# Patient Record
Sex: MALE | Race: WHITE | NOT HISPANIC OR LATINO | Employment: OTHER | URBAN - METROPOLITAN AREA
[De-identification: names, ages, dates, MRNs, and addresses within clinical notes are randomized per-mention and may not be internally consistent; named-entity substitution may affect disease eponyms.]

---

## 2019-06-10 ENCOUNTER — CONVERSION ENCOUNTER (OUTPATIENT)
Dept: SURGERY | Facility: CLINIC | Age: 69
End: 2019-06-10

## 2019-06-10 ENCOUNTER — OFFICE VISIT CONVERTED (OUTPATIENT)
Dept: SURGERY | Facility: CLINIC | Age: 69
End: 2019-06-10
Attending: SURGERY

## 2019-06-17 ENCOUNTER — HOSPITAL ENCOUNTER (OUTPATIENT)
Dept: GASTROENTEROLOGY | Facility: HOSPITAL | Age: 69
Setting detail: HOSPITAL OUTPATIENT SURGERY
Discharge: HOME | End: 2019-06-17
Attending: SURGERY

## 2019-07-08 ENCOUNTER — OFFICE VISIT CONVERTED (OUTPATIENT)
Dept: SURGERY | Facility: CLINIC | Age: 69
End: 2019-07-08
Attending: SURGERY

## 2019-09-20 ENCOUNTER — OFFICE VISIT CONVERTED (OUTPATIENT)
Dept: FAMILY MEDICINE CLINIC | Facility: CLINIC | Age: 69
End: 2019-09-20
Attending: FAMILY MEDICINE

## 2020-01-02 ENCOUNTER — OFFICE VISIT CONVERTED (OUTPATIENT)
Dept: FAMILY MEDICINE CLINIC | Facility: CLINIC | Age: 70
End: 2020-01-02
Attending: FAMILY MEDICINE

## 2020-01-02 ENCOUNTER — CONVERSION ENCOUNTER (OUTPATIENT)
Dept: FAMILY MEDICINE CLINIC | Facility: CLINIC | Age: 70
End: 2020-01-02

## 2020-01-22 ENCOUNTER — OFFICE VISIT CONVERTED (OUTPATIENT)
Dept: FAMILY MEDICINE CLINIC | Facility: CLINIC | Age: 70
End: 2020-01-22
Attending: FAMILY MEDICINE

## 2020-05-27 ENCOUNTER — OFFICE VISIT CONVERTED (OUTPATIENT)
Dept: FAMILY MEDICINE CLINIC | Facility: CLINIC | Age: 70
End: 2020-05-27
Attending: FAMILY MEDICINE

## 2020-05-29 ENCOUNTER — HOSPITAL ENCOUNTER (OUTPATIENT)
Dept: GENERAL RADIOLOGY | Facility: HOSPITAL | Age: 70
Discharge: HOME | End: 2020-05-29
Attending: FAMILY MEDICINE

## 2020-05-29 LAB
ALBUMIN SERPL-MCNC: 4.3 G/DL (ref 3.5–5)
ALBUMIN/GLOB SERPL: 1.4 {RATIO} (ref 1.4–2.6)
ALP SERPL-CCNC: 80 U/L (ref 56–155)
ALT SERPL-CCNC: 19 U/L (ref 10–40)
ANION GAP SERPL CALC-SCNC: 20 MMOL/L (ref 8–19)
AST SERPL-CCNC: 22 U/L (ref 15–50)
BASOPHILS # BLD AUTO: 0.05 10*3/UL (ref 0–0.2)
BASOPHILS NFR BLD AUTO: 0.5 % (ref 0–3)
BILIRUB SERPL-MCNC: 0.27 MG/DL (ref 0.2–1.3)
BUN SERPL-MCNC: 12 MG/DL (ref 5–25)
BUN/CREAT SERPL: 15 {RATIO} (ref 6–20)
CALCIUM SERPL-MCNC: 9.8 MG/DL (ref 8.7–10.4)
CHLORIDE SERPL-SCNC: 102 MMOL/L (ref 99–111)
CHOLEST SERPL-MCNC: 231 MG/DL (ref 107–200)
CHOLEST/HDLC SERPL: 4.9 {RATIO} (ref 3–6)
CONV ABS IMM GRAN: 0.04 10*3/UL (ref 0–0.2)
CONV CO2: 21 MMOL/L (ref 22–32)
CONV IMMATURE GRAN: 0.4 % (ref 0–1.8)
CONV TOTAL PROTEIN: 7.3 G/DL (ref 6.3–8.2)
CREAT UR-MCNC: 0.81 MG/DL (ref 0.7–1.2)
DEPRECATED RDW RBC AUTO: 44.9 FL (ref 35.1–43.9)
EOSINOPHIL # BLD AUTO: 0.32 10*3/UL (ref 0–0.7)
EOSINOPHIL # BLD AUTO: 3.3 % (ref 0–7)
ERYTHROCYTE [DISTWIDTH] IN BLOOD BY AUTOMATED COUNT: 13.3 % (ref 11.6–14.4)
GFR SERPLBLD BASED ON 1.73 SQ M-ARVRAT: >60 ML/MIN/{1.73_M2}
GLOBULIN UR ELPH-MCNC: 3 G/DL (ref 2–3.5)
GLUCOSE SERPL-MCNC: 119 MG/DL (ref 70–99)
HCT VFR BLD AUTO: 52.7 % (ref 42–52)
HDLC SERPL-MCNC: 47 MG/DL (ref 40–60)
HGB BLD-MCNC: 17.7 G/DL (ref 14–18)
LDLC SERPL CALC-MCNC: 167 MG/DL (ref 70–100)
LYMPHOCYTES # BLD AUTO: 2.77 10*3/UL (ref 1–5)
LYMPHOCYTES NFR BLD AUTO: 29 % (ref 20–45)
MCH RBC QN AUTO: 30.8 PG (ref 27–31)
MCHC RBC AUTO-ENTMCNC: 33.6 G/DL (ref 33–37)
MCV RBC AUTO: 91.8 FL (ref 80–96)
MONOCYTES # BLD AUTO: 0.71 10*3/UL (ref 0.2–1.2)
MONOCYTES NFR BLD AUTO: 7.4 % (ref 3–10)
NEUTROPHILS # BLD AUTO: 5.67 10*3/UL (ref 2–8)
NEUTROPHILS NFR BLD AUTO: 59.4 % (ref 30–85)
NRBC CBCN: 0 % (ref 0–0.7)
OSMOLALITY SERPL CALC.SUM OF ELEC: 289 MOSM/KG (ref 273–304)
PLATELET # BLD AUTO: 211 10*3/UL (ref 130–400)
PMV BLD AUTO: 11.8 FL (ref 9.4–12.4)
POTASSIUM SERPL-SCNC: 4.4 MMOL/L (ref 3.5–5.3)
PSA SERPL-MCNC: 8.16 NG/ML (ref 0–4)
RBC # BLD AUTO: 5.74 10*6/UL (ref 4.7–6.1)
SODIUM SERPL-SCNC: 139 MMOL/L (ref 135–147)
T4 FREE SERPL-MCNC: 1.1 NG/DL (ref 0.9–1.8)
TRIGL SERPL-MCNC: 84 MG/DL (ref 40–150)
TSH SERPL-ACNC: 2.55 M[IU]/L (ref 0.27–4.2)
VLDLC SERPL-MCNC: 17 MG/DL (ref 5–37)
WBC # BLD AUTO: 9.56 10*3/UL (ref 4.8–10.8)

## 2020-06-03 LAB
B MICROTI DNA BLD QL NAA+PROBE: NOT DETECTED
CONV ANAPLASMA PHAGOCYTOPHILUM PCR: NOT DETECTED
CONV BABESIA SPECIES PCR: NOT DETECTED
CONV EHRLICHIA EWINGII CANIS PCR: NOT DETECTED
CONV EHRLICHIA MURIS LIKE PCR: NOT DETECTED
E CHAFFEENSIS DNA BLD QL NAA+PROBE: NOT DETECTED

## 2020-06-08 ENCOUNTER — OFFICE VISIT CONVERTED (OUTPATIENT)
Dept: UROLOGY | Facility: CLINIC | Age: 70
End: 2020-06-08
Attending: NURSE PRACTITIONER

## 2020-06-29 ENCOUNTER — OFFICE VISIT CONVERTED (OUTPATIENT)
Dept: UROLOGY | Facility: CLINIC | Age: 70
End: 2020-06-29
Attending: UROLOGY

## 2020-07-06 ENCOUNTER — HOSPITAL ENCOUNTER (OUTPATIENT)
Dept: PREADMISSION TESTING | Facility: HOSPITAL | Age: 70
Discharge: HOME | End: 2020-07-06
Attending: UROLOGY

## 2020-07-07 LAB — SARS-COV-2 RNA SPEC QL NAA+PROBE: NOT DETECTED

## 2020-07-08 ENCOUNTER — HOSPITAL ENCOUNTER (OUTPATIENT)
Dept: PERIOP | Facility: HOSPITAL | Age: 70
Setting detail: HOSPITAL OUTPATIENT SURGERY
Discharge: HOME | End: 2020-07-08
Attending: UROLOGY

## 2020-07-08 LAB
ALBUMIN SERPL-MCNC: 4 G/DL (ref 3.5–5)
ALBUMIN/GLOB SERPL: 1.4 {RATIO} (ref 1.4–2.6)
ALP SERPL-CCNC: 85 U/L (ref 56–155)
ALT SERPL-CCNC: 14 U/L (ref 10–40)
ANION GAP SERPL CALC-SCNC: 15 MMOL/L (ref 8–19)
AST SERPL-CCNC: 15 U/L (ref 15–50)
BASOPHILS # BLD AUTO: 0.05 10*3/UL (ref 0–0.2)
BASOPHILS NFR BLD AUTO: 0.5 % (ref 0–3)
BILIRUB SERPL-MCNC: 0.18 MG/DL (ref 0.2–1.3)
BUN SERPL-MCNC: 17 MG/DL (ref 5–25)
BUN/CREAT SERPL: 21 {RATIO} (ref 6–20)
CALCIUM SERPL-MCNC: 9.5 MG/DL (ref 8.7–10.4)
CHLORIDE SERPL-SCNC: 104 MMOL/L (ref 99–111)
CONV ABS IMM GRAN: 0.06 10*3/UL (ref 0–0.2)
CONV CO2: 25 MMOL/L (ref 22–32)
CONV IMMATURE GRAN: 0.7 % (ref 0–1.8)
CONV TOTAL PROTEIN: 6.9 G/DL (ref 6.3–8.2)
CREAT UR-MCNC: 0.81 MG/DL (ref 0.7–1.2)
DEPRECATED RDW RBC AUTO: 44.4 FL (ref 35.1–43.9)
EOSINOPHIL # BLD AUTO: 0.39 10*3/UL (ref 0–0.7)
EOSINOPHIL # BLD AUTO: 4.2 % (ref 0–7)
ERYTHROCYTE [DISTWIDTH] IN BLOOD BY AUTOMATED COUNT: 13.2 % (ref 11.6–14.4)
GFR SERPLBLD BASED ON 1.73 SQ M-ARVRAT: >60 ML/MIN/{1.73_M2}
GLOBULIN UR ELPH-MCNC: 2.9 G/DL (ref 2–3.5)
GLUCOSE SERPL-MCNC: 110 MG/DL (ref 70–99)
HCT VFR BLD AUTO: 52 % (ref 42–52)
HGB BLD-MCNC: 17.6 G/DL (ref 14–18)
LYMPHOCYTES # BLD AUTO: 2.5 10*3/UL (ref 1–5)
LYMPHOCYTES NFR BLD AUTO: 27.1 % (ref 20–45)
MCH RBC QN AUTO: 30.9 PG (ref 27–31)
MCHC RBC AUTO-ENTMCNC: 33.8 G/DL (ref 33–37)
MCV RBC AUTO: 91.4 FL (ref 80–96)
MONOCYTES # BLD AUTO: 0.77 10*3/UL (ref 0.2–1.2)
MONOCYTES NFR BLD AUTO: 8.3 % (ref 3–10)
NEUTROPHILS # BLD AUTO: 5.46 10*3/UL (ref 2–8)
NEUTROPHILS NFR BLD AUTO: 59.2 % (ref 30–85)
NRBC CBCN: 0 % (ref 0–0.7)
OSMOLALITY SERPL CALC.SUM OF ELEC: 290 MOSM/KG (ref 273–304)
PLATELET # BLD AUTO: 187 10*3/UL (ref 130–400)
PMV BLD AUTO: 10.8 FL (ref 9.4–12.4)
POTASSIUM SERPL-SCNC: 4.5 MMOL/L (ref 3.5–5.3)
RBC # BLD AUTO: 5.69 10*6/UL (ref 4.7–6.1)
SODIUM SERPL-SCNC: 139 MMOL/L (ref 135–147)
WBC # BLD AUTO: 9.23 10*3/UL (ref 4.8–10.8)

## 2020-07-16 ENCOUNTER — OFFICE VISIT CONVERTED (OUTPATIENT)
Dept: UROLOGY | Facility: CLINIC | Age: 70
End: 2020-07-16
Attending: UROLOGY

## 2020-07-20 ENCOUNTER — OFFICE VISIT CONVERTED (OUTPATIENT)
Dept: UROLOGY | Facility: CLINIC | Age: 70
End: 2020-07-20
Attending: UROLOGY

## 2020-08-10 ENCOUNTER — HOSPITAL ENCOUNTER (OUTPATIENT)
Dept: PREADMISSION TESTING | Facility: HOSPITAL | Age: 70
Discharge: HOME | End: 2020-08-10
Attending: UROLOGY

## 2020-08-10 LAB
ANION GAP SERPL CALC-SCNC: 22 MMOL/L (ref 8–19)
APTT BLD: 26.3 S (ref 22.2–34.2)
BASOPHILS # BLD AUTO: 0.05 10*3/UL (ref 0–0.2)
BASOPHILS NFR BLD AUTO: 0.6 % (ref 0–3)
BUN SERPL-MCNC: 12 MG/DL (ref 5–25)
BUN/CREAT SERPL: 13 {RATIO} (ref 6–20)
CALCIUM SERPL-MCNC: 10.2 MG/DL (ref 8.7–10.4)
CHLORIDE SERPL-SCNC: 101 MMOL/L (ref 99–111)
CONV ABS IMM GRAN: 0.05 10*3/UL (ref 0–0.2)
CONV CO2: 21 MMOL/L (ref 22–32)
CONV IMMATURE GRAN: 0.6 % (ref 0–1.8)
CREAT UR-MCNC: 0.89 MG/DL (ref 0.7–1.2)
DEPRECATED RDW RBC AUTO: 43.8 FL (ref 35.1–43.9)
EOSINOPHIL # BLD AUTO: 0.16 10*3/UL (ref 0–0.7)
EOSINOPHIL # BLD AUTO: 1.8 % (ref 0–7)
ERYTHROCYTE [DISTWIDTH] IN BLOOD BY AUTOMATED COUNT: 13.2 % (ref 11.6–14.4)
GFR SERPLBLD BASED ON 1.73 SQ M-ARVRAT: >60 ML/MIN/{1.73_M2}
GLUCOSE SERPL-MCNC: 89 MG/DL (ref 70–99)
HCT VFR BLD AUTO: 52.9 % (ref 42–52)
HGB BLD-MCNC: 18.2 G/DL (ref 14–18)
INR PPP: 0.93 (ref 2–3)
LYMPHOCYTES # BLD AUTO: 2.6 10*3/UL (ref 1–5)
LYMPHOCYTES NFR BLD AUTO: 28.8 % (ref 20–45)
MCH RBC QN AUTO: 31 PG (ref 27–31)
MCHC RBC AUTO-ENTMCNC: 34.4 G/DL (ref 33–37)
MCV RBC AUTO: 90.1 FL (ref 80–96)
MONOCYTES # BLD AUTO: 0.66 10*3/UL (ref 0.2–1.2)
MONOCYTES NFR BLD AUTO: 7.3 % (ref 3–10)
NEUTROPHILS # BLD AUTO: 5.52 10*3/UL (ref 2–8)
NEUTROPHILS NFR BLD AUTO: 60.9 % (ref 30–85)
NRBC CBCN: 0 % (ref 0–0.7)
OSMOLALITY SERPL CALC.SUM OF ELEC: 289 MOSM/KG (ref 273–304)
PLATELET # BLD AUTO: 180 10*3/UL (ref 130–400)
PMV BLD AUTO: 10.8 FL (ref 9.4–12.4)
POTASSIUM SERPL-SCNC: 4.2 MMOL/L (ref 3.5–5.3)
PROTHROMBIN TIME: 10.2 S (ref 9.4–12)
RBC # BLD AUTO: 5.87 10*6/UL (ref 4.7–6.1)
SODIUM SERPL-SCNC: 140 MMOL/L (ref 135–147)
WBC # BLD AUTO: 9.04 10*3/UL (ref 4.8–10.8)

## 2020-08-12 LAB — BACTERIA UR CULT: NORMAL

## 2020-08-17 ENCOUNTER — HOSPITAL ENCOUNTER (OUTPATIENT)
Dept: PREADMISSION TESTING | Facility: HOSPITAL | Age: 70
Discharge: HOME | End: 2020-08-17
Attending: UROLOGY

## 2020-08-18 LAB — SARS-COV-2 RNA SPEC QL NAA+PROBE: NOT DETECTED

## 2020-08-20 ENCOUNTER — HOSPITAL ENCOUNTER (OUTPATIENT)
Dept: PERIOP | Facility: HOSPITAL | Age: 70
Setting detail: HOSPITAL OUTPATIENT SURGERY
Discharge: HOME | End: 2020-08-21
Attending: UROLOGY

## 2020-08-20 LAB
ABO GROUP BLD: NORMAL
BLD GP AB SCN SERPL QL: NORMAL
CONV ABD CONTROL: NORMAL
RH BLD: NORMAL

## 2020-08-21 LAB
ANION GAP SERPL CALC-SCNC: 14 MMOL/L (ref 8–19)
BASOPHILS # BLD AUTO: 0.02 10*3/UL (ref 0–0.2)
BASOPHILS NFR BLD AUTO: 0.2 % (ref 0–3)
BUN SERPL-MCNC: 12 MG/DL (ref 5–25)
BUN/CREAT SERPL: 13 {RATIO} (ref 6–20)
CALCIUM SERPL-MCNC: 8.8 MG/DL (ref 8.7–10.4)
CHLORIDE SERPL-SCNC: 102 MMOL/L (ref 99–111)
CONV ABS IMM GRAN: 0.05 10*3/UL (ref 0–0.2)
CONV CO2: 24 MMOL/L (ref 22–32)
CONV IMMATURE GRAN: 0.4 % (ref 0–1.8)
CREAT UR-MCNC: 0.89 MG/DL (ref 0.7–1.2)
DEPRECATED RDW RBC AUTO: 45.7 FL (ref 35.1–43.9)
EOSINOPHIL # BLD AUTO: 0.01 10*3/UL (ref 0–0.7)
EOSINOPHIL # BLD AUTO: 0.1 % (ref 0–7)
ERYTHROCYTE [DISTWIDTH] IN BLOOD BY AUTOMATED COUNT: 13.4 % (ref 11.6–14.4)
GFR SERPLBLD BASED ON 1.73 SQ M-ARVRAT: >60 ML/MIN/{1.73_M2}
GLUCOSE SERPL-MCNC: 108 MG/DL (ref 70–99)
HCT VFR BLD AUTO: 43.7 % (ref 42–52)
HGB BLD-MCNC: 14.7 G/DL (ref 14–18)
LYMPHOCYTES # BLD AUTO: 1.79 10*3/UL (ref 1–5)
LYMPHOCYTES NFR BLD AUTO: 15.3 % (ref 20–45)
MCH RBC QN AUTO: 30.8 PG (ref 27–31)
MCHC RBC AUTO-ENTMCNC: 33.6 G/DL (ref 33–37)
MCV RBC AUTO: 91.6 FL (ref 80–96)
MONOCYTES # BLD AUTO: 1.04 10*3/UL (ref 0.2–1.2)
MONOCYTES NFR BLD AUTO: 8.9 % (ref 3–10)
NEUTROPHILS # BLD AUTO: 8.79 10*3/UL (ref 2–8)
NEUTROPHILS NFR BLD AUTO: 75.1 % (ref 30–85)
NRBC CBCN: 0 % (ref 0–0.7)
OSMOLALITY SERPL CALC.SUM OF ELEC: 282 MOSM/KG (ref 273–304)
PLATELET # BLD AUTO: 163 10*3/UL (ref 130–400)
PMV BLD AUTO: 10.3 FL (ref 9.4–12.4)
POTASSIUM SERPL-SCNC: 4.1 MMOL/L (ref 3.5–5.3)
RBC # BLD AUTO: 4.77 10*6/UL (ref 4.7–6.1)
SODIUM SERPL-SCNC: 136 MMOL/L (ref 135–147)
WBC # BLD AUTO: 11.7 10*3/UL (ref 4.8–10.8)

## 2020-08-28 ENCOUNTER — OFFICE VISIT CONVERTED (OUTPATIENT)
Dept: UROLOGY | Facility: CLINIC | Age: 70
End: 2020-08-28
Attending: UROLOGY

## 2020-10-28 ENCOUNTER — HOSPITAL ENCOUNTER (OUTPATIENT)
Dept: GENERAL RADIOLOGY | Facility: HOSPITAL | Age: 70
Discharge: HOME | End: 2020-10-28
Attending: UROLOGY

## 2020-10-28 LAB — PSA SERPL-MCNC: <0.01 NG/ML (ref 0–4)

## 2020-11-09 ENCOUNTER — TELEPHONE CONVERTED (OUTPATIENT)
Dept: UROLOGY | Facility: CLINIC | Age: 70
End: 2020-11-09
Attending: UROLOGY

## 2021-03-08 ENCOUNTER — HOSPITAL ENCOUNTER (OUTPATIENT)
Dept: GENERAL RADIOLOGY | Facility: HOSPITAL | Age: 71
Discharge: HOME | End: 2021-03-08
Attending: UROLOGY

## 2021-03-08 LAB — PSA SERPL-MCNC: <0.01 NG/ML (ref 0–4)

## 2021-03-15 ENCOUNTER — TELEPHONE CONVERTED (OUTPATIENT)
Dept: UROLOGY | Facility: CLINIC | Age: 71
End: 2021-03-15
Attending: UROLOGY

## 2021-03-19 ENCOUNTER — OFFICE VISIT CONVERTED (OUTPATIENT)
Dept: FAMILY MEDICINE CLINIC | Facility: CLINIC | Age: 71
End: 2021-03-19
Attending: FAMILY MEDICINE

## 2021-03-19 ENCOUNTER — HOSPITAL ENCOUNTER (OUTPATIENT)
Dept: GENERAL RADIOLOGY | Facility: HOSPITAL | Age: 71
Discharge: HOME | End: 2021-03-19
Attending: FAMILY MEDICINE

## 2021-03-19 LAB
ALBUMIN SERPL-MCNC: 4.4 G/DL (ref 3.5–5)
ALBUMIN/GLOB SERPL: 1.3 {RATIO} (ref 1.4–2.6)
ALP SERPL-CCNC: 81 U/L (ref 56–155)
ALT SERPL-CCNC: 18 U/L (ref 10–40)
ANION GAP SERPL CALC-SCNC: 19 MMOL/L (ref 8–19)
AST SERPL-CCNC: 21 U/L (ref 15–50)
BASOPHILS # BLD AUTO: 0.06 10*3/UL (ref 0–0.2)
BASOPHILS NFR BLD AUTO: 0.8 % (ref 0–3)
BILIRUB SERPL-MCNC: 0.32 MG/DL (ref 0.2–1.3)
BUN SERPL-MCNC: 11 MG/DL (ref 5–25)
BUN/CREAT SERPL: 14 {RATIO} (ref 6–20)
CALCIUM SERPL-MCNC: 10.1 MG/DL (ref 8.7–10.4)
CHLORIDE SERPL-SCNC: 101 MMOL/L (ref 99–111)
CHOLEST SERPL-MCNC: 218 MG/DL (ref 107–200)
CHOLEST/HDLC SERPL: 4 {RATIO} (ref 3–6)
CONV ABS IMM GRAN: 0.02 10*3/UL (ref 0–0.2)
CONV CO2: 25 MMOL/L (ref 22–32)
CONV IMMATURE GRAN: 0.3 % (ref 0–1.8)
CONV TOTAL PROTEIN: 7.7 G/DL (ref 6.3–8.2)
CREAT UR-MCNC: 0.79 MG/DL (ref 0.7–1.2)
DEPRECATED RDW RBC AUTO: 44.4 FL (ref 35.1–43.9)
EOSINOPHIL # BLD AUTO: 0.09 10*3/UL (ref 0–0.7)
EOSINOPHIL # BLD AUTO: 1.2 % (ref 0–7)
ERYTHROCYTE [DISTWIDTH] IN BLOOD BY AUTOMATED COUNT: 14 % (ref 11.6–14.4)
GFR SERPLBLD BASED ON 1.73 SQ M-ARVRAT: >60 ML/MIN/{1.73_M2}
GLOBULIN UR ELPH-MCNC: 3.3 G/DL (ref 2–3.5)
GLUCOSE SERPL-MCNC: 106 MG/DL (ref 70–99)
HCT VFR BLD AUTO: 54.7 % (ref 42–52)
HDLC SERPL-MCNC: 55 MG/DL (ref 40–60)
HGB BLD-MCNC: 18.5 G/DL (ref 14–18)
LDLC SERPL CALC-MCNC: 150 MG/DL (ref 70–100)
LYMPHOCYTES # BLD AUTO: 1.98 10*3/UL (ref 1–5)
LYMPHOCYTES NFR BLD AUTO: 27.2 % (ref 20–45)
MCH RBC QN AUTO: 30.3 PG (ref 27–31)
MCHC RBC AUTO-ENTMCNC: 33.8 G/DL (ref 33–37)
MCV RBC AUTO: 89.5 FL (ref 80–96)
MONOCYTES # BLD AUTO: 0.5 10*3/UL (ref 0.2–1.2)
MONOCYTES NFR BLD AUTO: 6.9 % (ref 3–10)
NEUTROPHILS # BLD AUTO: 4.63 10*3/UL (ref 2–8)
NEUTROPHILS NFR BLD AUTO: 63.6 % (ref 30–85)
NRBC CBCN: 0 % (ref 0–0.7)
OSMOLALITY SERPL CALC.SUM OF ELEC: 290 MOSM/KG (ref 273–304)
PLATELET # BLD AUTO: 207 10*3/UL (ref 130–400)
PMV BLD AUTO: 11.3 FL (ref 9.4–12.4)
POTASSIUM SERPL-SCNC: 4.8 MMOL/L (ref 3.5–5.3)
RBC # BLD AUTO: 6.11 10*6/UL (ref 4.7–6.1)
SODIUM SERPL-SCNC: 140 MMOL/L (ref 135–147)
TRIGL SERPL-MCNC: 64 MG/DL (ref 40–150)
TSH SERPL-ACNC: 1.28 M[IU]/L (ref 0.27–4.2)
VLDLC SERPL-MCNC: 13 MG/DL (ref 5–37)
WBC # BLD AUTO: 7.28 10*3/UL (ref 4.8–10.8)

## 2021-03-24 ENCOUNTER — OFFICE VISIT CONVERTED (OUTPATIENT)
Dept: FAMILY MEDICINE CLINIC | Facility: CLINIC | Age: 71
End: 2021-03-24
Attending: NURSE PRACTITIONER

## 2021-03-24 ENCOUNTER — CONVERSION ENCOUNTER (OUTPATIENT)
Dept: FAMILY MEDICINE CLINIC | Facility: CLINIC | Age: 71
End: 2021-03-24

## 2021-05-10 NOTE — H&P
History and Physical      Patient Name: Colin Nevarez   Patient ID: [id number]   Sex: Male   YOB: 1950    Primary Care Provider: Liz Roper DO   Referring Provider: Liz Roper DO    Visit Date: June 8, 2020    Provider: CHEN Mauro   Location: [facility]   Location Address: [address] [place] [address], [address] [place] [address]  [id number]   Location Phone: [phone]          Chief Complaint  · Elevated PSA      History Of Present Illness  The patient is a 70 year old /White male, who presents on referral from Liz Roper DO, for an urological evaluation for an elevated PSA. The PSA was 8.16 on 5/29/2020 . Patient was seen in the past per Dr Clifton 1-12-17 and noted not issue with patient's age at that time. Noted psa of 4.5 which was collected per DR. Garner 7-9-15. There was not a psa documented as being drawn at visit it Dr Clifton so unsure what was his psa in 2017. Patient unsure of father history, passed from CVA at age 40. Patient does have extensive history of cancer in his family. Sister and Mother had Melanoma. Denies any voiding complaints. No dysuria or gross hematuria. States no problems with voiding or flow and sleeps through the night.      AUA 4/35       Past Medical History  Arthritis; Chronic Obstructive Pulmonary Disease; Forgetfulness; Heart flutter, ventricular; High cholesterol; Ringing in ear; Shortness Of Air; Shortness of breath         Past Surgical History  Appendectomy; Colonoscopy; Tonsilectomy         Medication List  aspirin 81 mg oral tablet,delayed release (DR/EC); Men's Multivitamin 400- mcg oral tablet         Allergy List  NO KNOWN DRUG ALLERGIES         Family Medical History  Stroke; Cancer, Unspecified; Family history of breast cancer         Social History  Alcohol (Never); Caffeine (Never); Second hand smoke exposure (Current some day); Tobacco (Current every day)         Immunizations  Name Date Admin   Influenza    Influenza   "  [id number]    Prevnar 13          Review of Systems  · Constitutional  o Denies  o : fever, headache, chills  · Eyes  o Denies  o : eye pain, double vision, blurred vision  · HENT  o Denies  o : sinus problems, sore throat, ear infection  · Cardiovascular  o Denies  o : chest pain, high blood pressure, varicosities  · Respiratory  o Admits  o : shortness of breath, wheezing, frequent cough  · Gastrointestinal  o Denies  o : nausea, vomiting, heartburn, indigestion, abdominal pain  · Genitourinary  o Admits  o : urgency, decreased stream  o Denies  o : frequency, nocturia, hematuria, urinary retention, painful urination  · Integument  o Denies  o : rash, itching, boils  · Neurologic  o Denies  o : tingling or numbness, tremors, dizzy spells  · Musculoskeletal  o Admits  o : joint pain  o Denies  o : neck pain, back pain  · Endocrine  o Denies  o : cold intolerance, heat intolerance, tired, excessive thirst, sluggish  · Psychiatric  o Admits  o : feels satisfied with life  o Denies  o : severe depression, concerns with hurting themselves  · Heme-Lymph  o Denies  o : swollen glands, blood clotting problems  · Allergic-Immunologic  o Denies  o : sinus allergy symptoms, hay fever      Vitals  Date Time BP Position Site L\R Cuff Size HR RR TEMP (F) WT  HT  BMI kg/m2 BSA m2 O2 Sat        06/08/2020 01:46 /94 Sitting    96 - R  98 160lbs 0oz 5'  10\" 22.96 1.89           Physical Examination  · Constitutional  o Appearance  o : Well nourished, well developed patient in no acute distress. Ambulating without difficulty.  · Respiratory  o Respiratory Effort  o : Breathing is unlabored without accessory muscle use  o Inspection of Chest  o : normal appearance, no retractions  o Auscultation of Lungs  o : diminished with faint rhonchi right lower lobe  · Cardiovascular  o Heart  o :   § Auscultation of Heart  § : regular rate and rhythm, no murmurs, gallops or rubs  · Gastrointestinal  o Abdominal Examination  o : " Scaphoid abdomen which is non-tender to palpation with normal tone and without rigidity or guarding. Normal bowel sounds. No masses present.  o Liver and spleen  o : No hepatomegaly present. Liver is non-tender to palpation and spleen is not palpable.  o Hernias  o : small left inguinal hernia  · Genitourinary  o Bladder  o : no abnormalities  o Penis  o : Normal appearance without lesion, discharge or masses.  o Urethral Meatus  o : no abnormalities  o Scrotum and Scrotal Contents  o :   § Scrotum  § : no abnormalities  § Epididymides  § : no abnormalities except left spermatocele  § Testes  § : no abnormalities  o Digital Rectal Examination  o :   § Prostate  § : enlarged prostate present approximately 30g firmness right base region  · Lymphatic  o Groin  o : No lymphadenopathy present  · Skin and Subcutaneous Tissue  o General Inspection  o : No rashes, lesions or areas of discoloration present. Skin turgor is normal.  o General Palpation  o : No abnormalities, masses or tenderness on palpation.  · Neurologic  o Mental Status Examination  o : grossly oriented to person, place and time  o Gait and Station  o : normal gait, able to stand without difficulty  · Psychiatric  o Mood and Affect  o : mood normal, affect appropriate      Figure 1.0: Pain Rating Scale-[place]         Results  · In-Office Procedures  o Lab procedure  § Automated dipstick urinalysis with microscopy (82749)   § Color Ur: Yellow   § Clarity Ur: Clear   § Glucose Ur Ql Strip: Negative   § Bilirub Ur Ql Strip: Negative   § Ketones Ur Ql Strip: Negative   § Sp Gr Ur Qn: 1.005   § Hgb Ur Ql Strip: Negative   § pH Ur-LsCnc: 6.0   § Prot Ur Ql Strip: Negative   § Urobilinogen Ur Strip-mCnc: 0.2 E.U./dL   § Nitrite Ur Ql Strip: Negative   § WBC Est Ur Ql Strip: Negative   § RBC UrnS Qn HPF: 0   § WBC UrnS Qn HPF: 0   § Bacteria UrnS Qn HPF: 0   § Crystals UrnS Qn HPF: 0   § Epithelial Cells (non renal): 0 /HPF  § Epithelial Cells (renal): 0        Assessment  · Chronic Obstructive Pulmonary Disease     491.21  · Elevated PSA     790.93/R97.20  · Enlarged prostate on rectal examination     600.00/N40.0  · Prostate nodule     600.10/N40.2  · Hypertension     401.9/I10  · Inguinal hernia, left     550.90/K40.90  · Spermatocele of epididymis     608.1/N43.40      Plan  · Orders  o MRI prostate wo then w contrast (83405) - 790.93/R97.20, 600.00/N40.0, 600.10/N40.2 - 06/08/2020  o BUN (92184) - 790.93/R97.20, 600.10/N40.2 - 06/08/2020  o Creatinine blood (37796) - 790.93/R97.20, 600.10/N40.2 - 06/08/2020  · Medications  o Medications have been Reconciled  o Transition of Care or Provider Policy     patient has elevated psa since 2015 according to available records. will schedule mri prostate to evaluate then possible us and bx of prostate. Denies any metal or implanted devices. follow up after mri with Dr Giles when returns from medical and discuss    very small left inguinal hernia and left spermatocele treat conservatively since not problematic or painful             Electronically Signed by: CHEN Mauro -Author on June 8, 2020 02:56:30 PM

## 2021-05-13 NOTE — PROGRESS NOTES
"   Progress Note      Patient Name: Colin Nevarez   Patient ID: [id number]   Sex: Male   YOB: 1950    Primary Care Provider: Liz Roper DO   Referring Provider: Liz Roper DO    Visit Date: 2020    Provider: Florin Rodriguez MD   Location: [facility]   Location Address: [address] [place] [address]  [id number]   Location Phone: [phone]          Chief Complaint  · pt here for urologic issues      History Of Present Illness     61    Prostate CA    2020 MRI fusion prostate biopsy  Region of interest3+3, multiple cores, 22%  Right base3+3, 2/2, 17%  Right apex3+3, 2/2, 77%  Right mid3+4, 2/2, 46%  Left base, left apex, left midnegative  Region of interest #23 +3, 2/2, 26%     MRI [id number] g17 x 4 mm, PIRADS 5.  Right lateral peripheral zone at the apex to mid gland    8.16\">70-year-old gentleman recently diagnosed with prostatic adenocarcinoma Dr. Giles clinical T2, pt with ruptured open appy many yrs ago.    Good stream.  No trouble with initiation or intermittency of stream. Nocturia X  0.  No urgency or frequency.  No incontinence.  No prostate meds    Not worried about erections.    no gross hematuria, dysuria or recurrent urinary tract infections.  No history of kidney or bladder stone.    Patient had a ruptured appendix in .  Still has a large midline incision.    No urologic family history    No CAD, No COPD.  smokes 1 ppd.  ASA 82  Mom dies of CA at 50.  Dad  at 60 of CVA.      0.81, GFR >60    Prostate CA    2020 MRI fusion prostate biopsy  Region of interest3+3, multiple cores, 22%  Right base3+3, 2/2, 17%  Right apex3+3, 2/2, 77%  Right mid3+4, 2/2, 46%  Left base, left apex, left midnegative  Region of interest #23 +3, 2/2, 26%     MRI [id number] g17 x 4 mm, PIRADS 5.  Right lateral peripheral zone at the apex to mid gland    8.16       Past Medical History  Arthritis; Chronic Obstructive Pulmonary Disease; Forgetfulness; Heart " "flutter, ventricular; High cholesterol; Ringing in ear; Shortness Of Air; Shortness of breath         Past Surgical History  Appendectomy; Colonoscopy; Tonsilectomy         Medication List  aspirin 81 mg oral tablet,delayed release (DR/EC); Men's Multivitamin 400- mcg oral tablet         Allergy List  NO KNOWN DRUG ALLERGIES         Family Medical History  Stroke; Cancer, Unspecified; Family history of breast cancer         Social History  Alcohol (Never); Caffeine (Never); Second hand smoke exposure (Current some day); Tobacco (Current every day)         Immunizations  Name Date Admin   Influenza    Influenza    [id number]    Prevnar 13          Review of Systems  · Constitutional  o Denies  o : chills, fever  · Gastrointestinal  o Denies  o : nausea, vomiting      Vitals  Date Time BP Position Site L\R Cuff Size HR RR TEMP (F) WT  HT  BMI kg/m2 BSA m2 O2 Sat        07/20/2020 09:23 AM       17  159lbs 0oz 5'  10\" 22.81 1.89           Physical Examination  · Constitutional  o Appearance  o : Well-appearing, well-developed, in no acute distress  · Respiratory  o Respiratory Effort  o : Unlabored breathing  o Auscultation of Lungs  o : Unlabored breathing, clear to auscultation bilaterally  · Cardiovascular  o Heart  o :   § Auscultation of Heart  § : Regular rate and rhythm, no murmurs  · Gastrointestinal  o Abdominal Examination  o : Nontender, nondistended, no rigidity or guarding, no hepatosplenomegaly  · Neurologic  o Mental Status Examination  o :   § Orientation  § : Grossly oriented to person, place and time, judgment and insight intact, normal mood and affect       6 cm midline incision going down from below the umbilicus to the pelvis.               Assessment  · Prostate cancer     185/C61    Problems Reconciled  Plan  · Medications  o Medications have been Reconciled  o Transition of Care or Provider Policy  · Instructions  o Electronically Identified Patient Education Materials Provided " Electronically       Patient with ruptured appendectomy several years ago.  Patient wanting prostatectomy I did discuss if I cannot get prostatectomy done safely because of adhesions we would stop the procedure and send him for radiation.  Patient voiced understanding.  I may also consider extraperitoneal approach at the time of surgery    Today in clinic the patient was counseled on the risk and benefits of laparoscopic robotic prostatectomy.  All risk and benefits were discussed including but not limited to the risk of bleeding, infection, damage to adjacent structures, anesthetic complications and all other complications up to and including death.     We also discussed the alternatives of laparoscopic robotic prostatectomy including the risk and benefits of each.  This included but was not limited to brachy therapy, external beam radiation therapy, open prostatectomy, active surveillance and also watchful waiting.     We also discussed today in clinic the side effects of surgery including erectile dysfunction and urinary incontinence.  The patient understands that his erections will not be as good as they are now after surgery.  We also discussed he may get no erections after surgery.  We also discussed that the patient will leak urine after surgery and this gets better over time usually taking a year to see a new baseline continence. The treatments of these were also discussed.  Patient understands these risks and acknowledges understanding.     We also discussed radiation therapy and encouraged the patient to seek an opinion a radiation oncologist.  We discussed the different radiation modalities including IMRTand brachytherapy.  We discussed a 10 year outcomes from radiation and surgery appears similar.  We discussed the risk of radiation including erectile dysfunction, radiation cystitis, proctitis and secondary malignancies.     We also discussed alternative therapies including HIFU and cryotherapy.  We  discussed these or not currently NCCN guidelines and are not considered standard of care currently.     Patient has decided on prostatectomy    Greater than 50 minutes was used in counseling and coordination of care, with greater than 51% of this in face-to-face counseling                  Electronically Signed by: Florin Rodriguez MD -Author on July 20, 2020 03:45:28 PM

## 2021-05-13 NOTE — PROGRESS NOTES
"   Progress Note      Patient Name: Colin Nevarez   Patient ID: [id number]   Sex: Male   YOB: 1950    Primary Care Provider: Liz Roper DO   Referring Provider: Liz Roper DO    Visit Date: 2020    Provider: Florin Rodriguez MD   Location: [facility]   Location Address: [address] [place] [address]  [id number]   Location Phone: [phone]          Chief Complaint  · pt here for urologic issues      History Of Present Illness     61    Prostate CA      RALP w LND  3+4, 4+3 5 to 10%, tertiary pattern 5 5% overall Venetie score 7, margins involved, right apical, right anterior, right posterior, 7 lymph nodes negative    pT2 N0 R1    2020 MRI fusion prostate biopsy  Region of interest3+3, multiple cores, 22%  Right base3+3, 2/2, 17%  Right apex3+3, 2/2, 77%  Right mid3+4, 2/2, 46%  Left base, left apex, left midnegative  Region of interest #23 +3, 2/2, 26%     MRI [id number] g17 x 4 mm, PIRADS 5.  Right lateral peripheral zone at the apex to mid gland    8.16\">70-year-old gentleman status post  RALPw LND  20 , pT2 N0 R1    He has been doing well, catheter is draining without issue.  No pain meds  No GH    Not worried about erections.    Previous    No history of kidney stone.    Patient had a ruptured appendix in .  Still has a large midline incision.    No urologic family history    No CAD, No COPD.  smokes 1 ppd.  ASA 82  Mom dies of CA at 50.  Dad  at 60 of CVA.      0.81, GFR >60    Prostate CA      RALP w LND  3+4, 4+3 5 to 10%, tertiary pattern 5 5% overall Venetie score 7, margins involved, right apical, right anterior, right posterior, 7 lymph nodes negative    pT2 N0 R1    2020 MRI fusion prostate biopsy  Region of interest3+3, multiple cores, 22%  Right base3+3, 2/2, 17%  Right apex3+3, 2/2, 77%  Right mid3+4, 2/2, 46%  Left base, left apex, left midnegative  Region of interest #23 +3, 2/2, 26%     MRI [id number] g17 x 4 mm, " "PIRADS 5.  Right lateral peripheral zone at the apex to mid gland  5/20  8.16       Past Medical History  Arthritis; Chronic Obstructive Pulmonary Disease; Forgetfulness; Heart flutter, ventricular; High cholesterol; Ringing in ear; Shortness Of Air; Shortness of breath         Past Surgical History  Appendectomy; Colonoscopy; Radical Prostatectomy; Tonsilectomy         Medication List  aspirin 81 mg oral tablet,delayed release (DR/EC); Men's Multivitamin 400- mcg oral tablet         Allergy List  NO KNOWN DRUG ALLERGIES         Family Medical History  Stroke; Cancer, Unspecified; Family history of breast cancer         Social History  Alcohol (Never); Caffeine (Never); Second hand smoke exposure (Current some day); Tobacco (Current every day)         Immunizations  Name Date Admin   Influenza    Influenza    [id number]    Prevnar 13          Review of Systems  · Constitutional  o Denies  o : chills  · Respiratory  o Denies  o : cough      Vitals  Date Time BP Position Site L\R Cuff Size HR RR TEMP (F) WT  HT  BMI kg/m2 BSA m2 O2 Sat        08/28/2020 08:58 AM       15  159lbs 0oz 5'  10\" 22.81 1.89           Physical Examination  · Constitutional  o Appearance  o : Well-appearing, well-developed, in no acute distress  · Respiratory  o Respiratory Effort  o : Unlabored breathing  · Gastrointestinal  o Abdominal Examination  o : Nontender, nondistended, no rigidity or guarding, no hepatosplenomegaly  · Neurologic  o Mental Status Examination  o :   § Orientation  § : Grossly oriented to person, place and time, judgment and insight intact, normal mood and affect     Incisions are clean/dry and intact               Assessment  · Prostate cancer     185/C61    Problems Reconciled  Plan  · Orders  o PSA ultrasensitive DIAGNOSTIC [facility] (78328) - 185/C61 - 10/28/2020  · Medications  o Medications have been Reconciled  o Transition of Care or Provider Policy  · Instructions  o Electronically Identified Patient " Education Materials Provided Electronically       Pathology discussed today, patient with a positive margin we discussed implications of this    Catheter removed today    Follow-up in 2 months with PSA before'    Greater than 15 minutes was used in counseling and coordination of care, with greater than 51% of this in face-to-face counseling             Electronically Signed by: Florin Rodriguez MD -Author on August 28, 2020 12:33:28 PM

## 2021-05-13 NOTE — PROGRESS NOTES
Progress Note      Patient Name: Colin Nevarez   Patient ID: [id number]   Sex: Male   YOB: 1950    Primary Care Provider: Liz Roper DO   Referring Provider: Liz Roper DO    Visit Date: May 27, 2020    Provider: Liz Roper DO   Location: [facility]   Location Address: [address] [place] [address] [address]  [place], [place]  [id number]   Location Phone: [phone]          Chief Complaint  · Follow-up to discuss CT results      History Of Present Illness  Colin Nevarez is a 70 year old /White male who presents for evaluation and treatment of:      He is here today for management of his chronic medical conditions. He has a PMH significant for tobacco abuse, COPD and chronic back pain. He is a smoker. He has smoked since he was 7 years old. He smokes a little less than one pack per day. He uses a Trelagy inhaler only occasionally. He has an albuterol neb machine at home he only occasionally uses. He says that his breathing is stable. He does not drink EtOH. He has chronic back pain that he only uses OTC medications for.     He is complaining today for feeling fatigued. He has removed several ticks since his last appointment.     He says that his breathing is stable.    He recently had a CT chest that was revealing for stable nodules most like granulomas.    He has no other complaints and denies CP, SOB, weakness, numbness, N/V/D, dizziness or syncope.       Past Medical History  Disease Name Date Onset Notes   Arthritis --  --    Chronic Obstructive Pulmonary Disease --  --    Forgetfulness --  --    Heart flutter, ventricular --  --    High cholesterol --  --    Ringing in ear --  --    Shortness Of Air --  --    Shortness of breath --  --          Past Surgical History  Procedure Name Date Notes   Appendectomy --  --    Colonoscopy 06/01/19 --    Tonsilectomy 1969 per pt         Medication List  Name Date Started Instructions   aspirin 81 mg oral tablet,delayed release  "(/EC)  take 1 tablet (81 mg) by oral route once daily   Men's Multivitamin 400- mcg oral tablet  take 1 tablet by oral route daily         Allergy List  Allergen Name Date Reaction Notes   NO KNOWN DRUG ALLERGIES --  --  --          Family Medical History  Disease Name Relative/Age Notes   Stroke Father/   --    Cancer, Unspecified Mother/50s  Sister/40s   --    Family history of breast cancer Grandmother (maternal)/  Grandmother (paternal)/  Mother/50s  Sister/40s   Mother/50s; Sister/40s; Grandmother (maternal); Grandmother (paternal)         Social History  Finding Status Start/Stop Quantity Notes   Alcohol Never --/-- --  drinks no   Caffeine Never --/-- --  drinks no   Second hand smoke exposure Current some day --/-- --  yes   Tobacco Current every day 8/-- less than 1ppd current everyday smoker; started smoking at age 9; smoked 20 cigarette(s) per day         Immunizations  NameDate Admin Mfg Trade Name Lot Number Route Inj VIS Given VIS Publication   Uffgnocfb04/23/2019 [facility] Fluarix, quadrivalent, preservative free [id number]  LD 10/23/2019    Comments: pt tolerated well NDC# [id number]   Mdsjjorqw77/01/2018 [facility] Fluarix, quadrivalent, preservative free [id number] NE NE 10/01/2018    Comments:    Okipwmuoh1843/04/2017 MSD PNEUMOVAX 23 [id number] [facility] 09/20/2019    Comments: patient had both pneumona injections   Prevnar 1309/03/2018 PFR PREVNAR 13  [facility] 09/20/2019    Comments:          Review of Systems  · Constitutional  o * See HPI  · Cardiovascular  o * See HPI  · Respiratory  o * See HPI  · Gastrointestinal  o * See HPI      Vitals  Date Time BP Position Site L\R Cuff Size HR RR TEMP (F) WT  HT  BMI kg/m2 BSA m2 O2 Sat HC       05/27/2020 09:24 /87 Sitting    74 - R 20 98.4 160lbs 3oz 5'  8\" 24.36 1.87 96 %    05/27/2020 09:24 /85 Sitting                     Physical Examination  · Constitutional  o Appearance  o : well-nourished, well developed, alert, NAD  · Head and Face  o Head  o : "   § Inspection  § : atraumatic, normocephalic  · Eyes  o Eyes  o : extraocular movements intact, no scleral icterus, no conjunctival injection  · Ears, Nose, Mouth and Throat  o Nose  o :   § Intranasal Exam  § : nares patent  o Oral Cavity  o :   § Oral Mucosa  § : moist mucous membranes  · Respiratory  o Respiratory  o : decreased breath sound throughout, no rhonchi or wheezing appreciated.  · Cardiovascular  o Heart  o :   § Auscultation of Heart  § : regular rate and rhythm, no murmurs, rubs, or gallops  o Peripheral Vascular System  o :   § Extremities  § : no edema  · Skin and Subcutaneous Tissue  o General Inspection  o : no rashes present, no lesions present, no areas of discoloration, skin turgor normal  · Neurologic  o Cranial Nerves  o : crainial nerves 2-12 grossly intact  o Gait and Station  o :   § Gait Screening  § : normal gait  · Psychiatric  o General  o : normal mood and affect      Figure 1.0: Pain Rating Scale-[place]             Assessment  · COPD (chronic obstructive pulmonary disease)     496/J44.9  · Fatigue     780.79/R53.83  · Hyperlipidemia     272.4/E78.5  · Tobacco abuse counseling       Tobacco abuse counseling     V65.42/Z71.6  · Need for hepatitis C screening test     V73.89/Z11.59  · Screening for lipid disorders     V77.91/Z13.220      Plan  · Orders  o ACO-17: Screened for tobacco use AND received tobacco cessation intervention (4004F) - V65.42/Z71.6 - 05/27/2020  o Free T4 (05924) - 780.79/R53.83 - 05/27/2020  o Male Physical Primary Care Panel (CMP, CBC, TSH, Lipid, PSA) [facility] (25146, 74835, 36978, 70213, 12832, ) - 780.79/R53.83, 272.4/E78.5 - 05/27/2020  o ACO-39: Current medications updated and reviewed () - - 05/27/2020  o ACO-13: Fall Risk Screening with no falls in past year or only one fall without injury in the past year (1101F) - - 05/27/2020  o Tick borne disease panel (06793, 15455, 98646) - 780.79/R53.83 - 05/27/2020  · Medications  o Medications have been  Reconciled  o Transition of Care or Provider Policy  · Instructions  o Advised that cheeses and other sources of dairy fats, animal fats, fast food, and the extras (candy, pastries, pies, doughnuts and cookies) all contain LDL raising nutrients. Advised to increase fruits, vegetables, whole grains, and to monitor portion sizes.   o Tobacco and smoking cessation counseling for more than 3 minutes was completed.  o Medicare suggests a once in a lifetime screening for Hepatitis C for all Medicare beneficiaries born between 2763-9521.  o Patient was educated/instructed on their diagnosis, treatment and medications prior to discharge from the clinic today.  · Disposition  o Follow up in 6 months     1. Fatigue with recent tick bite: He was given an order today for a tick panel.   2.  Tobacco abuse: Tobacco cessation was highly encouraged today's visit.  3.  COPD: The patient was given 4 Anoro inhalers to use as directed. He has a neb machine at home and he was told to use as directed with duo nebs.     Follow-up in 6 months.             Electronically Signed by: Liz Roper DO -Author on June 5, 2020 10:43:45 PM

## 2021-05-13 NOTE — PROGRESS NOTES
Progress Note      Patient Name: Colin Nevarez   Patient ID: [id number]   Sex: Male   YOB: 1950    Primary Care Provider: Liz Roper DO   Referring Provider: Liz Roper DO    Visit Date: July 16, 2020    Provider: Domi Giles MD   Location: [facility]   Location Address: [address] [place] [address], [address]  [place], [place]  [id number]   Location Phone: [phone]          Chief Complaint  · follow up      History Of Present Illness  The patient is a 70 year old /White male , who presents to follow up on cystoscopy ,prostate biopsy done 7/8/20.          cbc,cmp 7/8/20  psa 5/29/20 8.16.  Patient is doing fine post biopsy office prostate.  All the biopsies on the right side of prostate are positive for prostate cancer.  Majority of them are [place] grade 3+3 and 1 of them is 3+4.  Without much amount of prostate cancer, I would recommend radical prostatectomy.  Patient also has a median lobe which might make it harder to have it frozen if we do cryo ablation of prostate which is another avenue we can go by.       Past Medical History  Arthritis; Chronic Obstructive Pulmonary Disease; Forgetfulness; Heart flutter, ventricular; High cholesterol; Ringing in ear; Shortness Of Air; Shortness of breath         Past Surgical History  Appendectomy; Colonoscopy; Tonsilectomy         Medication List  aspirin 81 mg oral tablet,delayed release (DR/EC); Men's Multivitamin 400- mcg oral tablet         Allergy List  NO KNOWN DRUG ALLERGIES         Family Medical History  Stroke; Cancer, Unspecified; Family history of breast cancer         Social History  Alcohol (Never); Caffeine (Never); Second hand smoke exposure (Current some day); Tobacco (Current every day)         Immunizations  Name Date Admin   Influenza    Influenza    [id number]    Prevnar 13          Review of Systems  · Constitutional  o Denies  o : fever, headache, chills  · Eyes  o Denies  o : eye pain, double vision,  "blurred vision  · HENT  o Denies  o : sinus problems, sore throat, ear infection  · Cardiovascular  o Denies  o : chest pain, high blood pressure, varicosities  · Respiratory  o Denies  o : shortness of breath, wheezing, frequent cough  · Gastrointestinal  o Denies  o : nausea, vomiting, heartburn, indigestion, abdominal pain  · Genitourinary  o Denies  o : urgency, frequency, urinary retention, painful urination  · Integument  o Denies  o : rash, itching, boils  · Neurologic  o Denies  o : tingling or numbness, tremors, dizzy spells  · Musculoskeletal  o Denies  o : joint pain, neck pain, back pain  · Endocrine  o Denies  o : cold intolerance, heat intolerance, tired, excessive thirst, sluggish  · Psychiatric  o Admits  o : feels satisfied with life  o Denies  o : severe depression, concerns with hurting themselves  · Heme-Lymph  o Denies  o : swollen glands, blood clotting problems  · Allergic-Immunologic  o Denies  o : sinus allergy symptoms, hay fever      Vitals  Date Time BP Position Site L\R Cuff Size HR RR TEMP (F) WT  HT  BMI kg/m2 BSA m2 O2 Sat        07/16/2020 09:51 /82 Sitting    74 - R  96.9 165lbs 0oz 5'  10\" 23.67 1.92           Physical Examination  · Constitutional  o Appearance  o : Well nourished, well developed patient in no acute distress. Ambulating without difficulty.  · Skin and Subcutaneous Tissue  o General Inspection  o : No rashes, lesions or areas of discoloration present. Skin turgor is normal.  o General Palpation  o : No abnormalities, masses or tenderness on palpation.  · Neurologic  o Mental Status Examination  o : grossly oriented to person, place and time  o Gait and Station  o : normal gait, able to stand without difficulty  · Psychiatric  o Mood and Affect  o : mood normal, affect appropriate      Figure 1.0: Pain Rating Scale-[place]         Results  · In-Office Procedures  o Lab procedure  § Automated dipstick urinalysis with microscopy (74549)   § Color Ur: Yellow "   § Clarity Ur: Clear   § Glucose Ur Ql Strip: Negative   § Bilirub Ur Ql Strip: Negative   § Ketones Ur Ql Strip: Negative   § Sp Gr Ur Qn: 1.015   § Hgb Ur Ql Strip: Small   § pH Ur-LsCnc: 7.0   § Prot Ur Ql Strip: Negative   § Urobilinogen Ur Strip-mCnc: 0.2 E.U./dL   § Nitrite Ur Ql Strip: Negative   § WBC Est Ur Ql Strip: Trace       Assessment  · Prostate Cancer     185/C61  T2 N0 M0    Problems Reconciled  Plan  · Instructions  o With the volume of prostate cancer I would recommend radical prostatectomy. He can always have radiation or cryoablation of prostate but with a median lobe, it might be harder to freeze that area. I am going to refer him to Dr. Rodriguez for at least a consultation for radical prostatectomy            Electronically Signed by: Domi Giles MD -Author on July 16, 2020 10:15:51 AM

## 2021-05-13 NOTE — PROGRESS NOTES
Progress Note      Patient Name: Colin Nevarez   Patient ID: [id number]   Sex: Male   YOB: 1950    Primary Care Provider: Liz Roper DO   Referring Provider: Liz Roper DO    Visit Date: June 29, 2020    Provider: Domi Giles MD   Location: [facility]   Location Address: [address] [place] [address], [address]  [id number]   Location Phone: [phone]          Chief Complaint  · follow up      History Of Present Illness  The patient is a 70 year old /White male , who presents for results of mri.      5/29/20 psa 8.16  mri of prostate 6/25/20.  Not much difficulty with urination.  Nocturia sometimes.  Pain in the left scrotum.  One episode of UTI       Past Medical History  Arthritis; Chronic Obstructive Pulmonary Disease; Forgetfulness; Heart flutter, ventricular; High cholesterol; Ringing in ear; Shortness Of Air; Shortness of breath         Past Surgical History  Appendectomy; Colonoscopy; Tonsilectomy         Medication List  aspirin 81 mg oral tablet,delayed release (DR/EC); Men's Multivitamin 400- mcg oral tablet         Allergy List  NO KNOWN DRUG ALLERGIES         Family Medical History  Stroke; Cancer, Unspecified; Family history of breast cancer         Social History  Alcohol (Never); Caffeine (Never); Second hand smoke exposure (Current some day); Tobacco (Current every day)         Immunizations  Name Date Admin   Influenza    Influenza    [id number]    Prevnar 13          Review of Systems  · Constitutional  o Denies  o : fever, headache, chills  · Eyes  o Denies  o : eye pain, double vision, blurred vision  · HENT  o Denies  o : sinus problems, sore throat, ear infection  · Cardiovascular  o Denies  o : chest pain, high blood pressure, varicosities  · Respiratory  o Denies  o : shortness of breath, wheezing, frequent cough  · Gastrointestinal  o Denies  o : nausea, vomiting, heartburn, indigestion, abdominal pain  · Genitourinary  o Denies  o :  "urgency, frequency, urinary retention, painful urination  · Integument  o Denies  o : rash, itching, boils  · Neurologic  o Denies  o : tingling or numbness, tremors, dizzy spells  · Musculoskeletal  o Denies  o : joint pain, neck pain, back pain  · Endocrine  o Denies  o : cold intolerance, heat intolerance, tired, excessive thirst, sluggish  · Psychiatric  o Admits  o : feels satisfied with life  o Denies  o : severe depression, concerns with hurting themselves  · Heme-Lymph  o Denies  o : swollen glands, blood clotting problems  · Allergic-Immunologic  o Denies  o : sinus allergy symptoms, hay fever  · All Others Negative      Vitals  Date Time BP Position Site L\R Cuff Size HR RR TEMP (F) WT  HT  BMI kg/m2 BSA m2 O2 Sat HC       06/29/2020 08:52 /86 Sitting    72 - R  97.9 160lbs 0oz 5'  10\" 22.96 1.89           Physical Examination  · Constitutional  o Appearance  o : Well nourished, well developed patient in no acute distress. Ambulating without difficulty.  · Gastrointestinal  o Abdominal Examination  o : Scaphoid abdomen which is non-tender to palpation with normal tone and without rigidity or guarding. Normal bowel sounds. No masses present.  o Liver and spleen  o : No hepatomegaly present. Liver is non-tender to palpation and spleen is not palpable.  o Hernias  o : No abdominal wall hernias are present.  · Genitourinary  o Bladder  o : no abnormalities  o Penis  o : Uncircumcised normal penis  o Urethral Meatus  o : no abnormalities  o Scrotum and Scrotal Contents  o :   § Scrotum  § : no abnormalities  § Epididymides  § : Bilateral large spermatoceles. Left about 4.5 cm and 4 cm on the right side about 4 cm x 3.5 cm  § Testes  § : no abnormalities  o Digital Rectal Examination  o :   § Prostate  § : Deferred at his request  · Skin and Subcutaneous Tissue  o General Inspection  o : No rashes, lesions or areas of discoloration present. Skin turgor is normal.  o General Palpation  o : No abnormalities, " masses or tenderness on palpation.  · Neurologic  o Mental Status Examination  o : grossly oriented to person, place and time  o Gait and Station  o : normal gait, able to stand without difficulty  · Psychiatric  o Mood and Affect  o : mood normal, affect appropriate      Figure 1.0: Pain Rating Scale-[place]         Results  · In-Office Procedures  o Lab procedure  § Automated dipstick urinalysis with microscopy (75838)   § Color Ur: Yellow   § Clarity Ur: Clear   § Glucose Ur Ql Strip: Negative   § Bilirub Ur Ql Strip: Negative   § Ketones Ur Ql Strip: Negative   § Sp Gr Ur Qn: 1.015   § Hgb Ur Ql Strip: Negative   § pH Ur-LsCnc: 6.0   § Prot Ur Ql Strip: Negative   § Urobilinogen Ur Strip-mCnc: 0.2 E.U./dL   § Nitrite Ur Ql Strip: Negative   § WBC Est Ur Ql Strip: Negative   § RBC UrnS Qn HPF: 0   § WBC UrnS Qn HPF: 0   § Bacteria UrnS Qn HPF: 0   § Crystals UrnS Qn HPF: 0   § Epithelial Cells (non renal): 0 /HPF  § Epithelial Cells (renal): 0       Assessment  · Elevated Prostate Specific Antigen (PSA)     790.93/R97.2  · Spermatocele     608.1/N43.40  Bilateral    Problems Reconciled  Plan  · Medications  o Medications have been Reconciled  o Transition of Care or Provider Policy  · Instructions  o Will do MRI of prostate fusion biopsies along with cystoscopy in the hospital            Electronically Signed by: Domi Giles MD -Author on June 29, 2020 09:32:09 AM

## 2021-05-13 NOTE — PROGRESS NOTES
Progress Note      Patient Name: Colin Nevarez   Patient ID: [id number]   Sex: Male   YOB: 1950    Primary Care Provider: Liz Roper DO   Referring Provider: Liz Roper DO    Visit Date: 2020    Provider: Florin Rodriguez MD   Location: [facility]   Location Address: [address] [place] [address]  [id number]   Location Phone: [phone]          Chief Complaint  · pt here for urologic issues      History Of Present Illness  TELEHEALTH TELEPHONE VISIT  Colin Nevarez is a 70 year old /White male who is presenting for evaluation via telehealth telephone visit. Verbal consent obtained before beginning visit.   Provider spent 8 minutes with the patient during the telehealth visit.   The following staff were present during this visit: A Umer   Past Medical History/ Overview of Patient Symptoms     61    Prostate CA    10/20 >70-year-old gentleman status post  RALPw LND  20 , pT2 N0 R1    Kegels causes him to have rectal pain.  Noc improved X1.  2 pads QD.    Not worried about erections.    Previous    No history of kidney stone.    Patient had a ruptured appendix in .  Still has a large midline incision.    No urologic family history    No CAD, No COPD.  smokes 1 ppd.  ASA 82  Mom dies of CA at 50.  Dad  at 60 of CVA.      0.81, GFR >60    Prostate CA    10/20  <0.01      RALP w LND  3+4, 4+3 5 to 10%, tertiary pattern 5 5% overall Dee score 7, margins involved, right apical, right anterior, right posterior, 7 lymph nodes negative    pT2 N0 R1    2020 MRI fusion prostate biopsy  Region of interest - 3+3, multiple cores, 22%  Right base3+3, 2/2, 17%  Right apex3+3, 2/2, 77%  Right mid3+4, 2/2, 46%  Left base, left apex, left midnegative  Region of interest #23 +3, 2/2, 26%     MRI [id number] g17 x 4 mm, PIRADS 5.  Right lateral peripheral zone at the apex to mid gland    8.16       Past Medical History  Arthritis;  Chronic Obstructive Pulmonary Disease; Forgetfulness; Heart flutter, ventricular; High cholesterol; Prostate Cancer; Ringing in ear; Shortness Of Air; Shortness of breath         Past Surgical History  Appendectomy; Colonoscopy; Radical Prostatectomy; Tonsilectomy         Medication List  aspirin 81 mg oral tablet,delayed release (DR/EC); Men's Multivitamin 400- mcg oral tablet         Allergy List  NO KNOWN DRUG ALLERGIES       Allergies Reconciled  Family Medical History  Stroke; Cancer, Unspecified; Family history of breast cancer         Social History  Alcohol (Never); Caffeine (Never); Second hand smoke exposure (Current some day); Tobacco (Current every day)         Immunizations  Name Date Admin   Influenza 10/23/2019   Influenza 10/01/2018   Mlicskasu59 09/04/2017   Prevnar 13 09/03/2018         Review of Systems  · Constitutional  o Denies  o : chills  · Respiratory  o Denies  o : cough  · Gastrointestinal  o Denies  o : nausea      Physical Examination                    Assessment  · Prostate cancer     185/C61  · Incontinence     788.30/R32      Plan  · Orders  o PSA ultrasensitive DIAGNOSTIC [facility] (94437, 56034) - 185/C61 - 03/09/2021  o Physican Telephone evaluation, 5-10 min (99589) - 185/C61, 788.30/R32 - 11/09/2020  · Medications  o Medications have been Reconciled  o Transition of Care or Provider Policy  · Instructions  o Plan Of Care:   o Electronically Identified Patient Education Materials Provided Electronically       Doing well.    Follow-up in 4 months with PSA before                 Electronically Signed by: Florin Rodriguez MD -Author on November 9, 2020 02:20:16 PM

## 2021-05-14 VITALS
SYSTOLIC BLOOD PRESSURE: 143 MMHG | WEIGHT: 155.31 LBS | DIASTOLIC BLOOD PRESSURE: 84 MMHG | RESPIRATION RATE: 20 BRPM | HEART RATE: 70 BPM | HEIGHT: 68 IN | OXYGEN SATURATION: 98 % | BODY MASS INDEX: 23.54 KG/M2 | TEMPERATURE: 97 F

## 2021-05-14 VITALS
WEIGHT: 158.12 LBS | HEIGHT: 68 IN | DIASTOLIC BLOOD PRESSURE: 90 MMHG | SYSTOLIC BLOOD PRESSURE: 160 MMHG | HEART RATE: 74 BPM | BODY MASS INDEX: 23.96 KG/M2 | OXYGEN SATURATION: 95 % | RESPIRATION RATE: 20 BRPM | TEMPERATURE: 97.5 F

## 2021-05-14 VITALS — HEIGHT: 70 IN | BODY MASS INDEX: 22.76 KG/M2 | WEIGHT: 159 LBS | RESPIRATION RATE: 15 BRPM

## 2021-05-14 NOTE — PROGRESS NOTES
Progress Note      Patient Name: Colin Nevarez   Patient ID: [id number]   Sex: Male   YOB: 1950    Primary Care Provider: Liz Roper DO   Referring Provider: Liz Roper DO    Visit Date: March 19, 2021    Provider: Narendra Jenkins DO   Location: [facility]   Location Address: [address] [place] [address]  [id number]   Location Phone: [phone]          Chief Complaint  · 6 month follow up   · bloodwork      History Of Present Illness  Colin Nevarez is a 70 year old /White male who presents for evaluation and treatment of:        Patient presents to follow-up 6 months last seen by PCP on 5/2020 history of prostate cancer follows with urology recently seen in the last 4 days, getting repeat screening done 9/2021 seen via telehealth overall doing well         Past Medical History  Disease Name Date Onset Notes   Arthritis --  --    Chronic Obstructive Pulmonary Disease --  --    Forgetfulness --  --    Heart flutter, ventricular --  --    High cholesterol --  --    Prostate cancer --  --    Ringing in ear --  --    Shortness Of Air --  --    Shortness of breath --  --          Past Surgical History  Procedure Name Date Notes   Appendectomy --  --    Colonoscopy 06/01/19 --    Radical Prostatectomy --  --    Tonsilectomy 1969 per pt         Medication List  Name Date Started Instructions   aspirin 81 mg oral tablet,delayed release (DR/EC)  take 1 tablet (81 mg) by oral route once daily   Breo Ellipta 100-25 mcg/dose inhalation blister with device  inhale 1 puff by inhalation route once daily at the same time each day   Men's Multivitamin 400- mcg oral tablet  take 1 tablet by oral route daily   Symbicort 160-4.5 mcg/actuation inhalation HFA aerosol inhaler  inhale 2 puffs by inhalation route 2 times per day in the morning and evening         Allergy List  Allergen Name Date Reaction Notes   NO KNOWN DRUG ALLERGIES --  --  --        Allergies  "Reconciled  Family Medical History  Disease Name Relative/Age Notes   Stroke Father/   --    Cancer, Unspecified Mother/50s  Sister/40s   --    Family history of breast cancer Grandmother (maternal)/  Grandmother (paternal)/  Mother/50s  Sister/40s   Mother/50s; Sister/40s; Grandmother (maternal); Grandmother (paternal)         Social History  Finding Status Start/Stop Quantity Notes   Alcohol Never --/-- --  03/15/2021 - drinks no   Caffeine Never --/-- --  drinks no   Second hand smoke exposure Current some day --/-- --  yes   Tobacco Current every day 8/-- less than 1ppd 06/08/2020 - current everyday smoker; started smoking at age 9; smoked 20 cigarette(s) per day         Immunizations  NameDate Admin Mfg Trade Name Lot Number Route Inj VIS Given VIS Publication   Onqtbvsjh47/23/2019 [facility] Fluarix, quadrivalent, preservative free [id number] IM LD 10/23/2019    Comments: pt tolerated well NDC# [id number]   Ljbzgevpv6061/04/2017 MSD PNEUMOVAX 23 [id number] [facility] 09/20/2019    Comments: patient had both pneumona injections   Prevnar 1309/03/2018 PFR PREVNAR 13  [facility] 09/20/2019    Comments:          Review of Systems  · Constitutional  o Denies  o : fever, fatigue, weight loss, weight gain  · HENT  o Denies  o : sinus pain, nasal congestion, nasal discharge, postnasal drip  · Cardiovascular  o Denies  o : lower extremity edema, claudication, chest pressure, palpitations  · Respiratory  o Denies  o : shortness of breath, wheezing, cough, hemoptysis, dyspnea on exertion  · Gastrointestinal  o Denies  o : nausea, vomiting, diarrhea, constipation, abdominal pain  · Integument  o Denies  o : rash, itching  · Musculoskeletal  o Denies  o : joint pain, joint swelling  · Psychiatric  o Denies  o : anxiety, depression      Vitals  Date Time BP Position Site L\R Cuff Size HR RR TEMP (F) WT  HT  BMI kg/m2 BSA m2 O2 Sat FR L/min FiO2        03/19/2021 10:24 /90 Sitting    74 - R 20 97.5 158lbs 2oz 5'  8\" 24.04 1.86 95 %  21% "          Physical Examination  · Constitutional  o Appearance  o : no acute distress, well-nourished  · Head and Face  o Head  o :   § Inspection  § : atraumatic, normocephalic  · Eyes  o Eyes  o : extraocular movements intact, no scleral icterus, no conjunctival injection  · Ears, Nose, Mouth and Throat  o Ears  o :   § External Ears  § : normal  o Nose  o :   § Intranasal Exam  § : nares patent  o Oral Cavity  o :   § Oral Mucosa  § : moist mucous membranes  · Respiratory  o Respiratory Effort  o : breathing comfortably, symmetric chest rise  o Auscultation of Lungs  o : clear to asculatation bilaterally, no wheezes, rales, or rhonchii  · Cardiovascular  o Heart  o :   § Auscultation of Heart  § : regular rate and rhythm, no murmurs, rubs, or gallops  o Peripheral Vascular System  o :   § Extremities  § : no edema  · Lymphatic  o Neck  o : no lymphadenopathy present  · Skin and Subcutaneous Tissue  o General Inspection  o : no lesions present, no areas of discoloration, skin turgor normal  · Neurologic  o Mental Status Examination  o :   § Orientation  § : grossly oriented to person, place and time  o Gait and Station  o :   § Gait Screening  § : normal gait  · Psychiatric  o General  o : normal mood and affect              Assessment  · Screening for depression     V79.0/Z13.89  · Chronic Obstructive Pulmonary Disease     491.21  · High cholesterol     272.0/E78.0  · Shortness Of Air     786.05  · Prostate cancer     185/C61           Will repeat labs for chronic condition HLD physical panel    Follow-up and repeat PSA per urology    Continue with inhalers for COPD renew Symbicort or try to get samples today      Follow-up 6 months for annual on his visit with PCP     Problems Reconciled  Plan  · Orders  o Physical, Primary Care Panel (CBC, CMP, Lipid, TSH) [facility] ([id number], [id number], [id number], [id number]) - 491.21, 272.0/E78.0, 786.05, 185/C61 - 03/19/2021  o ACO-18: Negative screen for clinical depression using a standardized  tool () - - 03/19/2021  o ACO-14: Influenza immunization was not administered for reasons documented [facility] () - - 03/19/2021   pt declined flu shot  o ACO-13: Fall Risk Screening with 2 or more falls in past year or any fall with injury in the past year (1100F) - - 03/19/2021  o ACO-39: Current medications updated and reviewed (, 1159F) - 491.21, 272.0/E78.0, 786.05, 185/C61 - 03/19/2021  · Medications  o Medications have been Reconciled  o Transition of Care or Provider Policy  · Instructions  o Depression Screen completed and scanned into the EMR under the designated folder within the patient's documents.  o Today's PHQ-9 result is _4__  o Handouts were given to patient:   o Patient is taking medications as prescribed and doing well.   o Take all medications as prescribed/directed.  o Patient was educated/instructed on their diagnosis, treatment and medications prior to discharge from the clinic today.  o Risks, benefits, and alternatives were discussed with the patient. The patient is aware of risks associated with:  o Chronic conditions reviewed and taken into consideration for today's treatment plan.  o Flu vaccine declined.  o Electronically Identified Patient Education Materials Provided Electronically  · Disposition  o Call or Return if symptoms worsen or persist.  o annual wellness at follow up  o Labs before follow up ordered  o Return Visit Request in/on 6 months +/- 7 days ([id number]).            Electronically Signed by: Narendra Jenkins DO -Author on March 19, 2021 11:05:05 AM

## 2021-05-14 NOTE — PROGRESS NOTES
Progress Note      Patient Name: Colin Nevarez   Patient ID: [id number]   Sex: Male   YOB: 1950    Primary Care Provider: Liz Roper DO   Referring Provider: Liz Roper DO    Visit Date: March 15, 2021    Provider: Florin Rodriguez MD   Location: [facility]   Location Address: [address] [place] [address]  [id number]   Location Phone: [phone]          Chief Complaint  · urological issues      History Of Present Illness  TELEHEALTH TELEPHONE VISIT  Colin Nevarez is a 70 year old /White male who is presenting for evaluation via telehealth telephone visit. Verbal consent obtained before beginning visit.   Provider spent 6 minutes with the patient during the telehealth visit.   The following staff were present during this visit: SUKHWINDER [place]   Past Medical History/ Overview of Patient Symptoms     61    Prostate CA    3/21   >

## 2021-05-14 NOTE — PROGRESS NOTES
Progress Note      Patient Name: Colin Nevarez   Patient ID: [id number]   Sex: Male   YOB: 1950    Primary Care Provider: Liz Roper DO   Referring Provider: Liz Roper DO    Visit Date: March 24, 2021    Provider: CHEN Lee   Location: [facility]   Location Address: [address] [place] [address]  [id number]   Location Phone: [phone]          Chief Complaint  · Two week follow up for lab results.      History Of Present Illness  Colin Nevarez is a 70 year old /White male who presents for evaluation and treatment of:      Pt presents for FU labs drawn 3/19/21. Pt with COPD, OA, elevated BP, hyperlipidemia. Smoker. No acute issues or complaints.       Past Medical History  Disease Name Date Onset Notes   Arthritis --  --    Chronic Obstructive Pulmonary Disease --  --    Forgetfulness --  --    Heart flutter, ventricular --  --    High cholesterol --  --    Prostate cancer --  --    Ringing in ear --  --    Shortness Of Air --  --    Shortness of breath --  --          Past Surgical History  Procedure Name Date Notes   Appendectomy --  --    Colonoscopy 06/01/19 --    Radical Prostatectomy --  --    Tonsilectomy 1969 per pt         Medication List  Name Date Started Instructions   aspirin 81 mg oral tablet,delayed release (DR/EC)  take 1 tablet (81 mg) by oral route once daily   Breo Ellipta 100-25 mcg/dose inhalation blister with device  inhale 1 puff by inhalation route once daily at the same time each day   Men's Multivitamin 400- mcg oral tablet  take 1 tablet by oral route daily   Symbicort 160-4.5 mcg/actuation inhalation HFA aerosol inhaler  inhale 2 puffs by inhalation route 2 times per day in the morning and evening         Allergy List  Allergen Name Date Reaction Notes   NO KNOWN DRUG ALLERGIES --  --  --        Allergies Reconciled  Family Medical History  Disease Name Relative/Age Notes   Stroke Father/   --    Cancer,  "Unspecified Mother/50s  Sister/40s   --    Family history of breast cancer Grandmother (maternal)/  Grandmother (paternal)/  Mother/50s  Sister/40s   Mother/50s; Sister/40s; Grandmother (maternal); Grandmother (paternal)         Social History  Finding Status Start/Stop Quantity Notes   Alcohol Never --/-- --  03/15/2021 - drinks no   Caffeine Never --/-- --  drinks no   Second hand smoke exposure Current some day --/-- --  yes   Tobacco Current every day 8/-- less than 1ppd 06/08/2020 - current everyday smoker; started smoking at age 9; smoked 20 cigarette(s) per day         Immunizations  NameDate Admin Mfg Trade Name Lot Number Route Inj VIS Given VIS Publication   Qvdiqimfy56/23/2019 [facility] Fluarix, quadrivalent, preservative free [id number] IM LD 10/23/2019    Comments: pt tolerated well NDC# [id number]   Ckxabbbaf5414/04/2017 MSD PNEUMOVAX 23 [id number] [facility] 09/20/2019    Comments: patient had both pneumona injections   Prevnar 1309/03/2018 PFR PREVNAR 13  [facility] 09/20/2019    Comments:          Review of Systems  · Constitutional  o Denies  o : fever, fatigue, weight loss, weight gain  · Eyes  o Denies  o : blurred or double vision  · HENT  o Denies  o : headaches, nasal congestion, sore throat  · Cardiovascular  o Denies  o : lower extremity edema, claudication, chest pressure, palpitations  · Respiratory  o Denies  o : shortness of breath, wheezing, cough, hemoptysis, dyspnea on exertion  · Gastrointestinal  o Denies  o : nausea, vomiting, diarrhea, constipation, abdominal pain  · Musculoskeletal  o Denies  o : muscle pain  · Psychiatric  o Denies  o : anxiety, depression, suicidal ideation, homicidal ideation      Vitals  Date Time BP Position Site L\R Cuff Size HR RR TEMP (F) WT  HT  BMI kg/m2 BSA m2 O2 Sat FR L/min FiO2        03/24/2021 11:15 /84 Sitting    70 - R 20 97 155lbs 5oz 5'  8\" 23.61 1.84 98 %  21%          Physical Examination  · Constitutional  o Appearance  o : no acute distress, " well-nourished  · Head and Face  o Head  o :   § Inspection  § : atraumatic, normocephalic  o Face  o :   § Inspection  § : no facial lesions  · Eyes  o Eyes  o : extraocular movements intact, no scleral icterus, no conjunctival injection  · Ears, Nose, Mouth and Throat  o Ears  o :   § External Ears  § : normal  o Nose  o :   § Intranasal Exam  § : nares patent  o Oral Cavity  o :   § Oral Mucosa  § : moist mucous membranes  · Neck  o Thyroid  o : gland size normal, nontender, no nodules or masses present on palpation, symmetric  · Respiratory  o Respiratory Effort  o : breathing comfortably, symmetric chest rise  o Auscultation of Lungs  o : RUL, RML, RLL insp wheeze present   · Cardiovascular  o Heart  o :   § Auscultation of Heart  § : regular rate and rhythm, no murmurs, rubs, or gallops  o Peripheral Vascular System  o :   § Extremities  § : no edema  · Lymphatic  o Neck  o : no lymphadenopathy present  o Supraclavicular Nodes  o : no supraclavicular nodes  · Skin and Subcutaneous Tissue  o General Inspection  o : no lesions present, no areas of discoloration, skin turgor normal  · Neurologic  o Mental Status Examination  o :   § Orientation  § : grossly oriented to person, place and time  o Gait and Station  o :   § Gait Screening  § : normal gait  · Psychiatric  o General  o : normal mood and affect              Assessment  · COPD (chronic obstructive pulmonary disease)     496/J44.9  · Essential hypertension     401.9/I10  · Hyperlipidemia     272.4/E78.5  · Nicotine dependence     305.1/F17.200  · Osteoarthritis     715.90/M19.90       Reviewed labs results with patient.    Elevated BP without dx of HTN:  Discussed tx options  Rec medication to treat  Pt declines at this time  Low na diet   Regular exercise/activity   Take BP reg, keep log, bring to next appt     COPD:  Continue inhalers as prescribed  States he has albuterol inhaler at home  STRONGLY rec smoking cessation        "      Plan  · Orders  o Smoking cessation counseling, 3-10 minutes [facility] ([id number]) - 496/J44.9 - 03/24/2021  o Smoking cessation counseling, 3-10 minutes [facility] ([id number]) - 305.1/F17.200 - 03/24/2021  o ACO-17: Screened for tobacco use AND received tobacco cessation intervention (4004F) - 305.1/F17.200 - 03/24/2021  o ACO-14: Influenza immunization was not administered for reasons documented [facility] () - - 03/24/2021   Declined  o ACO-13: Fall Risk Screening with no falls in past year or only one fall without injury in the past year (1101F) - - 03/24/2021  o ACO - Pt declines to or was not able to provide an Advance Care Plan or name a Surrogate Decision Maker (1124F) - - 03/24/2021  o ACO-39: Current medications updated and reviewed (1159F, ) - - 03/24/2021  · Medications  o Medications have been Reconciled  o Transition of Care or Provider Policy  · Instructions  o Handout: Reviewed with patient COPD/Emphysema \"Action Plan\" handout-patient given copy of action plan.  o Goals of Care include: reduce long-term decline in lung function, prevent and treat exacerbations, reduce hospitalizations and mortality, relieve disabling dyspnea, and improve exercise tolerance and health related quality of life.  o Patient advised to monitor blood pressure (B/P) at home and journal readings. Patient informed that a B/P reading at home of more than 130/80 is considered hypertension. For readings greater qway596/90 or higher patient is advised to follow up in the office with readings for management. Patient advised to limit sodium intake.  o Advised that cheeses and other sources of dairy fats, animal fats, fast food, and the extras (candy, pastries, pies, doughnuts and cookies) all contain LDL raising nutrients. Advised to increase fruits, vegetables, whole grains, and to monitor portion sizes.   o *Form of nicotine being used: cigarettes   o Patient was strongly encouraged to discontinue use of any nicotine containing product or " minimize the use of the product.  o Discussed smoking cessation and counseling with patient for over 3 minutes.  o Tylenol may be used as needed every 4-6 hours for pain.  o Advil/Aleve/Motrin/Ibuprofen may be used as needed every 6-8 hours with food.  o Patient is taking medications as prescribed and doing well.   o Take all medications as prescribed/directed.  o Patient was educated/instructed on their diagnosis, treatment and medications prior to discharge from the clinic today.  o Patient counseled to stop smoking.  o Patient was instructed to exercise regularly.  o Patient instructed to seek medical attention urgently for new or worsening symptoms.  o Call the office with any concerns or questions.  o Bring all medicines with their bottles to each office visit.  o Risks, benefits, and alternatives were discussed with the patient. The patient is aware of risks associated with: all meds and conditions.   o Chronic conditions reviewed and taken into consideration for today's treatment plan.  o Flu vaccine declined.  o Discussed Covid-19 precautions including, but not limited to, social distancing, avoid touching your face, and hand washing.   · Disposition  o Call or Return if symptoms worsen or persist.  o Follow Up PRN.  o Follow Up in 3 months.  o Proceed to ED for all medical emergencies.            Electronically Signed by: CHEN Lee -Author on March 24, 2021 11:54:43 AM

## 2021-05-15 VITALS — RESPIRATION RATE: 17 BRPM | BODY MASS INDEX: 22.76 KG/M2 | HEIGHT: 70 IN | WEIGHT: 159 LBS

## 2021-05-15 VITALS
WEIGHT: 160 LBS | SYSTOLIC BLOOD PRESSURE: 157 MMHG | HEIGHT: 70 IN | HEART RATE: 72 BPM | TEMPERATURE: 97.9 F | DIASTOLIC BLOOD PRESSURE: 86 MMHG | BODY MASS INDEX: 22.9 KG/M2

## 2021-05-15 VITALS — BODY MASS INDEX: 22.73 KG/M2 | HEIGHT: 68 IN | RESPIRATION RATE: 16 BRPM | WEIGHT: 150 LBS

## 2021-05-15 VITALS
BODY MASS INDEX: 24.28 KG/M2 | WEIGHT: 160.19 LBS | HEIGHT: 68 IN | TEMPERATURE: 98.4 F | OXYGEN SATURATION: 96 % | SYSTOLIC BLOOD PRESSURE: 169 MMHG | RESPIRATION RATE: 20 BRPM | HEART RATE: 74 BPM | DIASTOLIC BLOOD PRESSURE: 87 MMHG

## 2021-05-15 VITALS — HEIGHT: 68 IN | RESPIRATION RATE: 16 BRPM | WEIGHT: 152.31 LBS | BODY MASS INDEX: 23.08 KG/M2

## 2021-05-15 VITALS
HEART RATE: 84 BPM | HEIGHT: 68 IN | SYSTOLIC BLOOD PRESSURE: 132 MMHG | BODY MASS INDEX: 23.85 KG/M2 | WEIGHT: 157.37 LBS | DIASTOLIC BLOOD PRESSURE: 70 MMHG | SYSTOLIC BLOOD PRESSURE: 132 MMHG | OXYGEN SATURATION: 92 % | DIASTOLIC BLOOD PRESSURE: 106 MMHG | RESPIRATION RATE: 16 BRPM | TEMPERATURE: 97.8 F

## 2021-05-15 VITALS
BODY MASS INDEX: 23.79 KG/M2 | OXYGEN SATURATION: 94 % | HEART RATE: 82 BPM | DIASTOLIC BLOOD PRESSURE: 84 MMHG | HEIGHT: 68 IN | RESPIRATION RATE: 18 BRPM | SYSTOLIC BLOOD PRESSURE: 154 MMHG | TEMPERATURE: 98.6 F | WEIGHT: 157 LBS

## 2021-05-15 VITALS
RESPIRATION RATE: 16 BRPM | WEIGHT: 154.5 LBS | SYSTOLIC BLOOD PRESSURE: 137 MMHG | BODY MASS INDEX: 23.42 KG/M2 | TEMPERATURE: 98.4 F | HEART RATE: 73 BPM | DIASTOLIC BLOOD PRESSURE: 86 MMHG | OXYGEN SATURATION: 95 % | HEIGHT: 68 IN

## 2021-05-15 VITALS
DIASTOLIC BLOOD PRESSURE: 82 MMHG | TEMPERATURE: 96.9 F | WEIGHT: 165 LBS | HEIGHT: 70 IN | SYSTOLIC BLOOD PRESSURE: 157 MMHG | BODY MASS INDEX: 23.62 KG/M2 | HEART RATE: 74 BPM

## 2021-05-15 VITALS
SYSTOLIC BLOOD PRESSURE: 198 MMHG | HEART RATE: 96 BPM | DIASTOLIC BLOOD PRESSURE: 94 MMHG | WEIGHT: 160 LBS | BODY MASS INDEX: 22.9 KG/M2 | HEIGHT: 70 IN | TEMPERATURE: 98 F

## 2021-06-24 ENCOUNTER — TELEPHONE (OUTPATIENT)
Dept: FAMILY MEDICINE CLINIC | Facility: CLINIC | Age: 71
End: 2021-06-24

## 2021-06-24 NOTE — TELEPHONE ENCOUNTER
Pt called stating you was going to give him some samples of inhalers back before you were out but then we did not have what he said you wanted him to have. Please advise.

## 2021-06-25 RX ORDER — FLUTICASONE FUROATE 100 UG/1
1 POWDER RESPIRATORY (INHALATION) DAILY
Qty: 2 EACH | Refills: 0 | COMMUNITY
Start: 2021-06-25 | End: 2022-03-29

## 2021-06-25 NOTE — TELEPHONE ENCOUNTER
I have a bag in my office with his name and three inhalers in it. Please call and have him swing by and pick it up.

## 2021-06-25 NOTE — TELEPHONE ENCOUNTER
Patient is aware and will  samples in office later this morning. Can you go ahead and order the samples in patient's med list so we can dispense them? 0.

## 2021-07-12 ENCOUNTER — OFFICE VISIT (OUTPATIENT)
Dept: FAMILY MEDICINE CLINIC | Facility: CLINIC | Age: 71
End: 2021-07-12

## 2021-07-12 VITALS
OXYGEN SATURATION: 95 % | SYSTOLIC BLOOD PRESSURE: 187 MMHG | BODY MASS INDEX: 23.76 KG/M2 | WEIGHT: 156.8 LBS | DIASTOLIC BLOOD PRESSURE: 79 MMHG | HEIGHT: 68 IN | TEMPERATURE: 97.3 F | HEART RATE: 68 BPM

## 2021-07-12 DIAGNOSIS — B96.89 ACUTE BACTERIAL SINUSITIS: Primary | ICD-10-CM

## 2021-07-12 DIAGNOSIS — E78.00 HIGH CHOLESTEROL: ICD-10-CM

## 2021-07-12 DIAGNOSIS — J44.9 CHRONIC OBSTRUCTIVE PULMONARY DISEASE, UNSPECIFIED COPD TYPE (HCC): ICD-10-CM

## 2021-07-12 DIAGNOSIS — J01.90 ACUTE BACTERIAL SINUSITIS: Primary | ICD-10-CM

## 2021-07-12 DIAGNOSIS — I10 ESSENTIAL HYPERTENSION: ICD-10-CM

## 2021-07-12 PROBLEM — C61 PROSTATE CANCER: Status: ACTIVE | Noted: 2021-07-12

## 2021-07-12 PROBLEM — M19.90 ARTHRITIS: Status: ACTIVE | Noted: 2021-07-12

## 2021-07-12 PROBLEM — I49.02: Status: ACTIVE | Noted: 2021-07-12

## 2021-07-12 PROCEDURE — 99214 OFFICE O/P EST MOD 30 MIN: CPT | Performed by: FAMILY MEDICINE

## 2021-07-12 RX ORDER — AMLODIPINE BESYLATE 5 MG/1
5 TABLET ORAL DAILY
Qty: 90 TABLET | Refills: 1 | Status: SHIPPED | OUTPATIENT
Start: 2021-07-12 | End: 2021-11-29 | Stop reason: SDUPTHER

## 2021-07-12 RX ORDER — MULTIPLE VITAMINS W/ MINERALS TAB 9MG-400MCG
TAB ORAL
COMMUNITY

## 2021-07-12 RX ORDER — PREDNISONE 20 MG/1
TABLET ORAL
Qty: 11 TABLET | Refills: 0 | Status: SHIPPED | OUTPATIENT
Start: 2021-07-12 | End: 2021-07-19

## 2021-07-12 RX ORDER — AMOXICILLIN AND CLAVULANATE POTASSIUM 875; 125 MG/1; MG/1
1 TABLET, FILM COATED ORAL 2 TIMES DAILY
Qty: 29 TABLET | Refills: 0 | Status: SHIPPED | OUTPATIENT
Start: 2021-07-12 | End: 2021-11-29

## 2021-07-12 RX ORDER — ASPIRIN 81 MG/1
TABLET, CHEWABLE ORAL
COMMUNITY

## 2021-07-12 NOTE — ASSESSMENT & PLAN NOTE
The 10-year ASCVD risk score (Kath OGLESBY Jr., et al., 2013) is: 38.2%    Values used to calculate the score:      Age: 71 years      Sex: Male      Is Non- : No      Diabetic: No      Tobacco smoker: Yes      Systolic Blood Pressure: 187 mmHg      Is BP treated: No      HDL Cholesterol: 55 mg/dL      Total Cholesterol: 218 mg/dL     The patient cannot tolerate statins.  At his next clinic appointment we will discuss Zetia with the patient.

## 2021-07-12 NOTE — PROGRESS NOTES
"Chief Complaint  Sinusitis (Congestion )    Subjective          Colin Nevarez presents to [facility]  He is here today for management of his chronic medical conditions. He has a PMH significant for tobacco abuse, COPD, prostate cancer and chronic back pain. He is a smoker. He has smoked since he was 7 years old. He smokes a little less than one pack per day.    The patient has no other complaints today and denies chest pain, shortness of breath, weakness, numbness, nausea, vomiting, diarrhea, dizziness or syncopal event.        Objective   Vital Signs:   BP (!) 187/79 (BP Location: Left arm, Patient Position: Sitting, Cuff Size: Adult)   Pulse 68   Temp 97.3 °F (36.3 °C)   Ht 172.7 cm (67.99\")   Wt 71.1 kg (156 lb 12.8 oz)   SpO2 95%   BMI 23.85 kg/m²     Physical Exam  Vitals reviewed.   Constitutional:       Appearance: Normal appearance. He is well-developed.   HENT:      Head: Normocephalic and atraumatic.      Right Ear: External ear normal.      Left Ear: External ear normal.      Mouth/Throat:      Pharynx: No oropharyngeal exudate.   Eyes:      Conjunctiva/sclera: Conjunctivae normal.      Pupils: Pupils are equal, round, and reactive to light.   Neck:      Vascular: No carotid bruit.   Cardiovascular:      Rate and Rhythm: Normal rate and regular rhythm.      Heart sounds: No murmur heard.   No friction rub. No gallop.    Pulmonary:      Effort: Pulmonary effort is normal.      Breath sounds: Normal breath sounds. Decreased air movement present. No wheezing or rhonchi.   Abdominal:      General: Bowel sounds are normal. There is no distension.      Palpations: Abdomen is soft.      Tenderness: There is no abdominal tenderness.   Skin:     General: Skin is warm and dry.   Neurological:      Mental Status: He is alert and oriented to person, place, and time.      Cranial Nerves: No cranial nerve deficit.      Motor: No weakness.   Psychiatric:         Mood and Affect: " Mood and affect normal.         Behavior: Behavior normal.         Thought Content: Thought content normal.         Judgment: Judgment normal.        Result Review :     CMP    CMP 8/10/20 8/21/20 3/19/21   Glucose 89 108 (A) 106 (A)   BUN 12 12 11   Creatinine 0.89 0.89 0.79   Sodium 140 136 140   Potassium 4.2 4.1 4.8   Chloride 101 102 101   Calcium 10.2 8.8 10.1   Albumin   4.4   Total Bilirubin   0.32   Alkaline Phosphatase   81   AST (SGOT)   21   ALT (SGPT)   18   (A) Abnormal value            CBC    CBC 8/10/20 8/21/20 3/19/21   WBC 9.04 11.70 (A) 7.28   RBC 5.87 4.77 6.11 (A)   Hemoglobin 18.2 (A) 14.7 18.5 (A)   Hematocrit 52.9 (A) 43.7 54.7 (A)   MCV 90.1 91.6 89.5   MCH 31.0 30.8 30.3   MCHC 34.4 33.6 33.8   RDW 13.2 13.4 14.0   Platelets 180 163 207   (A) Abnormal value       Comments are available for some flowsheets but are not being displayed.           Lipid Panel    Lipid Panel 3/19/21   Total Cholesterol 218 (A)   Triglycerides 64   HDL Cholesterol 55   VLDL Cholesterol 13   LDL Cholesterol  150 (A)   (A) Abnormal value       Comments are available for some flowsheets but are not being displayed.           TSH    TSH 3/19/21   TSH 1.280                     Assessment and Plan    Diagnoses and all orders for this visit:    1. Acute bacterial sinusitis (Primary)  Comments:  The patient was given a prescription today of Augmentin and prednisone to be taken as directed.  He was told to call back or call 911 if he did not get better.    2. Chronic obstructive pulmonary disease, unspecified COPD type (CMS/[facility])  Assessment & Plan:  The patient's O2 saturation today is 95%.  He has significantly diminished breath sounds.  He is continue to smoke.  He is not interested in quitting smoking at this time.  He will be continued on Breo inhaler as prescribed.      3. High cholesterol  Assessment & Plan:  The 10-year ASCVD risk score (Kath OGLESBY Jr., et al., 2013) is: 38.2%    Values used to calculate the score:       Age: 71 years      Sex: Male      Is Non- : No      Diabetic: No      Tobacco smoker: Yes      Systolic Blood Pressure: 187 mmHg      Is BP treated: No      HDL Cholesterol: 55 mg/dL      Total Cholesterol: 218 mg/dL     The patient cannot tolerate statins.  At his next clinic appointment we will discuss Zetia with the patient.        4. Essential hypertension  Assessment & Plan:  The patient was started on amlodipine 5 mg daily today's visit.  He was educated about hypertension and told that he had had it for quite some time and we have been waiting to see if it improved but it has not.  He verbalized understanding.  He was encouraged to check his blood pressure at home.    The patient was educated about the need to check blood pressure at least twice per day and bring a log to their next appointment. They were educated about HTN crisis and the symptoms to look for. They were told to call 911 if they developed these symptoms.         Other orders  -     amLODIPine (NORVASC) 5 MG tablet; Take 1 tablet by mouth Daily.  Dispense: 90 tablet; Refill: 1  -     amoxicillin-clavulanate (Augmentin) 875-125 MG per tablet; Take 1 tablet by mouth 2 (Two) Times a Day.  Dispense: 29 tablet; Refill: 0  -     predniSONE (DELTASONE) 20 MG tablet; Take 2 tablets by mouth Daily for 4 days, THEN 1 tablet Daily for 3 days.  Dispense: 11 tablet; Refill: 0      Follow Up   Return in about 6 months (around 1/12/2022).  Patient was given instructions and counseling regarding his condition or for health maintenance advice. Please see specific information pulled into the AVS if appropriate.

## 2021-07-12 NOTE — ASSESSMENT & PLAN NOTE
The patient's O2 saturation today is 95%.  He has significantly diminished breath sounds.  He is continue to smoke.  He is not interested in quitting smoking at this time.  He will be continued on Breo inhaler as prescribed.

## 2021-07-12 NOTE — ASSESSMENT & PLAN NOTE
The patient was started on amlodipine 5 mg daily today's visit.  He was educated about hypertension and told that he had had it for quite some time and we have been waiting to see if it improved but it has not.  He verbalized understanding.  He was encouraged to check his blood pressure at home.    The patient was educated about the need to check blood pressure at least twice per day and bring a log to their next appointment. They were educated about HTN crisis and the symptoms to look for. They were told to call 911 if they developed these symptoms.

## 2021-09-01 ENCOUNTER — TRANSCRIBE ORDERS (OUTPATIENT)
Dept: LAB | Facility: HOSPITAL | Age: 71
End: 2021-09-01

## 2021-09-01 ENCOUNTER — LAB (OUTPATIENT)
Dept: LAB | Facility: HOSPITAL | Age: 71
End: 2021-09-01

## 2021-09-01 DIAGNOSIS — C61 PROSTATE CANCER (HCC): Primary | ICD-10-CM

## 2021-09-01 DIAGNOSIS — C61 PROSTATE CANCER (HCC): ICD-10-CM

## 2021-09-01 LAB — PSA SERPL-MCNC: <0.014 NG/ML (ref 0–4)

## 2021-09-01 PROCEDURE — 36415 COLL VENOUS BLD VENIPUNCTURE: CPT

## 2021-09-01 PROCEDURE — 84153 ASSAY OF PSA TOTAL: CPT

## 2021-09-20 PROBLEM — N39.3 STRESS INCONTINENCE OF URINE: Status: ACTIVE | Noted: 2021-09-20

## 2021-09-20 NOTE — PROGRESS NOTES
Chief Complaint    Urologic complaint    Subjective          Colin Nevarez presents to [facility]  History of Present Illness     71-year-old gentleman status post  RALPw LND  20 , pT2 N0 R1    Minimal incontinence, no pads.      No GH    No straining    Not worried about erections.    Previous    No history of kidney stone.    Patient had a ruptured appendix in .  Still has a large midline incision.    No urologic family history    No CAD, No COPD.  smokes 1 ppd.  ASA 82  Mom dies of CA at 50.  Dad  at 60 of CVA.      0.81, GFR >60    Prostate CA       <0.014  3/21    <0.01  10/20  <0.01      RALP w LND  3+4, 4+3 5 to 10%, tertiary pattern 5 5% overall Shiner score 7, margins involved, right apical, right anterior, right posterior, 7 lymph nodes negative    pT2 N0 R1    2020 MRI fusion prostate biopsy  Region of interest - 3+3, multiple cores, 22%  Right base3+3, 2/2, 17%  Right apex3+3, 2/2, 77%  Right mid3+4, 2/2, 46%  Left base, left apex, left midnegative  Region of interest #23 +3, 2/2, 26%     MRI [id number] g17 x 4 mm, PIRADS 5.  Right lateral peripheral zone at the apex to mid gland    8.16    Past History:  Medical History: has a past medical history of Arthritis, COPD (chronic obstructive pulmonary disease) (CMS/HCC), Essential hypertension (2021), Forgetfulness, Heart flutter, ventricular (CMS/HCC), High cholesterol, Prostate cancer (CMS/HCC), Ringing in ear, Shortness of Air, and SOB (shortness of breath).   Surgical History: has a past surgical history that includes Appendectomy; Colonoscopy (2019); Prostatectomy; and Tonsillectomy ().   Family History: family history includes Breast cancer in his maternal grandmother and paternal grandmother; Breast cancer (age of onset: 40) in his sister; Breast cancer (age of onset: 50) in his mother; Stroke in his father.   Social History: reports that he has been smoking. He does not  have any smokeless tobacco history on file. He reports that he does not drink alcohol.  Allergies: Patient has no known allergies.       Current Outpatient Medications:   •  amLODIPine (NORVASC) 5 MG tablet, Take 1 tablet by mouth Daily., Disp: 90 tablet, Rfl: 1  •  amoxicillin-clavulanate (Augmentin) 875-125 MG per tablet, Take 1 tablet by mouth 2 (Two) Times a Day., Disp: 29 tablet, Rfl: 0  •  aspirin 81 MG chewable tablet, , Disp: , Rfl:   •  Fluticasone Furoate (Arnuity Ellipta) 100 MCG/ACT aerosol powder , Inhale 1 puff Daily., Disp: 2 each, Rfl: 0  •  Fluticasone Furoate-Vilanterol (Breo Ellipta) 100-25 MCG/INH inhaler, Breo Ellipta 100-25 mcg/dose inhalation blister with device inhale 1 puff by inhalation route once daily at the same time each day   Active, Disp: , Rfl:   •  multivitamin with minerals (MENS MULTIVITAMIN PO), Men's Multivitamin 400- mcg oral tablet take 1 tablet by oral route daily   Active, Disp: , Rfl:      Physical exam       Alert and orient x3  Well appearing, well developed, in no acute distress   Unlabored respirations  Nontender/nondistended      Grossly oriented to person, place and time, judgment is intact, normal mood and affect    Results for orders placed or performed in visit on 09/01/21   PSA DIAGNOSTIC    Specimen: Blood   Result Value Ref Range    PSA <0.014 0.000 - 4.000 ng/mL        Objective     Vital Signs:   There were no vitals taken for this visit.             Assessment and Plan    Diagnoses and all orders for this visit:    1. Prostate cancer (CMS/[facility]) (Primary)    2. Stress incontinence of urine      PSA undetectable, patient given reassurance    Follow-up in 6 months with PSA, if that is okay we will go to yearly.

## 2021-09-21 ENCOUNTER — OFFICE VISIT (OUTPATIENT)
Dept: UROLOGY | Facility: CLINIC | Age: 71
End: 2021-09-21

## 2021-09-21 VITALS — BODY MASS INDEX: 23.64 KG/M2 | HEIGHT: 68 IN | WEIGHT: 156 LBS | RESPIRATION RATE: 17 BRPM

## 2021-09-21 DIAGNOSIS — C61 PROSTATE CANCER (HCC): Primary | ICD-10-CM

## 2021-09-21 DIAGNOSIS — N39.3 STRESS INCONTINENCE OF URINE: ICD-10-CM

## 2021-09-21 PROCEDURE — 99212 OFFICE O/P EST SF 10 MIN: CPT | Performed by: UROLOGY

## 2021-11-29 ENCOUNTER — OFFICE VISIT (OUTPATIENT)
Dept: FAMILY MEDICINE CLINIC | Facility: CLINIC | Age: 71
End: 2021-11-29

## 2021-11-29 VITALS
OXYGEN SATURATION: 95 % | TEMPERATURE: 97.4 F | DIASTOLIC BLOOD PRESSURE: 86 MMHG | WEIGHT: 156 LBS | HEIGHT: 68 IN | HEART RATE: 75 BPM | SYSTOLIC BLOOD PRESSURE: 147 MMHG | RESPIRATION RATE: 20 BRPM | BODY MASS INDEX: 23.64 KG/M2

## 2021-11-29 DIAGNOSIS — J44.9 CHRONIC OBSTRUCTIVE PULMONARY DISEASE, UNSPECIFIED COPD TYPE (HCC): Chronic | ICD-10-CM

## 2021-11-29 DIAGNOSIS — F17.219 CIGARETTE NICOTINE DEPENDENCE WITH NICOTINE-INDUCED DISORDER: Chronic | ICD-10-CM

## 2021-11-29 DIAGNOSIS — I10 ESSENTIAL HYPERTENSION: Primary | ICD-10-CM

## 2021-11-29 DIAGNOSIS — E78.00 HIGH CHOLESTEROL: ICD-10-CM

## 2021-11-29 PROBLEM — C61 PROSTATE CANCER: Chronic | Status: ACTIVE | Noted: 2021-07-12

## 2021-11-29 PROBLEM — I49.02: Chronic | Status: ACTIVE | Noted: 2021-07-12

## 2021-11-29 PROCEDURE — 99214 OFFICE O/P EST MOD 30 MIN: CPT | Performed by: FAMILY MEDICINE

## 2021-11-29 RX ORDER — AMLODIPINE BESYLATE 10 MG/1
10 TABLET ORAL DAILY
Qty: 90 TABLET | Refills: 1 | Status: SHIPPED | OUTPATIENT
Start: 2022-01-01 | End: 2021-11-29 | Stop reason: SDUPTHER

## 2021-11-29 RX ORDER — ALBUTEROL SULFATE 2.5 MG/3ML
2.5 SOLUTION RESPIRATORY (INHALATION) EVERY 4 HOURS PRN
Qty: 100 EACH | Refills: 12 | Status: SHIPPED | OUTPATIENT
Start: 2021-11-29 | End: 2021-11-29

## 2021-11-29 RX ORDER — AMLODIPINE BESYLATE 10 MG/1
10 TABLET ORAL DAILY
Qty: 30 TABLET | Refills: 0 | Status: SHIPPED | OUTPATIENT
Start: 2021-11-29 | End: 2022-03-29 | Stop reason: SDUPTHER

## 2021-11-29 RX ORDER — ALBUTEROL SULFATE 2.5 MG/3ML
2.5 SOLUTION RESPIRATORY (INHALATION) EVERY 4 HOURS PRN
Qty: 100 EACH | Refills: 12 | Status: SHIPPED | OUTPATIENT
Start: 2021-11-29

## 2021-11-29 RX ORDER — AMLODIPINE BESYLATE 10 MG/1
10 TABLET ORAL DAILY
Qty: 90 TABLET | Refills: 1 | Status: SHIPPED | OUTPATIENT
Start: 2021-11-29 | End: 2021-11-29

## 2021-11-29 NOTE — ASSESSMENT & PLAN NOTE
Tobacco use is unchanged.  Smoking cessation counseling was provided.  Tobacco use will be reassessed at the next regular appointment.

## 2021-11-29 NOTE — ASSESSMENT & PLAN NOTE
Lipid abnormalities are stable. He cannot tolerate statins.    Lipids will be reassessed in 6 months.    The 10-year ASCVD risk score (Kath OGLESBY Jr., et al., 2013) is: 31%    Values used to calculate the score:      Age: 71 years      Sex: Male      Is Non- : No      Diabetic: No      Tobacco smoker: Yes      Systolic Blood Pressure: 147 mmHg      Is BP treated: Yes      HDL Cholesterol: 55 mg/dL      Total Cholesterol: 218 mg/dL

## 2021-11-29 NOTE — PROGRESS NOTES
"Chief Complaint  Hypertension (feels like blood pressure has been running high, doesn't have a way to check it everyday)    Subjective          Colin Nevarez presents to [facility]  He is here today for management of his chronic medical conditions. He has a PMH significant for tobacco abuse, COPD, prostate cancer and chronic back pain. He is a smoker. He has smoked since he was 7 years old. He smokes a little less than one pack per day.    He is still smoking at today's visit.     He does not check his blood pressure at home.     He says that his breathing is slightly worse than a year ago. He does use his inhalers from time to time      The patient has no other complaints today and denies chest pain, shortness of breath, weakness, numbness, nausea, vomiting, diarrhea, dizziness or syncopal event.      Objective   Vital Signs:   /86 (BP Location: Left arm, Patient Position: Sitting)   Pulse 75   Temp 97.4 °F (36.3 °C) (Temporal)   Resp 20   Ht 172.7 cm (67.99\")   Wt 70.8 kg (156 lb)   SpO2 95%   BMI 23.73 kg/m²     Physical Exam  Vitals reviewed.   Constitutional:       Appearance: Normal appearance. He is well-developed.   HENT:      Head: Normocephalic and atraumatic.      Right Ear: External ear normal.      Left Ear: External ear normal.      Mouth/Throat:      Pharynx: No oropharyngeal exudate.   Eyes:      Conjunctiva/sclera: Conjunctivae normal.      Pupils: Pupils are equal, round, and reactive to light.   Neck:      Vascular: No carotid bruit.   Cardiovascular:      Rate and Rhythm: Normal rate and regular rhythm.      Heart sounds: No murmur heard.  No friction rub. No gallop.    Pulmonary:      Breath sounds: Decreased air movement present. Examination of the right-upper field reveals decreased breath sounds. Examination of the left-upper field reveals decreased breath sounds. Examination of the right-middle field reveals decreased breath sounds. Examination " of the left-middle field reveals decreased breath sounds. Examination of the right-lower field reveals decreased breath sounds. Examination of the left-lower field reveals decreased breath sounds. Decreased breath sounds present.   Abdominal:      General: There is no distension.   Skin:     General: Skin is warm and dry.   Neurological:      Mental Status: He is alert and oriented to person, place, and time.      Cranial Nerves: No cranial nerve deficit.      Motor: No weakness.   Psychiatric:         Mood and Affect: Mood and affect normal.         Behavior: Behavior normal.         Thought Content: Thought content normal.         Judgment: Judgment normal.        Result Review :     CMP    CMP 3/19/21   Glucose 106 (A)   BUN 11   Creatinine 0.79   Sodium 140   Potassium 4.8   Chloride 101   Calcium 10.1   Albumin 4.4   Total Bilirubin 0.32   Alkaline Phosphatase 81   AST (SGOT) 21   ALT (SGPT) 18   (A) Abnormal value            CBC    CBC 3/19/21   WBC 7.28   RBC 6.11 (A)   Hemoglobin 18.5 (A)   Hematocrit 54.7 (A)   MCV 89.5   MCH 30.3   MCHC 33.8   RDW 14.0   Platelets 207   (A) Abnormal value            Lipid Panel    Lipid Panel 3/19/21   Total Cholesterol 218 (A)   Triglycerides 64   HDL Cholesterol 55   VLDL Cholesterol 13   LDL Cholesterol  150 (A)   (A) Abnormal value       Comments are available for some flowsheets but are not being displayed.           TSH    TSH 3/19/21   TSH 1.280                     Assessment and Plan    Diagnoses and all orders for this visit:    1. Essential hypertension (Primary)  Assessment & Plan:  Hypertension is unchanged.  Medication changes per orders.  Blood pressure will be reassessed at the next regular appointment.      2. High cholesterol  Assessment & Plan:  Lipid abnormalities are stable. He cannot tolerate statins.    Lipids will be reassessed in 6 months.    The 10-year ASCVD risk score (Stanton MENDEL Jr., et al., 2013) is: 31%    Values used to calculate the score:       Age: 71 years      Sex: Male      Is Non- : No      Diabetic: No      Tobacco smoker: Yes      Systolic Blood Pressure: 147 mmHg      Is BP treated: Yes      HDL Cholesterol: 55 mg/dL      Total Cholesterol: 218 mg/dL        3. Chronic obstructive pulmonary disease, unspecified COPD type (HCC)  Assessment & Plan:  COPD is improving with treatment.  Continue current medications.          4. Cigarette nicotine dependence with nicotine-induced disorder  Assessment & Plan:  Tobacco use is unchanged.  Smoking cessation counseling was provided.  Tobacco use will be reassessed at the next regular appointment.      Other orders  -     Discontinue: amLODIPine (NORVASC) 10 MG tablet; Take 1 tablet by mouth Daily.  Dispense: 90 tablet; Refill: 1  -     amLODIPine (NORVASC) 10 MG tablet; Take 1 tablet by mouth Daily.  Dispense: 30 tablet; Refill: 0  -     Discontinue: amLODIPine (NORVASC) 10 MG tablet; Take 1 tablet by mouth Daily.  Dispense: 90 tablet; Refill: 1  -     Discontinue: albuterol (PROVENTIL) (2.5 MG/3ML) 0.083% nebulizer solution; Take 2.5 mg by nebulization Every 4 (Four) Hours As Needed for Wheezing.  Dispense: 100 each; Refill: 12  -     albuterol (PROVENTIL) (2.5 MG/3ML) 0.083% nebulizer solution; Take 2.5 mg by nebulization Every 4 (Four) Hours As Needed for Wheezing.  Dispense: 100 each; Refill: 12      Follow Up   Return in about 3 months (around 2/28/2022).  Patient was given instructions and counseling regarding his condition or for health maintenance advice. Please see specific information pulled into the AVS if appropriate.

## 2022-03-29 ENCOUNTER — TRANSCRIBE ORDERS (OUTPATIENT)
Dept: LAB | Facility: HOSPITAL | Age: 72
End: 2022-03-29

## 2022-03-29 ENCOUNTER — OFFICE VISIT (OUTPATIENT)
Dept: FAMILY MEDICINE CLINIC | Facility: CLINIC | Age: 72
End: 2022-03-29

## 2022-03-29 ENCOUNTER — LAB (OUTPATIENT)
Dept: LAB | Facility: HOSPITAL | Age: 72
End: 2022-03-29

## 2022-03-29 VITALS
TEMPERATURE: 97.8 F | HEART RATE: 73 BPM | HEIGHT: 68 IN | BODY MASS INDEX: 23.45 KG/M2 | SYSTOLIC BLOOD PRESSURE: 147 MMHG | RESPIRATION RATE: 18 BRPM | OXYGEN SATURATION: 96 % | DIASTOLIC BLOOD PRESSURE: 75 MMHG | WEIGHT: 154.7 LBS

## 2022-03-29 DIAGNOSIS — F17.219 CIGARETTE NICOTINE DEPENDENCE WITH NICOTINE-INDUCED DISORDER: Chronic | ICD-10-CM

## 2022-03-29 DIAGNOSIS — Z12.11 ENCOUNTER FOR SCREENING FOR MALIGNANT NEOPLASM OF COLON: ICD-10-CM

## 2022-03-29 DIAGNOSIS — S40.869A TICK BITE OF AXILLARY REGION, UNSPECIFIED LATERALITY, INITIAL ENCOUNTER: Primary | ICD-10-CM

## 2022-03-29 DIAGNOSIS — E78.00 HIGH CHOLESTEROL: ICD-10-CM
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
## 2022-03-29 DIAGNOSIS — I10 ESSENTIAL HYPERTENSION: ICD-10-CM

## 2022-03-29 DIAGNOSIS — S40.869A TICK BITE OF AXILLARY REGION, UNSPECIFIED LATERALITY, INITIAL ENCOUNTER: ICD-10-CM

## 2022-03-29 DIAGNOSIS — I10 ESSENTIAL HYPERTENSION: Primary | Chronic | ICD-10-CM
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
## 2022-03-29 LAB
ALBUMIN SERPL-MCNC: 4.8 G/DL (ref 3.5–5.2)
ALBUMIN/GLOB SERPL: 1.7 G/DL
ALP SERPL-CCNC: 102 U/L (ref 39–117)
ALT SERPL W P-5'-P-CCNC: 18 U/L (ref 1–41)
ANION GAP SERPL CALCULATED.3IONS-SCNC: 12 MMOL/L (ref 5–15)
AST SERPL-CCNC: 19 U/L (ref 1–40)
BASOPHILS # BLD AUTO: 0.06 10*3/MM3 (ref 0–0.2)
BASOPHILS NFR BLD AUTO: 0.7 % (ref 0–1.5)
BILIRUB SERPL-MCNC: 0.4 MG/DL (ref 0–1.2)
BUN SERPL-MCNC: 11 MG/DL (ref 8–23)
BUN/CREAT SERPL: 15.3 (ref 7–25)
CALCIUM SPEC-SCNC: 10 MG/DL (ref 8.6–10.5)
CHLORIDE SERPL-SCNC: 99 MMOL/L (ref 98–107)
CHOLEST SERPL-MCNC: 240 MG/DL (ref 0–200)
CO2 SERPL-SCNC: 26 MMOL/L (ref 22–29)
CREAT SERPL-MCNC: 0.72 MG/DL (ref 0.76–1.27)
DEPRECATED RDW RBC AUTO: 43 FL (ref 37–54)
EGFRCR SERPLBLD CKD-EPI 2021: 98.3 ML/MIN/1.73
EOSINOPHIL # BLD AUTO: 0.11 10*3/MM3 (ref 0–0.4)
EOSINOPHIL NFR BLD AUTO: 1.2 % (ref 0.3–6.2)
ERYTHROCYTE [DISTWIDTH] IN BLOOD BY AUTOMATED COUNT: 13.4 % (ref 12.3–15.4)
GLOBULIN UR ELPH-MCNC: 2.9 GM/DL
GLUCOSE SERPL-MCNC: 90 MG/DL (ref 65–99)
HCT VFR BLD AUTO: 53.8 % (ref 37.5–51)
HDLC SERPL-MCNC: 54 MG/DL (ref 40–60)
HGB BLD-MCNC: 18.2 G/DL (ref 13–17.7)
IMM GRANULOCYTES # BLD AUTO: 0.03 10*3/MM3 (ref 0–0.05)
IMM GRANULOCYTES NFR BLD AUTO: 0.3 % (ref 0–0.5)
LDLC SERPL CALC-MCNC: 168 MG/DL (ref 0–100)
LDLC/HDLC SERPL: 3.07 {RATIO}
LYMPHOCYTES # BLD AUTO: 2.87 10*3/MM3 (ref 0.7–3.1)
LYMPHOCYTES NFR BLD AUTO: 31.1 % (ref 19.6–45.3)
MCH RBC QN AUTO: 30 PG (ref 26.6–33)
MCHC RBC AUTO-ENTMCNC: 33.8 G/DL (ref 31.5–35.7)
MCV RBC AUTO: 88.6 FL (ref 79–97)
MONOCYTES # BLD AUTO: 0.77 10*3/MM3 (ref 0.1–0.9)
MONOCYTES NFR BLD AUTO: 8.4 % (ref 5–12)
NEUTROPHILS NFR BLD AUTO: 5.38 10*3/MM3 (ref 1.7–7)
NEUTROPHILS NFR BLD AUTO: 58.3 % (ref 42.7–76)
NRBC BLD AUTO-RTO: 0 /100 WBC (ref 0–0.2)
PLATELET # BLD AUTO: 237 10*3/MM3 (ref 140–450)
PMV BLD AUTO: 11.4 FL (ref 6–12)
POTASSIUM SERPL-SCNC: 4.1 MMOL/L (ref 3.5–5.2)
PROT SERPL-MCNC: 7.7 G/DL (ref 6–8.5)
RBC # BLD AUTO: 6.07 10*6/MM3 (ref 4.14–5.8)
SODIUM SERPL-SCNC: 137 MMOL/L (ref 136–145)
TRIGL SERPL-MCNC: 100 MG/DL (ref 0–150)
TSH SERPL DL<=0.05 MIU/L-ACNC: 1.89 UIU/ML (ref 0.27–4.2)
VLDLC SERPL-MCNC: 18 MG/DL (ref 5–40)
WBC NRBC COR # BLD: 9.22 10*3/MM3 (ref 3.4–10.8)

## 2022-03-29 PROCEDURE — 80061 LIPID PANEL: CPT

## 2022-03-29 PROCEDURE — 99214 OFFICE O/P EST MOD 30 MIN: CPT | Performed by: FAMILY MEDICINE

## 2022-03-29 PROCEDURE — 84443 ASSAY THYROID STIM HORMONE: CPT

## 2022-03-29 PROCEDURE — 86618 LYME DISEASE ANTIBODY: CPT

## 2022-03-29 PROCEDURE — 36415 COLL VENOUS BLD VENIPUNCTURE: CPT

## 2022-03-29 PROCEDURE — 85025 COMPLETE CBC W/AUTO DIFF WBC: CPT

## 2022-03-29 PROCEDURE — 80053 COMPREHEN METABOLIC PANEL: CPT

## 2022-03-29 RX ORDER — AMLODIPINE BESYLATE 10 MG/1
10 TABLET ORAL DAILY
Qty: 90 TABLET | Refills: 1 | Status: SHIPPED | OUTPATIENT
Start: 2022-03-29 | End: 2022-10-17 | Stop reason: SDUPTHER

## 2022-03-29 NOTE — PROGRESS NOTES
"Chief Complaint  Labs Only (Needs yearly labs ) and colonosopy referral    Subjective          Colin Nevarez presents to [facility]  He is here today for management of his chronic medical conditions. He has tobacco abuse disorder, COPD, prostate cancer and chronic back pain. He is a smoker. He has smoked since he was 7 years old. He smokes a little less than one pack per day.     He is still smoking at today's visit.      He does not check his blood pressure at home.      He says that his breathing is better than a year ago with his current inhalters. He uses his inhalers from time to time      The patient has no other complaints today and denies chest pain, shortness of breath, weakness, numbness, nausea, vomiting, diarrhea, dizziness or syncopal event.      Objective   Vital Signs:   /75 (BP Location: Left arm, Patient Position: Sitting)   Pulse 73   Temp 97.8 °F (36.6 °C)   Resp 18   Ht 172.7 cm (67.99\")   Wt 70.2 kg (154 lb 11.2 oz)   SpO2 96%   BMI 23.53 kg/m²     BMI is within normal parameters. No follow-up required.      Physical Exam  Vitals reviewed.   Constitutional:       Appearance: Normal appearance. He is well-developed.   HENT:      Head: Normocephalic and atraumatic.      Right Ear: External ear normal.      Left Ear: External ear normal.      Mouth/Throat:      Pharynx: No oropharyngeal exudate.   Eyes:      Conjunctiva/sclera: Conjunctivae normal.      Pupils: Pupils are equal, round, and reactive to light.   Neck:      Vascular: No carotid bruit.   Cardiovascular:      Rate and Rhythm: Normal rate and regular rhythm.      Heart sounds: No murmur heard.    No friction rub. No gallop.   Pulmonary:      Effort: Pulmonary effort is normal.      Breath sounds: Normal breath sounds. No wheezing or rhonchi.   Abdominal:      General: There is no distension.   Skin:     General: Skin is warm and dry.   Neurological:      Mental Status: He is alert and " oriented to person, place, and time.      Cranial Nerves: No cranial nerve deficit.      Motor: No weakness.   Psychiatric:         Mood and Affect: Mood and affect normal.         Behavior: Behavior normal.         Thought Content: Thought content normal.         Judgment: Judgment normal.        Result Review :                               Assessment and Plan    Diagnoses and all orders for this visit:    1. Essential hypertension (Primary)  Assessment & Plan:  Hypertension is improving with treatment.  Continue current treatment regimen.  Dietary sodium restriction.  Blood pressure will be reassessed at the next regular appointment.    Orders:  -     Comprehensive Metabolic Panel; Future  -     CBC & Differential; Future  -     TSH; Future    2. Encounter for screening for malignant neoplasm of colon  -     Ambulatory Referral For Screening Colonoscopy    3. High cholesterol  Assessment & Plan:  Lipid abnormalities are improving with lifestyle modifications.  Nutritional counseling was provided.  Lipids will be reassessed in 6 months.    Orders:  -     Lipid Panel; Future    4. Cigarette nicotine dependence with nicotine-induced disorder  Assessment & Plan:  Tobacco use is unchanged.  Smoking cessation counseling was provided.  Tobacco use will be reassessed at the next regular appointment.      Other orders  -     amLODIPine (NORVASC) 10 MG tablet; Take 1 tablet by mouth Daily.  Dispense: 90 tablet; Refill: 1      Follow Up   Return in about 6 months (around 9/29/2022).  Patient was given instructions and counseling regarding his condition or for health maintenance advice. Please see specific information pulled into the AVS if appropriate.

## 2022-03-30 LAB
B BURGDOR IGG SER QL: NEGATIVE
B BURGDOR IGM SER QL: NEGATIVE

## 2022-04-26 ENCOUNTER — OFFICE VISIT (OUTPATIENT)
Dept: FAMILY MEDICINE CLINIC | Facility: CLINIC | Age: 72
End: 2022-04-26

## 2022-04-26 VITALS
WEIGHT: 149 LBS | TEMPERATURE: 97.3 F | BODY MASS INDEX: 22.58 KG/M2 | OXYGEN SATURATION: 92 % | RESPIRATION RATE: 16 BRPM | DIASTOLIC BLOOD PRESSURE: 80 MMHG | HEIGHT: 68 IN | SYSTOLIC BLOOD PRESSURE: 150 MMHG | HEART RATE: 87 BPM

## 2022-04-26 DIAGNOSIS — J20.8 ACUTE BACTERIAL BRONCHITIS: Primary | ICD-10-CM

## 2022-04-26 DIAGNOSIS — J44.9 CHRONIC OBSTRUCTIVE PULMONARY DISEASE, UNSPECIFIED COPD TYPE: Chronic | ICD-10-CM

## 2022-04-26 DIAGNOSIS — B96.89 ACUTE BACTERIAL BRONCHITIS: Primary | ICD-10-CM

## 2022-04-26 DIAGNOSIS — I10 ESSENTIAL HYPERTENSION: Chronic | ICD-10-CM

## 2022-04-26 DIAGNOSIS — F17.219 CIGARETTE NICOTINE DEPENDENCE WITH NICOTINE-INDUCED DISORDER: Chronic | ICD-10-CM

## 2022-04-26 PROCEDURE — 99214 OFFICE O/P EST MOD 30 MIN: CPT | Performed by: FAMILY MEDICINE

## 2022-04-26 RX ORDER — CEFDINIR 300 MG/1
300 CAPSULE ORAL 2 TIMES DAILY
Qty: 20 CAPSULE | Refills: 0 | Status: SHIPPED | OUTPATIENT
Start: 2022-04-26 | End: 2022-07-14

## 2022-05-03 PROBLEM — J20.8 ACUTE BACTERIAL BRONCHITIS: Status: ACTIVE | Noted: 2022-05-03

## 2022-05-03 PROBLEM — B96.89 ACUTE BACTERIAL BRONCHITIS: Status: ACTIVE | Noted: 2022-05-03

## 2022-05-03 NOTE — ASSESSMENT & PLAN NOTE
COPD is worsening.  Counseled to avoid exposure to cigarette smoke.  Continue current medications.

## 2022-05-03 NOTE — ASSESSMENT & PLAN NOTE
The patient was encouraged to stop smoking.  He was encouraged to use his inhalers as directed.  Patient was given a prescription today of cefdinir to be taken as directed.  He was told for symptoms not improved to go to acute care ER.

## 2022-07-14 ENCOUNTER — OFFICE VISIT (OUTPATIENT)
Dept: FAMILY MEDICINE CLINIC | Facility: CLINIC | Age: 72
End: 2022-07-14

## 2022-07-14 VITALS
DIASTOLIC BLOOD PRESSURE: 69 MMHG | SYSTOLIC BLOOD PRESSURE: 125 MMHG | RESPIRATION RATE: 18 BRPM | TEMPERATURE: 98.2 F | HEART RATE: 70 BPM | HEIGHT: 70 IN | WEIGHT: 146 LBS | OXYGEN SATURATION: 95 % | BODY MASS INDEX: 20.9 KG/M2

## 2022-07-14 DIAGNOSIS — R31.9 HEMATURIA, UNSPECIFIED TYPE: ICD-10-CM

## 2022-07-14 DIAGNOSIS — J44.9 CHRONIC OBSTRUCTIVE PULMONARY DISEASE, UNSPECIFIED COPD TYPE: Chronic | ICD-10-CM

## 2022-07-14 DIAGNOSIS — I10 ESSENTIAL HYPERTENSION: Chronic | ICD-10-CM

## 2022-07-14 DIAGNOSIS — Z00.00 MEDICARE ANNUAL WELLNESS VISIT, SUBSEQUENT: Primary | ICD-10-CM

## 2022-07-14 DIAGNOSIS — F17.219 CIGARETTE NICOTINE DEPENDENCE WITH NICOTINE-INDUCED DISORDER: Chronic | ICD-10-CM

## 2022-07-14 PROBLEM — B96.89 ACUTE BACTERIAL BRONCHITIS: Status: RESOLVED | Noted: 2022-05-03 | Resolved: 2022-07-14

## 2022-07-14 PROBLEM — J20.8 ACUTE BACTERIAL BRONCHITIS: Status: RESOLVED | Noted: 2022-05-03 | Resolved: 2022-07-14

## 2022-07-14 LAB
BACTERIA UR QL AUTO: ABNORMAL /HPF
BILIRUB BLD-MCNC: NEGATIVE MG/DL
BILIRUB UR QL STRIP: NEGATIVE
CLARITY UR: CLEAR
CLARITY, POC: CLEAR
COLOR UR: YELLOW
COLOR UR: YELLOW
EXPIRATION DATE: ABNORMAL
GLUCOSE UR STRIP-MCNC: NEGATIVE MG/DL
GLUCOSE UR STRIP-MCNC: NEGATIVE MG/DL
HGB UR QL STRIP.AUTO: NEGATIVE
HYALINE CASTS UR QL AUTO: ABNORMAL /LPF
KETONES UR QL STRIP: NEGATIVE
KETONES UR QL: NEGATIVE
LEUKOCYTE EST, POC: NEGATIVE
LEUKOCYTE ESTERASE UR QL STRIP.AUTO: NEGATIVE
Lab: ABNORMAL
NITRITE UR QL STRIP: NEGATIVE
NITRITE UR-MCNC: NEGATIVE MG/ML
PH UR STRIP.AUTO: 7.5 [PH] (ref 5–8)
PH UR: 7.5 [PH] (ref 5–8)
PROT UR QL STRIP: ABNORMAL
PROT UR STRIP-MCNC: NEGATIVE MG/DL
RBC # UR STRIP: ABNORMAL /HPF
RBC # UR STRIP: ABNORMAL /UL
REF LAB TEST METHOD: ABNORMAL
SP GR UR STRIP: 1.01 (ref 1–1.03)
SP GR UR: 1.02 (ref 1–1.03)
SQUAMOUS #/AREA URNS HPF: ABNORMAL /HPF
UROBILINOGEN UR QL STRIP: ABNORMAL
UROBILINOGEN UR QL: NORMAL
WBC # UR STRIP: ABNORMAL /HPF

## 2022-07-14 PROCEDURE — 87086 URINE CULTURE/COLONY COUNT: CPT | Performed by: FAMILY MEDICINE

## 2022-07-14 PROCEDURE — 1170F FXNL STATUS ASSESSED: CPT | Performed by: FAMILY MEDICINE

## 2022-07-14 PROCEDURE — 99213 OFFICE O/P EST LOW 20 MIN: CPT | Performed by: FAMILY MEDICINE

## 2022-07-14 PROCEDURE — 81001 URINALYSIS AUTO W/SCOPE: CPT | Performed by: FAMILY MEDICINE

## 2022-07-14 PROCEDURE — 81003 URINALYSIS AUTO W/O SCOPE: CPT | Performed by: FAMILY MEDICINE

## 2022-07-14 PROCEDURE — 1159F MED LIST DOCD IN RCRD: CPT | Performed by: FAMILY MEDICINE

## 2022-07-14 PROCEDURE — G0439 PPPS, SUBSEQ VISIT: HCPCS | Performed by: FAMILY MEDICINE

## 2022-07-14 PROCEDURE — 1126F AMNT PAIN NOTED NONE PRSNT: CPT | Performed by: FAMILY MEDICINE

## 2022-07-14 NOTE — PROGRESS NOTES
The ABCs of the Annual Wellness Visit  Subsequent Medicare Wellness Visit    Chief Complaint   Patient presents with   • Shortness of Breath   • Blood in Urine     Urine frequency    • Medicare Wellness-subsequent      Subjective    History of Present Illness:  Colin Nevarez is a 72 y.o. male who presents for a Subsequent Medicare Wellness Visit.    The following portions of the patient's history were reviewed and   updated as appropriate: allergies, current medications, past family history, past medical history, past social history, past surgical history and problem list.    Compared to one year ago, the patient feels his physical   health is better.    Compared to one year ago, the patient feels his mental   health is better.    Recent Hospitalizations:  He was not admitted to the hospital during the last year.       Current Medical Providers:  Patient Care Team:  Liz Roper DO as PCP - General (Family Medicine)    Outpatient Medications Prior to Visit   Medication Sig Dispense Refill   • albuterol (PROVENTIL) (2.5 MG/3ML) 0.083% nebulizer solution Take 2.5 mg by nebulization Every 4 (Four) Hours As Needed for Wheezing. 100 each 12   • amLODIPine (NORVASC) 10 MG tablet Take 1 tablet by mouth Daily. 90 tablet 1   • aspirin 81 MG chewable tablet      • Fluticasone-Umeclidin-Vilant (TRELEGY ELLIPTA IN) Inhale.     • guaiFENesin (ROBITUSSIN) 100 MG/5ML syrup Take 200 mg by mouth 3 (Three) Times a Day As Needed for Cough.     • multivitamin with minerals tablet tablet Men's Multivitamin 400- mcg oral tablet take 1 tablet by oral route daily   Active     • cefdinir (OMNICEF) 300 MG capsule Take 1 capsule by mouth 2 (Two) Times a Day. 20 capsule 0     No facility-administered medications prior to visit.       No opioid medication identified on active medication list. I have reviewed chart for other potential  high risk medication/s and harmful drug interactions in the elderly.          Aspirin is on active  "medication list. Aspirin use is indicated based on review of current medical condition/s. Pros and cons of this therapy have been discussed today. Benefits of this medication outweigh potential harm.  Patient has been encouraged to continue taking this medication.  .      Patient Active Problem List   Diagnosis   • Arthritis   • Chronic obstructive pulmonary disease (HCC)   • Heart flutter, ventricular (HCC)   • High cholesterol   • Prostate cancer (HCC)   • Essential hypertension   • Stress incontinence of urine   • Cigarette nicotine dependence with nicotine-induced disorder   • Hematuria   • Medicare annual wellness visit, subsequent     Advance Care Planning  Advance Directive is not on file.  ACP discussion was held with the patient during this visit. Patient has an advance directive (not in EMR), copy requested.    Review of Systems   HENT: Negative for trouble swallowing.    Eyes: Negative for visual disturbance.   Respiratory: Negative for apnea.    Cardiovascular: Negative for chest pain.   Gastrointestinal: Negative for blood in stool.   Endocrine: Negative for polyphagia.   Genitourinary: Negative for dysuria.   Skin: Negative for color change.   Allergic/Immunologic: Negative for immunocompromised state.   Neurological: Negative for seizures.   Hematological: Negative for adenopathy.   Psychiatric/Behavioral: Negative for behavioral problems.        Objective    Vitals:    07/14/22 0817   BP: 125/69   BP Location: Left arm   Patient Position: Sitting   Pulse: 70   Resp: 18   Temp: 98.2 °F (36.8 °C)   SpO2: 95%   Weight: 66.2 kg (146 lb)   Height: 177.8 cm (70\")   PainSc: 0-No pain     Estimated body mass index is 20.95 kg/m² as calculated from the following:    Height as of this encounter: 177.8 cm (70\").    Weight as of this encounter: 66.2 kg (146 lb).    BMI is within normal parameters. No other follow-up for BMI required.      Does the patient have evidence of cognitive impairment? No    Physical " Exam  Vitals reviewed.   Constitutional:       Appearance: Normal appearance. He is well-developed.   HENT:      Head: Normocephalic and atraumatic.      Right Ear: External ear normal.      Left Ear: External ear normal.      Mouth/Throat:      Pharynx: No oropharyngeal exudate.   Eyes:      Conjunctiva/sclera: Conjunctivae normal.      Pupils: Pupils are equal, round, and reactive to light.   Neck:      Vascular: No carotid bruit.   Cardiovascular:      Rate and Rhythm: Normal rate and regular rhythm.      Heart sounds: No murmur heard.    No friction rub. No gallop.   Pulmonary:      Breath sounds: Decreased air movement present. No wheezing or rhonchi.   Abdominal:      General: There is no distension.   Skin:     General: Skin is warm and dry.   Neurological:      Mental Status: He is alert and oriented to person, place, and time.      Cranial Nerves: No cranial nerve deficit.      Motor: No weakness.   Psychiatric:         Mood and Affect: Mood and affect normal.         Behavior: Behavior normal.         Thought Content: Thought content normal.         Judgment: Judgment normal.                 HEALTH RISK ASSESSMENT    Smoking Status:  Social History     Tobacco Use   Smoking Status Current Every Day Smoker   • Packs/day: 1.00   • Types: Cigarettes   Smokeless Tobacco Never Used     Alcohol Consumption:  Social History     Substance and Sexual Activity   Alcohol Use Never     Fall Risk Screen:    [facility] Fall Risk Assessment was completed, and patient is at MODERATE risk for falls. Assessment completed on:3/29/2022    Depression Screening:  PHQ-2/PHQ-9 Depression Screening 7/14/2022   Retired PHQ-9 Total Score -   Retired Total Score -   Little Interest or Pleasure in Doing Things 1-->several days   Feeling Down, Depressed or Hopeless 1-->several days   Trouble Falling or Staying Asleep, or Sleeping Too Much 1-->several days   Feeling Tired or Having Little Energy 1-->several days   Poor Appetite or  Overeating 0-->not at all   Feeling Bad about Yourself - or that You are a Failure or Have Let Yourself or Your Family Down 0-->not at all   Trouble Concentrating on Things, Such as Reading the Newspaper or Watching Television 0-->not at all   Moving or Speaking So Slowly that Other People Could Have Noticed? Or the Opposite - Being So Fidgety 0-->not at all   Thoughts that You Would be Better Off Dead or of Hurting Yourself in Some Way 0-->not at all   PHQ-9: Brief Depression Severity Measure Score 4   If You Checked Off Any Problems, How Difficult Have These Problems Made It For You to Do Your Work, Take Care of Things at Home, or Get Along with Other People? not difficult at all       Health Habits and Functional and Cognitive Screening:  Functional & Cognitive Status 7/14/2022   Do you have difficulty preparing food and eating? No   Do you have difficulty bathing yourself, getting dressed or grooming yourself? No   Do you have difficulty using the toilet? No   Do you have difficulty moving around from place to place? No   Do you have trouble with steps or getting out of a bed or a chair? No   Current Diet Other   Dental Exam Not up to date   Eye Exam Not up to date   Exercise (times per week) 5 times per week   Current Exercises Include Yard Work   Do you need help using the phone?  No   Are you deaf or do you have serious difficulty hearing?  Yes   Do you need help with transportation? No   Do you need help shopping? No   Do you need help preparing meals?  No   Do you need help with housework?  No   Do you need help with laundry? No   Do you need help taking your medications? No   Do you need help managing money? No   Do you ever drive or ride in a car without wearing a seat belt? Yes   Have you felt unusual stress, anger or loneliness in the last month? Yes   Who do you live with? Alone   If you need help, do you have trouble finding someone available to you? Yes   Have you been bothered in the last four weeks  by sexual problems? No   Do you have difficulty concentrating, remembering or making decisions? No       Age-appropriate Screening Schedule:  Refer to the list below for future screening recommendations based on patient's age, sex and/or medical conditions. Orders for these recommended tests are listed in the plan section. The patient has been provided with a written plan.    Health Maintenance   Topic Date Due   • TDAP/TD VACCINES (1 - Tdap) 11/29/2022 (Originally 5/6/1969)   • ZOSTER VACCINE (1 of 2) 11/29/2022 (Originally 5/6/2001)   • INFLUENZA VACCINE  10/01/2022   • LIPID PANEL  03/29/2023              Assessment & Plan   CMS Preventative Services Quick Reference  Risk Factors Identified During Encounter  None Identified  The above risks/problems have been discussed with the patient.  Follow up actions/plans if indicated are seen below in the Assessment/Plan Section.  Pertinent information has been shared with the patient in the After Visit Summary.    Diagnoses and all orders for this visit:    1. Medicare annual wellness visit, subsequent (Primary)    2. Hematuria, unspecified type  -     POCT urinalysis dipstick, automated  -     Urine Culture - Urine, Urine, Clean Catch; Future  -     Urinalysis With Microscopic - Urine, Clean Catch; Future  -     Ambulatory Referral to Urology    3. Cigarette nicotine dependence with nicotine-induced disorder  Assessment & Plan:  Tobacco use is unchanged.  Smoking cessation counseling was provided.  Tobacco use will be reassessed at the next regular appointment.    Orders:  -     CT Chest Low Dose Wo; Future    4. Chronic obstructive pulmonary disease, unspecified COPD type (HCC)  Assessment & Plan:  COPD is improving with treatment.  COPD information handout given.          5. Essential hypertension  Assessment & Plan:  Hypertension is improving with treatment.  Continue current treatment regimen.  Dietary sodium restriction.  Blood pressure will be reassessed at the  next regular appointment.        Follow Up:   No follow-ups on file.     An After Visit Summary and PPPS were made available to the patient.

## 2022-07-16 LAB — BACTERIA SPEC AEROBE CULT: ABNORMAL

## 2022-07-18 ENCOUNTER — TELEPHONE (OUTPATIENT)
Dept: FAMILY MEDICINE CLINIC | Facility: CLINIC | Age: 72
End: 2022-07-18

## 2022-07-18 RX ORDER — NITROFURANTOIN 25; 75 MG/1; MG/1
100 CAPSULE ORAL 2 TIMES DAILY
Qty: 10 CAPSULE | Refills: 0 | Status: SHIPPED | OUTPATIENT
Start: 2022-07-18 | End: 2023-01-12

## 2022-07-18 NOTE — TELEPHONE ENCOUNTER
Patient called back, stated he had a missed call from office. Let patient know of lab results and prescriptions that were sent. Also let him know Dr. Rodriguez office called to schedule appointment. Gave their office number to patient

## 2022-07-19 ENCOUNTER — LAB (OUTPATIENT)
Dept: LAB | Facility: HOSPITAL | Age: 72
End: 2022-07-19
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
## 2022-07-19 DIAGNOSIS — S40.869A TICK BITE OF AXILLARY REGION, UNSPECIFIED LATERALITY, INITIAL ENCOUNTER: ICD-10-CM

## 2022-07-19 DIAGNOSIS — C61 PROSTATE CANCER: Primary | ICD-10-CM

## 2022-07-19 DIAGNOSIS — C61 PROSTATE CANCER: ICD-10-CM

## 2022-07-19 PROCEDURE — 36415 COLL VENOUS BLD VENIPUNCTURE: CPT

## 2022-07-19 PROCEDURE — 84153 ASSAY OF PSA TOTAL: CPT

## 2022-07-19 PROCEDURE — 86757 RICKETTSIA ANTIBODY: CPT

## 2022-07-20 LAB — PSA SERPL-MCNC: <0.014 NG/ML (ref 0–4)

## 2022-07-21 ENCOUNTER — TELEPHONE (OUTPATIENT)
Dept: FAMILY MEDICINE CLINIC | Facility: CLINIC | Age: 72
End: 2022-07-21

## 2022-07-21 DIAGNOSIS — I71.20 THORACIC AORTIC ANEURYSM WITHOUT RUPTURE: Primary | ICD-10-CM

## 2022-07-23 LAB — R RICKETTSI IGM SER-ACNC: 0.42 INDEX (ref 0–0.89)

## 2022-07-25 ENCOUNTER — TELEPHONE (OUTPATIENT)
Dept: FAMILY MEDICINE CLINIC | Facility: CLINIC | Age: 72
End: 2022-07-25

## 2022-07-25 ENCOUNTER — APPOINTMENT (OUTPATIENT)
Dept: CT IMAGING | Facility: HOSPITAL | Age: 72
End: 2022-07-25

## 2022-07-28 LAB
R RICKETTSI IGG SER QL IA: POSITIVE
R RICKETTSI IGG TITR SER IF: NORMAL {TITER}

## 2022-08-07 NOTE — PROGRESS NOTES
Chief Complaint    Urologic complaint    Subjective          Colin Nevarez presents to [facility]  History of Present Illness     72-year-old gentleman       status post  RALPw LND  20 , pT2 N0 R1  Gross  Hematuria  With recent UTI      Patient had a UTI last month treated by his primary care, had a little bit of gross hematuria this cleared up with antibiotics.  Just minimal dysuria this is all cleared up.    No other UTIs    Voids without issue.  Minimal incontinence, no pads.    Not worried about erections.      PVR         33      Previous    No history of kidney stone.    Patient had a ruptured appendix in .  Still has a large midline incision.    No urologic family history    No CAD, No COPD.  smokes 1 ppd.  ASA 81  Mom dies of CA at 50.  Dad  at 60 of CVA.      0.81, GFR >60      Results for orders placed or performed in visit on 22   Bladder Scan   Result Value Ref Range    Urine Volume 33ml    POC Urinalysis Dipstick, Automated    Specimen: Urine   Result Value Ref Range    Color Yellow Yellow, Straw, Dark Yellow, Ying    Clarity, UA Clear Clear    Specific Gravity  1.010 1.005 - 1.030    pH, Urine 6.0 5.0 - 8.0    Leukocytes Negative Negative    Nitrite, UA Negative Negative    Protein, POC Negative Negative mg/dL    Glucose, UA Negative Negative mg/dL    Ketones, UA Negative Negative    Urobilinogen, UA Normal Normal    Bilirubin Negative Negative    Blood, UA Negative Negative    Lot Number [id number]     Expiration Date 82,023          Prostate CA       <0.014         <0.014  3/21    <0.01  10/20  <0.01      RALP w LND  3+4, 4+3 5 to 10%, tertiary pattern 5 5% overall [place] score 7, margins involved, right apical, right anterior, right posterior, 7 lymph nodes negative    pT2 N0 R1    2020 MRI fusion prostate biopsy  Region of interest - 3+3, multiple cores, 22%  Right base3+3, 2/2, 17%  Right apex3+3, 2/2, 77%  Right mid3+4, 2/2,  46%  Left base, left apex, left midnegative  Region of interest #23 +3, 2/2, 26%    6/20 MRI [id number] g17 x 4 mm, PIRADS 5.  Right lateral peripheral zone at the apex to mid gland  5/20  8.16        Past History:  Medical History: has a past medical history of Arthritis, COPD (chronic obstructive pulmonary disease) (HCC), Essential hypertension (7/12/2021), Forgetfulness, Heart flutter, ventricular (HCC), High cholesterol, Prostate cancer (HCC), Ringing in ear, Shortness of Air, and SOB (shortness of breath).   Surgical History: has a past surgical history that includes Appendectomy; Colonoscopy (06/01/2019); Prostatectomy; and Tonsillectomy (1969).   Family History: family history includes Breast cancer in his maternal grandmother and paternal grandmother; Breast cancer (age of onset: 40) in his sister; Breast cancer (age of onset: 50) in his mother; Stroke in his father.   Social History: reports that he has been smoking cigarettes. He has been smoking about 1.00 pack per day. He has never used smokeless tobacco. He reports that he does not drink alcohol.  Allergies: Patient has no known allergies.       Current Outpatient Medications:   •  albuterol (PROVENTIL) (2.5 MG/3ML) 0.083% nebulizer solution, Take 2.5 mg by nebulization Every 4 (Four) Hours As Needed for Wheezing., Disp: 100 each, Rfl: 12  •  amLODIPine (NORVASC) 10 MG tablet, Take 1 tablet by mouth Daily., Disp: 90 tablet, Rfl: 1  •  aspirin 81 MG chewable tablet, , Disp: , Rfl:   •  Fluticasone-Umeclidin-Vilant (TRELEGY ELLIPTA IN), Inhale., Disp: , Rfl:   •  guaiFENesin (ROBITUSSIN) 100 MG/5ML syrup, Take 200 mg by mouth 3 (Three) Times a Day As Needed for Cough., Disp: , Rfl:   •  multivitamin with minerals tablet tablet, Men's Multivitamin 400- mcg oral tablet take 1 tablet by oral route daily   Active, Disp: , Rfl:   •  nitrofurantoin, macrocrystal-monohydrate, (Macrobid) 100 MG capsule, Take 1 capsule by mouth 2 (Two) Times a Day., Disp: 10  capsule, Rfl: 0     Physical exam       Alert and orient x3  Well appearing, well developed, in no acute distress   Unlabored respirations  Nontender/nondistended      Grossly oriented to person, place and time, judgment is intact, normal mood and affect    Results for orders placed or performed in visit on 07/19/22   PSA DIAGNOSTIC    Specimen: Blood   Result Value Ref Range    PSA <0.014 0.000 - 4.000 ng/mL   [facility] Spotted Fever, IgG    Specimen: Blood   Result Value Ref Range    RMSF IgG Positive (A) Negative   [place] Spotted Fever, IgM    Specimen: Blood   Result Value Ref Range    RMSF IgM 0.42 0.00 - 0.89 index   Reflexed RMSF, IgG, IFA    Specimen: Blood   Result Value Ref Range    RMSF IgG <1:64 Neg <1:64        Objective     Vital Signs:   There were no vitals taken for this visit.             Assessment and Plan    Diagnoses and all orders for this visit:    1. Prostate cancer (HCC) (Primary)    2. Urinary incontinence, stress, male        Patient had some minimal gross hematuria with a UTI.  This cleared up with antibiotics.  After discussion we will follow this conservatively.  If he has more trouble he will let me know.      PSA undetectable, patient given reassurance    Follow-up in 1 year with PSA with NP.  If PSA ever becomes detectable I want to see him back - needs PSA yearly.

## 2022-08-09 ENCOUNTER — OFFICE VISIT (OUTPATIENT)
Dept: UROLOGY | Facility: CLINIC | Age: 72
End: 2022-08-09

## 2022-08-09 DIAGNOSIS — C61 PROSTATE CANCER: Primary | ICD-10-CM

## 2022-08-09 DIAGNOSIS — N39.3 URINARY INCONTINENCE, STRESS, MALE: ICD-10-CM

## 2022-08-09 LAB
BILIRUB BLD-MCNC: NEGATIVE MG/DL
CLARITY, POC: CLEAR
COLOR UR: YELLOW
EXPIRATION DATE: NORMAL
GLUCOSE UR STRIP-MCNC: NEGATIVE MG/DL
KETONES UR QL: NEGATIVE
LEUKOCYTE EST, POC: NEGATIVE
Lab: NORMAL
NITRITE UR-MCNC: NEGATIVE MG/ML
PH UR: 6 [PH] (ref 5–8)
PROT UR STRIP-MCNC: NEGATIVE MG/DL
RBC # UR STRIP: NEGATIVE /UL
SP GR UR: 1.01 (ref 1–1.03)
URINE VOLUME: NORMAL
UROBILINOGEN UR QL: NORMAL

## 2022-08-09 PROCEDURE — 51798 US URINE CAPACITY MEASURE: CPT | Performed by: UROLOGY

## 2022-08-09 PROCEDURE — 99213 OFFICE O/P EST LOW 20 MIN: CPT | Performed by: UROLOGY

## 2022-08-09 PROCEDURE — 81003 URINALYSIS AUTO W/O SCOPE: CPT | Performed by: UROLOGY

## 2022-10-17 ENCOUNTER — TELEPHONE (OUTPATIENT)
Dept: FAMILY MEDICINE CLINIC | Facility: CLINIC | Age: 72
End: 2022-10-17

## 2022-10-17 RX ORDER — AMLODIPINE BESYLATE 10 MG/1
10 TABLET ORAL DAILY
Qty: 90 TABLET | Refills: 0 | Status: SHIPPED | OUTPATIENT
Start: 2022-10-17 | End: 2023-01-12 | Stop reason: SDUPTHER

## 2022-10-17 NOTE — TELEPHONE ENCOUNTER
PATIENT CAME IN WANTING A REFILL ON THERE PRESCRIPTION OF     AMLODIPINE (NORVASC) 10 MG    PATIENT WOULD LIKE THIS SENT TO [facility]

## 2023-01-12 ENCOUNTER — TELEPHONE (OUTPATIENT)
Dept: FAMILY MEDICINE CLINIC | Facility: CLINIC | Age: 73
End: 2023-01-12
Payer: MEDICARE

## 2023-01-12 RX ORDER — AMOXICILLIN AND CLAVULANATE POTASSIUM 875; 125 MG/1; MG/1
1 TABLET, FILM COATED ORAL 2 TIMES DAILY
Qty: 20 TABLET | Refills: 0 | Status: SHIPPED | OUTPATIENT
Start: 2023-01-12 | End: 2023-03-27

## 2023-01-12 RX ORDER — AMLODIPINE BESYLATE 10 MG/1
10 TABLET ORAL DAILY
Qty: 90 TABLET | Refills: 1 | Status: SHIPPED | OUTPATIENT
Start: 2023-01-12 | End: 2023-03-27 | Stop reason: SDUPTHER

## 2023-02-08 ENCOUNTER — TELEPHONE (OUTPATIENT)
Dept: FAMILY MEDICINE CLINIC | Facility: CLINIC | Age: 73
End: 2023-02-08
Payer: MEDICARE

## 2023-02-08 DIAGNOSIS — Z12.11 COLON CANCER SCREENING: Primary | ICD-10-CM

## 2023-02-24 ENCOUNTER — PREP FOR SURGERY (OUTPATIENT)
Dept: OTHER | Facility: HOSPITAL | Age: 73
End: 2023-02-24
Payer: MEDICARE

## 2023-02-24 ENCOUNTER — OFFICE VISIT (OUTPATIENT)
Dept: SURGERY | Facility: CLINIC | Age: 73
End: 2023-02-24
Payer: MEDICARE

## 2023-02-24 VITALS — BODY MASS INDEX: 20.9 KG/M2 | RESPIRATION RATE: 16 BRPM | HEIGHT: 70 IN | WEIGHT: 146 LBS

## 2023-02-24 DIAGNOSIS — Z86.010 PERSONAL HISTORY OF COLONIC POLYPS: Primary | ICD-10-CM

## 2023-02-24 PROBLEM — Z86.0100 PERSONAL HISTORY OF COLONIC POLYPS: Status: ACTIVE | Noted: 2023-02-24

## 2023-02-24 PROCEDURE — S0260 H&P FOR SURGERY: HCPCS | Performed by: SURGERY

## 2023-02-24 NOTE — PROGRESS NOTES
Chief Complaint:  Colonoscopy    Primary Care Provider: Liz Roper DO    Referring Provider: Liz Roper DO    History of Present Illness  Colin Nevarez is a 72 y.o. male referred by Liz Roper DO to have a colonoscopy.  Patient last had a colonoscopy in June 2019 by Dr. Jenkins after he had a positive Cologuard and for 5 small polyps were removed.  The patient was instructed to get another colonoscopy 3 years later.    Allergies: Patient has no known allergies.    Outpatient Medications Marked as Taking for the 2/24/23 encounter (Office Visit) with Geoffrey Carey MD   Medication Sig Dispense Refill   • albuterol (PROVENTIL) (2.5 MG/3ML) 0.083% nebulizer solution Take 2.5 mg by nebulization Every 4 (Four) Hours As Needed for Wheezing. 100 each 12   • amLODIPine (NORVASC) 10 MG tablet Take 1 tablet by mouth Daily. 90 tablet 1   • aspirin 81 MG chewable tablet      • Fluticasone-Umeclidin-Vilant (TRELEGY ELLIPTA IN) Inhale.     • multivitamin with minerals tablet tablet Men's Multivitamin 400- mcg oral tablet take 1 tablet by oral route daily   Active         Past Medical History:   • Arthritis   • COPD (chronic obstructive pulmonary disease) (HCC)   • Essential hypertension   • Forgetfulness   • Heart flutter, ventricular (HCC)   • High cholesterol   • Prostate cancer (HCC)   • Ringing in ear   • Shortness of Air   • SOB (shortness of breath)        Past Surgical History:   • APPENDECTOMY   • COLONOSCOPY   • PROSTATECTOMY   • TONSILLECTOMY    Per PT       Family History:   Family History   Problem Relation Age of Onset   • Breast cancer Mother 50   • Stroke Father    • Breast cancer Sister 40   • Breast cancer Maternal Grandmother    • Breast cancer Paternal Grandmother         Social History:  Social History     Tobacco Use   • Smoking status: Every Day     Packs/day: 1.00     Types: Cigarettes   • Smokeless tobacco: Never   Substance Use Topics   • Alcohol use: Never       Objective     Vital  "Signs:  Resp 16   Ht 177.8 cm (70\")   Wt 66.2 kg (146 lb)   BMI 20.95 kg/m²   • Constitutional: here alone, alert, no acute distress, reliable historian  • HENT:  NCAT, no visible deformities or lesions  • Eyes:  sclerae clear, conjunctivae clear, EOMI  • Neck:  normal appearance, no masses, trachea midline  • Respiratory:  breathing not labored, respiratory effort appears normal  • Cardiovascular:  heart regular rate  • Abdomen:  nondistended    • Skin and subcutaneous tissue:  Warm and dry  • Musculoskeletal: moving all extremities symmetrically and purposefully  • Neurologic:  no obvious motor or sensory deficits, alert & oriented x 3, speech clear      Assessment:  Personal history of colonic polyps    Plan:  Colonoscopy    Discussion: Indications, options, risks, benefits, and expected outcomes of planned surgery were discussed with the patient and he agrees to proceed.    Geoffrye Carey MD  02/24/2023    Electronically signed by Geoffrey Carey MD, 02/24/23, 2:27 PM EST.        "

## 2023-03-27 ENCOUNTER — OFFICE VISIT (OUTPATIENT)
Dept: FAMILY MEDICINE CLINIC | Facility: CLINIC | Age: 73
End: 2023-03-27
Payer: MEDICARE

## 2023-03-27 ENCOUNTER — LAB (OUTPATIENT)
Dept: LAB | Facility: HOSPITAL | Age: 73
End: 2023-03-27
Payer: MEDICARE

## 2023-03-27 VITALS
RESPIRATION RATE: 20 BRPM | OXYGEN SATURATION: 95 % | TEMPERATURE: 97.8 F | HEIGHT: 70 IN | SYSTOLIC BLOOD PRESSURE: 138 MMHG | HEART RATE: 77 BPM | WEIGHT: 146.7 LBS | DIASTOLIC BLOOD PRESSURE: 78 MMHG | BODY MASS INDEX: 21 KG/M2

## 2023-03-27 DIAGNOSIS — I10 ESSENTIAL HYPERTENSION: Chronic | ICD-10-CM

## 2023-03-27 DIAGNOSIS — E78.00 HIGH CHOLESTEROL: Chronic | ICD-10-CM

## 2023-03-27 DIAGNOSIS — Z87.891 SMOKING HISTORY: ICD-10-CM

## 2023-03-27 DIAGNOSIS — Z00.00 ANNUAL PHYSICAL EXAM: ICD-10-CM

## 2023-03-27 DIAGNOSIS — J44.9 CHRONIC OBSTRUCTIVE PULMONARY DISEASE, UNSPECIFIED COPD TYPE: Primary | Chronic | ICD-10-CM

## 2023-03-27 DIAGNOSIS — Z12.11 ENCOUNTER FOR SCREENING FOR MALIGNANT NEOPLASM OF COLON: ICD-10-CM

## 2023-03-27 DIAGNOSIS — I71.21 ANEURYSM OF ASCENDING AORTA WITHOUT RUPTURE: ICD-10-CM

## 2023-03-27 DIAGNOSIS — F17.219 CIGARETTE NICOTINE DEPENDENCE WITH NICOTINE-INDUCED DISORDER: Chronic | ICD-10-CM

## 2023-03-27 PROCEDURE — 84443 ASSAY THYROID STIM HORMONE: CPT

## 2023-03-27 PROCEDURE — 85025 COMPLETE CBC W/AUTO DIFF WBC: CPT

## 2023-03-27 PROCEDURE — 80061 LIPID PANEL: CPT

## 2023-03-27 PROCEDURE — 80053 COMPREHEN METABOLIC PANEL: CPT

## 2023-03-27 PROCEDURE — 36415 COLL VENOUS BLD VENIPUNCTURE: CPT

## 2023-03-27 RX ORDER — CEFDINIR 300 MG/1
300 CAPSULE ORAL 2 TIMES DAILY
Qty: 20 CAPSULE | Refills: 0 | Status: SHIPPED | OUTPATIENT
Start: 2023-03-27 | End: 2023-03-28

## 2023-03-27 RX ORDER — FLUTICASONE FUROATE, UMECLIDINIUM BROMIDE AND VILANTEROL TRIFENATATE 200; 62.5; 25 UG/1; UG/1; UG/1
1 POWDER RESPIRATORY (INHALATION) DAILY
Qty: 3 EACH | Refills: 0 | COMMUNITY
Start: 2023-03-27

## 2023-03-27 RX ORDER — AMLODIPINE BESYLATE 10 MG/1
10 TABLET ORAL DAILY
Qty: 90 TABLET | Refills: 1 | Status: SHIPPED | OUTPATIENT
Start: 2023-03-27

## 2023-03-27 RX ORDER — EZETIMIBE 10 MG/1
10 TABLET ORAL DAILY
Qty: 90 TABLET | Refills: 1 | Status: SHIPPED | OUTPATIENT
Start: 2023-03-27

## 2023-03-27 NOTE — ASSESSMENT & PLAN NOTE
Lipid abnormalities are improving with lifestyle modifications.  Nutritional counseling was provided.  Lipids will be reassessed in 6 months.    The 10-year ASCVD risk score (Richard THRASHER, et al., 2019) is: 30.7%    Values used to calculate the score:      Age: 72 years      Sex: Male      Is Non- : No      Diabetic: No      Tobacco smoker: Yes      Systolic Blood Pressure: 138 mmHg      Is BP treated: Yes      HDL Cholesterol: 54 mg/dL      Total Cholesterol: 240 mg/dL

## 2023-03-27 NOTE — PROGRESS NOTES
"Chief Complaint  Fatigue (Wants to discuss lab results.)    Subjective        Colin Nevarez presents to [facility]  History of Present Illness  He is here today for management of his chronic medical conditions and for an acute visit. He has a PMH significant for tobacco abuse, COPD, prostate cancer and chronic back pain. He is a smoker. He has smoked since he was 7 years old. He smokes a little less than one pack per day.     He has been having a cough and congestion for the past three days. He denies fever or chills.      The patient has no other complaints today and denies chest pain, shortness of breath, weakness, numbness, nausea, vomiting, diarrhea, dizziness or syncopal event.      Objective   Vital Signs:  /78 (BP Location: Left arm, Patient Position: Sitting, Cuff Size: Adult)   Pulse 77   Temp 97.8 °F (36.6 °C) (Temporal)   Resp 20   Ht 177.8 cm (70\")   Wt 66.5 kg (146 lb 11.2 oz)   SpO2 95%   BMI 21.05 kg/m²   Estimated body mass index is 21.05 kg/m² as calculated from the following:    Height as of this encounter: 177.8 cm (70\").    Weight as of this encounter: 66.5 kg (146 lb 11.2 oz).       BMI is within normal parameters. No other follow-up for BMI required.      Physical Exam  Vitals reviewed.   Constitutional:       Appearance: Normal appearance. He is well-developed.   HENT:      Head: Normocephalic and atraumatic.      Right Ear: External ear normal.      Left Ear: External ear normal.      Mouth/Throat:      Pharynx: No oropharyngeal exudate.   Eyes:      Conjunctiva/sclera: Conjunctivae normal.      Pupils: Pupils are equal, round, and reactive to light.   Neck:      Vascular: No carotid bruit.   Cardiovascular:      Rate and Rhythm: Normal rate and regular rhythm.      Heart sounds: No murmur heard.    No friction rub. No gallop.   Pulmonary:      Breath sounds: Decreased breath sounds present. No wheezing or rhonchi.   Abdominal:      General: " There is no distension.   Skin:     General: Skin is warm and dry.   Neurological:      Mental Status: He is alert and oriented to person, place, and time.      Cranial Nerves: No cranial nerve deficit.      Motor: No weakness.   Psychiatric:         Mood and Affect: Mood and affect normal.         Behavior: Behavior normal.         Thought Content: Thought content normal.         Judgment: Judgment normal.        Result Review :                                 Assessment and Plan   Diagnoses and all orders for this visit:    1. Chronic obstructive pulmonary disease, unspecified COPD type (HCC) (Primary)  Assessment & Plan:  COPD is improving with treatment.  Continue current medications.  -     cefdinir (OMNICEF) 300 MG capsule; Take 1 capsule by mouth 2 (Two) Times a Day.  Dispense: 20 capsule; Refill: 0  -     Fluticasone-Umeclidin-Vilant (Trelegy Ellipta) 200-62.5-25 MCG/ACT aerosol powder ; Inhale 1 puff Daily.  Dispense: 3 each; Refill: 0      2. Encounter for screening for malignant neoplasm of colon  -     Ambulatory Referral For Screening Colonoscopy    3. Essential hypertension  Assessment & Plan:  Hypertension is improving with treatment.  Continue current treatment regimen.  Dietary sodium restriction.  Weight loss.  Blood pressure will be reassessed at the next regular appointment.    -     amLODIPine (NORVASC) 10 MG tablet; Take 1 tablet by mouth Daily.  Dispense: 90 tablet; Refill: 1    4. Cigarette nicotine dependence with nicotine-induced disorder  Assessment & Plan:  Tobacco use is unchanged.  Smoking cessation counseling was provided.  Tobacco use will be reassessed at the next regular appointment.      5. Aneurysm of ascending aorta without rupture  -     CT Chest With Contrast; Future    6. High cholesterol  Assessment & Plan:  Lipid abnormalities are improving with lifestyle modifications.  Nutritional counseling was provided.  Lipids will be reassessed in 6 months.    The 10-year ASCVD risk  score (Richard THRASHER, et al., 2019) is: 30.7%    Values used to calculate the score:      Age: 72 years      Sex: Male      Is Non- : No      Diabetic: No      Tobacco smoker: Yes      Systolic Blood Pressure: 138 mmHg      Is BP treated: Yes      HDL Cholesterol: 54 mg/dL      Total Cholesterol: 240 mg/dL      Orders:  -     Lipid panel; Future  -     ezetimibe (Zetia) 10 MG tablet; Take 1 tablet by mouth Daily.  Dispense: 90 tablet; Refill: 1    7. Smoking history  -     CT Chest With Contrast; Future    8. Annual physical exam  -     TSH; Future  -     Comprehensive metabolic panel; Future  -     CBC w AUTO Differential; Future       Follow Up   Return in about 6 months (around 9/27/2023).  Patient was given instructions and counseling regarding his condition or for health maintenance advice. Please see specific information pulled into the AVS if appropriate.

## 2023-03-28 ENCOUNTER — TELEPHONE (OUTPATIENT)
Dept: FAMILY MEDICINE CLINIC | Facility: CLINIC | Age: 73
End: 2023-03-28
Payer: MEDICARE

## 2023-03-28 LAB
ALBUMIN SERPL-MCNC: 4.7 G/DL (ref 3.5–5.2)
ALBUMIN/GLOB SERPL: 1.5 G/DL
ALP SERPL-CCNC: 91 U/L (ref 39–117)
ALT SERPL W P-5'-P-CCNC: 20 U/L (ref 1–41)
ANION GAP SERPL CALCULATED.3IONS-SCNC: 12.7 MMOL/L (ref 5–15)
AST SERPL-CCNC: 21 U/L (ref 1–40)
BASOPHILS # BLD AUTO: 0.06 10*3/MM3 (ref 0–0.2)
BASOPHILS NFR BLD AUTO: 0.6 % (ref 0–1.5)
BILIRUB SERPL-MCNC: 0.3 MG/DL (ref 0–1.2)
BUN SERPL-MCNC: 12 MG/DL (ref 8–23)
BUN/CREAT SERPL: 13.5 (ref 7–25)
CALCIUM SPEC-SCNC: 10.1 MG/DL (ref 8.6–10.5)
CHLORIDE SERPL-SCNC: 98 MMOL/L (ref 98–107)
CHOLEST SERPL-MCNC: 225 MG/DL (ref 0–200)
CO2 SERPL-SCNC: 26.3 MMOL/L (ref 22–29)
CREAT SERPL-MCNC: 0.89 MG/DL (ref 0.76–1.27)
DEPRECATED RDW RBC AUTO: 41.6 FL (ref 37–54)
EGFRCR SERPLBLD CKD-EPI 2021: 91.6 ML/MIN/1.73
EOSINOPHIL # BLD AUTO: 0.19 10*3/MM3 (ref 0–0.4)
EOSINOPHIL NFR BLD AUTO: 2 % (ref 0.3–6.2)
ERYTHROCYTE [DISTWIDTH] IN BLOOD BY AUTOMATED COUNT: 13.1 % (ref 12.3–15.4)
GLOBULIN UR ELPH-MCNC: 3.1 GM/DL
GLUCOSE SERPL-MCNC: 85 MG/DL (ref 65–99)
HCT VFR BLD AUTO: 56.3 % (ref 37.5–51)
HDLC SERPL-MCNC: 49 MG/DL (ref 40–60)
HGB BLD-MCNC: 19.1 G/DL (ref 13–17.7)
IMM GRANULOCYTES # BLD AUTO: 0.03 10*3/MM3 (ref 0–0.05)
IMM GRANULOCYTES NFR BLD AUTO: 0.3 % (ref 0–0.5)
LDLC SERPL CALC-MCNC: 159 MG/DL (ref 0–100)
LDLC/HDLC SERPL: 3.19 {RATIO}
LYMPHOCYTES # BLD AUTO: 2.79 10*3/MM3 (ref 0.7–3.1)
LYMPHOCYTES NFR BLD AUTO: 29.7 % (ref 19.6–45.3)
MCH RBC QN AUTO: 30 PG (ref 26.6–33)
MCHC RBC AUTO-ENTMCNC: 33.9 G/DL (ref 31.5–35.7)
MCV RBC AUTO: 88.5 FL (ref 79–97)
MONOCYTES # BLD AUTO: 0.73 10*3/MM3 (ref 0.1–0.9)
MONOCYTES NFR BLD AUTO: 7.8 % (ref 5–12)
NEUTROPHILS NFR BLD AUTO: 5.59 10*3/MM3 (ref 1.7–7)
NEUTROPHILS NFR BLD AUTO: 59.6 % (ref 42.7–76)
NRBC BLD AUTO-RTO: 0.1 /100 WBC (ref 0–0.2)
PLATELET # BLD AUTO: 223 10*3/MM3 (ref 140–450)
PMV BLD AUTO: 11.6 FL (ref 6–12)
POTASSIUM SERPL-SCNC: 4.3 MMOL/L (ref 3.5–5.2)
PROT SERPL-MCNC: 7.8 G/DL (ref 6–8.5)
RBC # BLD AUTO: 6.36 10*6/MM3 (ref 4.14–5.8)
SODIUM SERPL-SCNC: 137 MMOL/L (ref 136–145)
TRIGL SERPL-MCNC: 98 MG/DL (ref 0–150)
TSH SERPL DL<=0.05 MIU/L-ACNC: 2.4 UIU/ML (ref 0.27–4.2)
VLDLC SERPL-MCNC: 17 MG/DL (ref 5–40)
WBC NRBC COR # BLD: 9.39 10*3/MM3 (ref 3.4–10.8)

## 2023-03-28 RX ORDER — AMOXICILLIN AND CLAVULANATE POTASSIUM 875; 125 MG/1; MG/1
1 TABLET, FILM COATED ORAL 2 TIMES DAILY
Qty: 20 TABLET | Refills: 0 | Status: SHIPPED | OUTPATIENT
Start: 2023-03-28

## 2023-03-28 NOTE — TELEPHONE ENCOUNTER
Patient stated antibiotic that was sent to pharmacy yesterday was expensive. Per Dr. Roper, change to Augmentin 875  BID for 10days    Spoke with patient about new medication

## 2023-04-03 ENCOUNTER — TELEPHONE (OUTPATIENT)
Dept: FAMILY MEDICINE CLINIC | Facility: CLINIC | Age: 73
End: 2023-04-03
Payer: MEDICARE

## 2023-04-03 DIAGNOSIS — R91.1 LUNG NODULE: Primary | ICD-10-CM

## 2023-04-03 NOTE — TELEPHONE ENCOUNTER
"  Caller: Colin Nevarez \"Mayco\"    Relationship: Self    Best call back number: [phone]    What was the call regarding: PATIENT STATES HE WAS RETURNING A CALL TO THE OFFICE BUT DOESN'T KNOW WHO CALLED OR WHY. NO LMTRC ENCOUNTER SO UNABLE TO WARM TRANSFER.    Do you require a callback: YES   "
Patient needed appt to see Dr. Roper, this was made for tomorrow, 4/4/23.   
yes

## 2023-04-04 ENCOUNTER — OFFICE VISIT (OUTPATIENT)
Dept: FAMILY MEDICINE CLINIC | Facility: CLINIC | Age: 73
End: 2023-04-04
Payer: MEDICARE

## 2023-04-04 VITALS
SYSTOLIC BLOOD PRESSURE: 135 MMHG | HEART RATE: 69 BPM | OXYGEN SATURATION: 95 % | RESPIRATION RATE: 18 BRPM | BODY MASS INDEX: 20.77 KG/M2 | WEIGHT: 145.1 LBS | DIASTOLIC BLOOD PRESSURE: 82 MMHG | TEMPERATURE: 97.2 F | HEIGHT: 70 IN

## 2023-04-04 DIAGNOSIS — R91.1 LESION OF LUNG: Primary | ICD-10-CM

## 2023-04-04 DIAGNOSIS — M24.542 CONTRACTURE OF JOINT OF FINGER, LEFT: ICD-10-CM

## 2023-04-04 DIAGNOSIS — I10 ESSENTIAL HYPERTENSION: Chronic | ICD-10-CM

## 2023-04-04 DIAGNOSIS — F17.219 CIGARETTE NICOTINE DEPENDENCE WITH NICOTINE-INDUCED DISORDER: Chronic | ICD-10-CM

## 2023-04-04 DIAGNOSIS — J44.9 CHRONIC OBSTRUCTIVE PULMONARY DISEASE, UNSPECIFIED COPD TYPE: Chronic | ICD-10-CM

## 2023-04-04 NOTE — PROGRESS NOTES
"Chief Complaint  Results (Recent labs ) and Hand Pain (Left hand pinky finger )    Subjective        Colin Nevarez presents to [facility]  History of Present Illness  He is here today for management of his chronic medical conditions and for an acute visit. He has tobacco abuse disorder, COPD, prostate cancer and chronic back pain. He is a smoker. He has smoked since he was 7 years old. He smokes a little less than one pack per day.     He has been having a cough and congestion for the past three days. He denies fever or chills.     40 years ago he hurt his left little finger.      The patient has no other complaints today and denies chest pain, shortness of breath, weakness, numbness, nausea, vomiting, diarrhea, dizziness or syncopal event.      Objective   Vital Signs:  /82   Pulse 69   Temp 97.2 °F (36.2 °C)   Resp 18   Ht 177.8 cm (70\")   Wt 65.8 kg (145 lb 1.6 oz)   SpO2 95%   BMI 20.82 kg/m²   Estimated body mass index is 20.82 kg/m² as calculated from the following:    Height as of this encounter: 177.8 cm (70\").    Weight as of this encounter: 65.8 kg (145 lb 1.6 oz).       BMI is within normal parameters. No other follow-up for BMI required.      Physical Exam  Vitals reviewed.   Constitutional:       Appearance: Normal appearance. He is well-developed.   HENT:      Head: Normocephalic and atraumatic.      Right Ear: External ear normal.      Left Ear: External ear normal.      Mouth/Throat:      Pharynx: No oropharyngeal exudate.   Eyes:      Conjunctiva/sclera: Conjunctivae normal.      Pupils: Pupils are equal, round, and reactive to light.   Neck:      Vascular: No carotid bruit.   Cardiovascular:      Rate and Rhythm: Normal rate and regular rhythm.      Heart sounds: No murmur heard.    No friction rub. No gallop.   Pulmonary:      Effort: Pulmonary effort is normal.      Breath sounds: Normal breath sounds. No wheezing or rhonchi.   Abdominal:      " General: There is no distension.   Skin:     General: Skin is warm and dry.   Neurological:      Mental Status: He is alert and oriented to person, place, and time.      Cranial Nerves: No cranial nerve deficit.      Motor: No weakness.   Psychiatric:         Mood and Affect: Mood and affect normal.         Behavior: Behavior normal.         Thought Content: Thought content normal.         Judgment: Judgment normal.        Result Review :    CMP    CMP 3/27/23   Glucose 85   BUN 12   Creatinine 0.89   eGFR 91.6   Sodium 137   Potassium 4.3   Chloride 98   Calcium 10.1   Total Protein 7.8   Albumin 4.7   Globulin 3.1   Total Bilirubin 0.3   Alkaline Phosphatase 91   AST (SGOT) 21   ALT (SGPT) 20   Albumin/Globulin Ratio 1.5   BUN/Creatinine Ratio 13.5   Anion Gap 12.7           CBC    CBC 3/27/23   WBC 9.39   RBC 6.36 (A)   Hemoglobin 19.1 (A)   Hematocrit 56.3 (A)   MCV 88.5   MCH 30.0   MCHC 33.9   RDW 13.1   Platelets 223   (A) Abnormal value            Lipid Panel    Lipid Panel 3/27/23   Total Cholesterol 225 (A)   Triglycerides 98   HDL Cholesterol 49   VLDL Cholesterol 17   LDL Cholesterol  159 (A)   LDL/HDL Ratio 3.19   (A) Abnormal value            TSH    TSH 3/27/23   TSH 2.400                        Assessment and Plan   Diagnoses and all orders for this visit:    1. Lesion of lung (Primary)  Assessment & Plan:  The patient was given order today for a PET scan and referral to interventional radiology for lung biopsy.  He will be managed according to findings.      2. Contracture of joint of finger, left  -     Ambulatory Referral to Hand Surgery    3. Cigarette nicotine dependence with nicotine-induced disorder  Assessment & Plan:  Tobacco use is unchanged.  Smoking cessation counseling was provided.  Tobacco use will be reassessed at the next regular appointment.      4. Chronic obstructive pulmonary disease, unspecified COPD type  Assessment & Plan:  COPD is improving with treatment.  Continue current  medications.          5. Essential hypertension  Assessment & Plan:  Hypertension is improving with treatment.  Continue current treatment regimen.  Dietary sodium restriction.  Blood pressure will be reassessed at the next regular appointment.             Follow Up   Return in about 6 weeks (around 5/16/2023).  Patient was given instructions and counseling regarding his condition or for health maintenance advice. Please see specific information pulled into the AVS if appropriate.

## 2023-04-10 ENCOUNTER — HOSPITAL ENCOUNTER (OUTPATIENT)
Dept: PET IMAGING | Facility: HOSPITAL | Age: 73
Discharge: HOME | End: 2023-04-10
Payer: MEDICARE

## 2023-04-10 DIAGNOSIS — R91.1 LUNG NODULE: ICD-10-CM

## 2023-04-10 PROCEDURE — 0 FLUDEOXYGLUCOSE F18 SOLUTION: Performed by: FAMILY MEDICINE

## 2023-04-10 PROCEDURE — 78815 PET IMAGE W/CT SKULL-THIGH: CPT

## 2023-04-10 PROCEDURE — A9552 F18 FDG: HCPCS | Performed by: FAMILY MEDICINE

## 2023-04-10 RX ADMIN — FLUDEOXYGLUCOSE F18 1 DOSE: 300 INJECTION INTRAVENOUS at 13:15

## 2023-04-10 NOTE — ASSESSMENT & PLAN NOTE
The patient was given order today for a PET scan and referral to interventional radiology for lung biopsy.  He will be managed according to findings.

## 2023-04-17 ENCOUNTER — TELEPHONE (OUTPATIENT)
Dept: FAMILY MEDICINE CLINIC | Facility: CLINIC | Age: 73
End: 2023-04-17
Payer: MEDICARE

## 2023-04-17 NOTE — TELEPHONE ENCOUNTER
"Caller: Colin Nevarez \"Mayco\"    Relationship: Self    Best call back number: [phone]    What is the medical concern/diagnosis: BIOPSY      Any additional details: CALLER NEEDS TO KNOW IF REFERRAL HAS BEEN SENT TO DOCTOR FOR HIS BIOPSY PLEASE CALL AND ADVISE         "

## 2023-04-18 DIAGNOSIS — R91.1 LUNG NODULE: Primary | ICD-10-CM

## 2023-04-19 ENCOUNTER — TRANSCRIBE ORDERS (OUTPATIENT)
Dept: ADMINISTRATIVE | Facility: HOSPITAL | Age: 73
End: 2023-04-19
Payer: MEDICARE

## 2023-04-19 DIAGNOSIS — R91.1 LUNG NODULE: Primary | ICD-10-CM

## 2023-04-28 ENCOUNTER — LAB (OUTPATIENT)
Dept: LAB | Facility: HOSPITAL | Age: 73
End: 2023-04-28
Payer: MEDICARE

## 2023-04-28 DIAGNOSIS — R91.1 LUNG NODULE: ICD-10-CM

## 2023-04-28 DIAGNOSIS — Z01.818 PREOP TESTING: ICD-10-CM

## 2023-04-28 DIAGNOSIS — Z51.81 MEDICATION MONITORING ENCOUNTER: ICD-10-CM

## 2023-04-28 DIAGNOSIS — Z01.818 PREOP TESTING: Primary | ICD-10-CM

## 2023-04-28 LAB
APTT PPP: 30.6 SECONDS (ref 24.2–34.2)
BASOPHILS # BLD AUTO: 0.05 10*3/MM3 (ref 0–0.2)
BASOPHILS NFR BLD AUTO: 0.6 % (ref 0–1.5)
DEPRECATED RDW RBC AUTO: 42.4 FL (ref 37–54)
EOSINOPHIL # BLD AUTO: 0.44 10*3/MM3 (ref 0–0.4)
EOSINOPHIL NFR BLD AUTO: 5.6 % (ref 0.3–6.2)
ERYTHROCYTE [DISTWIDTH] IN BLOOD BY AUTOMATED COUNT: 13.2 % (ref 12.3–15.4)
HCT VFR BLD AUTO: 54.9 % (ref 37.5–51)
HGB BLD-MCNC: 19 G/DL (ref 13–17.7)
IMM GRANULOCYTES # BLD AUTO: 0.02 10*3/MM3 (ref 0–0.05)
IMM GRANULOCYTES NFR BLD AUTO: 0.3 % (ref 0–0.5)
INR PPP: 0.87 (ref 0.86–1.15)
LYMPHOCYTES # BLD AUTO: 2.42 10*3/MM3 (ref 0.7–3.1)
LYMPHOCYTES NFR BLD AUTO: 30.9 % (ref 19.6–45.3)
MCH RBC QN AUTO: 30.7 PG (ref 26.6–33)
MCHC RBC AUTO-ENTMCNC: 34.6 G/DL (ref 31.5–35.7)
MCV RBC AUTO: 88.7 FL (ref 79–97)
MONOCYTES # BLD AUTO: 0.57 10*3/MM3 (ref 0.1–0.9)
MONOCYTES NFR BLD AUTO: 7.3 % (ref 5–12)
NEUTROPHILS NFR BLD AUTO: 4.33 10*3/MM3 (ref 1.7–7)
NEUTROPHILS NFR BLD AUTO: 55.3 % (ref 42.7–76)
NRBC BLD AUTO-RTO: 0 /100 WBC (ref 0–0.2)
PLATELET # BLD AUTO: 194 10*3/MM3 (ref 140–450)
PMV BLD AUTO: 11.2 FL (ref 6–12)
PROTHROMBIN TIME: 11.9 SECONDS (ref 11.8–14.9)
RBC # BLD AUTO: 6.19 10*6/MM3 (ref 4.14–5.8)
WBC NRBC COR # BLD: 7.83 10*3/MM3 (ref 3.4–10.8)

## 2023-04-28 PROCEDURE — 85610 PROTHROMBIN TIME: CPT

## 2023-04-28 PROCEDURE — 85025 COMPLETE CBC W/AUTO DIFF WBC: CPT

## 2023-04-28 PROCEDURE — 36415 COLL VENOUS BLD VENIPUNCTURE: CPT

## 2023-04-28 PROCEDURE — 85730 THROMBOPLASTIN TIME PARTIAL: CPT

## 2023-05-01 ENCOUNTER — TELEPHONE (OUTPATIENT)
Dept: FAMILY MEDICINE CLINIC | Facility: CLINIC | Age: 73
End: 2023-05-01
Payer: MEDICARE

## 2023-05-03 ENCOUNTER — HOSPITAL ENCOUNTER (OUTPATIENT)
Dept: GENERAL RADIOLOGY | Facility: HOSPITAL | Age: 73
Discharge: HOME | End: 2023-05-03
Payer: MEDICARE

## 2023-05-03 ENCOUNTER — HOSPITAL ENCOUNTER (OUTPATIENT)
Dept: CT IMAGING | Facility: HOSPITAL | Age: 73
Discharge: HOME | End: 2023-05-03
Payer: MEDICARE

## 2023-05-03 ENCOUNTER — HOSPITAL ENCOUNTER (OUTPATIENT)
Dept: GENERAL RADIOLOGY | Facility: HOSPITAL | Age: 73
End: 2023-05-03
Payer: MEDICARE

## 2023-05-03 VITALS
DIASTOLIC BLOOD PRESSURE: 72 MMHG | RESPIRATION RATE: 25 BRPM | HEART RATE: 68 BPM | OXYGEN SATURATION: 96 % | BODY MASS INDEX: 20.9 KG/M2 | SYSTOLIC BLOOD PRESSURE: 113 MMHG | WEIGHT: 146 LBS | HEIGHT: 70 IN

## 2023-05-03 DIAGNOSIS — R91.1 LUNG NODULE: ICD-10-CM

## 2023-05-03 PROCEDURE — 71045 X-RAY EXAM CHEST 1 VIEW: CPT

## 2023-05-03 PROCEDURE — 88341 IMHCHEM/IMCYTCHM EA ADD ANTB: CPT | Performed by: FAMILY MEDICINE

## 2023-05-03 PROCEDURE — 88305 TISSUE EXAM BY PATHOLOGIST: CPT | Performed by: FAMILY MEDICINE

## 2023-05-03 PROCEDURE — 25010000002 FENTANYL CITRATE (PF) 50 MCG/ML SOLUTION: Performed by: RADIOLOGY

## 2023-05-03 PROCEDURE — 88342 IMHCHEM/IMCYTCHM 1ST ANTB: CPT | Performed by: FAMILY MEDICINE

## 2023-05-03 PROCEDURE — 25010000002 ONDANSETRON PER 1 MG: Performed by: RADIOLOGY

## 2023-05-03 RX ORDER — ONDANSETRON 2 MG/ML
INJECTION INTRAMUSCULAR; INTRAVENOUS AS NEEDED
Status: COMPLETED | OUTPATIENT
Start: 2023-05-03 | End: 2023-05-03

## 2023-05-03 RX ORDER — FENTANYL CITRATE 50 UG/ML
INJECTION, SOLUTION INTRAMUSCULAR; INTRAVENOUS AS NEEDED
Status: COMPLETED | OUTPATIENT
Start: 2023-05-03 | End: 2023-05-03

## 2023-05-03 RX ORDER — LIDOCAINE HYDROCHLORIDE 20 MG/ML
INJECTION, SOLUTION INFILTRATION; PERINEURAL
Status: DISPENSED
Start: 2023-05-03 | End: 2023-05-03

## 2023-05-03 RX ADMIN — FENTANYL CITRATE 50 MCG: 50 INJECTION, SOLUTION INTRAMUSCULAR; INTRAVENOUS at 10:10

## 2023-05-03 RX ADMIN — ONDANSETRON 4 MG: 2 INJECTION INTRAMUSCULAR; INTRAVENOUS at 10:07

## 2023-05-03 NOTE — NURSING NOTE
Repeat post procedure CXR WNL per MD ok to DC. VSS. Trace amount of blood at site. No c/o pain currently. DC instructions reviewed w/ pt and CG all questions/concerns addressed. Escorted to [employer] entrance by wheelchair. Driven home in POV by CG.

## 2023-05-03 NOTE — DISCHARGE INSTRUCTIONS
You received fentanyl for pain, zofran for nausea, and lidocaine for numbing for your procedure today.  Do not drive or stay by yourself for 24 hours.  Do not take a tub bath or soak in any body of water for 7 days.  No strenuous activity or lifting over 10 pounds for 7 days.  You may shower tonight, and remove bandage tomorrow. You do not need to replace bandage.  Monitor biopsy site for signs of infection: redness, swelling, warmth to touch, increasing pain or bleeding at site, drainage, fever, nausea, vomiting, chills.  If any of these occur, contact your physician or come to the ER.  If you have chest pain, difficulty breathing, shortness of breath, coughing up blood, dizziness, or weakness, come to the ER immediately.

## 2023-05-04 LAB
CYTO UR: NORMAL
LAB AP CASE REPORT: NORMAL
LAB AP CLINICAL INFORMATION: NORMAL
LAB AP SPECIAL STAINS: NORMAL
PATH REPORT.FINAL DX SPEC: NORMAL
PATH REPORT.GROSS SPEC: NORMAL

## 2023-05-05 DIAGNOSIS — C34.91 SQUAMOUS CELL CARCINOMA OF RIGHT LUNG: Primary | ICD-10-CM

## 2023-05-11 ENCOUNTER — CONSULT (OUTPATIENT)
Dept: ONCOLOGY | Facility: HOSPITAL | Age: 73
End: 2023-05-11
Payer: MEDICARE

## 2023-05-11 ENCOUNTER — TELEPHONE (OUTPATIENT)
Dept: SURGERY | Facility: CLINIC | Age: 73
End: 2023-05-11
Payer: MEDICARE

## 2023-05-11 ENCOUNTER — DOCUMENTATION (OUTPATIENT)
Dept: ONCOLOGY | Facility: HOSPITAL | Age: 73
End: 2023-05-11
Payer: MEDICARE

## 2023-05-11 VITALS
HEART RATE: 77 BPM | SYSTOLIC BLOOD PRESSURE: 127 MMHG | BODY MASS INDEX: 20.66 KG/M2 | TEMPERATURE: 100.1 F | WEIGHT: 143.96 LBS | RESPIRATION RATE: 16 BRPM | OXYGEN SATURATION: 92 % | DIASTOLIC BLOOD PRESSURE: 65 MMHG

## 2023-05-11 DIAGNOSIS — C34.90 MALIGNANT NEOPLASM OF LUNG, UNSPECIFIED LATERALITY, UNSPECIFIED PART OF LUNG: Primary | ICD-10-CM

## 2023-05-11 DIAGNOSIS — D75.1 ERYTHROCYTOSIS: ICD-10-CM

## 2023-05-11 PROCEDURE — G0463 HOSPITAL OUTPT CLINIC VISIT: HCPCS | Performed by: INTERNAL MEDICINE

## 2023-05-11 NOTE — PROGRESS NOTES
Chief Complaint  Squamous Cell Caecinoma of RT lung    Liz Roper, Liz Roberts, DO    Subjective          Colin Nevarez presents to [facility] for NSCLC    Mr. Nevarez is a very pleasant 73-year-old male with a history of COPD, prostate cancer, hypertension, tobacco use who presents with new diagnosis of renal cell carcinoma of the lung.  Patient had CT chest recently performed to evaluate thoracic aortic aneurysm.  This revealed 1.7 cm pulmonary nodule within the lateral right lower lobe with FDG activity of 7.33.  There is no other FDG uptake in any other lesions or lymph nodes.  He does have shortness of breath on exertion.  He does continue to smoke.  He has quit before.  He states that he does not want chemotherapy.  He has chronic cough.  He has chronic fatigue.  He denies any weight loss.  Denies any fevers or chills.    Oncology/Hematology History Overview Note   8/2020: Patient underwent radical prostatectomy (Dr. Rodriguez) for clinical T3, Dee 3+4=7 prostate adenocarcinoma, PSA <10.     4/10/23: NM/PET performed due to abnormal lung nodule on CT Chest demonstrated a non-calcified 1.7 cm pulmonary nodule within the lateral right lower lobe with FDG activity of 7.33. No other nodule identified. No mediastinal, hilar or axillary lymphadenopathy.    5/3/23: CT needle biopsy of RLL: Moderately to poorly differentiated squamous cell carcinoma.     Prostate cancer (HCC)   7/12/2021 Initial Diagnosis    Prostate cancer (HCC)     5/11/2023 Cancer Staged    Staging form: Prostate, AJCC 8th Edition  - Clinical: Stage IIB (cT1c, cN0, cM0, PSA: 8.2, Grade Group: 2) - Signed by Dionisio Neville MD on 5/11/2023     Squamous cell carcinoma lung   4/4/2023 Initial Diagnosis    Squamous cell carcinoma lung     5/11/2023 Cancer Staged    Staging form: Lung, AJCC 8th Edition  - Clinical: Stage IA2 (cT1b, cN0, cM0) - Signed by Dionisio Neville MD on 5/11/2023            Review of Systems   Constitutional: Positive for fatigue.   All other systems reviewed and are negative.    Current Outpatient Medications on File Prior to Visit   Medication Sig Dispense Refill   • albuterol (PROVENTIL) (2.5 MG/3ML) 0.083% nebulizer solution Take 2.5 mg by nebulization Every 4 (Four) Hours As Needed for Wheezing. 100 each 12   • amLODIPine (NORVASC) 10 MG tablet Take 1 tablet by mouth Daily. 90 tablet 1   • aspirin 81 MG chewable tablet      • Fluticasone-Umeclidin-Vilant (Trelegy Ellipta) 200-62.5-25 MCG/ACT aerosol powder  Inhale 1 puff Daily. 3 each 0   • amoxicillin-clavulanate (Augmentin) 875-125 MG per tablet Take 1 tablet by mouth 2 (Two) Times a Day. (Patient not taking: Reported on 5/1/2023) 20 tablet 0   • ezetimibe (Zetia) 10 MG tablet Take 1 tablet by mouth Daily. (Patient not taking: Reported on 4/4/2023) 90 tablet 1   • multivitamin with minerals tablet tablet Men's Multivitamin 400- mcg oral tablet take 1 tablet by oral route daily   Active (Patient not taking: Reported on 5/1/2023)       No current facility-administered medications on file prior to visit.       No Known Allergies  Past Medical History:   Diagnosis Date   • Arthritis    • COPD (chronic obstructive pulmonary disease)    • Essential hypertension 7/12/2021   • Forgetfulness    • Heart flutter, ventricular    • High cholesterol    • Prostate cancer    • Ringing in ear    • Shortness of Air    • SOB (shortness of breath)      Past Surgical History:   Procedure Laterality Date   • APPENDECTOMY     • COLONOSCOPY  06/01/2019   • PROSTATECTOMY     • TONSILLECTOMY  1969    Per PT     Social History     Socioeconomic History   • Marital status: Single   Tobacco Use   • Smoking status: Every Day     Packs/day: 1.00     Years: 45.00     Pack years: 45.00     Types: Cigarettes     Start date: 1965     Passive exposure: Never   • Smokeless tobacco: Never   Vaping Use   • Vaping Use: Never used   Substance and Sexual  Activity   • Alcohol use: Never   • Drug use: Defer   • Sexual activity: Defer     Family History   Problem Relation Age of Onset   • Breast cancer Mother 50   • Stroke Father    • Breast cancer Sister 40   • Breast cancer Maternal Grandmother    • Breast cancer Paternal Grandmother        Objective   Physical Exam  Well appearing patient in no acute distress on RA  Anicteric sclerae, no rash on exposed skin  Respirations non-labored  Awake, alert, and oriented x 4. Speech intact. No gross neurologic deficit  Appropriate mood and affect    Vitals:    05/11/23 0811   BP: 127/65   Pulse: 77   Resp: 16   Temp: 100.1 °F (37.8 °C)   SpO2: 92%   Weight: 65.3 kg (143 lb 15.4 oz)   PainSc: 0-No pain     ECOG score: 0         PHQ-9 Total Score: 0                    Result Review :   The following data was reviewed by: Dionisio Neville MD on 05/11/23:  Lab Results   Component Value Date    HGB 19.0 (H) 04/28/2023    HCT 54.9 (H) 04/28/2023    MCV 88.7 04/28/2023     04/28/2023    WBC 7.83 04/28/2023    NEUTROABS 4.33 04/28/2023    LYMPHSABS 2.42 04/28/2023    MONOSABS 0.57 04/28/2023    EOSABS 0.44 (H) 04/28/2023    BASOSABS 0.05 04/28/2023     Lab Results   Component Value Date    GLUCOSE 85 03/27/2023    BUN 12 03/27/2023    CREATININE 0.89 03/27/2023     03/27/2023    K 4.3 03/27/2023    CL 98 03/27/2023    CO2 26.3 03/27/2023    CALCIUM 10.1 03/27/2023    PROTEINTOT 7.8 03/27/2023    ALBUMIN 4.7 03/27/2023    BILITOT 0.3 03/27/2023    ALKPHOS 91 03/27/2023    AST 21 03/27/2023    ALT 20 03/27/2023     Lab Results   Component Value Date    FREET4 1.1 05/29/2020    TSH 2.400 03/27/2023     Labs personally reviewed. Erythrocytosis noted.            Urology notes personally reviewed    NM PET personally reviewed and per my read with hypermetabolic RLL nodule, 1.7 cm in size    XR Chest 1 View    Result Date: 5/3/2023    1. Spiculated nodule in the right lower lobe biopsied earlier today.  Neoplasm is in the  differential. 2. No pneumothorax or other complication identified.       Tio Sanchez M.D.       Electronically Signed and Approved By: Tio Sanchez M.D. on 5/03/2023 at 15:12             XR Chest 1 View    Result Date: 5/3/2023    1. Indeterminate 2.2 cm nodule in the right lower lobe.  Neoplasm is in the differential. 2. No significant pneumothorax appreciated status post biopsy earlier today       Tio Sanchez M.D.       Electronically Signed and Approved By: Tio Sanchez M.D. on 5/03/2023 at 11:33             CT Needle Biopsy Lung    Result Date: 5/3/2023    1. Technically successful core biopsy of a 2.3 cm right lower lobe spiculated nodule without evidence of complication      Tio Sanchez M.D.       Electronically Signed and Approved By: Tio Sanchez M.D. on 5/03/2023 at 11:29             NM PET/CT Skull Base to Mid Thigh    Result Date: 4/12/2023    1. Hypermetabolic right lower lobe pulmonary nodule measuring up to 1.7 cm in size suspicious for primary malignancy.  Biopsy would be recommended. 2. No hypermetabolic activity seen elsewhere within the neck, chest, abdomen, or pelvis to suggest metastatic disease at this time.     KYLE FISH MD       Electronically Signed and Approved By: KYLE FISH MD on 4/12/2023 at 7:23                   Assessment and Plan    Diagnoses and all orders for this visit:    1. Malignant neoplasm of lung, unspecified laterality, unspecified part of lung (Primary)  -     Ambulatory Referral to ONC Social Work  -     Ambulatory Referral to Thoracic Surgery  -     Pulmonary Function Test; Future    2. Erythrocytosis    Stage IA2 NSCLC (squamous cell carcinoma)    1.7 cm RLL FDG avid lesion per PET without any other hypermetabolic adenopathy. Staged at [id number]. Discussed stage of cancer with patient today. Discussed that this is a curable stage. Surgery would be recommended. PFTs ordered to evaluate lung function prior to surgery. Patient to see thoracic surgery after  completion of PFTs to assess for candidacy for resection. If resection not offered, patient to be referred to radiation oncology for SBRT treatment. Patient to RTC in 2 months for further management and follow up.      This is an acute or chronic illness that poses a threat to life or bodily function. The above treatment plan involves a high risk of complications and/or mortality of patient management.    The patient was seen and examined. Work by the provider also included review and/or ordering of lab tests, review and/or ordering of radiology tests, review and/or ordering of medicine tests, discussion with other physicians or providers, independent review of data, obtaining old records, review/summation of old records, and/or other review.     I have reviewed the family history, social history, and past medical history for this patient. Previous information and data has been reviewed and updated as needed. I have reviewed and verified the chief complaint, history, and other documentation. The patient was interviewed and examined at the bedside and the chart reviewed. The previous observations, recommendations, and conclusions were reviewed including those of other providers.     The plan was discussed with the patient and/or family. The patient was given time to ask questions and these questions were answered. At the conclusion of their visit they had no additional questions or concerns and all questions were answered to their satisfaction.    Erythrocytosis  Secondary to smoking history. No workup or treatment recommended.    Smoking Cessation  Cessation recommended. Patient hesitant.     Prostate Cancer  Dee 3+4=7, PSA <10, clinical T3, s/p radical prostatectomy 8/2020, [facility]        Patient Follow Up: 2 months  Patient was given instructions and counseling regarding his condition or for health maintenance advice. Please see specific information pulled into the AVS if appropriate.

## 2023-05-11 NOTE — PROGRESS NOTES
"  Reason for Referral: Rounding with new patients with high distress screening scores.    Diagnosis: Lung cancer    Distress Score: 5 indicated for sleep, fatigue, worry or anxiety, sadness or depression, relationship with family members, and treatment decisions.      Location of Distress Screening: MED ONC    PHQ Score: 0    Current Treatment Plan: Per patient: pulmonary function test if that is approved, surgery, and determine full treatment plan after surgery    Previous Cancer [place]: Prostate Cancer    Mental Status: Patient was very pleasant. Patient was very tearful when describing his support system and how many of his family have passed. Patient's mood and affect were congruent. Patient was oriented x 3. Patient displayed his sense of humor throughout the interaction. Patient was able to effectively communicate his thoughts and feelings. OSW reviewed patient's coping skills and identified ways patient has been able to cope with his grief and loss. Patient stated he thinks of suicide on occasion but has no formal plan or intent. Patient denied any thoughts of suicide or homicide today. When patient thinks of suicide, it is around his grief and loss of his son who committed suicide in 2006 at the foot of the patient's bed. Patient stated others thought he should be on depression medications but patient stated he preferred to rely on his trust in God to get him through. Patient stated \"Any time I ask HIM (pointing upward) to show me he always does. HE has gotten me through this.\"  OSW provided emotional support and provided education on processing grief and loss. OSW used active listening, empathy, normalizing and validating to assist the patient in expressing his thoughts and feelings. Patient expressed appreciation for OSW meeting with him today.     Mental Health Treatment: Patient stated others thought he should be on depression medications but patient stated he preferred to rely on his trust in God to get him " through. Patient has never had mental health treatment. Patient denied thoughts of suicide or homicide at today's visit with OSW.    Substance Use/Tobacco Use: Patient stated he has no history of illegal substances. Patient stated in his early 20's he drank alcohol but not for years. Patient stated he smokes 1 pack a day unless he is working with his hands and then he does not smoke as long as he is busy.     Spirituality: Patient stated he is Islam. Patient teaches a Bible study group. Patient stated he was called to be a preacher but did not accept the call. Patient stated he prays and reads his Bible.    Hobbies: Patient stated he loves to hunt.     Support systems: Patient's sister is with him today. Patient stated he has two sisters and his eldest son who is his primary support system. Patient has friends and neighbors in his support system.     Residential status/Who lives in the home: Patient lives alone with his dog.     Transportation: Patient's sister said she would bring him to his treatment appointments.     Financial Concerns: Patient stated he has sufficient income to meet his basic needs and no concerns with his healthcare coverage.     Home Care Needs: None identified at this visit.     Advanced Directive/Living Will: None on file    Insurance: Medicare A and B and [facility] Medicare supplement    Resources/Referrals: OSW provided her business card and an overview of oncology social work services. OSW provided grief and loss education and emotional support. Patient did not identify any barriers to care. Patient declined any resources or referrals but OSW will continue to provide support services as needed.       Additional Comment: Patient found his son  from suicide when his son was 23 years old in . Patient stated this was at the foot of patient's bed. Patient son had been diagnosed with bipolar disorder previously. Patient's son was involved in an unhealthy relationship at  the time of his death.

## 2023-05-11 NOTE — TELEPHONE ENCOUNTER
Attempted to reach out to the patient to schedule new patient appointment with Dr Dhaliwal @ [facility] but mailbox was full for me to leave a voicemail.

## 2023-05-15 ENCOUNTER — HOSPITAL ENCOUNTER (OUTPATIENT)
Dept: RESPIRATORY THERAPY | Facility: HOSPITAL | Age: 73
Discharge: HOME | End: 2023-05-15
Admitting: INTERNAL MEDICINE
Payer: MEDICARE

## 2023-05-15 DIAGNOSIS — C34.90 MALIGNANT NEOPLASM OF LUNG, UNSPECIFIED LATERALITY, UNSPECIFIED PART OF LUNG: ICD-10-CM

## 2023-05-15 PROCEDURE — 94060 EVALUATION OF WHEEZING: CPT

## 2023-05-15 PROCEDURE — 94729 DIFFUSING CAPACITY: CPT

## 2023-05-15 PROCEDURE — 94726 PLETHYSMOGRAPHY LUNG VOLUMES: CPT

## 2023-05-15 RX ORDER — LEVALBUTEROL INHALATION SOLUTION 1.25 MG/3ML
1.25 SOLUTION RESPIRATORY (INHALATION) ONCE
Status: COMPLETED | OUTPATIENT
Start: 2023-05-15 | End: 2023-05-15

## 2023-05-15 RX ADMIN — LEVALBUTEROL HYDROCHLORIDE 1.25 MG: 1.25 SOLUTION RESPIRATORY (INHALATION) at 06:47

## 2023-05-16 ENCOUNTER — OFFICE VISIT (OUTPATIENT)
Dept: OTHER | Facility: HOSPITAL | Age: 73
End: 2023-05-16
Payer: MEDICARE

## 2023-05-16 VITALS
DIASTOLIC BLOOD PRESSURE: 72 MMHG | BODY MASS INDEX: 20.76 KG/M2 | SYSTOLIC BLOOD PRESSURE: 124 MMHG | WEIGHT: 145 LBS | OXYGEN SATURATION: 90 % | HEART RATE: 71 BPM | HEIGHT: 70 IN

## 2023-05-16 DIAGNOSIS — J43.1 PANLOBULAR EMPHYSEMA: ICD-10-CM

## 2023-05-16 DIAGNOSIS — C34.91 SQUAMOUS CELL CARCINOMA OF RIGHT LUNG: Primary | ICD-10-CM

## 2023-05-16 PROCEDURE — 1159F MED LIST DOCD IN RCRD: CPT | Performed by: THORACIC SURGERY (CARDIOTHORACIC VASCULAR SURGERY)

## 2023-05-16 PROCEDURE — 1160F RVW MEDS BY RX/DR IN RCRD: CPT | Performed by: THORACIC SURGERY (CARDIOTHORACIC VASCULAR SURGERY)

## 2023-05-16 PROCEDURE — 3074F SYST BP LT 130 MM HG: CPT | Performed by: THORACIC SURGERY (CARDIOTHORACIC VASCULAR SURGERY)

## 2023-05-16 PROCEDURE — G0463 HOSPITAL OUTPT CLINIC VISIT: HCPCS | Performed by: THORACIC SURGERY (CARDIOTHORACIC VASCULAR SURGERY)

## 2023-05-16 PROCEDURE — 3078F DIAST BP <80 MM HG: CPT | Performed by: THORACIC SURGERY (CARDIOTHORACIC VASCULAR SURGERY)

## 2023-05-16 NOTE — PROGRESS NOTES
Chief Complaint  Non-small cell lung cancer  Subjective        Colin Nevarez presents to [facility]  History of Present Illness    Mr. Nevarez is a pleasant 73-year-old gentleman who presents with a newly discovered 1.7 cm right lower lobe nodule. He is accompanied by his sister, Lily.    The patient reports that the nodule started years ago. He states that approximately 10 years ago he was vomiting blood. He had some arteries that had ruptured in his lung. He states that he was told that his lungs were solid with something. He was told to wait so many months and come back. He states that he did. He reports that it was all gone. He reports that when he found little spots, they removed for years and they never moved. He states that this time he talked to Dr. Pollard and he told him that they needed to go back to have another run. He reports that Dr. Pollard spotted it last year. He states that he can not tell what it is, but they are going to keep an eye on it. He reports that they were doing the scan for aneurysm.    He reports that he has COPD. He states that he smoked until this past Thursday. He can walk from here to the parking lot without stopping. He states that he can go up a flight of stairs without stopping. He states that some days he can go run. He reports that the humidity makes it worse. He states that it is worse when it is humid outside.     He had prostate cancer in the past. He states that it was treated on 2020.    He smoked for approximately 60 years. He states that he was busy and stayed busy. He reports that he would smoke packs. He reports that if he gets out and gets going, he will smoke. He states that his father let him smoke as a child. He reports that he smoked 5 packs a day. He would roll his cigarettes and light one cigarette off the other one. He reports that his father  at 60 years old with an aneurysm to the brain. He uses an inhaler as  "needed. He does not see a pulmonologist.     He reports that his mother had melanoma and ovarian cancer. He states that he and his sister had breast cancer. He reports that his mother's sister had breast cancer. He states that he has had genetic testing. He reports that he is not BRCA. He states that he has one sister that has not been bothering her cancer yet.    He reports that he is a .    Objective   Vital Signs:  /72   Pulse 71   Ht 177.8 cm (70\")   Wt 65.8 kg (145 lb)   SpO2 90%   BMI 20.81 kg/m²   Estimated body mass index is 20.81 kg/m² as calculated from the following:    Height as of this encounter: 177.8 cm (70\").    Weight as of this encounter: 65.8 kg (145 lb).       BMI is within normal parameters. No other follow-up for BMI required.      Physical Exam  Vitals and nursing note reviewed.   Constitutional:       Appearance: He is well-developed.   HENT:      Head: Normocephalic and atraumatic.      Nose: Nose normal.   Eyes:      Conjunctiva/sclera: Conjunctivae normal.   Pulmonary:      Effort: Pulmonary effort is normal.   Musculoskeletal:         General: Normal range of motion.      Cervical back: Normal range of motion and neck supple.   Skin:     General: Skin is warm and dry.   Neurological:      Mental Status: He is alert and oriented to person, place, and time.   Psychiatric:         Behavior: Behavior normal.         Thought Content: Thought content normal.         Judgment: Judgment normal.        Result Review :      I have independently reviewed the PET CT scan performed on 04/10/2023 which demonstrates a hypermetabolic 1.7 cm right lower lobe lung nodule. No evidence of metastatic disease. No hilar or mediastinal lymphadenopathy.    Pathology from CT guided biopsy on 05/03/2023 demonstrates a moderately to poorly differentiated squamous cell carcinoma.    Pulmonary function studies demonstrate an FEV1 of 0.75 or 24 percent predicted and a DLCO of 6.4 or 25 percent " predicted.                   Assessment and Plan   Diagnoses and all orders for this visit:    1. Squamous cell carcinoma of right lung (Primary)  -     Ambulatory Referral to Radiation Oncology    2. Panlobular emphysema  -     Ambulatory Referral to Pulmonology         Mr. Nevarez is a pleasant 73-year-old gentleman with a newly diagnosed stage IA2 squamous cell carcinoma of the right lower lobe. His pulmonary functions would not support a resection and given that he has a stage I disease, he will be referred to radiation oncology for management of his stage I lung cancer. He will also go see Dr. Owusu for management of his COPD.       I spent 45 minutes caring for Colin on this date of service. This time includes time spent by me in the following activities:preparing for the visit, reviewing tests, obtaining and/or reviewing a separately obtained history, performing a medically appropriate examination and/or evaluation , counseling and educating the patient/family/caregiver, ordering medications, tests, or procedures, documenting information in the medical record and independently interpreting results and communicating that information with the patient/family/caregiver  Follow Up   No follow-ups on file.  Patient was given instructions and counseling regarding his condition or for health maintenance advice. Please see specific information pulled into the AVS if appropriate.     Transcribed from ambient dictation for Anabel Dhaliwal MD by Ally [employer] .  05/16/23   11:02 EDT    Patient or patient representative verbalized consent to the visit recording.  I have personally performed the services described in this document as transcribed by the above individual, and it is both accurate and complete.

## 2023-05-17 LAB
CYTO UR: NORMAL
LAB AP CASE REPORT: NORMAL
LAB AP CLINICAL INFORMATION: NORMAL
LAB AP SPECIAL STAINS: NORMAL
Lab: NORMAL
PATH REPORT.ADDENDUM SPEC: NORMAL
PATH REPORT.FINAL DX SPEC: NORMAL
PATH REPORT.GROSS SPEC: NORMAL

## 2023-05-19 ENCOUNTER — HOSPITAL ENCOUNTER (OUTPATIENT)
Dept: RADIATION ONCOLOGY | Facility: HOSPITAL | Age: 73
Setting detail: RADIATION/ONCOLOGY SERIES
End: 2023-05-19

## 2023-05-19 ENCOUNTER — CONSULT (OUTPATIENT)
Dept: RADIATION ONCOLOGY | Facility: HOSPITAL | Age: 73
End: 2023-05-19
Payer: MEDICARE

## 2023-05-19 VITALS
WEIGHT: 145.5 LBS | SYSTOLIC BLOOD PRESSURE: 150 MMHG | RESPIRATION RATE: 16 BRPM | HEART RATE: 72 BPM | DIASTOLIC BLOOD PRESSURE: 81 MMHG | BODY MASS INDEX: 20.88 KG/M2 | OXYGEN SATURATION: 94 % | TEMPERATURE: 98.8 F

## 2023-05-19 DIAGNOSIS — C34.31 PRIMARY SQUAMOUS CELL CARCINOMA OF LOWER LOBE OF RIGHT LUNG: Primary | ICD-10-CM

## 2023-05-19 DIAGNOSIS — C34.91 SQUAMOUS CELL CARCINOMA OF RIGHT LUNG: ICD-10-CM

## 2023-05-19 PROCEDURE — 77263 THER RADIOLOGY TX PLNG CPLX: CPT | Performed by: RADIOLOGY

## 2023-05-19 PROCEDURE — G0463 HOSPITAL OUTPT CLINIC VISIT: HCPCS | Performed by: RADIOLOGY

## 2023-05-19 NOTE — PROGRESS NOTES
New Patient Office Visit      Encounter Date: 05/19/2023   Patient Name: Colin Nevarez  YOB: 1950   Medical Record Number: [id number]   Primary Diagnosis: Primary squamous cell carcinoma of lower lobe of right lung [C34.31]   Cancer Staging: Cancer Staging   Prostate cancer (HCC)  Staging form: Prostate, AJCC 8th Edition  - Clinical: Stage IIB (cT1c, cN0, cM0, PSA: 8.2, Grade Group: 2) - Signed by Dionisio Neville MD on 5/11/2023    Squamous cell carcinoma lung  Staging form: Lung, AJCC 8th Edition  - Clinical: Stage IA2 (cT1b, cN0, cM0) - Signed by Dionisio Neville MD on 5/11/2023      Chief Complaint:    Chief Complaint   Patient presents with   • Consult       History of Present Illness: Colin Nevarez is a 73-year-old gentleman with recently diagnosed squamous cell carcinoma of the right lower lobe who is seen in consultation regarding radiotherapy as definitive management.  Mr. Nevarez has a history of COPD and underwent CT scan of the chest on 3/31/2023 revealing a large spiculated pulmonary nodule in the lateral basilar right lower lobe measuring 2.4 x 2 cm.  PET/CT performed on 4/10/2023 revealed this to measure 1.7 cm in size with hypermetabolism highly concerning for malignancy.  Pathology from CT-guided biopsy performed on 5/3/2023 revealed moderately to poorly differentiated squamous cell carcinoma; cells were positive for p40 and CK5/6 and negative for Napsin a and TTF-1.  He underwent pulmonary function testing revealing an FVC of 40% of predicted, FEV1 24% of predicted and DLCO 25% of predicted.  He reports feeling well overall with no complaints and denies any significant changes in breathing.  He was evaluated by Dr. Dhaliwal and deemed not an appropriate surgical candidate due to his pulmonary function.    Subjective        Review of Systems: Review of Systems   Constitutional: Positive for fatigue (6/10). Negative for appetite change and unexpected weight change.    HENT: Positive for hearing loss (hard of hearing ) and tinnitus (ongoing ). Negative for sore throat and trouble swallowing.    Eyes: Negative for visual disturbance.   Respiratory: Positive for cough (frequent productive cough with green secretions), shortness of breath (with exertion ) and wheezing (occasionally).    Cardiovascular: Negative for chest pain, palpitations and leg swelling.   Gastrointestinal: Positive for nausea (rarely). Negative for anal bleeding, blood in stool, constipation and diarrhea.   Genitourinary: Negative for difficulty urinating, dysuria, frequency and urgency.   Musculoskeletal: Positive for arthralgias. Negative for back pain.   Skin: Negative for color change and rash.   Neurological: Positive for dizziness (with position changes). Negative for numbness and headaches.   Psychiatric/Behavioral: Negative for sleep disturbance.       Past Medical History:   Past Medical History:   Diagnosis Date   • Arthritis    • COPD (chronic obstructive pulmonary disease)    • Essential hypertension 07/12/2021   • Forgetfulness    • Heart flutter, ventricular    • High cholesterol    • Lung cancer    • Prostate cancer    • Ringing in ear    • Shortness of Air    • SOB (shortness of breath)        Past Surgical History:   Past Surgical History:   Procedure Laterality Date   • APPENDECTOMY     • COLONOSCOPY  06/01/2019   • LUNG BIOPSY     • PROSTATECTOMY     • TONSILLECTOMY  1969    Per PT       Family History:   Family History   Problem Relation Age of Onset   • Ovarian cancer Mother    • Stroke Father    • Breast cancer Sister 40   • Cancer Maternal Grandmother    • Cancer Paternal Grandmother        Social History:   Social History     Socioeconomic History   • Marital status: Single   Tobacco Use   • Smoking status: Every Day     Packs/day: 1.00     Years: 45.00     Pack years: 45.00     Types: Cigarettes     Start date: 12/30/1954     Passive exposure: Current   • Smokeless tobacco: Never   •  Tobacco comments:     SMOKED ONE CIGARETTE SINCE LAST THURSDAY   Vaping Use   • Vaping Use: Never used   Substance and Sexual Activity   • Alcohol use: Never   • Drug use: Defer   • Sexual activity: Defer       Medications:     Current Outpatient Medications:   •  amLODIPine (NORVASC) 10 MG tablet, Take 1 tablet by mouth Daily., Disp: 90 tablet, Rfl: 1  •  aspirin 81 MG chewable tablet, , Disp: , Rfl:   •  Fluticasone-Umeclidin-Vilant (Trelegy Ellipta) 200-62.5-25 MCG/ACT aerosol powder , Inhale 1 puff Daily., Disp: 3 each, Rfl: 0  •  multivitamin with minerals tablet tablet, , Disp: , Rfl:   •  albuterol (PROVENTIL) (2.5 MG/3ML) 0.083% nebulizer solution, Take 2.5 mg by nebulization Every 4 (Four) Hours As Needed for Wheezing. (Patient not taking: Reported on 5/16/2023), Disp: 100 each, Rfl: 12  •  ezetimibe (Zetia) 10 MG tablet, Take 1 tablet by mouth Daily. (Patient not taking: Reported on 5/19/2023), Disp: 90 tablet, Rfl: 1    Allergies:   No Known Allergies    Pain: (on a scale of 0-10)   Pain Score    05/19/23 0953   PainSc: 0-No pain       Advanced Care Plan: N   Quality of Life: 100 - Full Activity    Objective     Physical Exam:   Vital Signs:   Vitals:    05/19/23 0953   BP: 150/81   Pulse: 72   Resp: 16   Temp: 98.8 °F (37.1 °C)   TempSrc: Temporal   SpO2: 94%   Weight: 66 kg (145 lb 8.1 oz)   PainSc: 0-No pain     Body mass index is 20.88 kg/m².     Physical Exam  Constitutional:       General: He is not in acute distress.     Appearance: Normal appearance. He is not ill-appearing, toxic-appearing or diaphoretic.   HENT:      Head: Normocephalic and atraumatic.   Cardiovascular:      Pulses: Normal pulses.   Pulmonary:      Effort: Pulmonary effort is normal. No respiratory distress.   Skin:     General: Skin is warm and dry.      Coloration: Skin is not jaundiced.   Neurological:      General: No focal deficit present.      Mental Status: He is alert and oriented to person, place, and time.      Cranial  Nerves: No cranial nerve deficit.      Gait: Gait normal.   Psychiatric:         Mood and Affect: Mood normal.         Behavior: Behavior normal.         Judgment: Judgment normal.         Results:   Radiographs: XR Chest 1 View    Result Date: 5/3/2023    1. Spiculated nodule in the right lower lobe biopsied earlier today.  Neoplasm is in the differential. 2. No pneumothorax or other complication identified.       Tio Sanchez M.D.       Electronically Signed and Approved By: Tio Sanchez M.D. on 5/03/2023 at 15:12             XR Chest 1 View    Result Date: 5/3/2023    1. Indeterminate 2.2 cm nodule in the right lower lobe.  Neoplasm is in the differential. 2. No significant pneumothorax appreciated status post biopsy earlier today       Tio Sanchez M.D.       Electronically Signed and Approved By: Tio Sanchez M.D. on 5/03/2023 at 11:33             CT Needle Biopsy Lung    Result Date: 5/3/2023    1. Technically successful core biopsy of a 2.3 cm right lower lobe spiculated nodule without evidence of complication      Tio Sanchez M.D.       Electronically Signed and Approved By: Tio Sanchez M.D. on 5/03/2023 at 11:29           I personally reviewed the PET/CT performed on 4/10/2023.  The pertinent findings are as above.    Pathology: I personally reviewed the pathology report from the procedure performed on 5/3/2023.  The pertinent findings are as above.    Labs:   WBC   Date Value Ref Range Status   04/28/2023 7.83 3.40 - 10.80 10*3/mm3 Final     Hemoglobin   Date Value Ref Range Status   04/28/2023 19.0 (H) 13.0 - 17.7 g/dL Final     Hematocrit   Date Value Ref Range Status   04/28/2023 54.9 (H) 37.5 - 51.0 % Final     Platelets   Date Value Ref Range Status   04/28/2023 194 140 - 450 10*3/mm3 Final      PSA   Date Value Ref Range Status   07/19/2022 <0.014 0.000 - 4.000 ng/mL Final   09/01/2021 <0.014 0.000 - 4.000 ng/mL Final   03/08/2021 <0.01 0.00 - 4.00 ng/mL Final   10/28/2020 <0.01 0.00 - 4.00  ng/mL Final   05/29/2020 8.16 (H) 0.00 - 4.00 ng/mL Final       Assessment / Plan        Assessment/Plan:   Colin Nevarez is a 73-year-old gentleman with cT1c cN0 cM0 moderately to poorly differentiated squamous cell carcinoma of the right lower lobe.  He has no clinical or radiographic evidence of distant metastatic disease.  He has advanced COPD and is not a surgical candidate.  ECOG 0    I discussed the clinical, radiographic and pathologic findings today with Mr. Nevarez.  I explained the role of radiotherapy in the definitive management of early-stage lung cancer for the medically inoperable.  We discussed the rationale for the delivery of SBRT in this scenario.  Understanding the risks, benefits and alternatives to radiation, Mr. Nevarez is agreeable to my recommendation and is scheduled for 4DCT simulation for treatment planning purposes.  As Mr. Nevarez has a history of prostate cancer treated in 2020, he is not eligible for treatment on PACIFIC-4, a phase 3 trial assessing the role of durvalumab in the treatment of early stage, node-negative non-small cell lung cancer for patients undergoing stereotactic body radiotherapy.        Aaron Roldan MD  Radiation Oncology  [facility]    This document has been signed by Aaron Roldan MD on May 19, 2023 11:24 EDT

## 2023-05-19 NOTE — LETTER
May 19, 2023     Anabel Dhaliwal MD  [address] [facility]  [address] [place] [address]    Patient: Colin Nevarez   YOB: 1950   Date of Visit: 5/19/2023       Dear Dr. Isra MD:    Thank you for referring Colin Nevarez to me for evaluation. Below are the relevant portions of my assessment and plan of care.    If you have questions, please do not hesitate to call me. I look forward to following Mr. Nevarez along with you.         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 [C34.31]   Cancer Staging: Cancer Staging   Prostate cancer (HCC)  Staging form: Prostate, AJCC 8th Edition  - Clinical: Stage IIB (cT1c, cN0, cM0, PSA: 8.2, Grade Group: 2) - Signed by Dionisio Neville MD on 5/11/2023    Squamous cell carcinoma lung  Staging form: Lung, AJCC 8th Edition  - Clinical: Stage IA2 (cT1b, cN0, cM0) - Signed by Dionisio Neville MD on 5/11/2023      Chief Complaint:    Chief Complaint   Patient presents with   • Consult       History of Present Illness: Colin Nevarez is a 73-year-old gentleman with recently diagnosed squamous cell carcinoma of the right lower lobe who is seen in consultation regarding radiotherapy as definitive management.  Mr. Nevarez has a history of COPD and underwent CT scan of the chest on 3/31/2023 revealing a large spiculated pulmonary nodule in the lateral basilar right lower lobe measuring 2.4 x 2 cm.  PET/CT performed on 4/10/2023 revealed this to measure 1.7 cm in size with hypermetabolism highly concerning for malignancy.  Pathology from CT-guided biopsy performed on 5/3/2023 revealed moderately to poorly differentiated squamous cell carcinoma; cells were positive for p40  and CK5/6 and negative for Napsin a and TTF-1.  He underwent pulmonary function testing revealing an FVC of 40% of predicted, FEV1 24% of predicted and DLCO 25% of predicted.  He reports feeling well overall with no complaints and denies any significant changes in breathing.  He was evaluated by Dr. Dhaliwal and deemed not an appropriate surgical candidate due to his pulmonary function.    Subjective        Review of Systems: Review of Systems   Constitutional: Positive for fatigue (6/10). Negative for appetite change and unexpected weight change.   HENT: Positive for hearing loss (hard of hearing ) and tinnitus (ongoing ). Negative for sore throat and trouble swallowing.    Eyes: Negative for visual disturbance.   Respiratory: Positive for cough (frequent productive cough with green secretions), shortness of breath (with exertion ) and wheezing (occasionally).    Cardiovascular: Negative for chest pain, palpitations and leg swelling.   Gastrointestinal: Positive for nausea (rarely). Negative for anal bleeding, blood in stool, constipation and diarrhea.   Genitourinary: Negative for difficulty urinating, dysuria, frequency and urgency.   Musculoskeletal: Positive for arthralgias. Negative for back pain.   Skin: Negative for color change and rash.   Neurological: Positive for dizziness (with position changes). Negative for numbness and headaches.   Psychiatric/Behavioral: Negative for sleep disturbance.       Past Medical History:   Past Medical History:   Diagnosis Date   • Arthritis    • COPD (chronic obstructive pulmonary disease)    • Essential hypertension 07/12/2021   • Forgetfulness    • Heart flutter, ventricular    • High cholesterol    • Lung cancer    • Prostate cancer    • Ringing in ear    • Shortness of Air    • SOB (shortness of breath)        Past Surgical History:   Past Surgical History:   Procedure Laterality Date   • APPENDECTOMY     • COLONOSCOPY  06/01/2019   • LUNG BIOPSY     • PROSTATECTOMY     •  TONSILLECTOMY  1969    Per PT       Family History:   Family History   Problem Relation Age of Onset   • Ovarian cancer Mother    • Stroke Father    • Breast cancer Sister 40   • Cancer Maternal Grandmother    • Cancer Paternal Grandmother        Social History:   Social History     Socioeconomic History   • Marital status: Single   Tobacco Use   • Smoking status: Every Day     Packs/day: 1.00     Years: 45.00     Pack years: 45.00     Types: Cigarettes     Start date: 12/30/1954     Passive exposure: Current   • Smokeless tobacco: Never   • Tobacco comments:     SMOKED ONE CIGARETTE SINCE LAST THURSDAY   Vaping Use   • Vaping Use: Never used   Substance and Sexual Activity   • Alcohol use: Never   • Drug use: Defer   • Sexual activity: Defer       Medications:     Current Outpatient Medications:   •  amLODIPine (NORVASC) 10 MG tablet, Take 1 tablet by mouth Daily., Disp: 90 tablet, Rfl: 1  •  aspirin 81 MG chewable tablet, , Disp: , Rfl:   •  Fluticasone-Umeclidin-Vilant (Trelegy Ellipta) 200-62.5-25 MCG/ACT aerosol powder , Inhale 1 puff Daily., Disp: 3 each, Rfl: 0  •  multivitamin with minerals tablet tablet, , Disp: , Rfl:   •  albuterol (PROVENTIL) (2.5 MG/3ML) 0.083% nebulizer solution, Take 2.5 mg by nebulization Every 4 (Four) Hours As Needed for Wheezing. (Patient not taking: Reported on 5/16/2023), Disp: 100 each, Rfl: 12  •  ezetimibe (Zetia) 10 MG tablet, Take 1 tablet by mouth Daily. (Patient not taking: Reported on 5/19/2023), Disp: 90 tablet, Rfl: 1    Allergies:   No Known Allergies    Pain: (on a scale of 0-10)   Pain Score    05/19/23 0953   PainSc: 0-No pain       Advanced Care Plan: N   Quality of Life: 100 - Full Activity    Objective     Physical Exam:   Vital Signs:   Vitals:    05/19/23 0953   BP: 150/81   Pulse: 72   Resp: 16   Temp: 98.8 °F (37.1 °C)   TempSrc: Temporal   SpO2: 94%   Weight: 66 kg (145 lb 8.1 oz)   PainSc: 0-No pain     Body mass index is 20.88 kg/m².     Physical  Exam  Constitutional:       General: He is not in acute distress.     Appearance: Normal appearance. He is not ill-appearing, toxic-appearing or diaphoretic.   HENT:      Head: Normocephalic and atraumatic.   Cardiovascular:      Pulses: Normal pulses.   Pulmonary:      Effort: Pulmonary effort is normal. No respiratory distress.   Skin:     General: Skin is warm and dry.      Coloration: Skin is not jaundiced.   Neurological:      General: No focal deficit present.      Mental Status: He is alert and oriented to person, place, and time.      Cranial Nerves: No cranial nerve deficit.      Gait: Gait normal.   Psychiatric:         Mood and Affect: Mood normal.         Behavior: Behavior normal.         Judgment: Judgment normal.         Results:   Radiographs: XR Chest 1 View    Result Date: 5/3/2023    1. Spiculated nodule in the right lower lobe biopsied earlier today.  Neoplasm is in the differential. 2. No pneumothorax or other complication identified.       Tio Sanchez M.D.       Electronically Signed and Approved By: Tio Sanchez M.D. on 5/03/2023 at 15:12             XR Chest 1 View    Result Date: 5/3/2023    1. Indeterminate 2.2 cm nodule in the right lower lobe.  Neoplasm is in the differential. 2. No significant pneumothorax appreciated status post biopsy earlier today       Tio Sanchez M.D.       Electronically Signed and Approved By: Tio Sanchez M.D. on 5/03/2023 at 11:33             CT Needle Biopsy Lung    Result Date: 5/3/2023    1. Technically successful core biopsy of a 2.3 cm right lower lobe spiculated nodule without evidence of complication      Tio Sanchez M.D.       Electronically Signed and Approved By: Tio Sanchez M.D. on 5/03/2023 at 11:29           I personally reviewed the PET/CT performed on 4/10/2023.  The pertinent findings are as above.    Pathology: I personally reviewed the pathology report from the procedure performed on 5/3/2023.  The pertinent findings are as  above.    Labs:   WBC   Date Value Ref Range Status   04/28/2023 7.83 3.40 - 10.80 10*3/mm3 Final     Hemoglobin   Date Value Ref Range Status   04/28/2023 19.0 (H) 13.0 - 17.7 g/dL Final     Hematocrit   Date Value Ref Range Status   04/28/2023 54.9 (H) 37.5 - 51.0 % Final     Platelets   Date Value Ref Range Status   04/28/2023 194 140 - 450 10*3/mm3 Final      PSA   Date Value Ref Range Status   07/19/2022 <0.014 0.000 - 4.000 ng/mL Final   09/01/2021 <0.014 0.000 - 4.000 ng/mL Final   03/08/2021 <0.01 0.00 - 4.00 ng/mL Final   10/28/2020 <0.01 0.00 - 4.00 ng/mL Final   05/29/2020 8.16 (H) 0.00 - 4.00 ng/mL Final       Assessment / Plan        Assessment/Plan:   Colin Nevarez is a 73-year-old gentleman with cT1c cN0 cM0 moderately to poorly differentiated squamous cell carcinoma of the right lower lobe.  He has no clinical or radiographic evidence of distant metastatic disease.  He has advanced COPD and is not a surgical candidate.  ECOG 0    I discussed the clinical, radiographic and pathologic findings today with Mr. Nevarez.  I explained the role of radiotherapy in the definitive management of early-stage lung cancer for the medically inoperable.  We discussed the rationale for the delivery of SBRT in this scenario.  Understanding the risks, benefits and alternatives to radiation, Mr. Nevarez is agreeable to my recommendation and is scheduled for 4DCT simulation for treatment planning purposes.  As Mr. Nevarez has a history of prostate cancer treated in 2020, he is not eligible for treatment on PACIFIC-4, a phase 3 trial assessing the role of durvalumab in the treatment of early stage, node-negative non-small cell lung cancer for patients undergoing stereotactic body radiotherapy.        Aaron Roldan MD  Radiation Oncology  [facility]    This document has been signed by Aaron Roldan MD on May 19, 2023 11:24 EDT

## 2023-05-22 ENCOUNTER — HOSPITAL ENCOUNTER (OUTPATIENT)
Dept: RADIATION ONCOLOGY | Facility: HOSPITAL | Age: 73
Discharge: HOME | End: 2023-05-22

## 2023-05-22 DIAGNOSIS — C34.31 MALIGNANT NEOPLASM OF LOWER LOBE, RIGHT BRONCHUS OR LUNG: ICD-10-CM

## 2023-05-22 PROCEDURE — 77334 RADIATION TREATMENT AID(S): CPT | Performed by: RADIOLOGY

## 2023-05-25 ENCOUNTER — HOSPITAL ENCOUNTER (OUTPATIENT)
Dept: RADIATION ONCOLOGY | Facility: HOSPITAL | Age: 73
Discharge: HOME | End: 2023-05-25

## 2023-05-26 PROCEDURE — 77301 RADIOTHERAPY DOSE PLAN IMRT: CPT | Performed by: RADIOLOGY

## 2023-05-26 PROCEDURE — 77338 DESIGN MLC DEVICE FOR IMRT: CPT | Performed by: RADIOLOGY

## 2023-05-26 PROCEDURE — 77293 RESPIRATOR MOTION MGMT SIMUL: CPT | Performed by: RADIOLOGY

## 2023-05-26 PROCEDURE — 77300 RADIATION THERAPY DOSE PLAN: CPT | Performed by: RADIOLOGY

## 2023-05-30 ENCOUNTER — HOSPITAL ENCOUNTER (OUTPATIENT)
Dept: RADIATION ONCOLOGY | Facility: HOSPITAL | Age: 73
Discharge: HOME | End: 2023-05-30

## 2023-05-30 VITALS
SYSTOLIC BLOOD PRESSURE: 125 MMHG | BODY MASS INDEX: 21.29 KG/M2 | TEMPERATURE: 97.8 F | WEIGHT: 148.37 LBS | DIASTOLIC BLOOD PRESSURE: 75 MMHG | HEART RATE: 68 BPM | RESPIRATION RATE: 16 BRPM | OXYGEN SATURATION: 94 %

## 2023-05-30 DIAGNOSIS — C34.31 MALIGNANT NEOPLASM OF LOWER LOBE, RIGHT BRONCHUS OR LUNG: Primary | ICD-10-CM

## 2023-05-30 LAB
RAD ONC ARIA COURSE ID: NORMAL
RAD ONC ARIA COURSE INTENT: NORMAL
RAD ONC ARIA COURSE LAST TREATMENT DATE: NORMAL
RAD ONC ARIA COURSE START DATE: NORMAL
RAD ONC ARIA COURSE TREATMENT ELAPSED DAYS: 0
RAD ONC ARIA FIRST TREATMENT DATE: NORMAL
RAD ONC ARIA PLAN FRACTIONS TREATED TO DATE: 1
RAD ONC ARIA PLAN ID: NORMAL
RAD ONC ARIA PLAN PRESCRIBED DOSE PER FRACTION: 11 GY
RAD ONC ARIA PLAN PRIMARY REFERENCE POINT: NORMAL
RAD ONC ARIA PLAN TOTAL FRACTIONS PRESCRIBED: 5
RAD ONC ARIA PLAN TOTAL PRESCRIBED DOSE: 5500 CGY
RAD ONC ARIA REFERENCE POINT DOSAGE GIVEN TO DATE: 11 GY
RAD ONC ARIA REFERENCE POINT ID: NORMAL
RAD ONC ARIA REFERENCE POINT SESSION DOSAGE GIVEN: 11 GY

## 2023-05-30 PROCEDURE — 77373 STRTCTC BDY RAD THER TX DLVR: CPT | Performed by: RADIOLOGY

## 2023-05-30 NOTE — PROGRESS NOTES
Stereotactic Body Radiotherapy Note    05/30/2023        Treatment Site: Right lower lobe  Energy: 6X  Dose: 1100 cGy  #Fx: 1  Start Date: 5/30/2023  Elapsed Days: 0      Cancer Staging   Stage IIB (cT1c, cN0, cM0, PSA: 8.2, Grade Group: 2)    Stage IA2 (cT1b, cN0, cM0)     Current Outpatient Medications on File Prior to Encounter   Medication Sig   • albuterol (PROVENTIL) (2.5 MG/3ML) 0.083% nebulizer solution Take 2.5 mg by nebulization Every 4 (Four) Hours As Needed for Wheezing. (Patient not taking: Reported on 5/16/2023)   • amLODIPine (NORVASC) 10 MG tablet Take 1 tablet by mouth Daily.   • aspirin 81 MG chewable tablet    • ezetimibe (Zetia) 10 MG tablet Take 1 tablet by mouth Daily. (Patient not taking: Reported on 5/19/2023)   • Fluticasone-Umeclidin-Vilant (Trelegy Ellipta) 200-62.5-25 MCG/ACT aerosol powder  Inhale 1 puff Daily.   • multivitamin with minerals tablet tablet      No current facility-administered medications on file prior to encounter.     Vitals:    05/30/23 0939   BP: 125/75   Pulse: 68   Resp: 16   Temp: 97.8 °F (36.6 °C)   SpO2: 94%     Pain Score    05/30/23 0939   PainSc: 0-No pain       Oncology/Hematology History Overview Note   8/2020: Patient underwent radical prostatectomy (Dr. Rodriguez) for clinical T3, Dee 3+4=7 prostate adenocarcinoma, PSA <10.     4/10/23: NM/PET performed due to abnormal lung nodule on CT Chest demonstrated a non-calcified 1.7 cm pulmonary nodule within the lateral right lower lobe with FDG activity of 7.33. No other nodule identified. No mediastinal, hilar or axillary lymphadenopathy.    5/3/23: CT needle biopsy of RLL: Moderately to poorly differentiated squamous cell carcinoma.     Prostate cancer (HCC)   7/12/2021 Initial Diagnosis    Prostate cancer (HCC)     5/11/2023 Cancer Staged    Staging form: Prostate, AJCC 8th Edition  - Clinical: Stage IIB (cT1c, cN0, cM0, PSA: 8.2, Grade Group: 2) - Signed by Dionisio Neville MD on 5/11/2023      Squamous cell carcinoma lung   4/4/2023 Initial Diagnosis    Squamous cell carcinoma lung     5/11/2023 Cancer Staged    Staging form: Lung, AJCC 8th Edition  - Clinical: Stage IA2 (cT1b, cN0, cM0) - Signed by Dionisio Neville MD on 5/11/2023     Malignant neoplasm of lower lobe, right bronchus or lung   5/22/2023 Initial Diagnosis    Malignant neoplasm of lower lobe, right bronchus or lung     5/30/2023 -  Radiation    RADIATION THERAPY Treatment Details (Noted on 5/22/2023)  Site: Right Lung - Lower lobe  Technique: SBRT  Goal: No goal specified  Planned Treatment Start Date: 5/30/2023         Review of Systems   Constitutional: Negative for activity change, appetite change and fatigue.   HENT: Positive for tinnitus (chronic). Negative for congestion, sinus pressure, sore throat and trouble swallowing.    Eyes: Negative for visual disturbance.   Respiratory: Positive for cough (occasional), shortness of breath (with excersion) and wheezing (occasional).    Cardiovascular: Negative for chest pain, palpitations and leg swelling.   Gastrointestinal: Negative for abdominal pain, constipation, diarrhea, nausea and vomiting.   Genitourinary: Negative for difficulty urinating, frequency and urgency.   Musculoskeletal: Positive for arthralgias and back pain (ongoing).   Neurological: Negative for dizziness and headache.   Psychiatric/Behavioral: Negative for sleep disturbance.            Physical Exam    Comments: A stereotactic ablative radiation plan was devised to deliver the dose as indicated above. The patient was positioned on the treatment couch in a Vac-Fix immobilization device.  We then aligned with CBCT image guidance, which I personally checked. Once alignment was verified, we delivered the prescription dose using dynamic respiratory motion compensation under my guidance.    The Medical Physicist was also in attendance during patient set-up and treatment delivery.    The patient tolerated the  procedure without incident.    Aaron Roldan MD  05/30/2023

## 2023-05-30 NOTE — PROGRESS NOTES
On Treatment Visit       Patient: Colin Nevarez   YOB: 1950   Medical Record Number: [id number]     Date of Visit  May 30, 2023   Primary Diagnosis:Malignant neoplasm of lower lobe, right bronchus or lung [C34.31]  Cancer Staging: Cancer Staging   Prostate cancer (HCC)  Staging form: Prostate, AJCC 8th Edition  - Clinical: Stage IIB (cT1c, cN0, cM0, PSA: 8.2, Grade Group: 2) - Signed by Dionisio Neville MD on 5/11/2023    Squamous cell carcinoma lung  Staging form: Lung, AJCC 8th Edition  - Clinical: Stage IA2 (cT1b, cN0, cM0) - Signed by Dionisio Neville MD on 5/11/2023         was seen today for an on treatment visit.  He is receiving radiation therapy to the RLL lung. He  has received 1100 cGy in 1 fractions out of a planned dose of 5500 cGy in 5 fractions.     Today on exam the patient is tolerating radiation therapy well and has no new disease or treatment-related complaints.                                      Review of Systems:   Review of Systems  As per HPI    Vitals:     Vitals:    05/30/23 0939   BP: 125/75   Pulse: 68   Resp: 16   Temp: 97.8 °F (36.6 °C)   SpO2: 94%       Weight:   Wt Readings from Last 3 Encounters:   05/30/23 67.3 kg (148 lb 5.9 oz)   05/19/23 66 kg (145 lb 8.1 oz)   05/16/23 65.8 kg (145 lb)      Pain:    Pain Score    05/30/23 0939   PainSc: 0-No pain         Physical Exam:  Gen: WD/WN; NAD  HEENT: MMM  Trachea: midline  Chest: symmetric  Resp: normal respiratory effort  Extr: warm, well-perfused  Neuro: awake and alert; no aphasia or neglect    Plan: I have reviewed treatment setup notes, checked and approved the daily guidance images.  I reviewed dose delivery, treatment parameters and deemed them appropriate. We plan to continue radiation therapy as prescribed.          Radiation Oncology    Electronically signed by Aaron Roldan MD 5/30/2023  11:41 EDT

## 2023-05-31 ENCOUNTER — HOSPITAL ENCOUNTER (OUTPATIENT)
Dept: RADIATION ONCOLOGY | Facility: HOSPITAL | Age: 73
Discharge: HOME | End: 2023-05-31

## 2023-05-31 VITALS
HEART RATE: 71 BPM | BODY MASS INDEX: 21.19 KG/M2 | WEIGHT: 147.71 LBS | RESPIRATION RATE: 16 BRPM | DIASTOLIC BLOOD PRESSURE: 86 MMHG | OXYGEN SATURATION: 98 % | TEMPERATURE: 97.8 F | SYSTOLIC BLOOD PRESSURE: 132 MMHG

## 2023-05-31 DIAGNOSIS — C34.91 SQUAMOUS CELL CARCINOMA OF RIGHT LUNG: Primary | ICD-10-CM

## 2023-05-31 LAB
RAD ONC ARIA COURSE ID: NORMAL
RAD ONC ARIA COURSE INTENT: NORMAL
RAD ONC ARIA COURSE LAST TREATMENT DATE: NORMAL
RAD ONC ARIA COURSE START DATE: NORMAL
RAD ONC ARIA COURSE TREATMENT ELAPSED DAYS: 1
RAD ONC ARIA FIRST TREATMENT DATE: NORMAL
RAD ONC ARIA PLAN FRACTIONS TREATED TO DATE: 2
RAD ONC ARIA PLAN ID: NORMAL
RAD ONC ARIA PLAN PRESCRIBED DOSE PER FRACTION: 11 GY
RAD ONC ARIA PLAN PRIMARY REFERENCE POINT: NORMAL
RAD ONC ARIA PLAN TOTAL FRACTIONS PRESCRIBED: 5
RAD ONC ARIA PLAN TOTAL PRESCRIBED DOSE: 5500 CGY
RAD ONC ARIA REFERENCE POINT DOSAGE GIVEN TO DATE: 22 GY
RAD ONC ARIA REFERENCE POINT ID: NORMAL
RAD ONC ARIA REFERENCE POINT SESSION DOSAGE GIVEN: 11 GY

## 2023-05-31 PROCEDURE — 77373 STRTCTC BDY RAD THER TX DLVR: CPT | Performed by: RADIOLOGY

## 2023-05-31 NOTE — PROGRESS NOTES
Stereotactic Body Radiotherapy Note    05/31/2023    Treatment Site: RLL  Energy: 6X  Dose: 22 of 55   #Fx: 2 of 5  Start Date: 5/30/23  Elapsed Days: 1      [Associated problem not found]   Cancer Staging   Stage IIB (cT1c, cN0, cM0, PSA: 8.2, Grade Group: 2)    Stage IA2 (cT1b, cN0, cM0)     Vitals:    05/31/23 0933   BP: 132/86   Pulse: 71   Resp: 16   Temp: 97.8 °F (36.6 °C)   SpO2: 98%     Pain Score    05/31/23 0933   PainSc: 0-No pain       Review of Systems: Review of Systems   Constitutional: Positive for fatigue (5/10). Negative for appetite change and unexpected weight change.   HENT: Positive for tinnitus (ongoing ). Negative for sore throat and trouble swallowing.    Eyes: Negative for visual disturbance.   Respiratory: Positive for cough (occasional productive cough with clear secretions, states this has improved) and shortness of breath (with exertion ).    Cardiovascular: Negative for chest pain, palpitations and leg swelling.   Gastrointestinal: Negative for constipation, diarrhea and nausea.   Genitourinary: Negative for difficulty urinating, dysuria, frequency and urgency.   Musculoskeletal: Positive for back pain (ongoing ).   Skin: Negative for color change and rash.   Neurological: Negative for dizziness and headaches.   Psychiatric/Behavioral: Negative for sleep disturbance.       EXAM:  Gen: Well appearing, NAD  HENT: NC/AT, MMM  Resp: Nonlabored respiration, symmetric expansion  Abd: NT/ND +BS  MSK: ROM intact in all 4 extremities, no obvious joint deformities  Neuro: Alert, Ox3. CN 3-12 grossly intact.   Psych: Appropriate mood and affect.    Comments:   A stereotactic ablative radiation plan was created to deliver the dose as indicated above.   The patient was positioned on the treatment couch in a Vac-Fix immobilization device.   Abdominal compression was utilized.   Pre-treatment image guidance using cone beam CT imaging was performed to ensure proper setup and alignment.   I personally  evaluated and approved the patient's pre-treatment imaging.   I was present for delivery of the patient's treatment. The prescription dose was delivered as intended.   A Medical Physicist was also in attendance during patient set-up and treatment delivery.    The patient tolerated treatment well and completed without incident.     Juanita Koenig MD  05/31/2023

## 2023-06-01 ENCOUNTER — HOSPITAL ENCOUNTER (OUTPATIENT)
Dept: RADIATION ONCOLOGY | Facility: HOSPITAL | Age: 73
Discharge: HOME | End: 2023-06-01

## 2023-06-01 VITALS
DIASTOLIC BLOOD PRESSURE: 75 MMHG | SYSTOLIC BLOOD PRESSURE: 143 MMHG | RESPIRATION RATE: 18 BRPM | BODY MASS INDEX: 21.23 KG/M2 | TEMPERATURE: 98.3 F | HEART RATE: 74 BPM | OXYGEN SATURATION: 94 % | WEIGHT: 147.93 LBS

## 2023-06-01 DIAGNOSIS — C34.31 PRIMARY SQUAMOUS CELL CARCINOMA OF LOWER LOBE OF RIGHT LUNG: Primary | ICD-10-CM

## 2023-06-01 LAB
RAD ONC ARIA COURSE ID: NORMAL
RAD ONC ARIA COURSE INTENT: NORMAL
RAD ONC ARIA COURSE LAST TREATMENT DATE: NORMAL
RAD ONC ARIA COURSE START DATE: NORMAL
RAD ONC ARIA COURSE TREATMENT ELAPSED DAYS: 2
RAD ONC ARIA FIRST TREATMENT DATE: NORMAL
RAD ONC ARIA PLAN FRACTIONS TREATED TO DATE: 3
RAD ONC ARIA PLAN ID: NORMAL
RAD ONC ARIA PLAN PRESCRIBED DOSE PER FRACTION: 11 GY
RAD ONC ARIA PLAN PRIMARY REFERENCE POINT: NORMAL
RAD ONC ARIA PLAN TOTAL FRACTIONS PRESCRIBED: 5
RAD ONC ARIA PLAN TOTAL PRESCRIBED DOSE: 5500 CGY
RAD ONC ARIA REFERENCE POINT DOSAGE GIVEN TO DATE: 33 GY
RAD ONC ARIA REFERENCE POINT ID: NORMAL
RAD ONC ARIA REFERENCE POINT SESSION DOSAGE GIVEN: 11 GY

## 2023-06-01 NOTE — PROGRESS NOTES
Stereotactic Body Radiotherapy Note    06/01/2023    [unfilled]    Treatment Site: [facility]  Energy: 6X  Dose: 3300 cGy/5500 cGy  #Fx: 3/5  Start Date: 5/30/23  Elapsed Days: 2      [Associated problem not found]  Cancer Staging   Prostate Stage IIB (cT1c, cN0, cM0, PSA: 8.2, Grade Group: 2)     RLL lung Stage IA2 (cT1b, cN0, cM0)     Current Outpatient Medications on File Prior to Encounter   Medication Sig   • albuterol (PROVENTIL) (2.5 MG/3ML) 0.083% nebulizer solution Take 2.5 mg by nebulization Every 4 (Four) Hours As Needed for Wheezing. (Patient not taking: Reported on 5/16/2023)   • amLODIPine (NORVASC) 10 MG tablet Take 1 tablet by mouth Daily.   • aspirin 81 MG chewable tablet    • ezetimibe (Zetia) 10 MG tablet Take 1 tablet by mouth Daily. (Patient not taking: Reported on 5/19/2023)   • Fluticasone-Umeclidin-Vilant (Trelegy Ellipta) 200-62.5-25 MCG/ACT aerosol powder  Inhale 1 puff Daily.   • multivitamin with minerals tablet tablet      No current facility-administered medications on file prior to encounter.     Vitals:    06/01/23 0905   BP: 143/75   Pulse: 74   Resp: 18   Temp: 98.3 °F (36.8 °C)   SpO2: 94%     Pain Score    06/01/23 0905   PainSc: 0-No pain       Oncology/Hematology History Overview Note   8/2020: Patient underwent radical prostatectomy (Dr. Rodriguez) for clinical T3, Dee 3+4=7 prostate adenocarcinoma, PSA <10.     4/10/23: NM/PET performed due to abnormal lung nodule on CT Chest demonstrated a non-calcified 1.7 cm pulmonary nodule within the lateral right lower lobe with FDG activity of 7.33. No other nodule identified. No mediastinal, hilar or axillary lymphadenopathy.    5/3/23: CT needle biopsy of RLL: Moderately to poorly differentiated squamous cell carcinoma.     Prostate cancer (HCC)   7/12/2021 Initial Diagnosis    Prostate cancer (HCC)     5/11/2023 Cancer Staged    Staging form: Prostate, AJCC 8th Edition  - Clinical: Stage IIB (cT1c, cN0, cM0, PSA: 8.2, Grade  Group: 2) - Signed by Doinisio Neville MD on 5/11/2023     Squamous cell carcinoma lung   4/4/2023 Initial Diagnosis    Squamous cell carcinoma lung     5/11/2023 Cancer Staged    Staging form: Lung, AJCC 8th Edition  - Clinical: Stage IA2 (cT1b, cN0, cM0) - Signed by Dionisio Neville MD on 5/11/2023     Malignant neoplasm of lower lobe, right bronchus or lung   5/22/2023 Initial Diagnosis    Malignant neoplasm of lower lobe, right bronchus or lung     5/30/2023 -  Radiation    RADIATION THERAPY Treatment Details (Noted on 5/22/2023)  Site: Right Lung - Lower lobe  Technique: SBRT  Goal: No goal specified  Planned Treatment Start Date: 5/30/2023         Review of Systems   Constitutional: Negative for appetite change, fatigue (5/10) and unexpected weight loss.   HENT: Positive for tinnitus (ongoing ). Negative for sore throat and trouble swallowing.    Eyes: Negative.    Respiratory: Positive for cough (occasional productive cough with clear secretions) and shortness of breath (with exertion ).         Pt states breathing improved since cutting back on smoking   Cardiovascular: Negative.    Gastrointestinal: Negative for constipation, diarrhea and nausea.   Endocrine: Negative.    Genitourinary: Negative for difficulty urinating, dysuria, frequency, hematuria and urgency.   Musculoskeletal: Positive for back pain (occasional- ongoing).   Skin: Negative for color change and rash.   Neurological: Negative for dizziness, weakness and headache.   Psychiatric/Behavioral: Positive for sleep disturbance (trouble sleeping due to fear of missing radiation appointment. ).            Physical Exam  Constitutional:       General: He is not in acute distress.     Appearance: He is not ill-appearing.   Pulmonary:      Effort: Pulmonary effort is normal.      Breath sounds: Wheezing (mild) present.   Neurological:      Mental Status: He is alert.         Comments: A stereotactic ablative radiation plan was devised to  deliver the dose as indicated above. The patient was positioned on the treatment couch in a Vac-Fix immobilization device.  We then aligned with CBCT image guidance, which I personally checked. Once alignment was verified, we delivered the prescription dose using dynamic respiratory motion compensation under my guidance.    The Medical Physicist was also in attendance during patient set-up and treatment delivery.    The patient tolerated the procedure without incident.  I encouraged pt to continue working on smoking cessation.    Falguni Salinas RN  06/01/2023

## 2023-06-02 ENCOUNTER — HOSPITAL ENCOUNTER (OUTPATIENT)
Dept: RADIATION ONCOLOGY | Facility: HOSPITAL | Age: 73
Discharge: HOME | End: 2023-06-02

## 2023-06-02 VITALS
HEART RATE: 75 BPM | BODY MASS INDEX: 21.35 KG/M2 | TEMPERATURE: 98.8 F | DIASTOLIC BLOOD PRESSURE: 85 MMHG | RESPIRATION RATE: 20 BRPM | OXYGEN SATURATION: 94 % | WEIGHT: 148.81 LBS | SYSTOLIC BLOOD PRESSURE: 144 MMHG

## 2023-06-02 DIAGNOSIS — C34.31 PRIMARY SQUAMOUS CELL CARCINOMA OF LOWER LOBE OF RIGHT LUNG: Primary | ICD-10-CM

## 2023-06-02 LAB
RAD ONC ARIA COURSE ID: NORMAL
RAD ONC ARIA COURSE INTENT: NORMAL
RAD ONC ARIA COURSE LAST TREATMENT DATE: NORMAL
RAD ONC ARIA COURSE START DATE: NORMAL
RAD ONC ARIA COURSE TREATMENT ELAPSED DAYS: 3
RAD ONC ARIA FIRST TREATMENT DATE: NORMAL
RAD ONC ARIA PLAN FRACTIONS TREATED TO DATE: 4
RAD ONC ARIA PLAN ID: NORMAL
RAD ONC ARIA PLAN PRESCRIBED DOSE PER FRACTION: 11 GY
RAD ONC ARIA PLAN PRIMARY REFERENCE POINT: NORMAL
RAD ONC ARIA PLAN TOTAL FRACTIONS PRESCRIBED: 5
RAD ONC ARIA PLAN TOTAL PRESCRIBED DOSE: 5500 CGY
RAD ONC ARIA REFERENCE POINT DOSAGE GIVEN TO DATE: 44 GY
RAD ONC ARIA REFERENCE POINT ID: NORMAL
RAD ONC ARIA REFERENCE POINT SESSION DOSAGE GIVEN: 11 GY

## 2023-06-02 NOTE — PROGRESS NOTES
Stereotactic Body Radiotherapy Note    06/02/2023    [unfilled]    Treatment Site: [facility]  Energy: 6X  Dose: 4400 cGy/5500 cGy  #Fx: 4/5  Start Date: 5/30/23  Elapsed Days: 3      [Associated problem not found]  Cancer Staging   Prostate Stage IIB (cT1c, cN0, cM0, PSA: 8.2, Grade Group: 2)     RLL lung Stage IA2 (cT1b, cN0, cM0)     Current Outpatient Medications on File Prior to Encounter   Medication Sig   • albuterol (PROVENTIL) (2.5 MG/3ML) 0.083% nebulizer solution Take 2.5 mg by nebulization Every 4 (Four) Hours As Needed for Wheezing. (Patient not taking: Reported on 5/16/2023)   • amLODIPine (NORVASC) 10 MG tablet Take 1 tablet by mouth Daily.   • aspirin 81 MG chewable tablet    • ezetimibe (Zetia) 10 MG tablet Take 1 tablet by mouth Daily. (Patient not taking: Reported on 5/19/2023)   • Fluticasone-Umeclidin-Vilant (Trelegy Ellipta) 200-62.5-25 MCG/ACT aerosol powder  Inhale 1 puff Daily.   • multivitamin with minerals tablet tablet      No current facility-administered medications on file prior to encounter.     Vitals:    06/02/23 0926   BP: 144/85   Pulse: 75   Resp: 20   Temp: 98.8 °F (37.1 °C)   SpO2: 94%     Pain Score    06/02/23 0926   PainSc: 0-No pain       Oncology/Hematology History Overview Note   8/2020: Patient underwent radical prostatectomy (Dr. Rodriguez) for clinical T3, Dee 3+4=7 prostate adenocarcinoma, PSA <10.     4/10/23: NM/PET performed due to abnormal lung nodule on CT Chest demonstrated a non-calcified 1.7 cm pulmonary nodule within the lateral right lower lobe with FDG activity of 7.33. No other nodule identified. No mediastinal, hilar or axillary lymphadenopathy.    5/3/23: CT needle biopsy of RLL: Moderately to poorly differentiated squamous cell carcinoma.     Prostate cancer (HCC)   7/12/2021 Initial Diagnosis    Prostate cancer (HCC)     5/11/2023 Cancer Staged    Staging form: Prostate, AJCC 8th Edition  - Clinical: Stage IIB (cT1c, cN0, cM0, PSA: 8.2, Grade  Group: 2) - Signed by Dionisio Neville MD on 5/11/2023     Squamous cell carcinoma lung   4/4/2023 Initial Diagnosis    Squamous cell carcinoma lung     5/11/2023 Cancer Staged    Staging form: Lung, AJCC 8th Edition  - Clinical: Stage IA2 (cT1b, cN0, cM0) - Signed by Dionisio Neville MD on 5/11/2023     Malignant neoplasm of lower lobe, right bronchus or lung   5/22/2023 Initial Diagnosis    Malignant neoplasm of lower lobe, right bronchus or lung     5/30/2023 -  Radiation    RADIATION THERAPY Treatment Details (Noted on 5/22/2023)  Site: Right Lung - Lower lobe  Technique: SBRT  Goal: No goal specified  Planned Treatment Start Date: 5/30/2023         Review of Systems   Constitutional: Positive for fatigue (5/10). Negative for appetite change and unexpected weight loss.   HENT: Positive for tinnitus (ongoing ). Negative for sore throat and trouble swallowing.    Eyes: Negative.    Respiratory: Positive for cough (occasional productive cough with clear secretions  , ongoing) and shortness of breath (with exertion, ongoing).         Pt states breathing improved since cutting back on smoking   Cardiovascular: Negative.    Gastrointestinal: Negative for blood in stool, constipation, diarrhea, nausea and vomiting.   Endocrine: Negative.    Genitourinary: Negative for difficulty urinating, dysuria, frequency, hematuria and urgency.   Musculoskeletal: Positive for arthralgias (Ongoing) and back pain (occasional- ongoing).   Skin: Negative for color change and rash.   Neurological: Positive for weakness (Ongoing). Negative for dizziness and headache.   Psychiatric/Behavioral: Positive for sleep disturbance (trouble sleeping due to fear of missing radiation appointment. ).            Physical Exam  Constitutional:       General: He is not in acute distress.     Appearance: He is not ill-appearing.   Cardiovascular:      Rate and Rhythm: Normal rate and regular rhythm.   Pulmonary:      Effort: Pulmonary  effort is normal.      Breath sounds: Wheezing present.   Neurological:      Mental Status: He is alert.         Comments: A stereotactic ablative radiation plan was devised to deliver the dose as indicated above. The patient was positioned on the treatment couch in a Vac-Fix immobilization device.  We then aligned with CBCT image guidance, which I personally checked. Once alignment was verified, we delivered the prescription dose using dynamic respiratory motion compensation under my guidance.    The Medical Physicist was also in attendance during patient set-up and treatment delivery.    The patient tolerated the procedure without incident.  I encouraged pt to continue working on smoking cessation.  Wheezing noted today.  Patient states he is breathing well and uses an inhaler as needed.    Karishma Stone MD  06/02/2023

## 2023-06-05 ENCOUNTER — HOSPITAL ENCOUNTER (OUTPATIENT)
Dept: RADIATION ONCOLOGY | Facility: HOSPITAL | Age: 73
Discharge: HOME | End: 2023-06-05

## 2023-06-05 VITALS
DIASTOLIC BLOOD PRESSURE: 75 MMHG | RESPIRATION RATE: 20 BRPM | BODY MASS INDEX: 21.32 KG/M2 | WEIGHT: 148.59 LBS | OXYGEN SATURATION: 96 % | SYSTOLIC BLOOD PRESSURE: 138 MMHG | TEMPERATURE: 98.3 F | HEART RATE: 74 BPM

## 2023-06-05 DIAGNOSIS — C34.31 MALIGNANT NEOPLASM OF LOWER LOBE, RIGHT BRONCHUS OR LUNG: Primary | ICD-10-CM

## 2023-06-05 DIAGNOSIS — C34.31 PRIMARY SQUAMOUS CELL CARCINOMA OF LOWER LOBE OF RIGHT LUNG: Primary | ICD-10-CM

## 2023-06-05 LAB
RAD ONC ARIA COURSE ID: NORMAL
RAD ONC ARIA COURSE INTENT: NORMAL
RAD ONC ARIA COURSE LAST TREATMENT DATE: NORMAL
RAD ONC ARIA COURSE START DATE: NORMAL
RAD ONC ARIA COURSE TREATMENT ELAPSED DAYS: 6
RAD ONC ARIA FIRST TREATMENT DATE: NORMAL
RAD ONC ARIA PLAN FRACTIONS TREATED TO DATE: 5
RAD ONC ARIA PLAN ID: NORMAL
RAD ONC ARIA PLAN PRESCRIBED DOSE PER FRACTION: 11 GY
RAD ONC ARIA PLAN PRIMARY REFERENCE POINT: NORMAL
RAD ONC ARIA PLAN TOTAL FRACTIONS PRESCRIBED: 5
RAD ONC ARIA PLAN TOTAL PRESCRIBED DOSE: 5500 CGY
RAD ONC ARIA REFERENCE POINT DOSAGE GIVEN TO DATE: 55 GY
RAD ONC ARIA REFERENCE POINT ID: NORMAL
RAD ONC ARIA REFERENCE POINT SESSION DOSAGE GIVEN: 11 GY

## 2023-06-05 NOTE — PROGRESS NOTES
Stereotactic Body Radiotherapy Note    06/05/2023        Treatment Site: Right lower lobe  Energy: 6X  Dose: 5500 cGy  #Fx: 5  Start Date: 5/30/2023  Elapsed Days: 6      Cancer Staging   Stage IIB (cT1c, cN0, cM0, PSA: 8.2, Grade Group: 2)    Stage IA2 (cT1b, cN0, cM0)     Current Outpatient Medications on File Prior to Encounter   Medication Sig    albuterol (PROVENTIL) (2.5 MG/3ML) 0.083% nebulizer solution Take 2.5 mg by nebulization Every 4 (Four) Hours As Needed for Wheezing. (Patient not taking: Reported on 5/16/2023)    amLODIPine (NORVASC) 10 MG tablet Take 1 tablet by mouth Daily.    aspirin 81 MG chewable tablet     ezetimibe (Zetia) 10 MG tablet Take 1 tablet by mouth Daily. (Patient not taking: Reported on 5/19/2023)    Fluticasone-Umeclidin-Vilant (Trelegy Ellipta) 200-62.5-25 MCG/ACT aerosol powder  Inhale 1 puff Daily.    multivitamin with minerals tablet tablet      No current facility-administered medications on file prior to encounter.     Vitals:    06/05/23 0938   BP: 138/75   Pulse: 74   Resp: 20   Temp: 98.3 °F (36.8 °C)   SpO2: 96%     Pain Score    06/05/23 0938   PainSc: 0-No pain       Oncology/Hematology History Overview Note   8/2020: Patient underwent radical prostatectomy (Dr. Rodriguez) for clinical T3, [place] 3+4=7 prostate adenocarcinoma, PSA <10.     4/10/23: NM/PET performed due to abnormal lung nodule on CT Chest demonstrated a non-calcified 1.7 cm pulmonary nodule within the lateral right lower lobe with FDG activity of 7.33. No other nodule identified. No mediastinal, hilar or axillary lymphadenopathy.    5/3/23: CT needle biopsy of RLL: Moderately to poorly differentiated squamous cell carcinoma.     Prostate cancer (HCC)   7/12/2021 Initial Diagnosis    Prostate cancer (HCC)     5/11/2023 Cancer Staged    Staging form: Prostate, AJCC 8th Edition  - Clinical: Stage IIB (cT1c, cN0, cM0, PSA: 8.2, Grade Group: 2) - Signed by Dionisio Neville MD on 5/11/2023     Squamous  cell carcinoma lung   4/4/2023 Initial Diagnosis    Squamous cell carcinoma lung     5/11/2023 Cancer Staged    Staging form: Lung, AJCC 8th Edition  - Clinical: Stage IA2 (cT1b, cN0, cM0) - Signed by Dionisio Neville MD on 5/11/2023     Malignant neoplasm of lower lobe, right bronchus or lung   5/22/2023 Initial Diagnosis    Malignant neoplasm of lower lobe, right bronchus or lung     5/30/2023 -  Radiation    RADIATION THERAPY Treatment Details (Noted on 5/22/2023)  Site: Right Lung - Lower lobe  Technique: SBRT  Goal: No goal specified  Planned Treatment Start Date: 5/30/2023         Review of Systems   Constitutional:  Negative for appetite change, fatigue and unexpected weight loss.   HENT:  Positive for tinnitus (chronic). Negative for sore throat and trouble swallowing.         Patient states that either on Friday or Saturday that he had some epigastric pain/stretching sensation, states that has since subsided.   Eyes:  Negative for visual disturbance.   Respiratory:  Positive for cough (Occasional, coughing clear secretions, ongoing), shortness of breath (with excersion, ongoing) and wheezing (occasional, ongoing).    Cardiovascular:  Negative for chest pain, palpitations and leg swelling.   Gastrointestinal:  Negative for constipation, diarrhea, nausea and vomiting.   Genitourinary:  Negative for difficulty urinating, dysuria, frequency and urgency.   Musculoskeletal:  Positive for arthralgias (Generalized) and back pain (ongoing). Negative for joint swelling.   Skin:  Negative for color change and rash.   Neurological:  Negative for dizziness, weakness and headache.   Psychiatric/Behavioral:  Negative for sleep disturbance.           Physical Exam    Comments: A stereotactic ablative radiation plan was devised to deliver the dose as indicated above. The patient was positioned on the treatment couch in a Vac-Fix immobilization device.  We then aligned with CBCT image guidance, which I personally  checked. Once alignment was verified, we delivered the prescription dose using dynamic respiratory motion compensation under my guidance.    The Medical Physicist was also in attendance during patient set-up and treatment delivery.    The patient tolerated the procedure without incident.    Aaron Roldan MD  06/05/2023

## 2023-06-06 ENCOUNTER — TELEPHONE (OUTPATIENT)
Dept: SURGERY | Facility: CLINIC | Age: 73
End: 2023-06-06
Payer: MEDICARE

## 2023-06-06 NOTE — TELEPHONE ENCOUNTER
Called and spoke with pt sister. She said he does have his bowel prep instructions.      Pt arrival time is 8:30am on 6-14-23.  Pt must bring ID/insurance cards/and medications  Do not take any morning medications  Must have a  home  [address] [address] lot at [facility]  No blood thinners   no

## 2023-06-14 ENCOUNTER — ANESTHESIA EVENT (OUTPATIENT)
Dept: GASTROENTEROLOGY | Facility: HOSPITAL | Age: 73
End: 2023-06-14
Payer: MEDICARE

## 2023-06-14 ENCOUNTER — ANESTHESIA (OUTPATIENT)
Dept: GASTROENTEROLOGY | Facility: HOSPITAL | Age: 73
End: 2023-06-14
Payer: MEDICARE

## 2023-06-14 ENCOUNTER — HOSPITAL ENCOUNTER (OUTPATIENT)
Facility: HOSPITAL | Age: 73
Setting detail: HOSPITAL OUTPATIENT SURGERY
Discharge: HOME | End: 2023-06-14
Attending: SURGERY | Admitting: SURGERY
Payer: MEDICARE

## 2023-06-14 VITALS
RESPIRATION RATE: 14 BRPM | OXYGEN SATURATION: 92 % | SYSTOLIC BLOOD PRESSURE: 137 MMHG | TEMPERATURE: 97.2 F | WEIGHT: 149.03 LBS | BODY MASS INDEX: 21.38 KG/M2 | DIASTOLIC BLOOD PRESSURE: 75 MMHG | HEART RATE: 70 BPM

## 2023-06-14 PROBLEM — Z86.0100 PERSONAL HISTORY OF COLONIC POLYPS: Status: RESOLVED | Noted: 2023-02-24 | Resolved: 2023-06-14

## 2023-06-14 PROBLEM — Z86.010 PERSONAL HISTORY OF COLONIC POLYPS: Status: RESOLVED | Noted: 2023-02-24 | Resolved: 2023-06-14

## 2023-06-14 PROCEDURE — 25010000002 PROPOFOL 10 MG/ML EMULSION: Performed by: NURSE ANESTHETIST, CERTIFIED REGISTERED

## 2023-06-14 RX ORDER — PROPOFOL 10 MG/ML
VIAL (ML) INTRAVENOUS AS NEEDED
Status: DISCONTINUED | OUTPATIENT
Start: 2023-06-14 | End: 2023-06-14 | Stop reason: SURG

## 2023-06-14 RX ORDER — LIDOCAINE HYDROCHLORIDE 20 MG/ML
INJECTION, SOLUTION EPIDURAL; INFILTRATION; INTRACAUDAL; PERINEURAL AS NEEDED
Status: DISCONTINUED | OUTPATIENT
Start: 2023-06-14 | End: 2023-06-14 | Stop reason: SURG

## 2023-06-14 RX ORDER — SODIUM CHLORIDE, SODIUM LACTATE, POTASSIUM CHLORIDE, CALCIUM CHLORIDE 600; 310; 30; 20 MG/100ML; MG/100ML; MG/100ML; MG/100ML
30 INJECTION, SOLUTION INTRAVENOUS CONTINUOUS
Status: DISCONTINUED | OUTPATIENT
Start: 2023-06-14 | End: 2023-06-14 | Stop reason: HOSPADM

## 2023-06-14 RX ADMIN — SODIUM CHLORIDE, POTASSIUM CHLORIDE, SODIUM LACTATE AND CALCIUM CHLORIDE 30 ML/HR: 600; 310; 30; 20 INJECTION, SOLUTION INTRAVENOUS at 09:34

## 2023-06-14 RX ADMIN — LIDOCAINE HYDROCHLORIDE 25 MG: 20 INJECTION, SOLUTION EPIDURAL; INFILTRATION; INTRACAUDAL; PERINEURAL at 09:40

## 2023-06-14 RX ADMIN — PROPOFOL 200 MCG/KG/MIN: 10 INJECTION, EMULSION INTRAVENOUS at 09:40

## 2023-06-14 RX ADMIN — PROPOFOL 70 MG: 10 INJECTION, EMULSION INTRAVENOUS at 09:40

## 2023-06-14 NOTE — ANESTHESIA POSTPROCEDURE EVALUATION
Patient: Colin Nevarez    Procedure Summary       Date: 06/14/23 Room / Location: [facility] ENDOSCOPY 3 / [facility] ENDOSCOPY    Anesthesia Start: 0937 Anesthesia Stop: 1010    Procedure: COLONOSCOPY Diagnosis:       Personal history of colonic polyps      (Personal history of colonic polyps [Z86.010])    Surgeons: Geoffrey Carey MD Provider: Sean Hopson MD    Anesthesia Type: general ASA Status: 4            Anesthesia Type: general    Vitals  Vitals Value Taken Time   /75 06/14/23 1026   Temp 36.2 °C (97.2 °F) 06/14/23 1025   Pulse 71 06/14/23 1026   Resp 14 06/14/23 1025   SpO2 93 % 06/14/23 1026   Vitals shown include unvalidated device data.        Post Anesthesia Care and Evaluation    Patient location during evaluation: bedside  Patient participation: complete - patient participated  Level of consciousness: awake  Pain management: adequate    Airway patency: patent  PONV Status: none  Cardiovascular status: acceptable and stable  Respiratory status: acceptable  Hydration status: acceptable    Comments: An Anesthesiologist personally participated in the most demanding procedures (including induction and emergence if applicable) in the anesthesia plan, monitored the course of anesthesia administration at frequent intervals and remained physically present and available for immediate diagnosis and treatment of emergencies.

## 2023-06-14 NOTE — ANESTHESIA PREPROCEDURE EVALUATION
Anesthesia Evaluation     Patient summary reviewed and Nursing notes reviewed   no history of anesthetic complications:   NPO Solid Status: > 8 hours  NPO Liquid Status: > 2 hours           Airway   Mallampati: II  TM distance: >3 FB  Neck ROM: full  No difficulty expected  Dental    (+) poor dentition    Comment: Upper teeth eroded and broken off    Pulmonary - normal exam    breath sounds clear to auscultation  (+) a smoker Current, lung cancer, COPD,  Cardiovascular - normal exam  Exercise tolerance: good (4-7 METS)    Rhythm: regular  Rate: normal    (+) hypertension      Neuro/Psych- negative ROS  GI/Hepatic/Renal/Endo - negative ROS     Musculoskeletal (-) negative ROS    Abdominal    Substance History - negative use     OB/GYN negative ob/gyn ROS         Other - negative ROS       ROS/Med Hx Other: PAT Nursing Notes unavailable.                 Anesthesia Plan    ASA 4     general       Anesthetic plan, risks, benefits, and alternatives have been provided, discussed and informed consent has been obtained with: patient and spouse/significant other.    CODE STATUS:

## 2023-06-14 NOTE — H&P
Chief Complaint:  Colonoscopy    Primary Care Provider: Liz Roper DO    Referring Provider: Lzi Roper DO    History of Present Illness  Colin Neavrez is a 72 y.o. male referred by Liz Roper DO to have a colonoscopy.  Patient last had a colonoscopy in June 2019 by Dr. Jenkins after he had a positive Cologuard and for 5 small polyps were removed.  The patient was instructed to get another colonoscopy 3 years later.    Allergies: Patient has no known allergies.    Outpatient Medications Marked as Taking for the 2/24/23 encounter (Office Visit) with Geoffrey Carey MD   Medication Sig Dispense Refill    albuterol (PROVENTIL) (2.5 MG/3ML) 0.083% nebulizer solution Take 2.5 mg by nebulization Every 4 (Four) Hours As Needed for Wheezing. 100 each 12    amLODIPine (NORVASC) 10 MG tablet Take 1 tablet by mouth Daily. 90 tablet 1    aspirin 81 MG chewable tablet       Fluticasone-Umeclidin-Vilant (TRELEGY ELLIPTA IN) Inhale.      multivitamin with minerals tablet tablet Men's Multivitamin 400- mcg oral tablet take 1 tablet by oral route daily   Active         Past Medical History:    Arthritis    COPD (chronic obstructive pulmonary disease) (HCC)    Essential hypertension    Forgetfulness    Heart flutter, ventricular (HCC)    High cholesterol    Prostate cancer (HCC)    Ringing in ear    Shortness of Air    SOB (shortness of breath)        Past Surgical History:    APPENDECTOMY    COLONOSCOPY    PROSTATECTOMY    TONSILLECTOMY    Per PT       Family History:   Family History   Problem Relation Age of Onset    Breast cancer Mother 50    Stroke Father     Breast cancer Sister 40    Breast cancer Maternal Grandmother     Breast cancer Paternal Grandmother         Social History:  Social History     Tobacco Use    Smoking status: Every Day     Packs/day: 1.00     Types: Cigarettes    Smokeless tobacco: Never   Substance Use Topics    Alcohol use: Never       Objective     Vital Signs:  Resp 16   Ht 177.8 cm  "(70\")   Wt 66.2 kg (146 lb)   BMI 20.95 kg/m²   Respiratory:  breathing not labored, respiratory effort appears normal  Cardiovascular:  heart regular rate  Abdomen:  nondistended    Skin and subcutaneous tissue:  Warm and dry  Musculoskeletal: moving all extremities symmetrically and purposefully  Neurologic:  no obvious motor or sensory deficits    Assessment:  Personal history of colonic polyps    Plan:  Colonoscopy    Discussion: Indications, options, risks, benefits, and expected outcomes of planned surgery were discussed with the patient and he agrees to proceed.    Geoffrey Carey M.D.  06/14/23    Electronically signed by Geoffrey Carey MD, 06/14/23, 7:17 AM EDT.            "

## 2023-06-16 ENCOUNTER — TELEPHONE (OUTPATIENT)
Dept: RADIATION ONCOLOGY | Facility: HOSPITAL | Age: 73
End: 2023-06-16
Payer: MEDICARE

## 2023-06-16 NOTE — TELEPHONE ENCOUNTER
"Patient completed radiation treatment for squamous cell carcinoma of right lower lobe on 06/05/2023 . Following up with patient regarding any concerns the patient may have at this time and receiving feedback in regards to patient over all care while under treatment.     Patient asked about symptoms and side effects that continue to be bothersome.  Denies any complaints.     Patient states that Pain is at 0 on scale of 0/10. Patient states medications have not changed. No refills needed at this time.    Patient was asked if there was anything that could've made their experience better at [facility] while they were under treatment. Patient states: \"No, I couldn't have ask for anything better! You all were great! \"    Patient encouraged to call the office if any concerns arise. Patient reminded of Follow up appointment on  08/03/2023 @ 09:00 a.m. with Renetta Garcia NP.  Also pt is aware of his CT scan that is scheduled prior to follow up on 07/31/2023.  "

## 2023-07-14 ENCOUNTER — TELEPHONE (OUTPATIENT)
Dept: FAMILY MEDICINE CLINIC | Facility: CLINIC | Age: 73
End: 2023-07-14

## 2023-07-31 ENCOUNTER — HOSPITAL ENCOUNTER (OUTPATIENT)
Dept: CT IMAGING | Facility: HOSPITAL | Age: 73
Discharge: HOME | End: 2023-07-31
Admitting: RADIOLOGY
Payer: MEDICARE

## 2023-07-31 DIAGNOSIS — C34.31 MALIGNANT NEOPLASM OF LOWER LOBE, RIGHT BRONCHUS OR LUNG: ICD-10-CM

## 2023-07-31 PROCEDURE — 71250 CT THORAX DX C-: CPT

## 2023-08-03 ENCOUNTER — OFFICE VISIT (OUTPATIENT)
Dept: RADIATION ONCOLOGY | Facility: HOSPITAL | Age: 73
End: 2023-08-03
Payer: MEDICARE

## 2023-08-03 VITALS
TEMPERATURE: 98.7 F | HEART RATE: 66 BPM | RESPIRATION RATE: 18 BRPM | DIASTOLIC BLOOD PRESSURE: 89 MMHG | WEIGHT: 152.34 LBS | OXYGEN SATURATION: 96 % | BODY MASS INDEX: 21.86 KG/M2 | SYSTOLIC BLOOD PRESSURE: 127 MMHG

## 2023-08-03 DIAGNOSIS — C34.31 MALIGNANT NEOPLASM OF LOWER LOBE, RIGHT BRONCHUS OR LUNG: Primary | ICD-10-CM

## 2023-08-03 DIAGNOSIS — F17.200 NICOTINE DEPENDENCE WITH CURRENT USE: ICD-10-CM

## 2023-08-03 DIAGNOSIS — R91.8 MULTIPLE PULMONARY NODULES: ICD-10-CM

## 2023-08-03 DIAGNOSIS — Z92.3 STATUS POST RADIATION THERAPY WITHIN FOUR TO TWELVE WEEKS: ICD-10-CM

## 2023-08-03 DIAGNOSIS — J44.9 CHRONIC OBSTRUCTIVE PULMONARY DISEASE, UNSPECIFIED COPD TYPE: Chronic | ICD-10-CM

## 2023-08-03 DIAGNOSIS — Z85.46 HISTORY OF PROSTATE CANCER: ICD-10-CM

## 2023-08-03 DIAGNOSIS — Z08 ENCOUNTER FOR FOLLOW-UP EXAMINATION AFTER COMPLETED TREATMENT FOR MALIGNANT NEOPLASM: ICD-10-CM

## 2023-08-03 DIAGNOSIS — Z90.79 HISTORY OF RADICAL PROSTATECTOMY: ICD-10-CM

## 2023-08-03 DIAGNOSIS — C34.31 PRIMARY SQUAMOUS CELL CARCINOMA OF LOWER LOBE OF RIGHT LUNG: ICD-10-CM

## 2023-08-03 PROCEDURE — G0463 HOSPITAL OUTPT CLINIC VISIT: HCPCS | Performed by: NURSE PRACTITIONER

## 2023-08-03 RX ORDER — ALBUTEROL SULFATE 90 UG/1
2 AEROSOL, METERED RESPIRATORY (INHALATION) EVERY 4 HOURS PRN
Qty: 18 G | Refills: 2 | Status: SHIPPED | OUTPATIENT
Start: 2023-08-03

## 2023-08-04 ENCOUNTER — LAB (OUTPATIENT)
Dept: LAB | Facility: HOSPITAL | Age: 73
End: 2023-08-04
Payer: MEDICARE

## 2023-08-04 DIAGNOSIS — N39.3 URINARY INCONTINENCE, STRESS, MALE: ICD-10-CM

## 2023-08-04 DIAGNOSIS — C61 PROSTATE CANCER: ICD-10-CM

## 2023-08-04 LAB — PSA SERPL-MCNC: 0.02 NG/ML (ref 0–4)

## 2023-08-04 PROCEDURE — 84153 ASSAY OF PSA TOTAL: CPT

## 2023-08-04 PROCEDURE — 36415 COLL VENOUS BLD VENIPUNCTURE: CPT

## 2023-08-07 ENCOUNTER — OFFICE VISIT (OUTPATIENT)
Dept: ONCOLOGY | Facility: HOSPITAL | Age: 73
End: 2023-08-07
Payer: MEDICARE

## 2023-08-07 VITALS
OXYGEN SATURATION: 92 % | HEART RATE: 74 BPM | BODY MASS INDEX: 21.79 KG/M2 | RESPIRATION RATE: 16 BRPM | TEMPERATURE: 98 F | DIASTOLIC BLOOD PRESSURE: 75 MMHG | WEIGHT: 151.9 LBS | SYSTOLIC BLOOD PRESSURE: 155 MMHG

## 2023-08-07 DIAGNOSIS — F17.219 CIGARETTE NICOTINE DEPENDENCE WITH NICOTINE-INDUCED DISORDER: Chronic | ICD-10-CM

## 2023-08-07 DIAGNOSIS — C61 PROSTATE CANCER: Chronic | ICD-10-CM

## 2023-08-07 DIAGNOSIS — C34.91 SQUAMOUS CELL CARCINOMA OF RIGHT LUNG: Primary | ICD-10-CM

## 2023-08-07 PROBLEM — C34.31 MALIGNANT NEOPLASM OF LOWER LOBE, RIGHT BRONCHUS OR LUNG: Status: RESOLVED | Noted: 2023-05-22 | Resolved: 2023-08-07

## 2023-08-07 PROCEDURE — 99214 OFFICE O/P EST MOD 30 MIN: CPT | Performed by: INTERNAL MEDICINE

## 2023-08-07 PROCEDURE — 1126F AMNT PAIN NOTED NONE PRSNT: CPT | Performed by: INTERNAL MEDICINE

## 2023-08-07 PROCEDURE — G0463 HOSPITAL OUTPT CLINIC VISIT: HCPCS | Performed by: INTERNAL MEDICINE

## 2023-08-07 PROCEDURE — 3078F DIAST BP <80 MM HG: CPT | Performed by: INTERNAL MEDICINE

## 2023-08-07 PROCEDURE — 3077F SYST BP >= 140 MM HG: CPT | Performed by: INTERNAL MEDICINE

## 2023-08-07 NOTE — PROGRESS NOTES
Chief Complaint  SQUAMOUS CELL CARCINOMA OF RIGHT LUNG    Liz Roper, Liz Roberts DO    Subjective          Colin Nevarez presents to [facility] for NSCLC    Mr. Nevarez is a very pleasant 73-year-old male with a history of COPD, prostate cancer, hypertension, tobacco use who presents for follow up for squamous cell carcinoma of RLL. He underwent SBRT and finished this in early June. Repeat scan 7/31/23 demonstrated improved disease. He feels well. He does have some mild cough when he goes inside certain buildings. Breathing is overall stable. He does have trelegy inhaler that he uses occasionally. He reports he is eating more and gaining weight. He feels better. No fevers, chills. Follows with urology regarding history of prostate cancer.     Oncology/Hematology History Overview Note   8/2020: Patient underwent radical prostatectomy (Dr. Rodriguez) for clinical T3, Dee 3+4=7 prostate adenocarcinoma, PSA <10.     4/10/23: NM/PET performed due to abnormal lung nodule on CT Chest demonstrated a non-calcified 1.7 cm pulmonary nodule within the lateral right lower lobe with FDG activity of 7.33. No other nodule identified. No mediastinal, hilar or axillary lymphadenopathy.    5/3/23: CT needle biopsy of RLL: Moderately to poorly differentiated squamous cell carcinoma.    6/5/23: SBRT completed to RLL nodule    6/14/2023: Colonoscopy with Dr. Carey. Normal exam with no specimens collected. Repeat in 5 years for surveillance (2028).     7/31/23: CT Chest: interval decrease in size of RLL nodule.          Prostate cancer   7/12/2021 Initial Diagnosis    Prostate cancer (HCC)     5/11/2023 Cancer Staged    Staging form: Prostate, AJCC 8th Edition  - Clinical: Stage IIB (cT1c, cN0, cM0, PSA: 8.2, Grade Group: 2) - Signed by Dionisio Neville MD on 5/11/2023     Squamous cell carcinoma lung   4/4/2023 Initial Diagnosis    Squamous cell carcinoma lung     5/11/2023  Cancer Staged    Staging form: Lung, AJCC 8th Edition  - Clinical: Stage IA2 (cT1b, cN0, cM0) - Signed by Dionisio Neville MD on 5/11/2023     Malignant neoplasm of lower lobe, right bronchus or lung (Resolved)   5/22/2023 Initial Diagnosis    Malignant neoplasm of lower lobe, right bronchus or lung     5/30/2023 - 6/5/2023 Radiation    Radiation OncologyTreatment Course:  Coiln Nevarez received 5500 cGy in 5 fractions to right lower lobe via stereotactic body radiotherapy.            Review of Systems   Constitutional:  Positive for fatigue and unexpected weight gain.   All other systems reviewed and are negative.  Current Outpatient Medications on File Prior to Visit   Medication Sig Dispense Refill    albuterol (PROVENTIL) (2.5 MG/3ML) 0.083% nebulizer solution Take 2.5 mg by nebulization Every 4 (Four) Hours As Needed for Wheezing. 100 each 12    albuterol sulfate  (90 Base) MCG/ACT inhaler Inhale 2 puffs Every 4 (Four) Hours As Needed for Wheezing or Shortness of Air. 18 g 2    amLODIPine (NORVASC) 10 MG tablet Take 1 tablet by mouth Daily. 90 tablet 1    aspirin 81 MG chewable tablet       ezetimibe (Zetia) 10 MG tablet Take 1 tablet by mouth Daily. (Patient not taking: Reported on 8/3/2023) 90 tablet 1    Fluticasone-Umeclidin-Vilant (Trelegy Ellipta) 200-62.5-25 MCG/ACT aerosol powder  Inhale 1 puff Daily. (Patient not taking: Reported on 8/3/2023) 3 each 0     No current facility-administered medications on file prior to visit.       No Known Allergies  Past Medical History:   Diagnosis Date    Arthritis     COPD (chronic obstructive pulmonary disease)     Essential hypertension 07/12/2021    Forgetfulness     Heart flutter, ventricular     High cholesterol     Lung cancer     Prostate cancer     Ringing in ear     Shortness of Air     SOB (shortness of breath)      Past Surgical History:   Procedure Laterality Date    APPENDECTOMY      COLONOSCOPY  06/01/2019    COLONOSCOPY N/A 6/14/2023     Procedure: COLONOSCOPY;  Surgeon: Geoffrey Carey MD;  Location: [facility] ENDOSCOPY;  Service: General;  Laterality: N/A;  NORMAL    LUNG BIOPSY      PROSTATECTOMY      TONSILLECTOMY  1969    Per PT     Social History     Socioeconomic History    Marital status: Single   Tobacco Use    Smoking status: Every Day     Packs/day: 1.00     Years: 45.00     Pack years: 45.00     Types: Cigarettes     Start date: 12/30/1954     Passive exposure: Current    Smokeless tobacco: Never    Tobacco comments:     SMOKED ONE CIGARETTE SINCE LAST THURSDAY   Vaping Use    Vaping Use: Never used   Substance and Sexual Activity    Alcohol use: Never    Drug use: Defer    Sexual activity: Defer     Family History   Problem Relation Age of Onset    Ovarian cancer Mother     Stroke Father     Breast cancer Sister 40    Cancer Maternal Grandmother     Cancer Paternal Grandmother        Objective   Physical Exam  Well appearing patient in no acute distress on RA  Anicteric sclerae, no rash on exposed skin  Respirations non-labored  Awake, alert, and oriented x 4. Speech intact. No gross neurologic deficit  Appropriate mood and affect  No visible edema      Vitals:    08/07/23 0836   BP: 155/75   Pulse: 74   Resp: 16   Temp: 98 øF (36.7 øC)   TempSrc: Temporal   SpO2: 92%   Weight: 68.9 kg (151 lb 14.4 oz)   PainSc: 0-No pain               PHQ-9 Total Score:             Result Review :   The following data was reviewed by: Dionisio Neville MD on 08/07/23:  Lab Results   Component Value Date    HGB 19.0 (H) 04/28/2023    HCT 54.9 (H) 04/28/2023    MCV 88.7 04/28/2023     04/28/2023    WBC 7.83 04/28/2023    NEUTROABS 4.33 04/28/2023    LYMPHSABS 2.42 04/28/2023    MONOSABS 0.57 04/28/2023    EOSABS 0.44 (H) 04/28/2023    BASOSABS 0.05 04/28/2023     Lab Results   Component Value Date    GLUCOSE 85 03/27/2023    BUN 12 03/27/2023    CREATININE 0.89 03/27/2023     03/27/2023    K 4.3 03/27/2023    CL 98 03/27/2023    CO2 26.3  03/27/2023    CALCIUM 10.1 03/27/2023    PROTEINTOT 7.8 03/27/2023    ALBUMIN 4.7 03/27/2023    BILITOT 0.3 03/27/2023    ALKPHOS 91 03/27/2023    AST 21 03/27/2023    ALT 20 03/27/2023     Lab Results   Component Value Date    FREET4 1.1 05/29/2020    TSH 2.400 03/27/2023     Labs personally reviewed. Erythrocytosis noted.     Rad onc note personally reviewed    7/31/23 CT Chest personally reviewed and improved RLL nodule    CT Chest Without Contrast Diagnostic    Result Date: 8/1/2023    1. Interval decrease in size of right lower lobe spiculated pulmonary nodule.  2. Stable appearance of multiple subcentimeter noncalcified additional pulmonary nodules which may be granulomas or metastatic disease.  Continued serial CT follow-up would be recommended.     KYLE FISH MD       Electronically Signed and Approved By: KYLE FISH MD on 8/01/2023 at 9:25                    Assessment and Plan    Diagnoses and all orders for this visit:    1. Squamous cell carcinoma of right lung (Primary)    2. Prostate cancer    3. Cigarette nicotine dependence with nicotine-induced disorder    Stage IA2 NSCLC (squamous cell carcinoma)    1.7 cm RLL FDG avid lesion per PET without any other hypermetabolic adenopathy. Staged at [id number]. Completed SBRT 6/5/23 per radiation oncology. 7/31/23 CT Chest demonstrated disease response. Recommend repeat CT Chest in 6 months. Follow up at that time.     Erythrocytosis  Secondary to smoking history. No workup or treatment recommended.    Smoking Cessation  Cessation recommended. Patient cutting back.     Prostate Cancer  Dee 3+4=7, PSA <10, clinical T3, s/p radical prostatectomy 8/2020, [facility]. Follows with urology        Patient Follow Up: 6 months with CT Chest  Patient was given instructions and counseling regarding his condition or for health maintenance advice. Please see specific information pulled into the AVS if appropriate.

## 2023-08-11 ENCOUNTER — OFFICE VISIT (OUTPATIENT)
Dept: UROLOGY | Facility: CLINIC | Age: 73
End: 2023-08-11
Payer: MEDICARE

## 2023-08-11 VITALS — HEIGHT: 70 IN | RESPIRATION RATE: 12 BRPM | WEIGHT: 152 LBS | BODY MASS INDEX: 21.76 KG/M2

## 2023-08-11 DIAGNOSIS — C61 PROSTATE CANCER: Primary | ICD-10-CM

## 2023-08-11 DIAGNOSIS — R97.21 RISING PSA FOLLOWING TREATMENT FOR MALIGNANT NEOPLASM OF PROSTATE: ICD-10-CM

## 2023-08-11 LAB
BILIRUB BLD-MCNC: NEGATIVE MG/DL
CLARITY, POC: CLEAR
COLOR UR: YELLOW
EXPIRATION DATE: NORMAL
GLUCOSE UR STRIP-MCNC: NEGATIVE MG/DL
KETONES UR QL: NEGATIVE
LEUKOCYTE EST, POC: NEGATIVE
Lab: NORMAL
NITRITE UR-MCNC: NEGATIVE MG/ML
PH UR: 6.5 [PH] (ref 5–8)
PROT UR STRIP-MCNC: NEGATIVE MG/DL
RBC # UR STRIP: NEGATIVE /UL
SP GR UR: 1.01 (ref 1–1.03)
UROBILINOGEN UR QL: NORMAL

## 2023-08-11 PROCEDURE — 81003 URINALYSIS AUTO W/O SCOPE: CPT | Performed by: NURSE PRACTITIONER

## 2023-08-11 PROCEDURE — 1160F RVW MEDS BY RX/DR IN RCRD: CPT | Performed by: NURSE PRACTITIONER

## 2023-08-11 PROCEDURE — 1159F MED LIST DOCD IN RCRD: CPT | Performed by: NURSE PRACTITIONER

## 2023-08-11 PROCEDURE — 99213 OFFICE O/P EST LOW 20 MIN: CPT | Performed by: NURSE PRACTITIONER

## 2023-08-11 NOTE — PROGRESS NOTES
Chief Complaint: Follow-up (Pt here for follow up.  Pt has PSA.  Pt is still having some urine leakage when getting up out of a chair.)    Subjective         History of Present Illness  Colin Nevarez is a 73 y.o. male presents to [facility] to be seen for annual f/u prostate cancer.    status post  RALPw LND  20 , pT2 N0 R1     NO UTI or GH     Voids without issue.    Minimal incontinence, no pads.     Not worried about erections.    PSA now 0.016 previously undetectable     Previous:      No history of kidney stone.     Patient had a ruptured appendix in .  Still has a large midline incision.     No urologic family history     No CAD, No COPD.  smokes 1 ppd.  ASA 81  Mom dies of CA at 50.  Dad  at 60 of CVA.       0.81, GFR >60    Prostate CA        <0.014         <0.014  3/21    <0.01  10/20  <0.01       RALP w LND  3+4, 4+3 5 to 10%, tertiary pattern 5 5% overall Dee score 7, margins involved, right apical, right anterior, right posterior, 7 lymph nodes negative     pT2 N0 R1     2020 MRI fusion prostate biopsy  Region of interest - 3+3, multiple cores, 22%  Right base3+3, 2/2, 17%  Right apex3+3, 2/2, 77%  Right mid3+4, 2/2, 46%  Left base, left apex, left midnegative  Region of interest #23 +3, 2/2, 26%      MRI [id number] g17 x 4 mm, PIRADS 5.  Right lateral peripheral zone at the apex to mid gland    8.16    Objective     Past Medical History:   Diagnosis Date    Arthritis     COPD (chronic obstructive pulmonary disease)     Essential hypertension 2021    Forgetfulness     Heart flutter, ventricular     High cholesterol     Lung cancer     Prostate cancer     Ringing in ear     Shortness of Air     SOB (shortness of breath)        Past Surgical History:   Procedure Laterality Date    APPENDECTOMY      COLONOSCOPY  2019    COLONOSCOPY N/A 2023    Procedure: COLONOSCOPY;  Surgeon: Geoffrey Carey MD;  Location: [facility]  "ENDOSCOPY;  Service: General;  Laterality: N/A;  NORMAL    LUNG BIOPSY      PROSTATECTOMY      TONSILLECTOMY  1969    Per PT         Current Outpatient Medications:     albuterol (PROVENTIL) (2.5 MG/3ML) 0.083% nebulizer solution, Take 2.5 mg by nebulization Every 4 (Four) Hours As Needed for Wheezing., Disp: 100 each, Rfl: 12    albuterol sulfate  (90 Base) MCG/ACT inhaler, Inhale 2 puffs Every 4 (Four) Hours As Needed for Wheezing or Shortness of Air., Disp: 18 g, Rfl: 2    amLODIPine (NORVASC) 10 MG tablet, Take 1 tablet by mouth Daily., Disp: 90 tablet, Rfl: 1    aspirin 81 MG chewable tablet, , Disp: , Rfl:     ezetimibe (Zetia) 10 MG tablet, Take 1 tablet by mouth Daily., Disp: 90 tablet, Rfl: 1    Fluticasone-Umeclidin-Vilant (Trelegy Ellipta) 200-62.5-25 MCG/ACT aerosol powder , Inhale 1 puff Daily., Disp: 3 each, Rfl: 0    No Known Allergies     Family History   Problem Relation Age of Onset    Ovarian cancer Mother     Stroke Father     Breast cancer Sister 40    Cancer Maternal Grandmother     Cancer Paternal Grandmother        Social History     Socioeconomic History    Marital status: Single   Tobacco Use    Smoking status: Every Day     Packs/day: 1.00     Years: 45.00     Pack years: 45.00     Types: Cigarettes     Start date: 12/30/1954     Passive exposure: Current    Smokeless tobacco: Never    Tobacco comments:     SMOKED ONE CIGARETTE SINCE LAST THURSDAY   Vaping Use    Vaping Use: Never used   Substance and Sexual Activity    Alcohol use: Never    Drug use: Defer    Sexual activity: Defer       Vital Signs:   Resp 12   Ht 177.8 cm (70\")   Wt 68.9 kg (152 lb)   BMI 21.81 kg/mý      Physical Exam     Result Review :   The following data was reviewed by: CHEN Jay on 08/11/2023:  Results for orders placed or performed in visit on 08/11/23   POC Urinalysis Dipstick, Automated    Specimen: Urine   Result Value Ref Range    Color Yellow Yellow, Straw, Dark Yellow, Ying    " Clarity, UA Clear Clear    Specific Gravity  1.010 1.005 - 1.030    pH, Urine 6.5 5.0 - 8.0    Leukocytes Negative Negative    Nitrite, UA Negative Negative    Protein, POC Negative Negative mg/dL    Glucose, UA Negative Negative mg/dL    Ketones, UA Negative Negative    Urobilinogen, UA Normal Normal, 0.2 E.U./dL    Bilirubin Negative Negative    Blood, UA Negative Negative    Lot Number [id number]     Expiration Date 2,024/8       PSA          8/4/2023    09:52   PSA   PSA 0.016          Procedures        Assessment and Plan    Diagnoses and all orders for this visit:    1. Prostate cancer (Primary)  -     POC Urinalysis Dipstick, Automated  -     PSA DIAGNOSTIC; Future    2. Rising PSA following treatment for malignant neoplasm of prostate        We discussed his PSA has increased at this time and we will bring him back in 3 months with a repeat PSA level      I spent 10 minutes caring for Colin on this date of service. This time includes time spent by me in the following activities:reviewing tests, obtaining and/or reviewing a separately obtained history, performing a medically appropriate examination and/or evaluation , counseling and educating the patient/family/caregiver, ordering medications, tests, or procedures, and documenting information in the medical record  Follow Up   Return in about 3 months (around 11/11/2023) for f/u rising PSA after prostatectomy .  Patient was given instructions and counseling regarding his condition or for health maintenance advice. Please see specific information pulled into the AVS if appropriate.         This document has been electronically signed by CHEN Jay  August 11, 2023 11:37 EDT

## 2023-08-15 ENCOUNTER — OFFICE VISIT (OUTPATIENT)
Dept: FAMILY MEDICINE CLINIC | Facility: CLINIC | Age: 73
End: 2023-08-15
Payer: MEDICARE

## 2023-08-15 VITALS
TEMPERATURE: 97.7 F | BODY MASS INDEX: 22.25 KG/M2 | HEIGHT: 70 IN | WEIGHT: 155.4 LBS | DIASTOLIC BLOOD PRESSURE: 78 MMHG | SYSTOLIC BLOOD PRESSURE: 133 MMHG | OXYGEN SATURATION: 94 % | RESPIRATION RATE: 18 BRPM | HEART RATE: 71 BPM

## 2023-08-15 DIAGNOSIS — F17.219 CIGARETTE NICOTINE DEPENDENCE WITH NICOTINE-INDUCED DISORDER: Chronic | ICD-10-CM

## 2023-08-15 DIAGNOSIS — Z00.00 MEDICARE ANNUAL WELLNESS VISIT, SUBSEQUENT: Primary | ICD-10-CM

## 2023-08-15 DIAGNOSIS — J44.9 CHRONIC OBSTRUCTIVE PULMONARY DISEASE, UNSPECIFIED COPD TYPE: Chronic | ICD-10-CM

## 2023-08-15 DIAGNOSIS — I10 ESSENTIAL HYPERTENSION: Chronic | ICD-10-CM

## 2023-08-15 RX ORDER — FLUTICASONE FUROATE, UMECLIDINIUM BROMIDE AND VILANTEROL TRIFENATATE 200; 62.5; 25 UG/1; UG/1; UG/1
200 POWDER RESPIRATORY (INHALATION) DAILY
Qty: 3 EACH | Refills: 0 | COMMUNITY
Start: 2023-08-15

## 2023-08-15 RX ORDER — FLUTICASONE FUROATE, UMECLIDINIUM BROMIDE AND VILANTEROL TRIFENATATE 200; 62.5; 25 UG/1; UG/1; UG/1
200 POWDER RESPIRATORY (INHALATION) ONCE
Qty: 3 EACH | Refills: 0 | COMMUNITY
Start: 2023-08-15 | End: 2023-08-15

## 2023-08-15 NOTE — PROGRESS NOTES
The ABCs of the Annual Wellness Visit  Subsequent Medicare Wellness Visit    Subjective    Colin Nevarez is a 73 y.o. male who presents for a Subsequent Medicare Wellness Visit.    The following portions of the patient's history were reviewed and   updated as appropriate: allergies, current medications, past family history, past medical history, past social history, past surgical history, and problem list.    Compared to one year ago, the patient feels his physical   health is the same.    Compared to one year ago, the patient feels his mental   health is the same.    Recent Hospitalizations:  He was not admitted to the hospital during the last year.       Current Medical Providers:  Patient Care Team:  Liz Roper DO as PCP - General (Family Medicine)  Nahed Samuels APRN as Nurse Practitioner (Urology)    Outpatient Medications Prior to Visit   Medication Sig Dispense Refill    albuterol (PROVENTIL) (2.5 MG/3ML) 0.083% nebulizer solution Take 2.5 mg by nebulization Every 4 (Four) Hours As Needed for Wheezing. 100 each 12    albuterol sulfate  (90 Base) MCG/ACT inhaler Inhale 2 puffs Every 4 (Four) Hours As Needed for Wheezing or Shortness of Air. 18 g 2    amLODIPine (NORVASC) 10 MG tablet Take 1 tablet by mouth Daily. 90 tablet 1    aspirin 81 MG chewable tablet       Fluticasone-Umeclidin-Vilant (Trelegy Ellipta) 200-62.5-25 MCG/ACT aerosol powder  Inhale 1 puff Daily. 3 each 0    ezetimibe (Zetia) 10 MG tablet Take 1 tablet by mouth Daily. (Patient not taking: Reported on 8/15/2023) 90 tablet 1     No facility-administered medications prior to visit.       No opioid medication identified on active medication list. I have reviewed chart for other potential  high risk medication/s and harmful drug interactions in the elderly.        Aspirin is on active medication list. Aspirin use is indicated based on review of current medical condition/s. Pros and cons of this therapy have been discussed today.  "Benefits of this medication outweigh potential harm.  Patient has been encouraged to continue taking this medication.  .      Patient Active Problem List   Diagnosis    Arthritis    Chronic obstructive pulmonary disease    Heart flutter, ventricular    High cholesterol    Prostate cancer    Essential hypertension    Urinary incontinence, stress, male    Cigarette nicotine dependence with nicotine-induced disorder    Hematuria    Medicare annual wellness visit, subsequent    Ascending aortic aneurysm    Squamous cell carcinoma lung     Advance Care Planning   Advance Care Planning     Advance Directive is not on file.  ACP discussion was held with the patient during this visit. Patient has an advance directive (not in EMR), copy requested.     Objective    Vitals:    08/15/23 0841   BP: 133/78   BP Location: Left arm   Patient Position: Sitting   Cuff Size: Adult   Pulse: 71   Resp: 18   Temp: 97.7 øF (36.5 øC)   TempSrc: Tympanic   SpO2: 94%   Weight: 70.5 kg (155 lb 6.4 oz)   Height: 177.8 cm (70\")   PainSc: 0-No pain     Estimated body mass index is 22.3 kg/mý as calculated from the following:    Height as of this encounter: 177.8 cm (70\").    Weight as of this encounter: 70.5 kg (155 lb 6.4 oz).    BMI is within normal parameters. No other follow-up for BMI required.      Does the patient have evidence of cognitive impairment? No          HEALTH RISK ASSESSMENT    Smoking Status:  Social History     Tobacco Use   Smoking Status Every Day    Packs/day: 1.00    Years: 45.00    Pack years: 45.00    Types: Cigarettes    Start date: 12/30/1954    Passive exposure: Current   Smokeless Tobacco Never   Tobacco Comments    SMOKED ONE CIGARETTE SINCE LAST THURSDAY     Alcohol Consumption:  Social History     Substance and Sexual Activity   Alcohol Use Never     Fall Risk Screen:    STEADI Fall Risk Assessment was completed, and patient is at LOW risk for falls.Assessment completed on:8/15/2023    Depression Screening:      " 8/15/2023     8:00 AM   PHQ-2/PHQ-9 Depression Screening   Little Interest or Pleasure in Doing Things 2-->more than half the days   Feeling Down, Depressed or Hopeless 0-->not at all   Trouble Falling or Staying Asleep, or Sleeping Too Much 0-->not at all   Feeling Tired or Having Little Energy 0-->not at all   Poor Appetite or Overeating 0-->not at all   Feeling Bad about Yourself - or that You are a Failure or Have Let Yourself or Your Family Down 0-->not at all   Trouble Concentrating on Things, Such as Reading the Newspaper or Watching Television 0-->not at all   Moving or Speaking So Slowly that Other People Could Have Noticed? Or the Opposite - Being So Fidgety 0-->not at all   Thoughts that You Would be Better Off Dead or of Hurting Yourself in Some Way 0-->not at all   PHQ-9: Brief Depression Severity Measure Score 2   If You Checked Off Any Problems, How Difficult Have These Problems Made It For You to Do Your Work, Take Care of Things at Home, or Get Along with Other People? not difficult at all       Health Habits and Functional and Cognitive Screenin/15/2023     8:00 AM   Functional & Cognitive Status   Do you have difficulty preparing food and eating? No   Do you have difficulty bathing yourself, getting dressed or grooming yourself? No   Do you have difficulty using the toilet? No   Do you have difficulty moving around from place to place? No   Do you have trouble with steps or getting out of a bed or a chair? No   Current Diet Unhealthy Diet   Dental Exam Not up to date   Eye Exam Not up to date   Exercise (times per week) 3 times per week   Current Exercises Include Yard Work   Do you need help using the phone?  No   Are you deaf or do you have serious difficulty hearing?  Yes   Do you need help to go to places out of walking distance? No   Do you need help shopping? No   Do you need help preparing meals?  No   Do you need help with housework?  No   Do you need help with laundry? No   Do  you need help taking your medications? No   Do you need help managing money? No   Do you ever drive or ride in a car without wearing a seat belt? No   Have you felt unusual stress, anger or loneliness in the last month? No   Who do you live with? Alone   If you need help, do you have trouble finding someone available to you? No   Have you been bothered in the last four weeks by sexual problems? No   Do you have difficulty concentrating, remembering or making decisions? No       Age-appropriate Screening Schedule:  Refer to the list below for future screening recommendations based on patient's age, sex and/or medical conditions. Orders for these recommended tests are listed in the plan section. The patient has been provided with a written plan.    Health Maintenance   Topic Date Due    COVID-19 Vaccine (4 - Moderna risk series) 08/17/2023 (Originally 5/24/2022)    TDAP/TD VACCINES (1 - Tdap) 04/04/2024 (Originally [birth year]/6/1970)    ZOSTER VACCINE (1 of 2) 04/04/2024 (Originally 5/6/1970)    INFLUENZA VACCINE  10/01/2023    LIPID PANEL  03/27/2024    ANNUAL WELLNESS VISIT  08/15/2024    COLORECTAL CANCER SCREENING  06/14/2028    HEPATITIS C SCREENING  Completed    Pneumococcal Vaccine 65+  Completed    AAA SCREEN (ONE-TIME)  Completed    LUNG CANCER SCREENING  Discontinued                  CMS Preventative Services Quick Reference  Risk Factors Identified During Encounter  Tobacco Use/Dependance Risk (use dotphrase .tobaccocessation for documentation)  The above risks/problems have been discussed with the patient.  Pertinent information has been shared with the patient in the After Visit Summary.  An After Visit Summary and PPPS were made available to the patient.    Follow Up:   Next Medicare Wellness visit to be scheduled in 1 year.       Additional E&M Note during same encounter follows:  Patient has multiple medical problems which are significant and separately identifiable that require additional work above and beyond  "the Medicare Wellness Visit.      Chief Complaint  Medicare Wellness-subsequent and face (Face feels like its on fire when he is out in the sun, think it could be because of the cancer treatments.)    Subjective        HPI  Colin Nevarez is also being seen today for HTN, HLD, tobacco abuse.    Review of Systems   HENT:  Negative for trouble swallowing.    Eyes:  Negative for visual disturbance.   Respiratory:  Negative for apnea.    Cardiovascular:  Negative for chest pain.   Gastrointestinal:  Negative for blood in stool.   Endocrine: Negative for polyphagia.   Genitourinary:  Negative for dysuria.   Skin:  Negative for color change.   Allergic/Immunologic: Negative for immunocompromised state.   Neurological:  Negative for seizures.   Hematological:  Negative for adenopathy.   Psychiatric/Behavioral:  Negative for behavioral problems.      Objective   Vital Signs:  /78 (BP Location: Left arm, Patient Position: Sitting, Cuff Size: Adult)   Pulse 71   Temp 97.7 øF (36.5 øC) (Tympanic)   Resp 18   Ht 177.8 cm (70\")   Wt 70.5 kg (155 lb 6.4 oz)   SpO2 94%   BMI 22.30 kg/mý     Physical Exam  Vitals reviewed.   Constitutional:       Appearance: Normal appearance. He is well-developed.   HENT:      Head: Normocephalic and atraumatic.      Right Ear: External ear normal.      Left Ear: External ear normal.      Mouth/Throat:      Pharynx: No oropharyngeal exudate.   Eyes:      Conjunctiva/sclera: Conjunctivae normal.      Pupils: Pupils are equal, round, and reactive to light.   Neck:      Vascular: No carotid bruit.   Cardiovascular:      Rate and Rhythm: Normal rate and regular rhythm.      Heart sounds: No murmur heard.    No friction rub. No gallop.   Pulmonary:      Effort: Pulmonary effort is normal.      Breath sounds: Decreased air movement present. No wheezing or rhonchi.   Abdominal:      General: There is no distension.   Skin:     General: Skin is warm and dry.   Neurological:      Mental " Status: He is alert and oriented to person, place, and time.      Cranial Nerves: No cranial nerve deficit.      Motor: No weakness.   Psychiatric:         Mood and Affect: Mood and affect normal.         Behavior: Behavior normal.         Thought Content: Thought content normal.         Judgment: Judgment normal.          CMP          3/27/2023    13:44   CMP   Glucose 85    BUN 12    Creatinine 0.89    EGFR 91.6    Sodium 137    Potassium 4.3    Chloride 98    Calcium 10.1    Total Protein 7.8    Albumin 4.7    Globulin 3.1    Total Bilirubin 0.3    Alkaline Phosphatase 91    AST (SGOT) 21    ALT (SGPT) 20    Albumin/Globulin Ratio 1.5    BUN/Creatinine Ratio 13.5    Anion Gap 12.7      CBC          3/27/2023    13:44 4/28/2023    10:14   CBC   WBC 9.39  7.83    RBC 6.36  6.19    Hemoglobin 19.1  19.0    Hematocrit 56.3  54.9    MCV 88.5  88.7    MCH 30.0  30.7    MCHC 33.9  34.6    RDW 13.1  13.2    Platelets 223  194      Lipid Panel          3/27/2023    13:44   Lipid Panel   Total Cholesterol 225    Triglycerides 98    HDL Cholesterol 49    VLDL Cholesterol 17    LDL Cholesterol  159    LDL/HDL Ratio 3.19      TSH          3/27/2023    13:44   TSH   TSH 2.400                 Assessment and Plan   Diagnoses and all orders for this visit:    1. Medicare annual wellness visit, subsequent (Primary)    2. Cigarette nicotine dependence with nicotine-induced disorder  Assessment & Plan:  Tobacco use is improving with lifestyle modifications.  Smoking cessation counseling was provided.  Tobacco use will be reassessed at the next regular appointment.      3. Essential hypertension  Assessment & Plan:  Hypertension is improving with treatment.  Continue current treatment regimen.  Dietary sodium restriction.  Weight loss.  Blood pressure will be reassessed at the next regular appointment.      4. Chronic obstructive pulmonary disease, unspecified COPD type  Assessment & Plan:  COPD is improving with treatment.  Continue  current medications.      -     Fluticasone-Umeclidin-Vilant (Trelegy Ellipta) 200-62.5-25 MCG/ACT aerosol powder ; Inhale 200 mcg Daily.  Dispense: 3 each; Refill: 0             Follow Up   Return in about 6 months (around 2/15/2024).  Patient was given instructions and counseling regarding his condition or for health maintenance advice. Please see specific information pulled into the AVS if appropriate.

## 2023-10-30 ENCOUNTER — LAB (OUTPATIENT)
Dept: LAB | Facility: HOSPITAL | Age: 73
End: 2023-10-30
Payer: MEDICARE

## 2023-10-30 DIAGNOSIS — C61 PROSTATE CANCER: ICD-10-CM

## 2023-10-30 LAB — PSA SERPL-MCNC: 0.01 NG/ML (ref 0–4)

## 2023-10-30 PROCEDURE — 84153 ASSAY OF PSA TOTAL: CPT

## 2023-10-30 PROCEDURE — 36415 COLL VENOUS BLD VENIPUNCTURE: CPT

## 2023-11-07 ENCOUNTER — OFFICE VISIT (OUTPATIENT)
Dept: UROLOGY | Facility: CLINIC | Age: 73
End: 2023-11-07
Payer: MEDICARE

## 2023-11-07 VITALS
WEIGHT: 152.6 LBS | BODY MASS INDEX: 21.85 KG/M2 | RESPIRATION RATE: 14 BRPM | HEIGHT: 70 IN | HEART RATE: 76 BPM | DIASTOLIC BLOOD PRESSURE: 93 MMHG | SYSTOLIC BLOOD PRESSURE: 150 MMHG

## 2023-11-07 DIAGNOSIS — N39.3 URINARY INCONTINENCE, STRESS, MALE: ICD-10-CM

## 2023-11-07 DIAGNOSIS — C61 PROSTATE CANCER: Primary | ICD-10-CM

## 2023-11-07 DIAGNOSIS — R97.21 RISING PSA FOLLOWING TREATMENT FOR MALIGNANT NEOPLASM OF PROSTATE: ICD-10-CM

## 2023-11-07 NOTE — PROGRESS NOTES
Chief Complaint: Follow-up (Pt here for follow up.  Pt had prostectomy .  Pt had PSA recently.)    Subjective         History of Present Illness  Colin Nevarez is a 73 y.o. male presents to [facility] to be seen for annual f/u prostate cancer.      He returns today with a repeat PSA from 10/30/23 which is still 0.015    No new or worsening symptoms.        Previous:    status post  RALPw LND  20 , pT2 N0 R1     NO UTI or GH     Voids without issue.    Minimal incontinence, no pads.     Not worried about erections.    PSA now 0.016 previously undetectable        No history of kidney stone.     Patient had a ruptured appendix in .  Still has a large midline incision.     No urologic family history     No CAD, No COPD.  smokes 1 ppd.  ASA 82  Mom dies of CA at 50.  Dad  at 60 of CVA.       0.81, GFR >60    Prostate CA        <0.014         <0.014  3/21    <0.01  10/20  <0.01       RALP w LND  3+4, 4+3 5 to 10%, tertiary pattern 5 5% overall [place] score 7, margins involved, right apical, right anterior, right posterior, 7 lymph nodes negative     pT2 N0 R1     2020 MRI fusion prostate biopsy  Region of interest - 3+3, multiple cores, 22%  Right base3+3, 2/2, 17%  Right apex3+3, 2/2, 77%  Right mid3+4, 2/2, 46%  Left base, left apex, left midnegative  Region of interest #23 +3, 2/2, 26%      MRI [id number] g17 x 4 mm, PIRADS 5.  Right lateral peripheral zone at the apex to mid gland    8.16    Objective     Past Medical History:   Diagnosis Date    Arthritis     COPD (chronic obstructive pulmonary disease)     Essential hypertension 2021    Forgetfulness     Heart flutter, ventricular     High cholesterol     Lung cancer     Prostate cancer     Ringing in ear     Shortness of Air     SOB (shortness of breath)        Past Surgical History:   Procedure Laterality Date    APPENDECTOMY      COLONOSCOPY  2019    COLONOSCOPY N/A 2023     "Procedure: COLONOSCOPY;  Surgeon: Geoffrey Carey MD;  Location: [facility] ENDOSCOPY;  Service: General;  Laterality: N/A;  NORMAL    LUNG BIOPSY      PROSTATECTOMY      TONSILLECTOMY  1969    Per PT         Current Outpatient Medications:     albuterol (PROVENTIL) (2.5 MG/3ML) 0.083% nebulizer solution, Take 2.5 mg by nebulization Every 4 (Four) Hours As Needed for Wheezing., Disp: 100 each, Rfl: 12    albuterol sulfate  (90 Base) MCG/ACT inhaler, Inhale 2 puffs Every 4 (Four) Hours As Needed for Wheezing or Shortness of Air., Disp: 18 g, Rfl: 2    amLODIPine (NORVASC) 10 MG tablet, Take 1 tablet by mouth Daily., Disp: 90 tablet, Rfl: 1    aspirin 81 MG chewable tablet, , Disp: , Rfl:     Fluticasone-Umeclidin-Vilant (Trelegy Ellipta) 200-62.5-25 MCG/ACT aerosol powder , Inhale 200 mcg Daily., Disp: 3 each, Rfl: 0    No Known Allergies     Family History   Problem Relation Age of Onset    Ovarian cancer Mother     Stroke Father     Breast cancer Sister 40    Cancer Maternal Grandmother     Cancer Paternal Grandmother        Social History     Socioeconomic History    Marital status: Single   Tobacco Use    Smoking status: Every Day     Packs/day: 1.00     Years: 45.00     Additional pack years: 0.00     Total pack years: 45.00     Types: Cigarettes     Start date: 12/30/1954     Passive exposure: Current    Smokeless tobacco: Never    Tobacco comments:     SMOKED ONE CIGARETTE SINCE LAST THURSDAY   Vaping Use    Vaping Use: Never used   Substance and Sexual Activity    Alcohol use: Never    Drug use: Defer    Sexual activity: Defer       Vital Signs:   /93 (BP Location: Left arm, Patient Position: Sitting)   Pulse 76   Resp 14   Ht 177.8 cm (70\")   Wt 69.2 kg (152 lb 9.6 oz)   BMI 21.90 kg/m²      Physical Exam     Result Review :   The following data was reviewed by: CHEN Jay on 11/7/2023:  Results for orders placed or performed in visit on 10/30/23   PSA DIAGNOSTIC    Specimen: Blood "   Result Value Ref Range    PSA 0.015 0.000 - 4.000 ng/mL      PSA          8/4/2023    09:52 10/30/2023    10:10   PSA   PSA 0.016  0.015          Procedures        Assessment and Plan    Diagnoses and all orders for this visit:    1. Prostate cancer (Primary)    2. Rising PSA following treatment for malignant neoplasm of prostate    3. Urinary incontinence, stress, male          We discussed his PSA has remained around the same at this time and we will bring him back in 6 months with a repeat PSA level      I spent 10 minutes caring for Colin on this date of service. This time includes time spent by me in the following activities:reviewing tests, obtaining and/or reviewing a separately obtained history, performing a medically appropriate examination and/or evaluation , counseling and educating the patient/family/caregiver, ordering medications, tests, or procedures, and documenting information in the medical record  Follow Up   No follow-ups on file.  Patient was given instructions and counseling regarding his condition or for health maintenance advice. Please see specific information pulled into the AVS if appropriate.         This document has been electronically signed by CHEN Jay  November 7, 2023 09:46 EST

## 2023-12-07 ENCOUNTER — OFFICE VISIT (OUTPATIENT)
Dept: FAMILY MEDICINE CLINIC | Facility: CLINIC | Age: 73
End: 2023-12-07
Payer: MEDICARE

## 2023-12-07 ENCOUNTER — PATIENT ROUNDING (BHMG ONLY) (OUTPATIENT)
Dept: FAMILY MEDICINE CLINIC | Facility: CLINIC | Age: 73
End: 2023-12-07

## 2023-12-07 ENCOUNTER — HOSPITAL ENCOUNTER (OUTPATIENT)
Dept: GENERAL RADIOLOGY | Facility: HOSPITAL | Age: 73
Discharge: HOME | End: 2023-12-07
Admitting: FAMILY MEDICINE
Payer: MEDICARE

## 2023-12-07 VITALS
TEMPERATURE: 98.2 F | HEIGHT: 70 IN | RESPIRATION RATE: 8 BRPM | HEART RATE: 74 BPM | OXYGEN SATURATION: 93 % | DIASTOLIC BLOOD PRESSURE: 78 MMHG | BODY MASS INDEX: 22.28 KG/M2 | WEIGHT: 155.6 LBS | SYSTOLIC BLOOD PRESSURE: 137 MMHG

## 2023-12-07 DIAGNOSIS — Z00.00 ANNUAL PHYSICAL EXAM: ICD-10-CM

## 2023-12-07 DIAGNOSIS — R10.10 UPPER ABDOMINAL PAIN: ICD-10-CM

## 2023-12-07 DIAGNOSIS — I10 ESSENTIAL HYPERTENSION: ICD-10-CM

## 2023-12-07 DIAGNOSIS — Z13.220 SCREENING FOR LIPID DISORDERS: ICD-10-CM

## 2023-12-07 DIAGNOSIS — J06.9 ACUTE URI: Primary | ICD-10-CM

## 2023-12-07 DIAGNOSIS — F17.219 CIGARETTE NICOTINE DEPENDENCE WITH NICOTINE-INDUCED DISORDER: ICD-10-CM

## 2023-12-07 PROCEDURE — 71046 X-RAY EXAM CHEST 2 VIEWS: CPT

## 2023-12-07 RX ORDER — BUDESONIDE, GLYCOPYRROLATE, AND FORMOTEROL FUMARATE 160; 9; 4.8 UG/1; UG/1; UG/1
2 AEROSOL, METERED RESPIRATORY (INHALATION) 2 TIMES DAILY
Qty: 10.7 G | Refills: 11 | COMMUNITY
Start: 2023-12-07

## 2023-12-07 RX ORDER — CEFTRIAXONE 1 G/1
1 INJECTION, POWDER, FOR SOLUTION INTRAMUSCULAR; INTRAVENOUS ONCE
Status: COMPLETED | OUTPATIENT
Start: 2023-12-07 | End: 2023-12-08

## 2023-12-07 RX ORDER — TRIAMCINOLONE ACETONIDE 40 MG/ML
60 INJECTION, SUSPENSION INTRA-ARTICULAR; INTRAMUSCULAR ONCE
Status: COMPLETED | OUTPATIENT
Start: 2023-12-07 | End: 2023-12-08

## 2023-12-07 NOTE — PROGRESS NOTES
A My-Chart message has been sent to the patient for PATIENT ROUNDING with [facility].

## 2023-12-07 NOTE — PROGRESS NOTES
"Chief Complaint  Cough and URI    Subjective        Colin Nevarez presents to [facility]  History of Present Illness  He is here today for management of his chronic medical conditions and for an acute visit. He has right middle lung cancer, tobacco abuse, COPD, prostate cancer and chronic back pain. He is a smoker. He has smoked since he was 7 years old. He smokes a little less than one pack per day.     He has been having a cough and congestion for the past three days. He denies fever or chills.     He is having right upper abdominal pain. His pain is positional.    The patient is having superficial skin burning and pain over his right lower ribs.  He says it has been going on now for the past few weeks.  He says ibuprofen 600 mg at nighttime will make the pain go away.  He says moving or leaning forward does help relieve the pain and he does not anything it makes it worse.     The patient has no other complaints today and denies chest pain, shortness of breath, weakness, numbness, nausea, vomiting, diarrhea, dizziness or syncopal event.            Objective   Vital Signs:  /78 (BP Location: Left arm, Patient Position: Sitting, Cuff Size: Adult)   Pulse 74   Temp 98.2 °F (36.8 °C) (Tympanic)   Resp 8   Ht 177.8 cm (70\")   Wt 70.6 kg (155 lb 9.6 oz)   SpO2 93%   BMI 22.33 kg/m²   Estimated body mass index is 22.33 kg/m² as calculated from the following:    Height as of this encounter: 177.8 cm (70\").    Weight as of this encounter: 70.6 kg (155 lb 9.6 oz).       BMI is within normal parameters. No other follow-up for BMI required.      Physical Exam  Vitals reviewed.   Constitutional:       Appearance: Normal appearance. He is well-developed.   HENT:      Head: Normocephalic and atraumatic.      Right Ear: External ear normal.      Left Ear: External ear normal.      Mouth/Throat:      Pharynx: No oropharyngeal exudate.   Eyes:      Conjunctiva/sclera: Conjunctivae " normal.      Pupils: Pupils are equal, round, and reactive to light.   Neck:      Vascular: No carotid bruit.   Cardiovascular:      Rate and Rhythm: Normal rate and regular rhythm.      Heart sounds: No murmur heard.     No friction rub. No gallop.   Pulmonary:      Effort: Pulmonary effort is normal.      Breath sounds: Normal breath sounds. Decreased air movement present. No wheezing or rhonchi.   Abdominal:      General: There is no distension.   Skin:     General: Skin is warm and dry.   Neurological:      Mental Status: He is alert and oriented to person, place, and time.      Cranial Nerves: No cranial nerve deficit.      Motor: No weakness.   Psychiatric:         Mood and Affect: Mood and affect normal.         Behavior: Behavior normal.         Thought Content: Thought content normal.         Judgment: Judgment normal.        Result Review :    CMP          3/27/2023    13:44   CMP   Glucose 85    BUN 12    Creatinine 0.89    EGFR 91.6    Sodium 137    Potassium 4.3    Chloride 98    Calcium 10.1    Total Protein 7.8    Albumin 4.7    Globulin 3.1    Total Bilirubin 0.3    Alkaline Phosphatase 91    AST (SGOT) 21    ALT (SGPT) 20    Albumin/Globulin Ratio 1.5    BUN/Creatinine Ratio 13.5    Anion Gap 12.7      CBC          3/27/2023    13:44 4/28/2023    10:14   CBC   WBC 9.39  7.83    RBC 6.36  6.19    Hemoglobin 19.1  19.0    Hematocrit 56.3  54.9    MCV 88.5  88.7    MCH 30.0  30.7    MCHC 33.9  34.6    RDW 13.1  13.2    Platelets 223  194      Lipid Panel          3/27/2023    13:44   Lipid Panel   Total Cholesterol 225    Triglycerides 98    HDL Cholesterol 49    VLDL Cholesterol 17    LDL Cholesterol  159    LDL/HDL Ratio 3.19      TSH          3/27/2023    13:44   TSH   TSH 2.400                   Assessment and Plan   Diagnoses and all orders for this visit:    1. Acute URI (Primary)  Assessment & Plan:  The patient has chronic obstructive lung disease.  He is still smoking.  This makes him prone to  have chronic URIs.  He was given Rocephin and Kenalog today and told to call back or go to ER if he did not get better.    Orders:  -     triamcinolone acetonide (KENALOG-40) injection 60 mg  -     cefTRIAXone (ROCEPHIN) injection 1 g    2. Annual physical exam  -     TSH; Future  -     Comprehensive Metabolic Panel; Future  -     CBC & Differential; Future    3. Essential hypertension  Assessment & Plan:  Hypertension is improving with treatment.  Continue current treatment regimen.  Dietary sodium restriction.  Weight loss.  Blood pressure will be reassessed at the next regular appointment.      4. Cigarette nicotine dependence with nicotine-induced disorder  Assessment & Plan:  Tobacco use is unchanged.  Smoking cessation counseling was provided.  Tobacco use will be reassessed at the next regular appointment.      5. Screening for lipid disorders  -     Lipid Panel; Future    6. Upper abdominal pain  -     XR Chest PA & Lateral; Future    Other orders  -     Budeson-Glycopyrrol-Formoterol (Breztri Aerosphere) 160-9-4.8 MCG/ACT aerosol inhaler; Inhale 2 puffs 2 (Two) Times a Day.  Dispense: 10.7 g; Refill: 11             Follow Up   Return in about 6 months (around 6/7/2024).  Patient was given instructions and counseling regarding his condition or for health maintenance advice. Please see specific information pulled into the AVS if appropriate.

## 2023-12-07 NOTE — ASSESSMENT & PLAN NOTE
The patient has chronic obstructive lung disease.  He is still smoking.  This makes him prone to have chronic URIs.  He was given Rocephin and Kenalog today and told to call back or go to ER if he did not get better.

## 2023-12-08 ENCOUNTER — CLINICAL SUPPORT (OUTPATIENT)
Dept: FAMILY MEDICINE CLINIC | Facility: CLINIC | Age: 73
End: 2023-12-08
Payer: MEDICARE

## 2023-12-08 DIAGNOSIS — J06.9 ACUTE URI: Primary | ICD-10-CM

## 2023-12-08 PROCEDURE — 96372 THER/PROPH/DIAG INJ SC/IM: CPT | Performed by: FAMILY MEDICINE

## 2023-12-08 RX ADMIN — TRIAMCINOLONE ACETONIDE 60 MG: 40 INJECTION, SUSPENSION INTRA-ARTICULAR; INTRAMUSCULAR at 09:25

## 2023-12-08 RX ADMIN — CEFTRIAXONE 1 G: 1 INJECTION, POWDER, FOR SOLUTION INTRAMUSCULAR; INTRAVENOUS at 09:24

## 2023-12-23 RX ORDER — METHYLPREDNISOLONE 4 MG/1
TABLET ORAL
Qty: 21 TABLET | Refills: 0 | Status: SHIPPED | OUTPATIENT
Start: 2023-12-23

## 2023-12-23 RX ORDER — CEFDINIR 300 MG/1
300 CAPSULE ORAL 2 TIMES DAILY
Qty: 20 CAPSULE | Refills: 0 | Status: SHIPPED | OUTPATIENT
Start: 2023-12-23

## 2023-12-23 RX ORDER — AZITHROMYCIN 250 MG/1
TABLET, FILM COATED ORAL
Qty: 6 TABLET | Refills: 0 | Status: SHIPPED | OUTPATIENT
Start: 2023-12-23

## 2023-12-26 RX ORDER — CEFDINIR 300 MG/1
300 CAPSULE ORAL 2 TIMES DAILY
Qty: 20 CAPSULE | Refills: 0 | Status: SHIPPED | OUTPATIENT
Start: 2023-12-26

## 2023-12-26 RX ORDER — METHYLPREDNISOLONE 4 MG/1
TABLET ORAL
Qty: 21 TABLET | Refills: 0 | Status: SHIPPED | OUTPATIENT
Start: 2023-12-26

## 2023-12-26 RX ORDER — AZITHROMYCIN 250 MG/1
TABLET, FILM COATED ORAL
Qty: 6 TABLET | Refills: 0 | Status: SHIPPED | OUTPATIENT
Start: 2023-12-26

## 2024-01-11 RX ORDER — AMLODIPINE BESYLATE 10 MG/1
10 TABLET ORAL DAILY
Qty: 90 TABLET | Refills: 1 | Status: SHIPPED | OUTPATIENT
Start: 2024-01-11

## 2024-01-11 NOTE — TELEPHONE ENCOUNTER
"    Caller: Colin Nevarez W \"Mayco\"    Relationship: Self    Best call back number: [phone]     Requested Prescriptions:   Requested Prescriptions     Pending Prescriptions Disp Refills    amLODIPine (NORVASC) 10 MG tablet 90 tablet 1     Sig: Take 1 tablet by mouth Daily.        Pharmacy where request should be sent: [id number] - [address] & [place] - [phone] [facility] [phone]      Last office visit with prescribing clinician: 12/7/2023   Last telemedicine visit with prescribing clinician: Visit date not found   Next office visit with prescribing clinician: 2/15/2024     Additional details provided by patient:     Does the patient have less than a 3 day supply:  [] Yes  [x] No    Would you like a call back once the refill request has been completed: [] Yes [] No    If the office needs to give you a call back, can they leave a voicemail: [] Yes [] No    Anabel Booth, PCT   01/11/24 08:11 EST         "

## 2024-01-25 ENCOUNTER — OFFICE VISIT (OUTPATIENT)
Dept: FAMILY MEDICINE CLINIC | Facility: CLINIC | Age: 74
End: 2024-01-25
Payer: MEDICARE

## 2024-01-25 VITALS
HEART RATE: 79 BPM | DIASTOLIC BLOOD PRESSURE: 71 MMHG | HEIGHT: 70 IN | WEIGHT: 147.6 LBS | BODY MASS INDEX: 21.13 KG/M2 | OXYGEN SATURATION: 90 % | RESPIRATION RATE: 22 BRPM | SYSTOLIC BLOOD PRESSURE: 131 MMHG | TEMPERATURE: 97.8 F

## 2024-01-25 DIAGNOSIS — N30.01 ACUTE CYSTITIS WITH HEMATURIA: Primary | ICD-10-CM

## 2024-01-25 DIAGNOSIS — I10 ESSENTIAL HYPERTENSION: Chronic | ICD-10-CM

## 2024-01-25 DIAGNOSIS — F17.219 CIGARETTE NICOTINE DEPENDENCE WITH NICOTINE-INDUCED DISORDER: Chronic | ICD-10-CM

## 2024-01-25 DIAGNOSIS — R30.9 PAINFUL URINATION: ICD-10-CM

## 2024-01-25 LAB
BACTERIA UR QL AUTO: ABNORMAL /HPF
BILIRUB BLD-MCNC: NEGATIVE MG/DL
BILIRUB UR QL STRIP: NEGATIVE
CLARITY UR: ABNORMAL
CLARITY, POC: CLEAR
COLOR UR: YELLOW
COLOR UR: YELLOW
EXPIRATION DATE: ABNORMAL
GLUCOSE UR STRIP-MCNC: NEGATIVE MG/DL
GLUCOSE UR STRIP-MCNC: NEGATIVE MG/DL
HGB UR QL STRIP.AUTO: ABNORMAL
HYALINE CASTS UR QL AUTO: ABNORMAL /LPF
KETONES UR QL STRIP: NEGATIVE
KETONES UR QL: NEGATIVE
LEUKOCYTE EST, POC: ABNORMAL
LEUKOCYTE ESTERASE UR QL STRIP.AUTO: ABNORMAL
Lab: ABNORMAL
NITRITE UR QL STRIP: NEGATIVE
NITRITE UR-MCNC: NEGATIVE MG/ML
PH UR STRIP.AUTO: 7 [PH] (ref 5–8)
PH UR: 6.5 [PH] (ref 5–8)
PROT UR QL STRIP: ABNORMAL
PROT UR STRIP-MCNC: ABNORMAL MG/DL
RBC # UR STRIP: ABNORMAL /HPF
RBC # UR STRIP: ABNORMAL /UL
REF LAB TEST METHOD: ABNORMAL
SP GR UR STRIP: 1.02 (ref 1–1.03)
SP GR UR: 1.02 (ref 1–1.03)
SQUAMOUS #/AREA URNS HPF: ABNORMAL /HPF
UROBILINOGEN UR QL STRIP: ABNORMAL
UROBILINOGEN UR QL: ABNORMAL
WBC # UR STRIP: ABNORMAL /HPF

## 2024-01-25 PROCEDURE — 81001 URINALYSIS AUTO W/SCOPE: CPT | Performed by: FAMILY MEDICINE

## 2024-01-25 PROCEDURE — 87086 URINE CULTURE/COLONY COUNT: CPT | Performed by: FAMILY MEDICINE

## 2024-01-25 PROCEDURE — 87186 SC STD MICRODIL/AGAR DIL: CPT | Performed by: FAMILY MEDICINE

## 2024-01-25 PROCEDURE — 87077 CULTURE AEROBIC IDENTIFY: CPT | Performed by: FAMILY MEDICINE

## 2024-01-25 RX ORDER — TRIAMCINOLONE ACETONIDE 40 MG/ML
60 INJECTION, SUSPENSION INTRA-ARTICULAR; INTRAMUSCULAR ONCE
Status: COMPLETED | OUTPATIENT
Start: 2024-01-25 | End: 2024-01-25

## 2024-01-25 RX ADMIN — TRIAMCINOLONE ACETONIDE 60 MG: 40 INJECTION, SUSPENSION INTRA-ARTICULAR; INTRAMUSCULAR at 11:16

## 2024-01-25 NOTE — PROGRESS NOTES
"Chief Complaint  URI, Cough, and Shortness of Breath    Subjective        Colin Nevarez presents to [facility]  History of Present Illness  He is here today for management of his chronic medical conditions and for an acute visit. He has right middle lung cancer, tobacco abuse, COPD, prostate cancer and chronic back pain. He is a smoker. He has smoked since he was 7 years old. He smokes a little less than one pack per day.     He has been having a cough and congestion for the past three days. He denies fever or chills.     He is here complaining of painful urination that has been present now for about 4 days.  He has had UTI in the past.     The patient has no other complaints today and denies chest pain, shortness of breath, weakness, numbness, nausea, vomiting, diarrhea, dizziness or syncopal event.        Objective   Vital Signs:  /71 (BP Location: Left arm, Patient Position: Sitting, Cuff Size: Adult)   Pulse 79   Temp 97.8 °F (36.6 °C) (Tympanic)   Resp 22   Ht 177.8 cm (70\")   Wt 67 kg (147 lb 9.6 oz)   SpO2 90%   BMI 21.18 kg/m²   Estimated body mass index is 21.18 kg/m² as calculated from the following:    Height as of this encounter: 177.8 cm (70\").    Weight as of this encounter: 67 kg (147 lb 9.6 oz).       BMI is within normal parameters. No other follow-up for BMI required.      Physical Exam  Vitals reviewed.   Constitutional:       Appearance: Normal appearance. He is well-developed.   HENT:      Head: Normocephalic and atraumatic.      Right Ear: External ear normal.      Left Ear: External ear normal.      Mouth/Throat:      Pharynx: No oropharyngeal exudate.   Eyes:      Conjunctiva/sclera: Conjunctivae normal.      Pupils: Pupils are equal, round, and reactive to light.   Neck:      Vascular: No carotid bruit.   Cardiovascular:      Rate and Rhythm: Normal rate and regular rhythm.      Heart sounds: No murmur heard.     No friction rub. No gallop. "   Pulmonary:      Effort: Pulmonary effort is normal.      Breath sounds: Normal breath sounds. No wheezing or rhonchi.   Abdominal:      General: There is no distension.   Skin:     General: Skin is warm and dry.   Neurological:      Mental Status: He is alert and oriented to person, place, and time.      Cranial Nerves: No cranial nerve deficit.      Motor: No weakness.   Psychiatric:         Mood and Affect: Mood and affect normal.         Behavior: Behavior normal.         Thought Content: Thought content normal.         Judgment: Judgment normal.        Result Review :      CMP          3/27/2023    13:44   CMP   Glucose 85    BUN 12    Creatinine 0.89    EGFR 91.6    Sodium 137    Potassium 4.3    Chloride 98    Calcium 10.1    Total Protein 7.8    Albumin 4.7    Globulin 3.1    Total Bilirubin 0.3    Alkaline Phosphatase 91    AST (SGOT) 21    ALT (SGPT) 20    Albumin/Globulin Ratio 1.5    BUN/Creatinine Ratio 13.5    Anion Gap 12.7      CBC          3/27/2023    13:44 4/28/2023    10:14   CBC   WBC 9.39  7.83    RBC 6.36  6.19    Hemoglobin 19.1  19.0    Hematocrit 56.3  54.9    MCV 88.5  88.7    MCH 30.0  30.7    MCHC 33.9  34.6    RDW 13.1  13.2    Platelets 223  194      Lipid Panel          3/27/2023    13:44   Lipid Panel   Total Cholesterol 225    Triglycerides 98    HDL Cholesterol 49    VLDL Cholesterol 17    LDL Cholesterol  159    LDL/HDL Ratio 3.19      TSH          3/27/2023    13:44   TSH   TSH 2.400                   Assessment and Plan     Diagnoses and all orders for this visit:    1. Acute cystitis with hematuria (Primary)  Comments:  The patient was given a prescription today of Bactrim to be taken as directed. His urine was sent off for culture as well as urinalysis to confirm his diagnosis    2. Essential hypertension  Assessment & Plan:  Hypertension is improving with treatment.  Continue current treatment regimen.  Dietary sodium restriction.  Blood pressure will be reassessed at the  next regular appointment.    Orders:  -     triamcinolone acetonide (KENALOG-40) injection 60 mg    3. Cigarette nicotine dependence with nicotine-induced disorder  Assessment & Plan:  Tobacco use is unchanged.  Smoking cessation counseling was provided.  Tobacco use will be reassessed at the next regular appointment.    Orders:  -     triamcinolone acetonide (KENALOG-40) injection 60 mg    4. Painful urination  -     Urinalysis With Microscopic - Urine, Clean Catch; Future  -     Urine Culture - Urine, Urine, Clean Catch; Future  -     POC Urinalysis Dipstick, Automated  -     Urinalysis With Microscopic - Urine, Clean Catch  -     Urine Culture - Urine, Urine, Clean Catch    Other orders  -     Pneumococcal Conjugate Vaccine 20-Valent (PCV20)  -     Discontinue: sulfamethoxazole-trimethoprim (Bactrim) 400-80 MG tablet; Take 1 tablet by mouth 2 (Two) Times a Day.  Dispense: 14 tablet; Refill: 0  -     sulfamethoxazole-trimethoprim (Bactrim DS) 800-160 MG per tablet; Take 1 tablet by mouth 2 (Two) Times a Day.  Dispense: 14 tablet; Refill: 0             Follow Up     Return in about 4 months (around 5/25/2024).  Patient was given instructions and counseling regarding his condition or for health maintenance advice. Please see specific information pulled into the AVS if appropriate.

## 2024-01-26 ENCOUNTER — PATIENT ROUNDING (BHMG ONLY) (OUTPATIENT)
Dept: FAMILY MEDICINE CLINIC | Facility: CLINIC | Age: 74
End: 2024-01-26
Payer: MEDICARE

## 2024-01-26 NOTE — PROGRESS NOTES
A My-Chart message has been sent to the patient for [place] [facility]

## 2024-01-27 LAB — BACTERIA SPEC AEROBE CULT: ABNORMAL

## 2024-01-27 RX ORDER — SULFAMETHOXAZOLE AND TRIMETHOPRIM 800; 160 MG/1; MG/1
1 TABLET ORAL 2 TIMES DAILY
Qty: 14 TABLET | Refills: 0 | Status: SHIPPED | OUTPATIENT
Start: 2024-01-27

## 2024-01-27 RX ORDER — SULFAMETHOXAZOLE AND TRIMETHOPRIM 400; 80 MG/1; MG/1
1 TABLET ORAL 2 TIMES DAILY
Qty: 14 TABLET | Refills: 0 | Status: SHIPPED | OUTPATIENT
Start: 2024-01-27 | End: 2024-01-27

## 2024-02-06 ENCOUNTER — HOSPITAL ENCOUNTER (OUTPATIENT)
Dept: CT IMAGING | Facility: HOSPITAL | Age: 74
Discharge: HOME | End: 2024-02-06
Admitting: NURSE PRACTITIONER
Payer: MEDICARE

## 2024-02-06 DIAGNOSIS — C34.31 PRIMARY SQUAMOUS CELL CARCINOMA OF LOWER LOBE OF RIGHT LUNG: ICD-10-CM

## 2024-02-06 DIAGNOSIS — C34.31 MALIGNANT NEOPLASM OF LOWER LOBE, RIGHT BRONCHUS OR LUNG: ICD-10-CM

## 2024-02-06 DIAGNOSIS — R91.8 MULTIPLE PULMONARY NODULES: ICD-10-CM

## 2024-02-06 PROCEDURE — 71250 CT THORAX DX C-: CPT

## 2024-02-06 NOTE — PROGRESS NOTES
Follow Up Office Visit      Encounter Date: 02/08/2024   Patient Name: Colin Nevarez  YOB: 1950   Medical Record Number: [id number]   Primary Diagnosis: Malignant neoplasm of lower lobe, right bronchus or lung [C34.31]     Cancer Staging   Prostate cancer  Staging form: Prostate, AJCC 8th Edition  - Clinical: Stage IIB (cT1c, cN0, cM0, PSA: 8.2, Grade Group: 2) - Signed by Dionisio Neville MD on 5/11/2023    Squamous cell carcinoma lung  Staging form: Lung, AJCC 8th Edition  - Clinical: Stage IA2 (cT1b, cN0, cM0) - Signed by Dionisio Neville MD on 5/11/2023    Radiation Completion Date:  6/5/2023 SBRT RLL    Chief Complaint:    Chief Complaint   Patient presents with    Follow-up    Lung Cancer     Non-small cell lung cancer       Oncology/Hematology History Overview Note   8/2020: Patient underwent radical prostatectomy (Dr. Rodriguez) for clinical T3, Dee 3+4=7 prostate adenocarcinoma, PSA <10.     4/10/23: NM/PET performed due to abnormal lung nodule on CT Chest demonstrated a non-calcified 1.7 cm pulmonary nodule within the lateral right lower lobe with FDG activity of 7.33. No other nodule identified. No mediastinal, hilar or axillary lymphadenopathy.    5/3/23: CT needle biopsy of RLL: Moderately to poorly differentiated squamous cell carcinoma.    6/5/23: SBRT completed to RLL nodule    6/14/2023: Colonoscopy with Dr. Carey. Normal exam with no specimens collected. Repeat in 5 years for surveillance (2028).     7/31/23: CT Chest: interval decrease in size of RLL nodule.          Prostate cancer   7/12/2021 Initial Diagnosis    Prostate cancer (HCC)     5/11/2023 Cancer Staged    Staging form: Prostate, AJCC 8th Edition  - Clinical: Stage IIB (cT1c, cN0, cM0, PSA: 8.2, Grade Group: 2) - Signed by Dionisio Neville MD on 5/11/2023     Squamous cell carcinoma lung   4/4/2023 Initial Diagnosis    Squamous cell carcinoma lung     5/11/2023 Cancer Staged    Staging  "form: Lung, AJCC 8th Edition  - Clinical: Stage IA2 (cT1b, cN0, cM0) - Signed by Dionisio Neville MD on 5/11/2023     Malignant neoplasm of lower lobe, right bronchus or lung (Resolved)   5/22/2023 Initial Diagnosis    Malignant neoplasm of lower lobe, right bronchus or lung     5/30/2023 - 6/5/2023 Radiation    Radiation OncologyTreatment Course:  Colin Nevarez received 5500 cGy in 5 fractions to right lower lobe via stereotactic body radiotherapy.          History of Present Illness: Colin Nevarez is a 73 y.o. male who returns to [facility] for routine follow-up of his lung cancer. He reports a recent worsening of his shortness of breath. This has been ongoing since December. He now has dyspnea with minimal exertion. His grandson has been doing most of his house hold responsibilities including taking care of the cattle. His cough is productive of yellow to green tinged sputum. He contributes his pneumonia to his Breztri inhaler prescribed by his PCP. He does continue to use his Albuterol inhaler that I had prescribed at prior visit. On 12/7/23 he was treated by his PCP for URI with rocephin 1g and Kenalog 60mg injection. He was then treated for \"pneumonia\" by his PCP with medrol dosepack, cefdinir and azithromycin on 12/26/23. He was most recently treated with Bactrim for a UTI on 1/27/24.     Subjective      Review of Systems: Review of Systems   Constitutional:  Positive for appetite change (Decreased since hes been dx with pneumonia), chills (Noted last week, has since subsided.), fatigue (Comes and goes, ongoing) and unexpected weight change (Down 10lbs since 12/7/23). Negative for fever.   HENT:  Positive for hearing loss (Ongoing), tinnitus (ongoing ) and voice change (hoarsness, ongoing). Negative for sore throat and trouble swallowing.    Eyes:  Negative for visual disturbance.   Respiratory:  Positive for cough (Productive with light green secretions, recently treated with abx for " pneumonia.) and shortness of breath (with exertion, ongoing but worsened after treatment.  Increased SOA recently dx with pneumonia.). Negative for chest tightness and wheezing.    Cardiovascular:  Positive for leg swelling (occasionally, ongoing). Negative for chest pain and palpitations.   Gastrointestinal:  Negative for anal bleeding, blood in stool, constipation, diarrhea, nausea and vomiting.   Genitourinary:  Negative for difficulty urinating, dysuria, frequency, hematuria and urgency.        Frequent incontinence, ongoing  NOC x0  Normal stream     Musculoskeletal:  Positive for back pain (ongoing ). Negative for arthralgias, gait problem and joint swelling.   Skin:  Negative for color change and rash.   Neurological:  Positive for weakness (Occasionally). Negative for dizziness and headaches.   Psychiatric/Behavioral:  Positive for sleep disturbance (Occasionally).        The following portions of the patient's history were reviewed and updated as appropriate: allergies, current medications, past family history, past medical history, past social history, past surgical history and problem list.    Medications:     Current Outpatient Medications:     albuterol sulfate  (90 Base) MCG/ACT inhaler, Inhale 2 puffs Every 4 (Four) Hours As Needed for Wheezing or Shortness of Air., Disp: 18 g, Rfl: 2    amLODIPine (NORVASC) 10 MG tablet, Take 1 tablet by mouth Daily., Disp: 90 tablet, Rfl: 1    aspirin 81 MG chewable tablet, Chew 1 tablet Daily., Disp: , Rfl:     Multiple Vitamins-Minerals (MULTIVITAMIN GUMMIES ADULTS PO), Take 1 tablet by mouth Daily. 1 gummie/day, Disp: , Rfl:     albuterol (PROVENTIL) (2.5 MG/3ML) 0.083% nebulizer solution, Take 2.5 mg by nebulization Every 4 (Four) Hours As Needed for Wheezing. (Patient not taking: Reported on 2/8/2024), Disp: 100 each, Rfl: 12    amoxicillin-clavulanate (AUGMENTIN) 875-125 MG per tablet, Take 1 tablet by mouth 2 (Two) Times a Day for 7 days. Take both  Augmentin and Doxycycline antibiotic 2 times a day for 7 days., Disp: 14 tablet, Rfl: 0    doxycycline (VIBRAMYCIN) 100 MG capsule, Take 1 capsule by mouth 2 (Two) Times a Day for 7 days. Take both Augmentin and Doxycycline antibiotic 2 times a day for 7 days., Disp: 14 capsule, Rfl: 0    Fluticasone-Umeclidin-Vilant (Trelegy Ellipta) 200-62.5-25 MCG/ACT aerosol powder , Inhale 200 mcg Daily. (Patient not taking: Reported on 2/8/2024), Disp: 3 each, Rfl: 0    guaiFENesin (Mucinex) 600 MG 12 hr tablet, Take 2 tablets by mouth 2 (Two) Times a Day for 14 days., Disp: 56 tablet, Rfl: 0    Allergies:   No Known Allergies    Patient Smoking History:   Social History     Tobacco Use   Smoking Status Every Day    Packs/day: 1.00    Years: 64.00    Additional pack years: 0.00    Total pack years: 64.00    Types: Cigarettes    Start date: 1959    Passive exposure: Current   Smokeless Tobacco Never   Tobacco Comments    States that he is cutting back and is smoking less than 1ppd.       Measures:  PHQ-9 Total Score: 4   Quality of Life: 80 - Restricted Physical Activity   ECOG score: 2 due to current pneumonia   ECOG: (2) Ambulatory and capable of self care, unable to carry out work activity, up and about > 50% or waking hours  Pain: (on a scale of 0-10)   Pain Score    02/08/24 0859   PainSc: 0-No pain         Objective     Physical Exam:   Vital Signs:   Vitals:    02/08/24 0859 02/08/24 0908   BP: 139/71    Pulse: 87    Resp: 24    Temp: 98.8 °F (37.1 °C)    TempSrc: Temporal    SpO2: (!) 85%  Comment: On room air, placed patient on 2L of o2. 94%  Comment: 2L   Weight: 64.8 kg (142 lb 13.7 oz)    PainSc: 0-No pain      Body mass index is 20.5 kg/m².   Wt Readings from Last 3 Encounters:   02/08/24 64.8 kg (142 lb 13.7 oz)   01/25/24 67 kg (147 lb 9.6 oz)   12/07/23 70.6 kg (155 lb 9.6 oz)       Physical Exam  Vitals reviewed.   Constitutional:       General: He is not in acute distress.     Appearance: Normal appearance. He  is normal weight. He is not ill-appearing.   HENT:      Head: Normocephalic and atraumatic.      Mouth/Throat:      Mouth: Mucous membranes are moist.      Pharynx: Oropharynx is clear. No oropharyngeal exudate or posterior oropharyngeal erythema.   Eyes:      Conjunctiva/sclera: Conjunctivae normal.      Pupils: Pupils are equal, round, and reactive to light.   Cardiovascular:      Rate and Rhythm: Normal rate and regular rhythm.      Pulses: Normal pulses.      Heart sounds: Normal heart sounds.   Pulmonary:      Effort: Pulmonary effort is normal. No respiratory distress.      Breath sounds: No stridor. Wheezing (RUL and RLL lobe) and rhonchi (RUL and RLL) present.      Comments: Diminished throughout. Placed on 2L O2 during today's visit.     Musculoskeletal:         General: Normal range of motion.      Cervical back: Normal range of motion.   Skin:     General: Skin is warm and dry.   Neurological:      General: No focal deficit present.      Mental Status: He is alert and oriented to person, place, and time. Mental status is at baseline.   Psychiatric:         Mood and Affect: Mood normal.         Behavior: Behavior normal.       Result Review: I independently reviewed the following data.   -- CT Chest Without Contrast Diagnostic (02/06/2024 10:30)     Pathology:   Lab Results   Component Value Date    CLININFO  05/03/2023     Lung nodule      FINALDX  05/03/2023     Lung, right lower lobe lesion, biopsy:   -Moderately to poorly differentiated squamous cell carcinoma    Comment: Per policy, reflex testing has been ordered, and the results will be separately reported.    REMARKS: The above positive (malignant) diagnosis was called to [place] in Dr. Roper's office at 13:10 EDT on 5/4/2023 by gil.            Imaging: CT Chest Without Contrast Diagnostic    Result Date: 2/7/2024    1. Peribronchial thickening, multifocal tree-in-bud and patchy infiltrate most notable within right lower lobe new from prior  comparison.  This likely represents an infectious/inflammatory process such as bronchiolitis or microaspiration with developing pneumonia possible. 2. More conspicuous right hilar lymph nodes compared to prior, possibly reactive in the setting of an acute airspace process.  Recommend attention on follow-up imaging. 3. Decreased spiculated nodule in the right lower lobe.  Other small pulmonary nodules are stable. 4. Centrilobular emphysema. 5. Aortic and coronary atherosclerotic disease.  Stable ectatic ascending thoracic aorta measuring up to 4.1 cm.      ZACKERY DWYER MD       Electronically Signed and Approved By: ZACKERY DWYER MD on 2/07/2024 at 9:00             XR Chest PA & Lateral    Result Date: 12/7/2023    1. Emphysema. 2. The mass in the right mid-lower lung zone is less prominent indicating a response to therapy.     EDGAR JOSÉ MD       Electronically Signed and Approved By: EDGAR JOSÉ MD on 12/07/2023 at 13:01               Labs:   WBC   Date Value Ref Range Status   04/28/2023 7.83 3.40 - 10.80 10*3/mm3 Final     Hemoglobin   Date Value Ref Range Status   04/28/2023 19.0 (H) 13.0 - 17.7 g/dL Final     Hematocrit   Date Value Ref Range Status   04/28/2023 54.9 (H) 37.5 - 51.0 % Final     Platelets   Date Value Ref Range Status   04/28/2023 194 140 - 450 10*3/mm3 Final     TSH   Date Value Ref Range Status   03/27/2023 2.400 0.270 - 4.200 uIU/mL Final      PSA   Date Value Ref Range Status   10/30/2023 0.015 0.000 - 4.000 ng/mL Final   08/04/2023 0.016 0.000 - 4.000 ng/mL Final   07/19/2022 <0.014 0.000 - 4.000 ng/mL Final   09/01/2021 <0.014 0.000 - 4.000 ng/mL Final   03/08/2021 <0.01 0.00 - 4.00 ng/mL Final   10/28/2020 <0.01 0.00 - 4.00 ng/mL Final   05/29/2020 8.16 (H) 0.00 - 4.00 ng/mL Final     Assessment / Plan      Impression: Colin Nevarez is a pleasant 73 y.o. 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 He has  no clinical or radiographic evidence of distant metastatic disease per PET scan on 4/10/2023. He has advanced COPD and is not a surgical candidate. He is status post SBRT to the right lower lobe, completed on 6/5/2023. He has dyspnea on exertion with predates radiation, however, this in addition to his overall respiratory function has declined since December. In December he was treated for a URI and then pneumonia by his PCP. Today he clinically presents with with pneumonia within his right lung. His oxygen saturation was 85% on room air upon arrival to our office with improvement after being placed on 2L oxygen. His recent CT Chest on 2/6/2024 demonstrates interval decrease in size of the treated right lower lobe nodule. There is findings concerning for pneumonia in the right lung. Encouraged patient present to the ED for further evaluation given CT chest findings, combined with adventitious lung sounds and his requirement for supplemental oxygen. Mr. Nevarez declined this recommendation and does not wish to seek evaluation in the ED today. Will treat with antibiotics today, Augmentin and Doxycycline as recommended by ID Pharmacist Florencio, and he will follow-up with me in 2 weeks for re-evaluation. Will order repeat CT scan of the chest for surveillance imaging at next visit.     COPD: managed by PCP Dr. Roper. Not currently followed by Pulmonology.     Nicotine Dependence: current cigarette smoker, reports decreased amount now down to less than 1 pack/day. Not interested in quitting.      History of Prostate Cancer: Stage IIB (cT1c, cN0, cM0), iPSA: 8.2, Dee 3+4= 7. Status post radical prostatectomy in 8/2020; pT1b pN0. Most recent PSA on 10/30/2023 is 0.015 ng/mL, which is stable compared to prior PSA of 0.016 ng/mL on 8/4/2024, however, this is slightly increased from <0.014 ng/mL on 7/19/2022. He is followed by Dr. Rodriguez. Next visit in May 2024.     Assessment/Plan:   Diagnoses and all orders for this  visit:    1. Malignant neoplasm of lower lobe, right bronchus or lung (Primary)    2. Primary squamous cell carcinoma of lower lobe of right lung    3. History of external beam radiation therapy    4. Encounter for follow-up surveillance of lung cancer    5. Multiple pulmonary nodules    6. Nicotine dependence with current use    7. Chronic obstructive pulmonary disease, unspecified COPD type    8. History of prostate cancer    9. History of radical prostatectomy    10. Pneumonia of right lung due to infectious organism, unspecified part of lung  -     guaiFENesin (Mucinex) 600 MG 12 hr tablet; Take 2 tablets by mouth 2 (Two) Times a Day for 14 days.  Dispense: 56 tablet; Refill: 0  -     amoxicillin-clavulanate (AUGMENTIN) 875-125 MG per tablet; Take 1 tablet by mouth 2 (Two) Times a Day for 7 days. Take both Augmentin and Doxycycline antibiotic 2 times a day for 7 days.  Dispense: 14 tablet; Refill: 0  -     doxycycline (VIBRAMYCIN) 100 MG capsule; Take 1 capsule by mouth 2 (Two) Times a Day for 7 days. Take both Augmentin and Doxycycline antibiotic 2 times a day for 7 days.  Dispense: 14 capsule; Refill: 0       Follow Up:   Return for re-evaluation of pneumonia in 14 days. He has been advised to proceed to the ED for any worsening respiratory symptoms.   Follow-up with Dr. Neville as scheduled.    Follow-up with Urology as scheduled regarding history of prostate cancer.     Return in about 2 weeks (around 2/22/2024) for Office Visit.  Colin Nevarez was encouraged to contact me in the interim with any questions or concerns regarding his care.        CHEN Garíca  Radiation Oncology  [facility]    This document has been signed by CHEN Chaves on February 8, 2024 09:57 EST

## 2024-02-08 ENCOUNTER — OFFICE VISIT (OUTPATIENT)
Dept: RADIATION ONCOLOGY | Facility: HOSPITAL | Age: 74
End: 2024-02-08
Payer: MEDICARE

## 2024-02-08 ENCOUNTER — OFFICE VISIT (OUTPATIENT)
Dept: ONCOLOGY | Facility: HOSPITAL | Age: 74
End: 2024-02-08
Payer: MEDICARE

## 2024-02-08 VITALS
DIASTOLIC BLOOD PRESSURE: 71 MMHG | HEART RATE: 87 BPM | BODY MASS INDEX: 20.5 KG/M2 | WEIGHT: 142.86 LBS | OXYGEN SATURATION: 94 % | SYSTOLIC BLOOD PRESSURE: 139 MMHG | TEMPERATURE: 98.8 F | RESPIRATION RATE: 24 BRPM

## 2024-02-08 VITALS
DIASTOLIC BLOOD PRESSURE: 74 MMHG | TEMPERATURE: 98.8 F | SYSTOLIC BLOOD PRESSURE: 143 MMHG | WEIGHT: 142.86 LBS | BODY MASS INDEX: 20.5 KG/M2 | RESPIRATION RATE: 18 BRPM | HEART RATE: 86 BPM | OXYGEN SATURATION: 88 %

## 2024-02-08 DIAGNOSIS — F17.200 NICOTINE DEPENDENCE WITH CURRENT USE: ICD-10-CM

## 2024-02-08 DIAGNOSIS — C61 PROSTATE CANCER: Chronic | ICD-10-CM

## 2024-02-08 DIAGNOSIS — J44.9 CHRONIC OBSTRUCTIVE PULMONARY DISEASE, UNSPECIFIED COPD TYPE: ICD-10-CM

## 2024-02-08 DIAGNOSIS — C34.31 MALIGNANT NEOPLASM OF LOWER LOBE, RIGHT BRONCHUS OR LUNG: Primary | ICD-10-CM

## 2024-02-08 DIAGNOSIS — Z85.46 HISTORY OF PROSTATE CANCER: ICD-10-CM

## 2024-02-08 DIAGNOSIS — Z08 ENCOUNTER FOR FOLLOW-UP SURVEILLANCE OF LUNG CANCER: ICD-10-CM

## 2024-02-08 DIAGNOSIS — C34.91 SQUAMOUS CELL CARCINOMA OF RIGHT LUNG: Primary | ICD-10-CM

## 2024-02-08 DIAGNOSIS — Z90.79 HISTORY OF RADICAL PROSTATECTOMY: ICD-10-CM

## 2024-02-08 DIAGNOSIS — R91.8 MULTIPLE PULMONARY NODULES: ICD-10-CM

## 2024-02-08 DIAGNOSIS — Z85.118 ENCOUNTER FOR FOLLOW-UP SURVEILLANCE OF LUNG CANCER: ICD-10-CM

## 2024-02-08 DIAGNOSIS — C34.31 PRIMARY SQUAMOUS CELL CARCINOMA OF LOWER LOBE OF RIGHT LUNG: ICD-10-CM

## 2024-02-08 DIAGNOSIS — J18.9 PNEUMONIA OF RIGHT LUNG DUE TO INFECTIOUS ORGANISM, UNSPECIFIED PART OF LUNG: ICD-10-CM

## 2024-02-08 DIAGNOSIS — Z92.3 HISTORY OF EXTERNAL BEAM RADIATION THERAPY: ICD-10-CM

## 2024-02-08 DIAGNOSIS — J18.9 PNEUMONIA DUE TO INFECTIOUS ORGANISM, UNSPECIFIED LATERALITY, UNSPECIFIED PART OF LUNG: ICD-10-CM

## 2024-02-08 PROCEDURE — G0463 HOSPITAL OUTPT CLINIC VISIT: HCPCS | Performed by: NURSE PRACTITIONER

## 2024-02-08 PROCEDURE — G0463 HOSPITAL OUTPT CLINIC VISIT: HCPCS | Performed by: INTERNAL MEDICINE

## 2024-02-08 RX ORDER — AMOXICILLIN AND CLAVULANATE POTASSIUM 875; 125 MG/1; MG/1
1 TABLET, FILM COATED ORAL 2 TIMES DAILY
Qty: 14 TABLET | Refills: 0 | Status: SHIPPED | OUTPATIENT
Start: 2024-02-08 | End: 2024-02-15

## 2024-02-08 RX ORDER — GUAIFENESIN 600 MG/1
1200 TABLET, EXTENDED RELEASE ORAL 2 TIMES DAILY
Qty: 56 TABLET | Refills: 0 | Status: SHIPPED | OUTPATIENT
Start: 2024-02-08 | End: 2024-02-22

## 2024-02-08 RX ORDER — DOXYCYCLINE HYCLATE 100 MG/1
100 CAPSULE ORAL 2 TIMES DAILY
Qty: 14 CAPSULE | Refills: 0 | Status: SHIPPED | OUTPATIENT
Start: 2024-02-08 | End: 2024-02-15

## 2024-02-08 NOTE — PROGRESS NOTES
Chief Complaint  Squamous cell carcinoma of right lung    Liz Roper, Liz Roberts,     Subjective          Colin Nevarez presents to [facility] for NSCLC    Mr. Nevarez is a very pleasant 73-year-old male with a history of COPD, prostate cancer, hypertension, tobacco use who presents for follow up for squamous cell carcinoma of RLL. He underwent SBRT and finished this in early June. Reports increased cough productive of green/yellow sputum. Did have some chill spells about a week ago but no fevers. Recently on antibiotic for UTI. Breathing is worse. O2 level of 88% in office today. Started on antibiotics per rad onc today, he has not picked them up yet. He reports the Breztri inhaler is the cause of the pneumonia. Follows with urology regarding history of prostate cancer.     Oncology/Hematology History Overview Note   8/2020: Patient underwent radical prostatectomy (Dr. Rodriguez) for clinical T3, [place] 3+4=7 prostate adenocarcinoma, PSA <10.     4/10/23: NM/PET performed due to abnormal lung nodule on CT Chest demonstrated a non-calcified 1.7 cm pulmonary nodule within the lateral right lower lobe with FDG activity of 7.33. No other nodule identified. No mediastinal, hilar or axillary lymphadenopathy.    5/3/23: CT needle biopsy of RLL: Moderately to poorly differentiated squamous cell carcinoma.    6/5/23: SBRT completed to RLL nodule    6/14/2023: Colonoscopy with Dr. Carey. Normal exam with no specimens collected. Repeat in 5 years for surveillance (2028).     7/31/23: CT Chest: interval decrease in size of RLL nodule.          Prostate cancer   7/12/2021 Initial Diagnosis    Prostate cancer (HCC)     5/11/2023 Cancer Staged    Staging form: Prostate, AJCC 8th Edition  - Clinical: Stage IIB (cT1c, cN0, cM0, PSA: 8.2, Grade Group: 2) - Signed by Dionisio Neville MD on 5/11/2023     Squamous cell carcinoma lung   4/4/2023 Initial Diagnosis    Squamous cell  carcinoma lung     5/11/2023 Cancer Staged    Staging form: Lung, AJCC 8th Edition  - Clinical: Stage IA2 (cT1b, cN0, cM0) - Signed by Dionisio Neville MD on 5/11/2023     Malignant neoplasm of lower lobe, right bronchus or lung (Resolved)   5/22/2023 Initial Diagnosis    Malignant neoplasm of lower lobe, right bronchus or lung     5/30/2023 - 6/5/2023 Radiation    Radiation OncologyTreatment Course:  Colin Nevarez received 5500 cGy in 5 fractions to right lower lobe via stereotactic body radiotherapy.            Review of Systems   Constitutional:  Positive for fatigue and unexpected weight gain.   Respiratory:  Positive for shortness of breath.    All other systems reviewed and are negative.    Current Outpatient Medications on File Prior to Visit   Medication Sig Dispense Refill    albuterol (PROVENTIL) (2.5 MG/3ML) 0.083% nebulizer solution Take 2.5 mg by nebulization Every 4 (Four) Hours As Needed for Wheezing. (Patient not taking: Reported on 2/8/2024) 100 each 12    albuterol sulfate  (90 Base) MCG/ACT inhaler Inhale 2 puffs Every 4 (Four) Hours As Needed for Wheezing or Shortness of Air. 18 g 2    amLODIPine (NORVASC) 10 MG tablet Take 1 tablet by mouth Daily. 90 tablet 1    amoxicillin-clavulanate (AUGMENTIN) 875-125 MG per tablet Take 1 tablet by mouth 2 (Two) Times a Day for 7 days. Take both Augmentin and Doxycycline antibiotic 2 times a day for 7 days. 14 tablet 0    aspirin 81 MG chewable tablet Chew 1 tablet Daily.      doxycycline (VIBRAMYCIN) 100 MG capsule Take 1 capsule by mouth 2 (Two) Times a Day for 7 days. Take both Augmentin and Doxycycline antibiotic 2 times a day for 7 days. 14 capsule 0    Fluticasone-Umeclidin-Vilant (Trelegy Ellipta) 200-62.5-25 MCG/ACT aerosol powder  Inhale 200 mcg Daily. (Patient not taking: Reported on 2/8/2024) 3 each 0    guaiFENesin (Mucinex) 600 MG 12 hr tablet Take 2 tablets by mouth 2 (Two) Times a Day for 14 days. 56 tablet 0    Multiple  Vitamins-Minerals (MULTIVITAMIN GUMMIES ADULTS PO) Take 1 tablet by mouth Daily. 1 gummie/day      [DISCONTINUED] sulfamethoxazole-trimethoprim (Bactrim DS) 800-160 MG per tablet Take 1 tablet by mouth 2 (Two) Times a Day. 14 tablet 0     No current facility-administered medications on file prior to visit.       No Known Allergies  Past Medical History:   Diagnosis Date    Arthritis     COPD (chronic obstructive pulmonary disease)     Essential hypertension 07/12/2021    Forgetfulness     Heart flutter, ventricular     High cholesterol     Lung cancer     Prostate cancer     Ringing in ear     Shortness of Air     SOB (shortness of breath)      Past Surgical History:   Procedure Laterality Date    APPENDECTOMY      COLONOSCOPY  06/01/2019    COLONOSCOPY N/A 6/14/2023    Procedure: COLONOSCOPY;  Surgeon: Geoffrey Carey MD;  Location: [facility] ENDOSCOPY;  Service: General;  Laterality: N/A;  NORMAL    LUNG BIOPSY      PROSTATECTOMY      TONSILLECTOMY  1969    Per PT     Social History     Socioeconomic History    Marital status: Single   Tobacco Use    Smoking status: Every Day     Packs/day: 1.00     Years: 64.00     Additional pack years: 0.00     Total pack years: 64.00     Types: Cigarettes     Start date: 1959     Passive exposure: Current    Smokeless tobacco: Never    Tobacco comments:     States that he is cutting back and is smoking less than 1ppd.   Vaping Use    Vaping Use: Never used   Substance and Sexual Activity    Alcohol use: Never    Drug use: Defer    Sexual activity: Defer     Family History   Problem Relation Age of Onset    Ovarian cancer Mother     Stroke Father     Breast cancer Sister 40    Cancer Maternal Grandmother     Cancer Paternal Grandmother        Objective   Physical Exam  Well appearing patient in no acute distress on RA  Anicteric sclerae, no rash on exposed skin  Respirations non-labored  Awake, alert, and oriented x 4. Speech intact. No gross neurologic deficit  Appropriate mood  and affect      Vitals:    02/08/24 1031   BP: 143/74   Pulse: 86   Resp: 18   Temp: 98.8 °F (37.1 °C)   TempSrc: Temporal   SpO2: (!) 88%  Comment: PT aware of o2   Weight: 64.8 kg (142 lb 13.7 oz)   PainSc: 0-No pain       ECOG score: 2         PHQ-9 Total Score:             Result Review :   The following data was reviewed by: Dionisio Neville MD on 02/08/24:  Lab Results   Component Value Date    HGB 19.0 (H) 04/28/2023    HCT 54.9 (H) 04/28/2023    MCV 88.7 04/28/2023     04/28/2023    WBC 7.83 04/28/2023    NEUTROABS 4.33 04/28/2023    LYMPHSABS 2.42 04/28/2023    MONOSABS 0.57 04/28/2023    EOSABS 0.44 (H) 04/28/2023    BASOSABS 0.05 04/28/2023     Lab Results   Component Value Date    GLUCOSE 85 03/27/2023    BUN 12 03/27/2023    CREATININE 0.89 03/27/2023     03/27/2023    K 4.3 03/27/2023    CL 98 03/27/2023    CO2 26.3 03/27/2023    CALCIUM 10.1 03/27/2023    PROTEINTOT 7.8 03/27/2023    ALBUMIN 4.7 03/27/2023    BILITOT 0.3 03/27/2023    ALKPHOS 91 03/27/2023    AST 21 03/27/2023    ALT 20 03/27/2023     Lab Results   Component Value Date    FREET4 1.1 05/29/2020    TSH 2.400 03/27/2023     Labs personally reviewed. Erythrocytosis noted.     2/8/24 Rad onc note personally reviewed    CT Chest personally reviewed and per my independent read with patchy infiltrate in the RLL    CT Chest Without Contrast Diagnostic    Result Date: 2/7/2024    1. Peribronchial thickening, multifocal tree-in-bud and patchy infiltrate most notable within right lower lobe new from prior comparison.  This likely represents an infectious/inflammatory process such as bronchiolitis or microaspiration with developing pneumonia possible. 2. More conspicuous right hilar lymph nodes compared to prior, possibly reactive in the setting of an acute airspace process.  Recommend attention on follow-up imaging. 3. Decreased spiculated nodule in the right lower lobe.  Other small pulmonary nodules are stable. 4. Centrilobular  emphysema. 5. Aortic and coronary atherosclerotic disease.  Stable ectatic ascending thoracic aorta measuring up to 4.1 cm.      ZACKERY DWYER MD       Electronically Signed and Approved By: ZACKERY DWYER MD on 2/07/2024 at 9:00                    Assessment and Plan    Diagnoses and all orders for this visit:    1. Squamous cell carcinoma of right lung (Primary)  -     CT Chest Without Contrast; Future    2. Prostate cancer    3. Pneumonia due to infectious organism, unspecified laterality, unspecified part of lung      Stage IA2 NSCLC (squamous cell carcinoma)    1.7 cm RLL FDG avid lesion per PET without any other hypermetabolic adenopathy. Staged at [id number]. Completed SBRT 6/5/23 per radiation oncology. 7/31/23 CT Chest demonstrated disease response. Repeat CT Chest 2/6/24 with pneumonia and likely reactive lymph nodes. Recommend repeat CT Chest in 6 months. Follow up at that time.     Pneumonia with hypoxia  PNA per recent CT Chest in RLL. Prescribed doxycycline and augmentin by rad onc. Recommend compliance and completion of full course of therapy.     Erythrocytosis  Secondary to smoking history. No workup or treatment recommended.    Smoking Cessation  Cessation recommended. Patient cutting back.     Prostate Cancer  Stage IIB, [place] 3+4=7, PSA <10, clinical T3N0, s/p radical prostatectomy 8/2020, [facility]. Follows with urology. PSA only minimally elevated        Patient Follow Up: 6 months with CT Chest  Patient was given instructions and counseling regarding his condition or for health maintenance advice. Please see specific information pulled into the AVS if appropriate.

## 2024-02-09 ENCOUNTER — LAB (OUTPATIENT)
Dept: LAB | Facility: HOSPITAL | Age: 74
End: 2024-02-09
Payer: MEDICARE

## 2024-02-09 DIAGNOSIS — Z00.00 ANNUAL PHYSICAL EXAM: ICD-10-CM

## 2024-02-09 DIAGNOSIS — R97.21 RISING PSA FOLLOWING TREATMENT FOR MALIGNANT NEOPLASM OF PROSTATE: ICD-10-CM

## 2024-02-09 DIAGNOSIS — Z13.220 SCREENING FOR LIPID DISORDERS: ICD-10-CM

## 2024-02-09 LAB
ALBUMIN SERPL-MCNC: 3.9 G/DL (ref 3.5–5.2)
ALBUMIN/GLOB SERPL: 1.1 G/DL
ALP SERPL-CCNC: 104 U/L (ref 39–117)
ALT SERPL W P-5'-P-CCNC: 25 U/L (ref 1–41)
ANION GAP SERPL CALCULATED.3IONS-SCNC: 12 MMOL/L (ref 5–15)
AST SERPL-CCNC: 13 U/L (ref 1–40)
BASOPHILS # BLD AUTO: 0.03 10*3/MM3 (ref 0–0.2)
BASOPHILS NFR BLD AUTO: 0.3 % (ref 0–1.5)
BILIRUB SERPL-MCNC: 0.4 MG/DL (ref 0–1.2)
BUN SERPL-MCNC: 9 MG/DL (ref 8–23)
BUN/CREAT SERPL: 12.7 (ref 7–25)
CALCIUM SPEC-SCNC: 9.8 MG/DL (ref 8.6–10.5)
CHLORIDE SERPL-SCNC: 99 MMOL/L (ref 98–107)
CHOLEST SERPL-MCNC: 164 MG/DL (ref 0–200)
CO2 SERPL-SCNC: 25 MMOL/L (ref 22–29)
CREAT SERPL-MCNC: 0.71 MG/DL (ref 0.76–1.27)
DEPRECATED RDW RBC AUTO: 43.8 FL (ref 37–54)
EGFRCR SERPLBLD CKD-EPI 2021: 97.5 ML/MIN/1.73
EOSINOPHIL # BLD AUTO: 0.06 10*3/MM3 (ref 0–0.4)
EOSINOPHIL NFR BLD AUTO: 0.6 % (ref 0.3–6.2)
ERYTHROCYTE [DISTWIDTH] IN BLOOD BY AUTOMATED COUNT: 13.9 % (ref 12.3–15.4)
GLOBULIN UR ELPH-MCNC: 3.6 GM/DL
GLUCOSE SERPL-MCNC: 102 MG/DL (ref 65–99)
HCT VFR BLD AUTO: 53.8 % (ref 37.5–51)
HDLC SERPL-MCNC: 41 MG/DL (ref 40–60)
HGB BLD-MCNC: 18.6 G/DL (ref 13–17.7)
IMM GRANULOCYTES # BLD AUTO: 0.08 10*3/MM3 (ref 0–0.05)
IMM GRANULOCYTES NFR BLD AUTO: 0.8 % (ref 0–0.5)
LDLC SERPL CALC-MCNC: 109 MG/DL (ref 0–100)
LDLC/HDLC SERPL: 2.63 {RATIO}
LYMPHOCYTES # BLD AUTO: 1.42 10*3/MM3 (ref 0.7–3.1)
LYMPHOCYTES NFR BLD AUTO: 14.6 % (ref 19.6–45.3)
MCH RBC QN AUTO: 30.3 PG (ref 26.6–33)
MCHC RBC AUTO-ENTMCNC: 34.6 G/DL (ref 31.5–35.7)
MCV RBC AUTO: 87.6 FL (ref 79–97)
MONOCYTES # BLD AUTO: 0.74 10*3/MM3 (ref 0.1–0.9)
MONOCYTES NFR BLD AUTO: 7.6 % (ref 5–12)
NEUTROPHILS NFR BLD AUTO: 7.42 10*3/MM3 (ref 1.7–7)
NEUTROPHILS NFR BLD AUTO: 76.1 % (ref 42.7–76)
NRBC BLD AUTO-RTO: 0 /100 WBC (ref 0–0.2)
PLATELET # BLD AUTO: 245 10*3/MM3 (ref 140–450)
PMV BLD AUTO: 10.5 FL (ref 6–12)
POTASSIUM SERPL-SCNC: 4.5 MMOL/L (ref 3.5–5.2)
PROT SERPL-MCNC: 7.5 G/DL (ref 6–8.5)
PSA SERPL-MCNC: <0.014 NG/ML (ref 0–4)
RBC # BLD AUTO: 6.14 10*6/MM3 (ref 4.14–5.8)
SODIUM SERPL-SCNC: 136 MMOL/L (ref 136–145)
TRIGL SERPL-MCNC: 75 MG/DL (ref 0–150)
TSH SERPL DL<=0.05 MIU/L-ACNC: 1.24 UIU/ML (ref 0.27–4.2)
VLDLC SERPL-MCNC: 14 MG/DL (ref 5–40)
WBC NRBC COR # BLD AUTO: 9.75 10*3/MM3 (ref 3.4–10.8)

## 2024-02-09 PROCEDURE — 85025 COMPLETE CBC W/AUTO DIFF WBC: CPT

## 2024-02-09 PROCEDURE — 84443 ASSAY THYROID STIM HORMONE: CPT

## 2024-02-09 PROCEDURE — 80061 LIPID PANEL: CPT

## 2024-02-09 PROCEDURE — 36415 COLL VENOUS BLD VENIPUNCTURE: CPT

## 2024-02-09 PROCEDURE — 80053 COMPREHEN METABOLIC PANEL: CPT

## 2024-02-09 PROCEDURE — 84153 ASSAY OF PSA TOTAL: CPT

## 2024-02-15 ENCOUNTER — OFFICE VISIT (OUTPATIENT)
Dept: FAMILY MEDICINE CLINIC | Facility: CLINIC | Age: 74
End: 2024-02-15
Payer: MEDICARE

## 2024-02-15 VITALS
BODY MASS INDEX: 19.98 KG/M2 | HEIGHT: 70 IN | DIASTOLIC BLOOD PRESSURE: 71 MMHG | OXYGEN SATURATION: 91 % | WEIGHT: 139.6 LBS | SYSTOLIC BLOOD PRESSURE: 143 MMHG | RESPIRATION RATE: 20 BRPM | HEART RATE: 79 BPM | TEMPERATURE: 98.4 F

## 2024-02-15 DIAGNOSIS — F17.219 CIGARETTE NICOTINE DEPENDENCE WITH NICOTINE-INDUCED DISORDER: Chronic | ICD-10-CM

## 2024-02-15 DIAGNOSIS — I10 ESSENTIAL HYPERTENSION: ICD-10-CM

## 2024-02-15 DIAGNOSIS — J44.9 CHRONIC OBSTRUCTIVE PULMONARY DISEASE, UNSPECIFIED COPD TYPE: Primary | Chronic | ICD-10-CM

## 2024-02-15 PROCEDURE — 1159F MED LIST DOCD IN RCRD: CPT | Performed by: FAMILY MEDICINE

## 2024-02-15 PROCEDURE — 1160F RVW MEDS BY RX/DR IN RCRD: CPT | Performed by: FAMILY MEDICINE

## 2024-02-15 PROCEDURE — 3077F SYST BP >= 140 MM HG: CPT | Performed by: FAMILY MEDICINE

## 2024-02-15 PROCEDURE — 99214 OFFICE O/P EST MOD 30 MIN: CPT | Performed by: FAMILY MEDICINE

## 2024-02-15 PROCEDURE — 3078F DIAST BP <80 MM HG: CPT | Performed by: FAMILY MEDICINE

## 2024-02-15 RX ORDER — FLUTICASONE FUROATE, UMECLIDINIUM BROMIDE AND VILANTEROL TRIFENATATE 200; 62.5; 25 UG/1; UG/1; UG/1
1 POWDER RESPIRATORY (INHALATION) DAILY
Qty: 84 EACH | Refills: 0 | COMMUNITY
Start: 2024-02-15

## 2024-02-22 ENCOUNTER — OFFICE VISIT (OUTPATIENT)
Dept: RADIATION ONCOLOGY | Facility: HOSPITAL | Age: 74
End: 2024-02-22
Payer: MEDICARE

## 2024-02-22 VITALS
WEIGHT: 141.54 LBS | SYSTOLIC BLOOD PRESSURE: 130 MMHG | HEART RATE: 84 BPM | BODY MASS INDEX: 20.31 KG/M2 | RESPIRATION RATE: 19 BRPM | DIASTOLIC BLOOD PRESSURE: 70 MMHG | TEMPERATURE: 98 F | OXYGEN SATURATION: 94 %

## 2024-02-22 DIAGNOSIS — Z92.3 HISTORY OF EXTERNAL BEAM RADIATION THERAPY: ICD-10-CM

## 2024-02-22 DIAGNOSIS — C34.31 MALIGNANT NEOPLASM OF LOWER LOBE, RIGHT BRONCHUS OR LUNG: ICD-10-CM

## 2024-02-22 DIAGNOSIS — C34.91 SQUAMOUS CELL CARCINOMA OF RIGHT LUNG: Primary | ICD-10-CM

## 2024-02-22 DIAGNOSIS — J44.9 CHRONIC OBSTRUCTIVE PULMONARY DISEASE, UNSPECIFIED COPD TYPE: ICD-10-CM

## 2024-02-22 DIAGNOSIS — Z90.79 HISTORY OF RADICAL PROSTATECTOMY: ICD-10-CM

## 2024-02-22 DIAGNOSIS — Z85.46 HISTORY OF PROSTATE CANCER: ICD-10-CM

## 2024-02-22 DIAGNOSIS — F17.200 NICOTINE DEPENDENCE WITH CURRENT USE: ICD-10-CM

## 2024-02-22 DIAGNOSIS — R91.8 MULTIPLE PULMONARY NODULES: ICD-10-CM

## 2024-02-22 PROCEDURE — G0463 HOSPITAL OUTPT CLINIC VISIT: HCPCS | Performed by: NURSE PRACTITIONER

## 2024-02-22 RX ORDER — GUAIFENESIN 600 MG/1
1200 TABLET, EXTENDED RELEASE ORAL 2 TIMES DAILY
Qty: 60 TABLET | Refills: 3 | Status: SHIPPED | OUTPATIENT
Start: 2024-02-22

## 2024-02-22 NOTE — PROGRESS NOTES
Follow Up Office Visit      Encounter Date: 02/22/2024   Patient Name: Colin Nevarez  YOB: 1950   Medical Record Number: [id number]   Primary Diagnosis: Squamous cell carcinoma of right lung [C34.91]     Cancer Staging   Prostate cancer  Staging form: Prostate, AJCC 8th Edition  - Clinical: Stage IIB (cT1c, cN0, cM0, PSA: 8.2, Grade Group: 2) - Signed by Dionisio Neville MD on 5/11/2023    Squamous cell carcinoma lung  Staging form: Lung, AJCC 8th Edition  - Clinical: Stage IA2 (cT1b, cN0, cM0) - Signed by Dionisio Neville MD on 5/11/2023    Radiation Completion Date:  6/5/2023 SBRT RLL    Chief Complaint:    Chief Complaint   Patient presents with    Follow-up    Lung Cancer       Oncology/Hematology History Overview Note   8/2020: Patient underwent radical prostatectomy (Dr. Rodriguez) for clinical T3, Dee 3+4=7 prostate adenocarcinoma, PSA <10.     4/10/23: NM/PET performed due to abnormal lung nodule on CT Chest demonstrated a non-calcified 1.7 cm pulmonary nodule within the lateral right lower lobe with FDG activity of 7.33. No other nodule identified. No mediastinal, hilar or axillary lymphadenopathy.    5/3/23: CT needle biopsy of RLL: Moderately to poorly differentiated squamous cell carcinoma.    6/5/23: SBRT completed to RLL nodule    6/14/2023: Colonoscopy with Dr. Carey. Normal exam with no specimens collected. Repeat in 5 years for surveillance (2028).     7/31/23: CT Chest: interval decrease in size of RLL nodule.          Prostate cancer   7/12/2021 Initial Diagnosis    Prostate cancer (HCC)     5/11/2023 Cancer Staged    Staging form: Prostate, AJCC 8th Edition  - Clinical: Stage IIB (cT1c, cN0, cM0, PSA: 8.2, Grade Group: 2) - Signed by Dionisio Neville MD on 5/11/2023     Squamous cell carcinoma lung   4/4/2023 Initial Diagnosis    Squamous cell carcinoma lung     5/11/2023 Cancer Staged    Staging form: Lung, AJCC 8th Edition  - Clinical: Stage IA2  "(cT1b, cN0, cM0) - Signed by Dionisio Neville MD on 5/11/2023     Malignant neoplasm of lower lobe, right bronchus or lung (Resolved)   5/22/2023 Initial Diagnosis    Malignant neoplasm of lower lobe, right bronchus or lung     5/30/2023 - 6/5/2023 Radiation    Radiation OncologyTreatment Course:  Colin Nevarez received 5500 cGy in 5 fractions to right lower lobe via stereotactic body radiotherapy.          History of Present Illness: Colin Nevarez is a 73 y.o. male who returns to [facility] for short interval (2 week) follow-up of his lung cancer. At his recent visit with me on 2/8/24 I prescribed Augmentin and Doxycycline for presumed pneumonia. He presents today for re-evaluation. Shortness of breath has improved. He does continue to have shortness of breath with exertion which is chronic. Cough is productive with clear secretions. Sputum is thick and tenacious. Encouraged him to resume taking Mucinex two times a day. He is recently having frequent bowel movements, 2-3 stools a day, likely secondary to antibiotics. Appetite is improving.     HPI from visit on 2/8/24:  He reports a recent worsening of his shortness of breath. This has been ongoing since December. He now has dyspnea with minimal exertion. His grandson has been doing most of his house hold responsibilities including taking care of the cattle. His cough is productive of yellow to green tinged sputum. He contributes his pneumonia to his Breztri inhaler prescribed by his PCP. He does continue to use his Albuterol inhaler that I had prescribed at prior visit. On 12/7/23 he was treated by his PCP for URI with rocephin 1g and Kenalog 60mg injection. He was then treated for \"pneumonia\" by his PCP with medrol dosepack, cefdinir and azithromycin on 12/26/23. He was most recently treated with Bactrim for a UTI on 1/27/24.     Subjective      Review of Systems: Review of Systems   Constitutional:  Positive for fatigue (Comes and goes, " ongoing) and unexpected weight change (Down 1lb from 2 weeks ago.). Negative for appetite change (Improved), chills and fever.   HENT:  Positive for hearing loss (Ongoing), tinnitus (ongoing ) and voice change (hoarsness, ongoing). Negative for sore throat and trouble swallowing.    Eyes:  Negative for visual disturbance.   Respiratory:  Positive for cough (Productive with clear secretions, ongoing), shortness of breath (Improved, noted with exertion, ongoing.) and wheezing (Occasionally, ongoing). Negative for chest tightness.    Cardiovascular:  Positive for leg swelling (occasionally, ongoing). Negative for chest pain and palpitations.   Gastrointestinal:  Negative for abdominal distention, abdominal pain, anal bleeding, blood in stool, constipation, diarrhea, nausea and vomiting.        Frequent bowel movements recently noted.     Genitourinary:  Positive for urgency (Occasionally, ongoing). Negative for decreased urine volume, difficulty urinating, dysuria, frequency and hematuria.        Frequent leakage/incontinence, ongoing  Noc x0  Normal stream     Musculoskeletal:  Positive for arthralgias (Occasional shoulder/neck pain, ongoing), back pain (ongoing ) and neck pain (Occasional, ongoing). Negative for gait problem and joint swelling.   Skin:  Negative for color change and rash.   Neurological:  Positive for weakness (Occasionally, ongoing). Negative for dizziness and headaches.   Psychiatric/Behavioral:  Negative for sleep disturbance.        The following portions of the patient's history were reviewed and updated as appropriate: allergies, current medications, past family history, past medical history, past social history, past surgical history and problem list.    Medications:     Current Outpatient Medications:     albuterol (PROVENTIL) (2.5 MG/3ML) 0.083% nebulizer solution, Take 2.5 mg by nebulization Every 4 (Four) Hours As Needed for Wheezing., Disp: 100 each, Rfl: 12    albuterol sulfate   (90 Base) MCG/ACT inhaler, Inhale 2 puffs Every 4 (Four) Hours As Needed for Wheezing or Shortness of Air., Disp: 18 g, Rfl: 2    amLODIPine (NORVASC) 10 MG tablet, Take 1 tablet by mouth Daily., Disp: 90 tablet, Rfl: 1    aspirin 81 MG chewable tablet, Chew 1 tablet Daily., Disp: , Rfl:     Fluticasone-Umeclidin-Vilant (Trelegy Ellipta) 200-62.5-25 MCG/ACT aerosol powder , Inhale 200 mcg Daily., Disp: 3 each, Rfl: 0    Fluticasone-Umeclidin-Vilant (Trelegy Ellipta) 200-62.5-25 MCG/ACT aerosol powder , Inhale 1 puff Daily., Disp: 84 each, Rfl: 0    guaiFENesin (Mucinex) 600 MG 12 hr tablet, Take 2 tablets by mouth 2 (Two) Times a Day., Disp: 60 tablet, Rfl: 3    Multiple Vitamins-Minerals (MULTIVITAMIN GUMMIES ADULTS PO), Take 1 tablet by mouth Daily. 1 gummie/day, Disp: , Rfl:     Allergies:   No Known Allergies    Patient Smoking History:   Social History     Tobacco Use   Smoking Status Every Day    Packs/day: 1.00    Years: 64.00    Additional pack years: 0.00    Total pack years: 64.00    Types: Cigarettes    Start date: 1959    Passive exposure: Current   Smokeless Tobacco Never   Tobacco Comments    States that he is cutting back and is smoking less than 1ppd.       Measures:  PHQ-9 Total Score:     Quality of Life: 90 - Limited Activity   ECOG score: 1   ECOG: (1) Restricted in physically strenuous activity but ambulatory and able to carry out work of a light or sedentary nature, e.g., light house work, office work  Pain: (on a scale of 0-10)   Pain Score    02/22/24 0945   PainSc: 0-No pain     Objective     Physical Exam:   Vital Signs:   Vitals:    02/22/24 0945   BP: 130/70   Pulse: 84   Resp: 19   Temp: 98 °F (36.7 °C)   TempSrc: Temporal   SpO2: 94%   Weight: 64.2 kg (141 lb 8.6 oz)   PainSc: 0-No pain       Body mass index is 20.31 kg/m².   Wt Readings from Last 3 Encounters:   02/22/24 64.2 kg (141 lb 8.6 oz)   02/15/24 63.3 kg (139 lb 9.6 oz)   02/08/24 64.8 kg (142 lb 13.7 oz)       Physical  Exam  Vitals reviewed.   Constitutional:       General: He is not in acute distress.     Appearance: Normal appearance. He is normal weight. He is not ill-appearing.   HENT:      Head: Normocephalic and atraumatic.      Mouth/Throat:      Mouth: Mucous membranes are moist.      Pharynx: Oropharynx is clear. No oropharyngeal exudate or posterior oropharyngeal erythema.   Eyes:      Conjunctiva/sclera: Conjunctivae normal.      Pupils: Pupils are equal, round, and reactive to light.   Cardiovascular:      Rate and Rhythm: Normal rate and regular rhythm.      Pulses: Normal pulses.      Heart sounds: Normal heart sounds.   Pulmonary:      Effort: Pulmonary effort is normal. No respiratory distress.      Breath sounds: No stridor. No wheezing or rhonchi.      Comments: Diminished throughout. On RA.     Musculoskeletal:         General: Normal range of motion.      Cervical back: Normal range of motion.   Skin:     General: Skin is warm and dry.   Neurological:      General: No focal deficit present.      Mental Status: He is alert and oriented to person, place, and time. Mental status is at baseline.   Psychiatric:         Mood and Affect: Mood normal.         Behavior: Behavior normal.       Result Review: I independently reviewed the following data.   -- CT Chest Without Contrast Diagnostic (02/06/2024 10:30)     Pathology:   Lab Results   Component Value Date    CLININFO  05/03/2023     Lung nodule      FINALDX  05/03/2023     Lung, right lower lobe lesion, biopsy:   -Moderately to poorly differentiated squamous cell carcinoma    Comment: Per policy, reflex testing has been ordered, and the results will be separately reported.    REMARKS: The above positive (malignant) diagnosis was called to [place] in Dr. Roper's office at 13:10 EDT on 5/4/2023 by gil.            Imaging: CT Chest Without Contrast Diagnostic    Result Date: 2/7/2024    1. Peribronchial thickening, multifocal tree-in-bud and patchy infiltrate most  notable within right lower lobe new from prior comparison.  This likely represents an infectious/inflammatory process such as bronchiolitis or microaspiration with developing pneumonia possible. 2. More conspicuous right hilar lymph nodes compared to prior, possibly reactive in the setting of an acute airspace process.  Recommend attention on follow-up imaging. 3. Decreased spiculated nodule in the right lower lobe.  Other small pulmonary nodules are stable. 4. Centrilobular emphysema. 5. Aortic and coronary atherosclerotic disease.  Stable ectatic ascending thoracic aorta measuring up to 4.1 cm.      ZACKERY DWYER MD       Electronically Signed and Approved By: ZACKERY DWYER MD on 2/07/2024 at 9:00             XR Chest PA & Lateral    Result Date: 12/7/2023    1. Emphysema. 2. The mass in the right mid-lower lung zone is less prominent indicating a response to therapy.     EDGAR JOSÉ MD       Electronically Signed and Approved By: EDGAR JOSÉ MD on 12/07/2023 at 13:01               Labs:   WBC   Date Value Ref Range Status   02/09/2024 9.75 3.40 - 10.80 10*3/mm3 Final     Hemoglobin   Date Value Ref Range Status   02/09/2024 18.6 (H) 13.0 - 17.7 g/dL Final     Hematocrit   Date Value Ref Range Status   02/09/2024 53.8 (H) 37.5 - 51.0 % Final     Platelets   Date Value Ref Range Status   02/09/2024 245 140 - 450 10*3/mm3 Final     TSH   Date Value Ref Range Status   02/09/2024 1.240 0.270 - 4.200 uIU/mL Final      PSA   Date Value Ref Range Status   02/09/2024 <0.014 0.000 - 4.000 ng/mL Final   10/30/2023 0.015 0.000 - 4.000 ng/mL Final   08/04/2023 0.016 0.000 - 4.000 ng/mL Final   07/19/2022 <0.014 0.000 - 4.000 ng/mL Final   09/01/2021 <0.014 0.000 - 4.000 ng/mL Final   03/08/2021 <0.01 0.00 - 4.00 ng/mL Final   10/28/2020 <0.01 0.00 - 4.00 ng/mL Final   05/29/2020 8.16 (H) 0.00 - 4.00 ng/mL Final     Assessment / Plan      Impression: Colin Nevarez is a pleasant 73 y.o. male with stage IA2 NCSCL cT1c cN0 cM0  moderately to poorly differentiated squamous cell carcinoma of the right lower lobe. He has no clinical or radiographic evidence of distant metastatic disease per PET scan on 4/10/2023. He has advanced COPD and is not a surgical candidate. He is status post SBRT to the right lower lobe, completed on 6/5/2023. He has dyspnea on exertion with predates radiation, however, this in addition to his overall respiratory function has declined since December. In December he was treated for a URI and then pneumonia by his PCP. His recent CT Chest on 2/6/2024 demonstrates interval decrease in size of the treated right lower lobe nodule. There is findings concerning for pneumonia in the right lung. He was treated for pneumonia with Augmentin and Doxycycline on 2/8/24 and is clinically significantly improved today with pulmonary function returned to his baseline.     COPD: managed by PCP Dr. Roper. Not currently followed by Pulmonology. Encouraged him to continue taking Mucinex BID for ongoing management of sputum associated with COPD.      Nicotine Dependence: current cigarette smoker, reports decreased amount now down to less than 1 pack/day. Not interested in quitting.      History of Prostate Cancer: Stage IIB (cT1c, cN0, cM0), iPSA: 8.2, Cowan 3+4= 7. Status post radical prostatectomy in 8/2020; pT1b pN0. Most recent PSA on 10/30/2023 is 0.015 ng/mL, which is stable compared to prior PSA of 0.016 ng/mL on 8/4/2024, however, this is slightly increased from <0.014 ng/mL on 7/19/2022. He is followed by Dr. Rodriguez. Next visit in May 2024.     Assessment/Plan:   Diagnoses and all orders for this visit:    1. Squamous cell carcinoma of right lung (Primary)  -     guaiFENesin (Mucinex) 600 MG 12 hr tablet; Take 2 tablets by mouth 2 (Two) Times a Day.  Dispense: 60 tablet; Refill: 3    2. Malignant neoplasm of lower lobe, right bronchus or lung    3. History of external beam radiation therapy    4. Multiple pulmonary  nodules    5. Nicotine dependence with current use    6. Chronic obstructive pulmonary disease, unspecified COPD type    7. History of prostate cancer    8. History of radical prostatectomy      Follow Up:   Return for follow-up in 6 months. Imaging ordered by Dr. Neville.   Follow-up with Dr. Neville on 8/13/24. CT Chest prior to this appointment has been ordered but not yet scheduled.   Follow-up with Urology on 5/7/24 regarding history of prostate cancer.     Return in about 6 months (around 8/22/2024) for Office Visit.  Colin Nevarez was encouraged to contact me in the interim with any questions or concerns regarding his care.        CHEN García  Radiation Oncology  [facility]    This document has been signed by CHEN Chaves on February 22, 2024 12:11 EST    Rituxan Counseling:  I discussed with the patient the risks of Rituxan infusions. Side effects can include infusion reactions, severe drug rashes including mucocutaneous reactions, reactivation of latent hepatitis and other infections and rarely progressive multifocal leukoencephalopathy.  All of the patient's questions and concerns were addressed.

## 2024-04-30 ENCOUNTER — LAB (OUTPATIENT)
Dept: LAB | Facility: HOSPITAL | Age: 74
End: 2024-04-30
Payer: MEDICARE

## 2024-04-30 DIAGNOSIS — C61 PROSTATE CANCER: ICD-10-CM

## 2024-04-30 DIAGNOSIS — R97.21 RISING PSA FOLLOWING TREATMENT FOR MALIGNANT NEOPLASM OF PROSTATE: ICD-10-CM

## 2024-04-30 DIAGNOSIS — C61 PROSTATE CANCER: Primary | ICD-10-CM

## 2024-04-30 DIAGNOSIS — N39.3 URINARY INCONTINENCE, STRESS, MALE: ICD-10-CM

## 2024-04-30 LAB — PSA SERPL-MCNC: 0.02 NG/ML (ref 0–4)

## 2024-04-30 PROCEDURE — 36415 COLL VENOUS BLD VENIPUNCTURE: CPT

## 2024-04-30 PROCEDURE — 84153 ASSAY OF PSA TOTAL: CPT

## 2024-05-07 ENCOUNTER — OFFICE VISIT (OUTPATIENT)
Dept: UROLOGY | Facility: CLINIC | Age: 74
End: 2024-05-07
Payer: MEDICARE

## 2024-05-07 VITALS — SYSTOLIC BLOOD PRESSURE: 133 MMHG | HEART RATE: 78 BPM | DIASTOLIC BLOOD PRESSURE: 83 MMHG

## 2024-05-07 DIAGNOSIS — R97.21 RISING PSA FOLLOWING TREATMENT FOR MALIGNANT NEOPLASM OF PROSTATE: Primary | ICD-10-CM

## 2024-05-14 DIAGNOSIS — C34.91 SQUAMOUS CELL CARCINOMA OF RIGHT LUNG: ICD-10-CM

## 2024-05-14 DIAGNOSIS — J44.9 CHRONIC OBSTRUCTIVE PULMONARY DISEASE, UNSPECIFIED COPD TYPE: Primary | ICD-10-CM

## 2024-05-14 RX ORDER — DOXYCYCLINE HYCLATE 100 MG/1
100 CAPSULE ORAL 2 TIMES DAILY
Qty: 14 CAPSULE | Refills: 0 | Status: SHIPPED | OUTPATIENT
Start: 2024-05-14 | End: 2024-05-21

## 2024-05-14 RX ORDER — GUAIFENESIN 600 MG/1
1200 TABLET, EXTENDED RELEASE ORAL 2 TIMES DAILY
Qty: 60 TABLET | Refills: 3 | Status: SHIPPED | OUTPATIENT
Start: 2024-05-14

## 2024-06-20 ENCOUNTER — OFFICE VISIT (OUTPATIENT)
Dept: PULMONOLOGY | Facility: CLINIC | Age: 74
End: 2024-06-20
Payer: MEDICARE

## 2024-06-20 VITALS
TEMPERATURE: 97.6 F | WEIGHT: 139.6 LBS | BODY MASS INDEX: 19.98 KG/M2 | HEIGHT: 70 IN | DIASTOLIC BLOOD PRESSURE: 86 MMHG | SYSTOLIC BLOOD PRESSURE: 141 MMHG | HEART RATE: 97 BPM | RESPIRATION RATE: 16 BRPM | OXYGEN SATURATION: 92 %

## 2024-06-20 DIAGNOSIS — J43.2 CENTRILOBULAR EMPHYSEMA: Chronic | ICD-10-CM

## 2024-06-20 DIAGNOSIS — F17.210 NICOTINE DEPENDENCE, CIGARETTES, UNCOMPLICATED: ICD-10-CM

## 2024-06-20 DIAGNOSIS — J44.9 CHRONIC OBSTRUCTIVE PULMONARY DISEASE, UNSPECIFIED COPD TYPE: Primary | Chronic | ICD-10-CM

## 2024-06-20 DIAGNOSIS — R06.09 DYSPNEA ON EXERTION: ICD-10-CM

## 2024-06-20 DIAGNOSIS — C34.91 SQUAMOUS CELL CARCINOMA OF RIGHT LUNG: ICD-10-CM

## 2024-06-20 NOTE — PROGRESS NOTES
Primary Care Provider  Liz Roper DO     Referring Provider  CHEN Chaves     Chief Complaint  COPD and Establish Care    Subjective          Colin Nevarez presents to [facility]  History of Present Illness  Colin Nevarez is a 74 y.o. male patient here to establish care as a new patient.  Patient was referred to our office from oncology for COPD and squamous cell carcinoma of right lung.    Patient COPD has been managed by primary care.  He did have a pulmonary function test on 5/15/2023, which showed severe obstruction.  Patient's CT scan from 2/6/2024 does show mild to moderate centrilobular emphysema.  Patient states that his shortness of breath is mild in severity, worse with exertion and improved with rest.  He does have a Trelegy inhaler but only uses it on occasion.  He states that he has been diagnosed with COPD for many years without any inhaler usage.  He states that his shortness of breath did get worse after having radiation for lung cancer.  States that for a while he was using Mucinex but has since figured out that this was causing him issues.  Patient continues to follow-up with radiation oncology and oncology.  Patient does have a 65+ year history of smoking and is currently smoking less than 1 pack of cigarettes daily and is not interested in quitting.  Overall, patient states that he is doing well and has no additional concerns at this time.  He is able to perform his ADLs without difficulty.  He is up-to-date with his COVID and pneumonia vaccine but does decline flu and RSV vaccine.     His history of smoking is   Tobacco Use: High Risk (6/20/2024)    Patient History     Smoking Tobacco Use: Every Day     Smokeless Tobacco Use: Never     Passive Exposure: Current     Colin Nevarez  reports that he has been smoking cigarettes. He started smoking about 65 years ago. He has a 65.5 pack-year smoking history. He has been exposed to  tobacco smoke. He has never used smokeless tobacco. I have educated him on the risk of diseases from using tobacco products such as cancer, COPD, and heart disease.     I advised him to quit and he is not willing to quit.    I spent 3  minutes counseling the patient.      Review of Systems   Constitutional:  Negative for chills, fatigue, fever, unexpected weight gain and unexpected weight loss.   HENT:  Congestion: Nasal.    Respiratory:  Positive for shortness of breath. Negative for apnea, cough and wheezing.         Negative for Hemoptysis     Cardiovascular:  Negative for chest pain, palpitations and leg swelling.   Skin:         Negative for cyanosis      Sleep: Negative for Excessive daytime sleepiness  Negative for morning headaches  Negative for Snoring    Family History   Problem Relation Age of Onset    Ovarian cancer Mother     Stroke Father     Breast cancer Sister 40    Cancer Maternal Grandmother     Cancer Paternal Grandmother         Social History     Socioeconomic History    Marital status: Single   Tobacco Use    Smoking status: Every Day     Current packs/day: 1.00     Average packs/day: 1 pack/day for 65.5 years (65.5 ttl pk-yrs)     Types: Cigarettes     Start date: 1959     Passive exposure: Current    Smokeless tobacco: Never    Tobacco comments:     States that he is cutting back and is smoking less than 1ppd.   Vaping Use    Vaping status: Never Used   Substance and Sexual Activity    Alcohol use: Never    Drug use: Defer    Sexual activity: Defer        Past Medical History:   Diagnosis Date    Arthritis     COPD (chronic obstructive pulmonary disease)     Essential hypertension 07/12/2021    Forgetfulness     Heart flutter, ventricular     High cholesterol     Lung cancer     Prostate cancer     Ringing in ear     Shortness of Air     SOB (shortness of breath)         Immunization History   Administered Date(s) Administered    COVID-19 (MODERNA) 1st,2nd,3rd Dose Monovalent 04/12/2021,  05/10/2021, 03/29/2022    COVID-19 (MODERNA) Monovalent Original Booster 04/12/2021, 05/10/2021    FLUAD TRI 65YR+ 10/12/2017    Fluzone Quad >6mos (Multi-dose) 10/23/2019    Pneumococcal Conjugate 13-Valent (PCV13) 09/03/2018    Pneumococcal Conjugate 20-Valent (PCV20) 01/25/2024    Pneumococcal Polysaccharide (PPSV23) 09/04/2017, 10/12/2017         No Known Allergies       Current Outpatient Medications:     albuterol (PROVENTIL) (2.5 MG/3ML) 0.083% nebulizer solution, Take 2.5 mg by nebulization Every 4 (Four) Hours As Needed for Wheezing., Disp: 100 each, Rfl: 12    albuterol sulfate  (90 Base) MCG/ACT inhaler, Inhale 2 puffs Every 4 (Four) Hours As Needed for Wheezing or Shortness of Air., Disp: 18 g, Rfl: 2    amLODIPine (NORVASC) 10 MG tablet, Take 1 tablet by mouth Daily., Disp: 90 tablet, Rfl: 1    aspirin 81 MG chewable tablet, Chew 1 tablet Daily., Disp: , Rfl:     Fluticasone-Umeclidin-Vilant (Trelegy Ellipta) 200-62.5-25 MCG/ACT aerosol powder , Inhale 200 mcg Daily. (Patient taking differently: Inhale 200 mcg Daily. PRN), Disp: 3 each, Rfl: 0    Multiple Vitamins-Minerals (MULTIVITAMIN GUMMIES ADULTS PO), Take 1 tablet by mouth Daily. 1 gummie/day, Disp: , Rfl:     guaiFENesin (Mucinex) 600 MG 12 hr tablet, Take 2 tablets by mouth 2 (Two) Times a Day. (Patient not taking: Reported on 6/20/2024), Disp: 60 tablet, Rfl: 3     Objective   Physical Exam  Constitutional:       General: He is not in acute distress.     Appearance: Normal appearance. He is normal weight.   HENT:      Right Ear: Hearing normal.      Left Ear: Hearing normal.      Nose: No nasal tenderness or congestion.      Mouth/Throat:      Mouth: Mucous membranes are moist. No oral lesions.   Eyes:      Extraocular Movements: Extraocular movements intact.      Pupils: Pupils are equal, round, and reactive to light.   Cardiovascular:      Rate and Rhythm: Normal rate and regular rhythm.      Pulses: Normal pulses.      Heart sounds:  "Normal heart sounds. No murmur heard.  Pulmonary:      Effort: Pulmonary effort is normal.      Breath sounds: Normal breath sounds. No wheezing, rhonchi or rales.   Musculoskeletal:      Right lower leg: No edema.      Left lower leg: No edema.   Skin:     General: Skin is warm and dry.      Findings: No lesion or rash.   Neurological:      General: No focal deficit present.      Mental Status: He is alert and oriented to person, place, and time.   Psychiatric:         Mood and Affect: Affect normal. Mood is not anxious or depressed.         Vital Signs:   /86 (BP Location: Left arm, Patient Position: Sitting, Cuff Size: Adult)   Pulse 97   Temp 97.6 °F (36.4 °C) (Oral)   Resp 16   Ht 177.8 cm (70\")   Wt 63.3 kg (139 lb 9.6 oz)   SpO2 92% Comment: RA  BMI 20.03 kg/m²        Result Review :   The following data was reviewed by: CHEN Olsen on 06/20/2024:  CMP          2/9/2024    09:35   CMP   Glucose 102    BUN 9    Creatinine 0.71    EGFR 97.5    Sodium 136    Potassium 4.5    Chloride 99    Calcium 9.8    Total Protein 7.5    Albumin 3.9    Globulin 3.6    Total Bilirubin 0.4    Alkaline Phosphatase 104    AST (SGOT) 13    ALT (SGPT) 25    Albumin/Globulin Ratio 1.1    BUN/Creatinine Ratio 12.7    Anion Gap 12.0      CBC w/diff          2/9/2024    09:35   CBC w/Diff   WBC 9.75    RBC 6.14    Hemoglobin 18.6    Hematocrit 53.8    MCV 87.6    MCH 30.3    MCHC 34.6    RDW 13.9    Platelets 245    Neutrophil Rel % 76.1    Immature Granulocyte Rel % 0.8    Lymphocyte Rel % 14.6    Monocyte Rel % 7.6    Eosinophil Rel % 0.6    Basophil Rel % 0.3      Data reviewed : Radiologic studies chest CT 2/6/2024, pulmonary function test 5/15/2023 and oncology last office note 2/22/2024, urology office note 5/7/2024 and primary care last office note 2/15/2024     Procedures        Assessment and Plan    Diagnoses and all orders for this visit:    1. Chronic obstructive pulmonary disease, unspecified COPD " type (Primary)  Comments:  Continue Trelegy  Orders:  -     Alpha - 1 - Antitrypsin; Future    2. Centrilobular emphysema  Comments:  Continue Trelegy    3. Dyspnea on exertion  Comments:  Continue albuterol    4. Nicotine dependence, cigarettes, uncomplicated  Comments:  Smoking cessation education    5. Squamous cell carcinoma of right lung  Comments:  Continue follow-up with oncology and radiation oncology.          Follow Up   Return in about 6 months (around 12/20/2024).  Patient was given instructions and counseling regarding his condition or for health maintenance advice. Please see specific information pulled into the AVS if appropriate.

## 2024-06-26 ENCOUNTER — CLINICAL SUPPORT (OUTPATIENT)
Dept: FAMILY MEDICINE CLINIC | Facility: CLINIC | Age: 74
End: 2024-06-26
Payer: MEDICARE

## 2024-06-26 VITALS — DIASTOLIC BLOOD PRESSURE: 83 MMHG | SYSTOLIC BLOOD PRESSURE: 147 MMHG | HEART RATE: 99 BPM

## 2024-06-26 DIAGNOSIS — I10 ESSENTIAL HYPERTENSION: Primary | Chronic | ICD-10-CM

## 2024-07-11 ENCOUNTER — APPOINTMENT (OUTPATIENT)
Dept: GENERAL RADIOLOGY | Facility: HOSPITAL | Age: 74
DRG: 190 | End: 2024-07-11
Payer: MEDICARE

## 2024-07-11 ENCOUNTER — TELEPHONE (OUTPATIENT)
Dept: FAMILY MEDICINE CLINIC | Facility: CLINIC | Age: 74
End: 2024-07-11
Payer: MEDICARE

## 2024-07-11 ENCOUNTER — HOSPITAL ENCOUNTER (INPATIENT)
Facility: HOSPITAL | Age: 74
LOS: 10 days | Discharge: HOME HEALTH | DRG: 190 | End: 2024-07-21
Attending: EMERGENCY MEDICINE | Admitting: INTERNAL MEDICINE
Payer: MEDICARE

## 2024-07-11 DIAGNOSIS — J44.1 COPD EXACERBATION: Primary | ICD-10-CM

## 2024-07-11 DIAGNOSIS — J96.10 CHRONIC RESPIRATORY FAILURE, UNSPECIFIED WHETHER WITH HYPOXIA OR HYPERCAPNIA: ICD-10-CM

## 2024-07-11 DIAGNOSIS — J44.9 CHRONIC OBSTRUCTIVE PULMONARY DISEASE, UNSPECIFIED COPD TYPE: ICD-10-CM

## 2024-07-11 DIAGNOSIS — R26.2 DIFFICULTY WALKING: ICD-10-CM

## 2024-07-11 LAB
ALBUMIN SERPL-MCNC: 4.5 G/DL (ref 3.5–5.2)
ALBUMIN/GLOB SERPL: 1.3 G/DL
ALP SERPL-CCNC: 112 U/L (ref 39–117)
ALT SERPL W P-5'-P-CCNC: 15 U/L (ref 1–41)
ANION GAP SERPL CALCULATED.3IONS-SCNC: 12.2 MMOL/L (ref 5–15)
ARTERIAL PATENCY WRIST A: POSITIVE
AST SERPL-CCNC: 18 U/L (ref 1–40)
ATMOSPHERIC PRESS: 745 MMHG
BASE EXCESS BLDA CALC-SCNC: -0.1 MMOL/L (ref -2–2)
BASOPHILS # BLD AUTO: 0.04 10*3/MM3 (ref 0–0.2)
BASOPHILS NFR BLD AUTO: 0.4 % (ref 0–1.5)
BDY SITE: ABNORMAL
BILIRUB SERPL-MCNC: 0.3 MG/DL (ref 0–1.2)
BUN SERPL-MCNC: 11 MG/DL (ref 8–23)
BUN/CREAT SERPL: 16.2 (ref 7–25)
CALCIUM SPEC-SCNC: 9.7 MG/DL (ref 8.6–10.5)
CHLORIDE SERPL-SCNC: 97 MMOL/L (ref 98–107)
CO2 SERPL-SCNC: 27.8 MMOL/L (ref 22–29)
CREAT SERPL-MCNC: 0.68 MG/DL (ref 0.76–1.27)
D-LACTATE SERPL-SCNC: 1.4 MMOL/L (ref 0.5–2)
DEPRECATED RDW RBC AUTO: 45.7 FL (ref 37–54)
EGFRCR SERPLBLD CKD-EPI 2021: 98.1 ML/MIN/1.73
EOSINOPHIL # BLD AUTO: 0.01 10*3/MM3 (ref 0–0.4)
EOSINOPHIL NFR BLD AUTO: 0.1 % (ref 0.3–6.2)
ERYTHROCYTE [DISTWIDTH] IN BLOOD BY AUTOMATED COUNT: 13.9 % (ref 12.3–15.4)
FLUAV SUBTYP SPEC NAA+PROBE: NOT DETECTED
FLUBV RNA ISLT QL NAA+PROBE: NOT DETECTED
GAS FLOW AIRWAY: 3 LPM
GLOBULIN UR ELPH-MCNC: 3.4 GM/DL
GLUCOSE SERPL-MCNC: 125 MG/DL (ref 65–99)
HCO3 BLDA-SCNC: 30.4 MMOL/L (ref 22–26)
HCT VFR BLD AUTO: 57.3 % (ref 37.5–51)
HCT VFR BLD CALC: 67 % (ref 38–51)
HEMODILUTION: NO
HGB BLD-MCNC: 19.2 G/DL (ref 13–17.7)
HGB BLDA-MCNC: 22.6 G/DL (ref 12–18)
HOLD SPECIMEN: NORMAL
HOLD SPECIMEN: NORMAL
IMM GRANULOCYTES # BLD AUTO: 0.02 10*3/MM3 (ref 0–0.05)
IMM GRANULOCYTES NFR BLD AUTO: 0.2 % (ref 0–0.5)
INHALED O2 CONCENTRATION: 32 %
LYMPHOCYTES # BLD AUTO: 1.03 10*3/MM3 (ref 0.7–3.1)
LYMPHOCYTES NFR BLD AUTO: 11 % (ref 19.6–45.3)
Lab: ABNORMAL
MCH RBC QN AUTO: 30.2 PG (ref 26.6–33)
MCHC RBC AUTO-ENTMCNC: 33.5 G/DL (ref 31.5–35.7)
MCV RBC AUTO: 90.1 FL (ref 79–97)
MODALITY: ABNORMAL
MONOCYTES # BLD AUTO: 0.67 10*3/MM3 (ref 0.1–0.9)
MONOCYTES NFR BLD AUTO: 7.2 % (ref 5–12)
NEUTROPHILS NFR BLD AUTO: 7.6 10*3/MM3 (ref 1.7–7)
NEUTROPHILS NFR BLD AUTO: 81.1 % (ref 42.7–76)
NOTIFIED WHO: ABNORMAL
NRBC BLD AUTO-RTO: 0 /100 WBC (ref 0–0.2)
NT-PROBNP SERPL-MCNC: 83.5 PG/ML (ref 0–900)
PCO2 BLDA: 68.6 MM HG (ref 35–45)
PH BLDA: 7.25 PH UNITS (ref 7.35–7.45)
PLATELET # BLD AUTO: 176 10*3/MM3 (ref 140–450)
PMV BLD AUTO: 10.8 FL (ref 6–12)
PO2 BLD: 204 MM[HG] (ref 0–500)
PO2 BLDA: 65.2 MM HG (ref 80–100)
POTASSIUM SERPL-SCNC: 4.9 MMOL/L (ref 3.5–5.2)
PROT SERPL-MCNC: 7.9 G/DL (ref 6–8.5)
RBC # BLD AUTO: 6.36 10*6/MM3 (ref 4.14–5.8)
READ BACK: YES
RSV RNA NPH QL NAA+NON-PROBE: NOT DETECTED
SAO2 % BLDCOA: 88 % (ref 95–99)
SARS-COV-2 RNA RESP QL NAA+PROBE: NOT DETECTED
SODIUM SERPL-SCNC: 137 MMOL/L (ref 136–145)
TROPONIN T SERPL HS-MCNC: 12 NG/L
WBC NRBC COR # BLD AUTO: 9.37 10*3/MM3 (ref 3.4–10.8)
WHOLE BLOOD HOLD COAG: NORMAL
WHOLE BLOOD HOLD SPECIMEN: NORMAL

## 2024-07-11 PROCEDURE — 94799 UNLISTED PULMONARY SVC/PX: CPT

## 2024-07-11 PROCEDURE — 93005 ELECTROCARDIOGRAM TRACING: CPT

## 2024-07-11 PROCEDURE — 25010000002 METHYLPREDNISOLONE PER 125 MG: Performed by: INTERNAL MEDICINE

## 2024-07-11 PROCEDURE — 85025 COMPLETE CBC W/AUTO DIFF WBC: CPT | Performed by: EMERGENCY MEDICINE

## 2024-07-11 PROCEDURE — 94761 N-INVAS EAR/PLS OXIMETRY MLT: CPT

## 2024-07-11 PROCEDURE — 83880 ASSAY OF NATRIURETIC PEPTIDE: CPT | Performed by: EMERGENCY MEDICINE

## 2024-07-11 PROCEDURE — 87040 BLOOD CULTURE FOR BACTERIA: CPT

## 2024-07-11 PROCEDURE — 87637 SARSCOV2&INF A&B&RSV AMP PRB: CPT | Performed by: EMERGENCY MEDICINE

## 2024-07-11 PROCEDURE — 93010 ELECTROCARDIOGRAM REPORT: CPT | Performed by: INTERNAL MEDICINE

## 2024-07-11 PROCEDURE — 80053 COMPREHEN METABOLIC PANEL: CPT | Performed by: EMERGENCY MEDICINE

## 2024-07-11 PROCEDURE — 94660 CPAP INITIATION&MGMT: CPT

## 2024-07-11 PROCEDURE — 87040 BLOOD CULTURE FOR BACTERIA: CPT | Performed by: EMERGENCY MEDICINE

## 2024-07-11 PROCEDURE — 71045 X-RAY EXAM CHEST 1 VIEW: CPT

## 2024-07-11 PROCEDURE — 82803 BLOOD GASES ANY COMBINATION: CPT

## 2024-07-11 PROCEDURE — 84484 ASSAY OF TROPONIN QUANT: CPT | Performed by: EMERGENCY MEDICINE

## 2024-07-11 PROCEDURE — 99291 CRITICAL CARE FIRST HOUR: CPT

## 2024-07-11 PROCEDURE — 94640 AIRWAY INHALATION TREATMENT: CPT

## 2024-07-11 PROCEDURE — 94664 DEMO&/EVAL PT USE INHALER: CPT

## 2024-07-11 PROCEDURE — 36415 COLL VENOUS BLD VENIPUNCTURE: CPT

## 2024-07-11 PROCEDURE — 36600 WITHDRAWAL OF ARTERIAL BLOOD: CPT

## 2024-07-11 PROCEDURE — 25010000002 METHYLPREDNISOLONE PER 125 MG: Performed by: EMERGENCY MEDICINE

## 2024-07-11 PROCEDURE — 83605 ASSAY OF LACTIC ACID: CPT | Performed by: EMERGENCY MEDICINE

## 2024-07-11 PROCEDURE — 99223 1ST HOSP IP/OBS HIGH 75: CPT | Performed by: INTERNAL MEDICINE

## 2024-07-11 PROCEDURE — 93005 ELECTROCARDIOGRAM TRACING: CPT | Performed by: EMERGENCY MEDICINE

## 2024-07-11 RX ORDER — AMOXICILLIN 250 MG
2 CAPSULE ORAL 2 TIMES DAILY PRN
Status: DISCONTINUED | OUTPATIENT
Start: 2024-07-11 | End: 2024-07-21 | Stop reason: HOSPADM

## 2024-07-11 RX ORDER — NITROGLYCERIN 0.4 MG/1
0.4 TABLET SUBLINGUAL
Status: DISCONTINUED | OUTPATIENT
Start: 2024-07-11 | End: 2024-07-21 | Stop reason: HOSPADM

## 2024-07-11 RX ORDER — SODIUM CHLORIDE 0.9 % (FLUSH) 0.9 %
10 SYRINGE (ML) INJECTION AS NEEDED
Status: DISCONTINUED | OUTPATIENT
Start: 2024-07-11 | End: 2024-07-11

## 2024-07-11 RX ORDER — SODIUM CHLORIDE 0.9 % (FLUSH) 0.9 %
10 SYRINGE (ML) INJECTION AS NEEDED
Status: DISCONTINUED | OUTPATIENT
Start: 2024-07-11 | End: 2024-07-21 | Stop reason: HOSPADM

## 2024-07-11 RX ORDER — ENOXAPARIN SODIUM 100 MG/ML
40 INJECTION SUBCUTANEOUS EVERY 24 HOURS
Status: DISCONTINUED | OUTPATIENT
Start: 2024-07-12 | End: 2024-07-21 | Stop reason: HOSPADM

## 2024-07-11 RX ORDER — ARFORMOTEROL TARTRATE 15 UG/2ML
15 SOLUTION RESPIRATORY (INHALATION)
Status: DISCONTINUED | OUTPATIENT
Start: 2024-07-11 | End: 2024-07-21 | Stop reason: HOSPADM

## 2024-07-11 RX ORDER — SODIUM CHLORIDE 9 MG/ML
40 INJECTION, SOLUTION INTRAVENOUS AS NEEDED
Status: DISCONTINUED | OUTPATIENT
Start: 2024-07-11 | End: 2024-07-21 | Stop reason: HOSPADM

## 2024-07-11 RX ORDER — SODIUM CHLORIDE 0.9 % (FLUSH) 0.9 %
10 SYRINGE (ML) INJECTION EVERY 12 HOURS SCHEDULED
Status: DISCONTINUED | OUTPATIENT
Start: 2024-07-11 | End: 2024-07-21 | Stop reason: HOSPADM

## 2024-07-11 RX ORDER — NICOTINE 21 MG/24HR
1 PATCH, TRANSDERMAL 24 HOURS TRANSDERMAL EVERY 24 HOURS
Status: DISCONTINUED | OUTPATIENT
Start: 2024-07-11 | End: 2024-07-14

## 2024-07-11 RX ORDER — ALBUTEROL SULFATE 2.5 MG/3ML
7.5 SOLUTION RESPIRATORY (INHALATION) CONTINUOUS
Status: DISCONTINUED | OUTPATIENT
Start: 2024-07-11 | End: 2024-07-12

## 2024-07-11 RX ORDER — ONDANSETRON 4 MG/1
4 TABLET, ORALLY DISINTEGRATING ORAL EVERY 6 HOURS PRN
Status: DISCONTINUED | OUTPATIENT
Start: 2024-07-11 | End: 2024-07-21 | Stop reason: HOSPADM

## 2024-07-11 RX ORDER — ASPIRIN 81 MG/1
81 TABLET, CHEWABLE ORAL DAILY
Status: DISCONTINUED | OUTPATIENT
Start: 2024-07-11 | End: 2024-07-21 | Stop reason: HOSPADM

## 2024-07-11 RX ORDER — BISACODYL 10 MG
10 SUPPOSITORY, RECTAL RECTAL DAILY PRN
Status: DISCONTINUED | OUTPATIENT
Start: 2024-07-11 | End: 2024-07-21 | Stop reason: HOSPADM

## 2024-07-11 RX ORDER — CALCIUM CARBONATE 500 MG/1
1 TABLET, CHEWABLE ORAL 2 TIMES DAILY PRN
Status: DISCONTINUED | OUTPATIENT
Start: 2024-07-11 | End: 2024-07-21 | Stop reason: HOSPADM

## 2024-07-11 RX ORDER — POLYETHYLENE GLYCOL 3350 17 G/17G
17 POWDER, FOR SOLUTION ORAL DAILY PRN
Status: DISCONTINUED | OUTPATIENT
Start: 2024-07-11 | End: 2024-07-21 | Stop reason: HOSPADM

## 2024-07-11 RX ORDER — IPRATROPIUM BROMIDE AND ALBUTEROL SULFATE 2.5; .5 MG/3ML; MG/3ML
3 SOLUTION RESPIRATORY (INHALATION) EVERY 4 HOURS PRN
Status: DISCONTINUED | OUTPATIENT
Start: 2024-07-11 | End: 2024-07-21 | Stop reason: HOSPADM

## 2024-07-11 RX ORDER — BISACODYL 5 MG/1
5 TABLET, DELAYED RELEASE ORAL DAILY PRN
Status: DISCONTINUED | OUTPATIENT
Start: 2024-07-11 | End: 2024-07-21 | Stop reason: HOSPADM

## 2024-07-11 RX ORDER — BUDESONIDE 0.5 MG/2ML
0.5 INHALANT ORAL
Status: DISCONTINUED | OUTPATIENT
Start: 2024-07-11 | End: 2024-07-21 | Stop reason: HOSPADM

## 2024-07-11 RX ORDER — POLYETHYLENE GLYCOL 3350 17 G
4 POWDER IN PACKET (EA) ORAL
Status: DISCONTINUED | OUTPATIENT
Start: 2024-07-11 | End: 2024-07-21 | Stop reason: HOSPADM

## 2024-07-11 RX ADMIN — ALBUTEROL SULFATE 7.5 MG: 2.5 SOLUTION RESPIRATORY (INHALATION) at 13:38

## 2024-07-11 RX ADMIN — WATER 60 MG: 1 INJECTION INTRAMUSCULAR; INTRAVENOUS; SUBCUTANEOUS at 21:34

## 2024-07-11 RX ADMIN — METHYLPREDNISOLONE SODIUM SUCCINATE 125 MG: 125 INJECTION INTRAMUSCULAR; INTRAVENOUS at 14:31

## 2024-07-11 RX ADMIN — BUDESONIDE 0.5 MG: 0.5 INHALANT ORAL at 19:22

## 2024-07-11 RX ADMIN — ASPIRIN 81 MG: 81 TABLET, CHEWABLE ORAL at 21:34

## 2024-07-11 RX ADMIN — ARFORMOTEROL TARTRATE 15 MCG: 15 SOLUTION RESPIRATORY (INHALATION) at 19:22

## 2024-07-11 RX ADMIN — NICOTINE 1 PATCH: 21 PATCH, EXTENDED RELEASE TRANSDERMAL at 21:33

## 2024-07-11 NOTE — ED PROVIDER NOTES
Time: 1:14 PM EDT  Date of encounter:  7/11/2024  Independent Historian/Clinical History and Information was obtained by:   Patient    History is limited by: N/A    Chief Complaint: Dyspnea      History of Present Illness:  Patient is a 74 y.o. year old male who presents to the emergency department for evaluation of dyspnea and cough this gotten worse.  Patient denies fever and chills.  Patient has no fever or chills.  Patient denies nausea and vomiting.  Patient has no leg pain or swelling.    HPI    Patient Care Team  Primary Care Provider: Liz Roper DO    Past Medical History:     No Known Allergies  Past Medical History:   Diagnosis Date    Arthritis     COPD (chronic obstructive pulmonary disease)     Essential hypertension 07/12/2021    Forgetfulness     Heart flutter, ventricular     High cholesterol     Lung cancer     Prostate cancer     Ringing in ear     Shortness of Air     SOB (shortness of breath)      Past Surgical History:   Procedure Laterality Date    APPENDECTOMY      COLONOSCOPY  06/01/2019    COLONOSCOPY N/A 6/14/2023    Procedure: COLONOSCOPY;  Surgeon: Geoffrey Carey MD;  Location: [facility] ENDOSCOPY;  Service: General;  Laterality: N/A;  NORMAL    LUNG BIOPSY      PROSTATECTOMY      TONSILLECTOMY  1969    Per PT     Family History   Problem Relation Age of Onset    Ovarian cancer Mother     Stroke Father     Breast cancer Sister 40    Cancer Maternal Grandmother     Cancer Paternal Grandmother        Home Medications:  Prior to Admission medications    Medication Sig Start Date End Date Taking? Authorizing Provider   albuterol (PROVENTIL) (2.5 MG/3ML) 0.083% nebulizer solution Take 2.5 mg by nebulization Every 4 (Four) Hours As Needed for Wheezing. 11/29/21   Liz Roper DO   albuterol sulfate  (90 Base) MCG/ACT inhaler Inhale 2 puffs Every 4 (Four) Hours As Needed for Wheezing or Shortness of Air. 8/3/23   Renetta Garcia APRN   amLODIPine (NORVASC) 10 MG tablet Take 1 tablet by  "mouth Daily. 1/11/24   Liz Roper DO   aspirin 81 MG chewable tablet Chew 1 tablet Daily.    Provider, Historical, MD   Fluticasone-Umeclidin-Vilant (Trelegy Ellipta) 200-62.5-25 MCG/ACT aerosol powder  Inhale 200 mcg Daily.  Patient taking differently: Inhale 200 mcg Daily. PRN 8/15/23   Liz Roper DO   guaiFENesin (Mucinex) 600 MG 12 hr tablet Take 2 tablets by mouth 2 (Two) Times a Day.  Patient not taking: Reported on 6/20/2024 5/14/24   Renetta Garcia APRN   Multiple Vitamins-Minerals (MULTIVITAMIN GUMMIES ADULTS PO) Take 1 tablet by mouth Daily. 1 gummie/day    Provider, Historical, MD        Social History:   Social History     Tobacco Use    Smoking status: Every Day     Current packs/day: 1.00     Average packs/day: 1 pack/day for 65.5 years (65.5 ttl pk-yrs)     Types: Cigarettes     Start date: 1959     Passive exposure: Current    Smokeless tobacco: Never    Tobacco comments:     States that he is cutting back and is smoking less than 1ppd.   Vaping Use    Vaping status: Never Used   Substance Use Topics    Alcohol use: Never    Drug use: Defer         Review of Systems:  Review of Systems   Constitutional:  Negative for chills and fever.   HENT:  Negative for congestion, rhinorrhea and sore throat.    Eyes:  Negative for pain and visual disturbance.   Respiratory:  Positive for cough and shortness of breath. Negative for apnea and chest tightness.    Cardiovascular:  Negative for chest pain and palpitations.   Gastrointestinal:  Negative for abdominal pain, diarrhea, nausea and vomiting.   Genitourinary:  Negative for difficulty urinating and dysuria.   Musculoskeletal:  Negative for joint swelling and myalgias.   Skin:  Negative for color change.   Neurological:  Negative for seizures and headaches.   Psychiatric/Behavioral: Negative.     All other systems reviewed and are negative.       Physical Exam:  /87   Pulse 81   Temp 98.1 °F (36.7 °C) (Oral)   Resp 24   Ht 177.8 cm (70\")   Wt " 63.3 kg (139 lb 8.8 oz)   SpO2 94%   BMI 20.02 kg/m²     Physical Exam  Vitals and nursing note reviewed.   Constitutional:       General: He is not in acute distress.     Appearance: Normal appearance. He is not toxic-appearing.   HENT:      Head: Normocephalic and atraumatic.      Jaw: There is normal jaw occlusion.   Eyes:      General: Lids are normal.      Extraocular Movements: Extraocular movements intact.      Conjunctiva/sclera: Conjunctivae normal.      Pupils: Pupils are equal, round, and reactive to light.   Cardiovascular:      Rate and Rhythm: Normal rate and regular rhythm.      Pulses: Normal pulses.      Heart sounds: Normal heart sounds.   Pulmonary:      Effort: Pulmonary effort is normal. No respiratory distress.      Breath sounds: Wheezing present. No rhonchi.   Abdominal:      General: Abdomen is flat.      Palpations: Abdomen is soft.      Tenderness: There is no abdominal tenderness. There is no guarding or rebound.   Musculoskeletal:         General: Normal range of motion.      Cervical back: Normal range of motion and neck supple.      Right lower leg: No edema.      Left lower leg: No edema.   Skin:     General: Skin is warm and dry.   Neurological:      Mental Status: He is alert and oriented to person, place, and time. Mental status is at baseline.   Psychiatric:         Mood and Affect: Mood normal.                  Procedures:  Procedures      Medical Decision Making:      Comorbidities that affect care:    COPD, Hypertension    External Notes reviewed:    Previous Clinic Note: Was last seen for COPD      The following orders were placed and all results were independently analyzed by me:  Orders Placed This Encounter   Procedures    Blood Culture - Blood,    Blood Culture - Blood,    COVID PRE-OP / PRE-PROCEDURE SCREENING ORDER (NO ISOLATION) - Swab, Nasopharynx    COVID-19, FLU A/B, RSV PCR 1 HR TAT - Swab, Nasopharynx    XR Chest 1 View    [place] Draw    Comprehensive Metabolic  Panel    BNP    Single High Sensitivity Troponin T    CBC Auto Differential    Lactic Acid, Plasma    Blood Gas, Arterial -    Blood Gas, Arterial -    NPO Diet NPO Type: Strict NPO    Undress & Gown    Vital Signs    Undress & Gown    Continuous Pulse Oximetry    Vital Signs    Code Status and Medical Interventions:    Inpatient Hospitalist Consult    Oxygen Therapy- Nasal Cannula; Titrate 1-6 LPM Per SpO2; 90 - 95%    NIPPV (CPAP or BIPAP)    ECG 12 Lead ED Triage Standing Order; SOA    Insert Peripheral IV    Insert Peripheral IV    Inpatient Admission    CBC & Differential    Green Top (Gel)    Lavender Top    Gold Top - SST    Light Blue Top       Medications Given in the Emergency Department:  Medications   sodium chloride 0.9 % flush 10 mL (has no administration in time range)   albuterol (PROVENTIL) nebulizer solution 0.083% 2.5 mg/3mL (7.5 mg Nebulization New Bag 7/11/24 1338)   methylPREDNISolone sodium succinate (SOLU-Medrol) 125 mg in sterile water (preservative free) 2 mL (125 mg Intravenous Given 7/11/24 1431)        ED Course:         Labs:    Lab Results (last 24 hours)       Procedure Component Value Units Date/Time    CBC & Differential [[id number]]  (Abnormal) Collected: 07/11/24 1149    Specimen: Blood from Arm, Right Updated: 07/11/24 1158    Narrative:      The following orders were created for panel order CBC & Differential.  Procedure                               Abnormality         Status                     ---------                               -----------         ------                     CBC Auto Differential[[id number]]        Abnormal            Final result                 Please view results for these tests on the individual orders.    Comprehensive Metabolic Panel [[id number]]  (Abnormal) Collected: 07/11/24 1149    Specimen: Blood from Arm, Right Updated: 07/11/24 1217     Glucose 125 mg/dL      BUN 11 mg/dL      Creatinine 0.68 mg/dL      Sodium 137 mmol/L      Potassium 4.9  mmol/L      Chloride 97 mmol/L      CO2 27.8 mmol/L      Calcium 9.7 mg/dL      Total Protein 7.9 g/dL      Albumin 4.5 g/dL      ALT (SGPT) 15 U/L      AST (SGOT) 18 U/L      Alkaline Phosphatase 112 U/L      Total Bilirubin 0.3 mg/dL      Globulin 3.4 gm/dL      A/G Ratio 1.3 g/dL      BUN/Creatinine Ratio 16.2     Anion Gap 12.2 mmol/L      eGFR 98.1 mL/min/1.73     Narrative:      GFR Normal >60  Chronic Kidney Disease <60  Kidney Failure <15    The GFR formula is only valid for adults with stable renal function between ages 18 and 70.    BNP [[id number]]  (Normal) Collected: 07/11/24 1149    Specimen: Blood from Arm, Right Updated: 07/11/24 1215     proBNP 83.5 pg/mL     Narrative:      This assay is used as an aid in the diagnosis of individuals suspected of having heart failure. It can be used as an aid in the diagnosis of acute decompensated heart failure (ADHF) in patients presenting with signs and symptoms of ADHF to the emergency department (ED). In addition, NT-proBNP of <300 pg/mL indicates ADHF is not likely.    Age Range Result Interpretation  NT-proBNP Concentration (pg/mL:      <50             Positive            >450                   Gray                 300-450                    Negative             <300    50-75           Positive            >900                  Gray                300-900                  Negative            <300      >75             Positive            >1800                  Gray                300-1800                  Negative            <300    Single High Sensitivity Troponin T [[id number]]  (Normal) Collected: 07/11/24 1149    Specimen: Blood from Arm, Right Updated: 07/11/24 1217     HS Troponin T 12 ng/L     Narrative:      High Sensitive Troponin T Reference Range:  <14.0 ng/L- Negative Female for AMI  <22.0 ng/L- Negative Male for AMI  >=14 - Abnormal Female indicating possible myocardial injury.  >=22 - Abnormal Male indicating possible myocardial injury.    Clinicians would have to utilize clinical acumen, EKG, Troponin, and serial changes to determine if it is an Acute Myocardial Infarction or myocardial injury due to an underlying chronic condition.         CBC Auto Differential [[id number]]  (Abnormal) Collected: 07/11/24 1149    Specimen: Blood from Arm, Right Updated: 07/11/24 1158     WBC 9.37 10*3/mm3      RBC 6.36 10*6/mm3      Hemoglobin 19.2 g/dL      Hematocrit 57.3 %      MCV 90.1 fL      MCH 30.2 pg      MCHC 33.5 g/dL      RDW 13.9 %      RDW-SD 45.7 fl      MPV 10.8 fL      Platelets 176 10*3/mm3      Neutrophil % 81.1 %      Lymphocyte % 11.0 %      Monocyte % 7.2 %      Eosinophil % 0.1 %      Basophil % 0.4 %      Immature Grans % 0.2 %      Neutrophils, Absolute 7.60 10*3/mm3      Lymphocytes, Absolute 1.03 10*3/mm3      Monocytes, Absolute 0.67 10*3/mm3      Eosinophils, Absolute 0.01 10*3/mm3      Basophils, Absolute 0.04 10*3/mm3      Immature Grans, Absolute 0.02 10*3/mm3      nRBC 0.0 /100 WBC     Lactic Acid, Plasma [[id number]]  (Normal) Collected: 07/11/24 1149    Specimen: Blood from Arm, Right Updated: 07/11/24 1215     Lactate 1.4 mmol/L     Blood Culture - Blood, Arm, Right [[id number]] Collected: 07/11/24 1149    Specimen: Blood from Arm, Right Updated: 07/11/24 1155    COVID PRE-OP / PRE-PROCEDURE SCREENING ORDER (NO ISOLATION) - Swab, Nasopharynx [[id number]]  (Normal) Collected: 07/11/24 1313    Specimen: Swab from Nasopharynx Updated: 07/11/24 1400    Narrative:      The following orders were created for panel order COVID PRE-OP / PRE-PROCEDURE SCREENING ORDER (NO ISOLATION) - Swab, Nasopharynx.  Procedure                               Abnormality         Status                     ---------                               -----------         ------                     COVID-19, FLU A/B, RSV P...[[id number]]  Normal              Final result                 Please view results for these tests on the individual orders.    COVID-19, FLU  A/B, RSV PCR 1 HR TAT - Swab, Nasopharynx [[id number]]  (Normal) Collected: 07/11/24 1313    Specimen: Swab from Nasopharynx Updated: 07/11/24 1400     COVID19 Not Detected     Influenza A PCR Not Detected     Influenza B PCR Not Detected     RSV, PCR Not Detected    Narrative:      Fact sheet for providers: https://www.fda.gov/media/544625/download    Fact sheet for patients: https://www.fda.gov/media/955475/download    Test performed by PCR.    Blood Culture - Blood, Arm, Left [[id number]] Collected: 07/11/24 1329    Specimen: Blood from Arm, Left Updated: 07/11/24 1334    Blood Gas, Arterial - [[id number]]  (Abnormal) Collected: 07/11/24 1428    Specimen: Arterial Blood Updated: 07/11/24 1431     Site Right Radial     Hieu's Test Positive     pH, Arterial 7.255 pH units      pCO2, Arterial 68.6 mm Hg      pO2, Arterial 65.2 mm Hg      HCO3, Arterial 30.4 mmol/L      Base Excess, Arterial -0.1 mmol/L      Comment: Serial Number: [id number]:  [id number]        O2 Saturation, Arterial 88.0 %      Hemoglobin, Blood Gas 22.6 g/dL      Hematocrit, Blood Gas 67.0 %      Barometric Pressure for Blood Gas 745.0000 mmHg      Modality Cannula     FIO2 32 %      Flow Rate 3.0000 lpm      Notified Who Dr. Guardado     Read Back Yes     Notified Time --     Hemodilution No     PO2/FIO2 204             Imaging:    XR Chest 1 View    Result Date: 7/11/2024  XR CHEST 1 VW Date of Exam: 7/11/2024 11:45 AM EDT Indication: SOA Triage Protocol Comparison 12/7/2023.. Findings: Lung fields remain moderately severely hyperinflated. Mildly prominent interstitial pattern appears stable and chronic. Heart size is normal. There are no acute infiltrates or effusions.     Impression: Evidence of emphysema. No acute infiltrate. Electronically Signed: Angeline Hyde MD  7/11/2024 12:00 PM EDT  Workstation ID: [id number]       Differential Diagnosis and Discussion:    Dyspnea: Differential diagnosis includes but is not limited to metabolic acidosis,  neurological disorders, psychogenic, asthma, pneumothorax, upper airway obstruction, COPD, pneumonia, noncardiogenic pulmonary edema, interstitial lung disease, anemia, congestive heart failure, and pulmonary embolism    All labs were reviewed and interpreted by me.  All X-rays impressions were independently interpreted by me.  EKG was interpreted by me.    MDM     The patient´s CBC that was reviewed and interpreted by me shows no abnormalities of critical concern. Of note, there is no anemia requiring a blood transfusion and the platelet count is acceptable.  The patient´s CMP that was reviewed and interpretted by me shows no abnormalities of critical concern. Of note, the patient´s sodium and potassium are acceptable. The patient´s liver enzymes are unremarkable. The patient´s renal function (creatinine) is preserved. The patient has a normal anion gap.  Patient was given an hour-long treatment.  Patient was then placed on BiPAP.  ABG shows a critical pH and pCO2.    Patient was placed on the cardiac monitor after given albuterol.  They were monitored for ventricular ectopy, arrhythmia, tachycardia, hypoxia, and changes in blood pressure.  Patient was rechecked several times throughout their stay.  Critical Care Note: Total Critical Care time of 50 minutes. Total critical care time documented does not include time spent on separately billed procedures for services of nurses or physician assistants. I personally saw and examined the patient. I have reviewed all diagnostic interpretations and treatment plans as written. I was present for the key portions of any procedures performed and the inclusive time noted in any critical care statement. Critical care time includes patient management by me, time spent at the patients bedside,  time to review lab and imaging results, discussing patient care, documentation in the medical record, and time spent with family or caregiver.        Patient Care  Considerations:    None      Consultants/Shared Management Plan:    This was discussed with Dr. Pineda who agrees with admission.    Social Determinants of Health:    Patient is independent, reliable, and has access to care.       Disposition and Care Coordination:    Admit:   Through independent evaluation of the patient's history, physical, and imperical data, the patient meets criteria for inpatient admission to the hospital.        Final diagnoses:   COPD exacerbation        ED Disposition       ED Disposition   Decision to Admit    Condition   --    Comment   Level of Care: Telemetry [5]   Diagnosis: COPD (chronic obstructive pulmonary disease) [492916]   Admitting Physician: WILIAN PINEDA [[id number]]   Attending Physician: WILIAN PINEDA [[id number]]   Certification: I Certify That Inpatient Hospital Services Are Medically Necessary For Greater Than 2 Midnights                 This medical record created using voice recognition software.             Javier Clayton MD  07/11/24 0159

## 2024-07-11 NOTE — H&P
[facility]   HOSPITALIST HISTORY AND PHYSICAL  Date: 2024   Patient Name: Colin Nevarez  : 1950  MRN: [id number]  Primary Care Physician:  Liz Roper DO  Date of admission: 2024    Subjective   Subjective     Chief Complaint:   Shortness of breath    HPI:    Colin Nevarez is a 74 y.o. male with a past medical history of COPD, hypertension, hyperlipidemia, lung cancer, and prostate cancer    Patient presents the emergency department on 2024 for complaints of worsening shortness of breath.  In the room patient tripoding with accessory muscle use, having conversational dyspnea.  In the ED patient found to have acute hypercapnic hypoxic respiratory failure.  Patient initially saturating in the 70s on room air, started on 3 L nasal cannula with improvement in O2.  ABG shows patient hypercapnic with a pCO2 of 68.6, pH of 7.255.  Patient has a hemoglobin of 19.2.  Patient's chest x-ray shows evidence of emphysema with no acute infiltrate.  Initially on meeting patient, he is apprehensive to be admitted.  Stating he has too much work to do at home, refusing to be admitted.  ED physician and internal medicine doctor discussed extensively with patient, neighbors who brought patient into the hospital, as well as son over the phone.  Patient eventually agreed to admission in the hospital.  States he will only stay the night, expecting be discharged in the morning.  Patient was informed it is unlikely his respiratory issues will be completely resolved by morning.  Patient is ongoing smoker, 1 pack/day.      Personal History     Past Medical History:  Past Medical History:   Diagnosis Date    Arthritis     COPD (chronic obstructive pulmonary disease)     Essential hypertension 2021    Forgetfulness     Heart flutter, ventricular     High cholesterol     Lung cancer     Prostate cancer     Ringing in ear     Shortness of Air     SOB (shortness of breath)        Past Surgical History:  Past  Surgical History:   Procedure Laterality Date    APPENDECTOMY      COLONOSCOPY  06/01/2019    COLONOSCOPY N/A 6/14/2023    Procedure: COLONOSCOPY;  Surgeon: Geoffrey Carey MD;  Location: [facility] ENDOSCOPY;  Service: General;  Laterality: N/A;  NORMAL    LUNG BIOPSY      PROSTATECTOMY      TONSILLECTOMY  1969    Per PT       Family History:   Family History   Problem Relation Age of Onset    Ovarian cancer Mother     Stroke Father     Breast cancer Sister 40    Cancer Maternal Grandmother     Cancer Paternal Grandmother        Social History:   Social History     Socioeconomic History    Marital status: Single   Tobacco Use    Smoking status: Every Day     Current packs/day: 1.00     Average packs/day: 1 pack/day for 65.5 years (65.5 ttl pk-yrs)     Types: Cigarettes     Start date: 1959     Passive exposure: Current    Smokeless tobacco: Never    Tobacco comments:     States that he is cutting back and is smoking less than 1ppd.   Vaping Use    Vaping status: Never Used   Substance and Sexual Activity    Alcohol use: Never    Drug use: Defer    Sexual activity: Defer       Home Medications:  Fluticasone-Umeclidin-Vilant, Multiple Vitamins-Minerals, albuterol, albuterol sulfate HFA, amLODIPine, and aspirin    Allergies:  No Known Allergies        Objective   Objective     Vitals:   Temp:  [98.1 °F (36.7 °C)] 98.1 °F (36.7 °C)  Heart Rate:  [76-91] 82  Resp:  [22-26] 24  BP: (112-144)/(61-88) 140/64  Flow (L/min):  [3-4] 3    Physical Exam    Constitutional: Awake, alert, increased work of breathing, tripoding, flushed face, conversational dyspnea   Eyes: Pupils equal, sclerae anicteric, no conjunctival injection   HENT: NCAT, mucous membranes moist   Neck: Supple, no thyromegaly, no lymphadenopathy, trachea midline   Respiratory: Diminished breath sounds heard throughout, prolonged expiratory phase with end expiratory wheezing   Cardiovascular: RRR, no murmurs, rubs, or gallops, palpable pedal pulses  bilaterally   Gastrointestinal: Positive bowel sounds, soft, nontender, nondistended   Musculoskeletal: No bilateral ankle edema, no clubbing or cyanosis to extremities   Neurologic: Oriented x 3, strength symmetric in all extremities, Cranial Nerves grossly intact to confrontation, speech clear    Result Review    Result Review:  I have personally reviewed the results from the time of this admission to 7/11/2024 18:27 EDT and agree with these findings:  []  Laboratory  []  Microbiology  []  Radiology  []  EKG/Telemetry   []  Cardiology/Vascular   []  Pathology  []  Old records  []  Other:      Assessment & Plan   Assessment / Plan     Assessment/Plan:   Acute hypoxic hypercapnic respiratory failure  COPD in acute exacerbation  History of squamous cell carcinoma of the right lung  Ongoing tobacco abuse, 1 pack/day  Hypertension  Hyperlipidemia  History of prostate cancer    Plan:  Patient will be admitted to hospital for further care and management  Given patient's hypercapnic respiratory acidosis, started on BiPAP, will try and continue through the night as tolerated  Will repeat ABG in the morning  Patient started with IV steroids, will continue with IV Solu-Medrol through the night  Scheduled and as needed breathing treatments  Incentive spirometer/flutter valve  NRT ordered  No leukocytosis, chest x-ray clear, no indications for antibiotics at this time  Discussed extensively with patient at bedside with emergency room physician.  Patient very high risk for decompensation outside hospital.  Originally patient was stating he wanted to go home.  Able to work with patient, his neighbors who brought him to the hospital as well as family over the phone to convince patient to stay in the hospital.  Patient still very adamant he would not be staying in the hospital past tomorrow morning.  Patient is aware it is unlikely he will clinically improved to the point of safely discharging in the morning.    VTE  Prophylaxis:  Pharmacologic VTE prophylaxis orders are present.    CODE STATUS:    Medical Intervention Limits: No intubation (DNI)  Code Status (Patient has no pulse and is not breathing): No CPR (Do Not Attempt to Resuscitate)  Medical Interventions (Patient has pulse or is breathing): Limited Support    Admission Status:  I believe this patient meets inpatient status.    Electronically signed by Orlin Harry MD, 07/11/24, 4:14 PM EDT.

## 2024-07-11 NOTE — TELEPHONE ENCOUNTER
Patient requested an antibiotic for pneumonia symptoms, has hx of right lung cancer and pneumonia, last OV 02/15/24,

## 2024-07-11 NOTE — TELEPHONE ENCOUNTER
Patient is calling to check the status of the above encounter hub provided effective turn around times for messages.

## 2024-07-11 NOTE — TELEPHONE ENCOUNTER
Called patient advised to go to urgent care or the emergency room. Patient verbalized understanding.

## 2024-07-12 LAB
ANION GAP SERPL CALCULATED.3IONS-SCNC: 8.8 MMOL/L (ref 5–15)
ARTERIAL PATENCY WRIST A: POSITIVE
ATMOSPHERIC PRESS: 743.7 MMHG
BASE EXCESS BLDA CALC-SCNC: 2.2 MMOL/L (ref -2–2)
BASOPHILS # BLD AUTO: 0 10*3/MM3 (ref 0–0.2)
BASOPHILS NFR BLD AUTO: 0 % (ref 0–1.5)
BDY SITE: ABNORMAL
BUN SERPL-MCNC: 17 MG/DL (ref 8–23)
BUN/CREAT SERPL: 29.3 (ref 7–25)
CALCIUM SPEC-SCNC: 9.1 MG/DL (ref 8.6–10.5)
CHLORIDE SERPL-SCNC: 98 MMOL/L (ref 98–107)
CO2 SERPL-SCNC: 27.2 MMOL/L (ref 22–29)
CREAT SERPL-MCNC: 0.58 MG/DL (ref 0.76–1.27)
DEPRECATED RDW RBC AUTO: 46.7 FL (ref 37–54)
EGFRCR SERPLBLD CKD-EPI 2021: 103 ML/MIN/1.73
EOSINOPHIL # BLD AUTO: 0 10*3/MM3 (ref 0–0.4)
EOSINOPHIL NFR BLD AUTO: 0 % (ref 0.3–6.2)
ERYTHROCYTE [DISTWIDTH] IN BLOOD BY AUTOMATED COUNT: 13.9 % (ref 12.3–15.4)
GLUCOSE SERPL-MCNC: 167 MG/DL (ref 65–99)
HCO3 BLDA-SCNC: 31.6 MMOL/L (ref 22–26)
HCT VFR BLD AUTO: 54.8 % (ref 37.5–51)
HCT VFR BLD CALC: 60 % (ref 38–51)
HEMODILUTION: NO
HGB BLD-MCNC: 17.8 G/DL (ref 13–17.7)
HGB BLDA-MCNC: 20.6 G/DL (ref 12–18)
IMM GRANULOCYTES # BLD AUTO: 0.02 10*3/MM3 (ref 0–0.05)
IMM GRANULOCYTES NFR BLD AUTO: 0.4 % (ref 0–0.5)
INHALED O2 CONCENTRATION: 35 %
L PNEUMO1 AG UR QL IA: NEGATIVE
LYMPHOCYTES # BLD AUTO: 0.48 10*3/MM3 (ref 0.7–3.1)
LYMPHOCYTES NFR BLD AUTO: 8.6 % (ref 19.6–45.3)
Lab: ABNORMAL
MAGNESIUM SERPL-MCNC: 2.1 MG/DL (ref 1.6–2.4)
MCH RBC QN AUTO: 29.9 PG (ref 26.6–33)
MCHC RBC AUTO-ENTMCNC: 32.5 G/DL (ref 31.5–35.7)
MCV RBC AUTO: 91.9 FL (ref 79–97)
MODALITY: ABNORMAL
MONOCYTES # BLD AUTO: 0.07 10*3/MM3 (ref 0.1–0.9)
MONOCYTES NFR BLD AUTO: 1.3 % (ref 5–12)
NEUTROPHILS NFR BLD AUTO: 5.03 10*3/MM3 (ref 1.7–7)
NEUTROPHILS NFR BLD AUTO: 89.7 % (ref 42.7–76)
NOTIFIED WHO: ABNORMAL
NRBC BLD AUTO-RTO: 0 /100 WBC (ref 0–0.2)
PCO2 BLDA: 64.4 MM HG (ref 35–45)
PEEP RESPIRATORY: 5 CM[H2O]
PH BLDA: 7.3 PH UNITS (ref 7.35–7.45)
PLATELET # BLD AUTO: 162 10*3/MM3 (ref 140–450)
PMV BLD AUTO: 10.8 FL (ref 6–12)
PO2 BLD: 247 MM[HG] (ref 0–500)
PO2 BLDA: 86.4 MM HG (ref 80–100)
POTASSIUM SERPL-SCNC: 4.8 MMOL/L (ref 3.5–5.2)
PROCALCITONIN SERPL-MCNC: 0.03 NG/ML (ref 0–0.25)
QT INTERVAL: 377 MS
QTC INTERVAL: 432 MS
RBC # BLD AUTO: 5.96 10*6/MM3 (ref 4.14–5.8)
READ BACK: YES
S PNEUM AG SPEC QL LA: NEGATIVE
SAO2 % BLDCOA: 95 % (ref 95–99)
SODIUM SERPL-SCNC: 134 MMOL/L (ref 136–145)
WBC NRBC COR # BLD AUTO: 5.6 10*3/MM3 (ref 3.4–10.8)

## 2024-07-12 PROCEDURE — 85025 COMPLETE CBC W/AUTO DIFF WBC: CPT | Performed by: INTERNAL MEDICINE

## 2024-07-12 PROCEDURE — 84145 PROCALCITONIN (PCT): CPT | Performed by: NURSE PRACTITIONER

## 2024-07-12 PROCEDURE — 82803 BLOOD GASES ANY COMBINATION: CPT | Performed by: INTERNAL MEDICINE

## 2024-07-12 PROCEDURE — 94799 UNLISTED PULMONARY SVC/PX: CPT

## 2024-07-12 PROCEDURE — 94761 N-INVAS EAR/PLS OXIMETRY MLT: CPT

## 2024-07-12 PROCEDURE — 36600 WITHDRAWAL OF ARTERIAL BLOOD: CPT | Performed by: INTERNAL MEDICINE

## 2024-07-12 PROCEDURE — 87449 NOS EACH ORGANISM AG IA: CPT | Performed by: NURSE PRACTITIONER

## 2024-07-12 PROCEDURE — 97165 OT EVAL LOW COMPLEX 30 MIN: CPT

## 2024-07-12 PROCEDURE — 87899 AGENT NOS ASSAY W/OPTIC: CPT | Performed by: NURSE PRACTITIONER

## 2024-07-12 PROCEDURE — 25010000002 METHYLPREDNISOLONE PER 125 MG: Performed by: INTERNAL MEDICINE

## 2024-07-12 PROCEDURE — 94664 DEMO&/EVAL PT USE INHALER: CPT

## 2024-07-12 PROCEDURE — 97161 PT EVAL LOW COMPLEX 20 MIN: CPT

## 2024-07-12 PROCEDURE — 25010000002 ENOXAPARIN PER 10 MG: Performed by: INTERNAL MEDICINE

## 2024-07-12 PROCEDURE — 94660 CPAP INITIATION&MGMT: CPT

## 2024-07-12 PROCEDURE — 99222 1ST HOSP IP/OBS MODERATE 55: CPT | Performed by: INTERNAL MEDICINE

## 2024-07-12 PROCEDURE — 80048 BASIC METABOLIC PNL TOTAL CA: CPT | Performed by: INTERNAL MEDICINE

## 2024-07-12 PROCEDURE — 83735 ASSAY OF MAGNESIUM: CPT | Performed by: INTERNAL MEDICINE

## 2024-07-12 PROCEDURE — 99232 SBSQ HOSP IP/OBS MODERATE 35: CPT | Performed by: INTERNAL MEDICINE

## 2024-07-12 RX ORDER — AZITHROMYCIN 250 MG/1
250 TABLET, FILM COATED ORAL
Status: COMPLETED | OUTPATIENT
Start: 2024-07-12 | End: 2024-07-16

## 2024-07-12 RX ORDER — AMLODIPINE BESYLATE 10 MG/1
10 TABLET ORAL DAILY
Status: DISCONTINUED | OUTPATIENT
Start: 2024-07-12 | End: 2024-07-21 | Stop reason: HOSPADM

## 2024-07-12 RX ADMIN — BUDESONIDE 0.5 MG: 0.5 INHALANT ORAL at 09:59

## 2024-07-12 RX ADMIN — BUDESONIDE 0.5 MG: 0.5 INHALANT ORAL at 19:24

## 2024-07-12 RX ADMIN — WATER 60 MG: 1 INJECTION INTRAMUSCULAR; INTRAVENOUS; SUBCUTANEOUS at 16:25

## 2024-07-12 RX ADMIN — ARFORMOTEROL TARTRATE 15 MCG: 15 SOLUTION RESPIRATORY (INHALATION) at 09:59

## 2024-07-12 RX ADMIN — WATER 60 MG: 1 INJECTION INTRAMUSCULAR; INTRAVENOUS; SUBCUTANEOUS at 03:50

## 2024-07-12 RX ADMIN — NICOTINE 1 PATCH: 21 PATCH, EXTENDED RELEASE TRANSDERMAL at 18:02

## 2024-07-12 RX ADMIN — ENOXAPARIN SODIUM 40 MG: 100 INJECTION SUBCUTANEOUS at 09:40

## 2024-07-12 RX ADMIN — ASPIRIN 81 MG: 81 TABLET, CHEWABLE ORAL at 09:39

## 2024-07-12 RX ADMIN — Medication 10 ML: at 21:05

## 2024-07-12 RX ADMIN — WATER 60 MG: 1 INJECTION INTRAMUSCULAR; INTRAVENOUS; SUBCUTANEOUS at 21:05

## 2024-07-12 RX ADMIN — ARFORMOTEROL TARTRATE 15 MCG: 15 SOLUTION RESPIRATORY (INHALATION) at 19:24

## 2024-07-12 RX ADMIN — AZITHROMYCIN DIHYDRATE 250 MG: 250 TABLET ORAL at 16:25

## 2024-07-12 RX ADMIN — WATER 60 MG: 1 INJECTION INTRAMUSCULAR; INTRAVENOUS; SUBCUTANEOUS at 09:39

## 2024-07-12 RX ADMIN — AMLODIPINE BESYLATE 10 MG: 10 TABLET ORAL at 18:01

## 2024-07-12 NOTE — PLAN OF CARE
Goal Outcome Evaluation:      VSS, Patient slept with BiPAP most the night. ABG this morning due. No new c/o

## 2024-07-12 NOTE — PLAN OF CARE
Goal Outcome Evaluation:  Plan of Care Reviewed With: patient        Progress: no change (Evaluation)  Outcome Evaluation: Patient presents with limitations of activity tolerance, and strength which are impacting ADL/mobility independence. Skilled OT is indicated to remediate/compensate for defecits to maximize independence and safety with functional tasks.      Anticipated Discharge Disposition (OT): home with home health

## 2024-07-12 NOTE — CONSULTS
PULMONARY CONSULT NOTE      [facility] 4TH FLOOR MEDICAL TELEMETRY UNIT  7/12/2024            Colin Nevarez  74 y.o. male  1950  [id number]            Requesting physician: No ref. provider found    Reason for Consultation:  COPD exacerbation    CC: Worsening shortness of breath    Subjective     History of Present Illness:  Colin Nevarez is a 74 y.o. 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  In the ED, patient was found to be hypoxic with O2 sats in the 70s on room air.  ABG revealed 7.25/68.6/65.2/30.4 and he was placed on NIPPV.  CXR revealed no acute infiltrate.  Patient is known to our services and because of the above our services was consulted for further evaluation and treatment.  Upon exam, patient is sitting up on side of bed tripoding with acute distress after just ambulating to and from the bathroom without oxygen.  Patient reports having worsening shortness of breath over the last several days.  Trouble lying flat.  He denies any fever, chills, chest pain, nausea, vomiting, or diarrhea.  He continues to smoke.  Does not use oxygen at home.    Review of Systems:    Review of Systems   Constitutional:  Positive for activity change.   Respiratory:  Positive for cough, shortness of breath and wheezing.    Neurological:  Positive for weakness.     All systems were reviewed and negative except as noted above in the HPI.    Home Meds:  Medications Prior to Admission   Medication Sig Dispense Refill Last Dose    albuterol (PROVENTIL) (2.5 MG/3ML) 0.083% nebulizer solution Take 2.5 mg by nebulization Every 4 (Four) Hours As Needed for Wheezing. 100 each 12     albuterol sulfate  (90 Base) MCG/ACT inhaler Inhale 2 puffs Every 4 (Four) Hours As Needed for Wheezing or Shortness of Air. 18 g  2     amLODIPine (NORVASC) 10 MG tablet Take 1 tablet by mouth Daily. 90 tablet 1 7/11/2024    aspirin 81 MG chewable tablet Chew 1 tablet Daily.   7/11/2024    Fluticasone-Umeclidin-Vilant (Trelegy Ellipta) 200-62.5-25 MCG/ACT aerosol powder  Inhale 200 mcg Daily. 3 each 0 7/11/2024    Multiple Vitamins-Minerals (MULTIVITAMIN GUMMIES ADULTS PO) Take 1 tablet by mouth Daily. 1 gummie/day   7/11/2024       Inpatient Meds:    Current Facility-Administered Medications:     albuterol (PROVENTIL) nebulizer solution 0.083% 2.5 mg/3mL, 7.5 mg, Nebulization, Continuous, Javier Clayton MD, 7.5 mg at 07/11/24 1338    arformoterol (BROVANA) nebulizer solution 15 mcg, 15 mcg, Nebulization, BID - RT, Orlin Harry MD, 15 mcg at 07/12/24 0959    aspirin chewable tablet 81 mg, 81 mg, Oral, Daily, Orlin Harry MD, 81 mg at 07/12/24 0939    sennosides-docusate (PERICOLACE) 8.6-50 MG per tablet 2 tablet, 2 tablet, Oral, BID PRN **AND** polyethylene glycol (MIRALAX) packet 17 g, 17 g, Oral, Daily PRN **AND** bisacodyl (DULCOLAX) EC tablet 5 mg, 5 mg, Oral, Daily PRN **AND** bisacodyl (DULCOLAX) suppository 10 mg, 10 mg, Rectal, Daily PRN, Orlin Harry MD    budesonide (PULMICORT) nebulizer solution 0.5 mg, 0.5 mg, Nebulization, BID - RT, Orlin Harry MD, 0.5 mg at 07/12/24 0959    calcium carbonate (TUMS) chewable tablet 500 mg (200 mg elemental), 1 tablet, Oral, BID PRN, Orlin Harry MD    Enoxaparin Sodium (LOVENOX) syringe 40 mg, 40 mg, Subcutaneous, Q24H, Orlin Harry MD, 40 mg at 07/12/24 0940    ipratropium-albuterol (DUO-NEB) nebulizer solution 3 mL, 3 mL, Nebulization, Q4H PRN, Orlin Harry MD    methylPREDNISolone sodium succinate (SOLU-Medrol) 60 mg in sterile water (preservative free) 0.96 mL, 60 mg, Intravenous, Q6H, Orlin Harry MD, 60 mg at 07/12/24 0939    nicotine (NICODERM CQ) 21 MG/24HR patch 1 patch, 1 patch, Transdermal, Q24H, Orlin Harry MD, 1 patch at 07/11/24 2130    nicotine polacrilex (COMMIT)  lozenge 4 mg, 4 mg, Mouth/Throat, Q1H PRN, Orlin Harry MD    nitroglycerin (NITROSTAT) SL tablet 0.4 mg, 0.4 mg, Sublingual, Q5 Min PRN, Orlin Harry MD    ondansetron ODT (ZOFRAN-ODT) disintegrating tablet 4 mg, 4 mg, Oral, Q6H PRN, Orlin Harry MD    Pharmacy to Dose enoxaparin (LOVENOX), , Does not apply, Continuous PRN, Orlin Harry MD    sodium chloride 0.9 % flush 10 mL, 10 mL, Intravenous, PRN, Javier Clayton MD    sodium chloride 0.9 % flush 10 mL, 10 mL, Intravenous, Q12H, Orlin Harry MD    sodium chloride 0.9 % flush 10 mL, 10 mL, Intravenous, PRN, Orlin Harry MD    sodium chloride 0.9 % infusion 40 mL, 40 mL, Intravenous, PRN, Orlin Harry MD    Allergies:  No Known Allergies    Past Medical History:  Past Medical History:   Diagnosis Date    Arthritis     COPD (chronic obstructive pulmonary disease)     Essential hypertension 07/12/2021    Forgetfulness     Heart flutter, ventricular     High cholesterol     Lung cancer     Prostate cancer     Ringing in ear     Shortness of Air     SOB (shortness of breath)        Past Surgical History:  Past Surgical History:   Procedure Laterality Date    APPENDECTOMY      COLONOSCOPY  06/01/2019    COLONOSCOPY N/A 6/14/2023    Procedure: COLONOSCOPY;  Surgeon: Geoffrey Carey MD;  Location: [facility] ENDOSCOPY;  Service: General;  Laterality: N/A;  NORMAL    LUNG BIOPSY      PROSTATECTOMY      TONSILLECTOMY  1969    Per PT        Social History:   Social History     Socioeconomic History    Marital status: Single   Tobacco Use    Smoking status: Every Day     Current packs/day: 1.00     Average packs/day: 1 pack/day for 65.5 years (65.5 ttl pk-yrs)     Types: Cigarettes     Start date: 1959     Passive exposure: Current    Smokeless tobacco: Never    Tobacco comments:     States that he is cutting back and is smoking less than 1ppd.   Vaping Use    Vaping status: Never Used   Substance and Sexual Activity    Alcohol use: Never    Drug use: Defer     Sexual activity: Defer       Family History:  Family History   Problem Relation Age of Onset    Ovarian cancer Mother     Stroke Father     Breast cancer Sister 40    Cancer Maternal Grandmother     Cancer Paternal Grandmother        Objective     Vital Sign Min/Max for last 24 hours:  Temp  Min: 97.2 °F (36.2 °C)  Max: 98.2 °F (36.8 °C)   BP  Min: 112/77  Max: 160/97   Pulse  Min: 72  Max: 98   Resp  Min: 18  Max: 26   SpO2  Min: 75 %  Max: 98 %   Flow (L/min)  Min: 3  Max: 4   Weight  Min: 63.3 kg (139 lb 8.8 oz)  Max: 63.3 kg (139 lb 8.8 oz)     Physical Exam:  97.2 °F (36.2 °C) (Oral) 79 123/73 18 97% 63.3 kg (139 lb 8.8 oz) Body mass index is 20.02 kg/m².  Physical Exam  Constitutional:       Appearance: He is normal weight.   Eyes:      Extraocular Movements: Extraocular movements intact.      Pupils: Pupils are equal, round, and reactive to light.   Cardiovascular:      Rate and Rhythm: Normal rate and regular rhythm.      Pulses: Normal pulses.   Pulmonary:      Effort: Respiratory distress present.      Breath sounds: Wheezing and rhonchi present.      Comments: Increased work of breathing, tripoding after ambulating to bathroom  Skin:     General: Skin is warm and dry.      Capillary Refill: Capillary refill takes 2 to 3 seconds.   Neurological:      General: No focal deficit present.      Mental Status: He is alert and oriented to person, place, and time.   Psychiatric:         Mood and Affect: Mood normal.         Data Review-     Labs: I personally reviewed latest laboratory results.  Lab Results (last 24 hours)       Procedure Component Value Units Date/Time    Blood Gas, Arterial - [[id number]]  (Abnormal) Collected: 07/12/24 0616    Specimen: Arterial Blood Updated: 07/12/24 0621     Site Right Radial     Hieu's Test Positive     pH, Arterial 7.299 pH units      pCO2, Arterial 64.4 mm Hg      pO2, Arterial 86.4 mm Hg      HCO3, Arterial 31.6 mmol/L      Base Excess, Arterial 2.2 mmol/L      Comment:  Serial Number: [id number]:  [id number]        O2 Saturation, Arterial 95.0 %      Hemoglobin, Blood Gas 20.6 g/dL      Hematocrit, Blood Gas 60.0 %      Barometric Pressure for Blood Gas 743.7000 mmHg      Modality BiPap     FIO2 35 %      PEEP 5     Notified Who crystal     Read Back Yes     Notified Time --     Hemodilution No     PO2/FIO2 247    Basic Metabolic Panel [[id number]]  (Abnormal) Collected: 07/12/24 0432    Specimen: Blood Updated: 07/12/24 0536     Glucose 167 mg/dL      BUN 17 mg/dL      Creatinine 0.58 mg/dL      Sodium 134 mmol/L      Potassium 4.8 mmol/L      Comment: Slight hemolysis detected by analyzer. Result may be falsely elevated.        Chloride 98 mmol/L      CO2 27.2 mmol/L      Calcium 9.1 mg/dL      BUN/Creatinine Ratio 29.3     Anion Gap 8.8 mmol/L      eGFR 103.0 mL/min/1.73     Narrative:      GFR Normal >60  Chronic Kidney Disease <60  Kidney Failure <15    The GFR formula is only valid for adults with stable renal function between ages 18 and 70.    Magnesium [[id number]]  (Normal) Collected: 07/12/24 0432    Specimen: Blood Updated: 07/12/24 0536     Magnesium 2.1 mg/dL     CBC Auto Differential [[id number]]  (Abnormal) Collected: 07/12/24 0432    Specimen: Blood Updated: 07/12/24 0514     WBC 5.60 10*3/mm3      RBC 5.96 10*6/mm3      Hemoglobin 17.8 g/dL      Hematocrit 54.8 %      MCV 91.9 fL      MCH 29.9 pg      MCHC 32.5 g/dL      RDW 13.9 %      RDW-SD 46.7 fl      MPV 10.8 fL      Platelets 162 10*3/mm3      Neutrophil % 89.7 %      Lymphocyte % 8.6 %      Monocyte % 1.3 %      Eosinophil % 0.0 %      Basophil % 0.0 %      Immature Grans % 0.4 %      Neutrophils, Absolute 5.03 10*3/mm3      Lymphocytes, Absolute 0.48 10*3/mm3      Monocytes, Absolute 0.07 10*3/mm3      Eosinophils, Absolute 0.00 10*3/mm3      Basophils, Absolute 0.00 10*3/mm3      Immature Grans, Absolute 0.02 10*3/mm3      nRBC 0.0 /100 WBC     Blood Gas, Arterial - [[id number]]  (Abnormal) Collected:  07/11/24 1428    Specimen: Arterial Blood Updated: 07/11/24 1431     Site Right Radial     Hieu's Test Positive     pH, Arterial 7.255 pH units      pCO2, Arterial 68.6 mm Hg      pO2, Arterial 65.2 mm Hg      HCO3, Arterial 30.4 mmol/L      Base Excess, Arterial -0.1 mmol/L      Comment: Serial Number: [id number]:  [id number]        O2 Saturation, Arterial 88.0 %      Hemoglobin, Blood Gas 22.6 g/dL      Hematocrit, Blood Gas 67.0 %      Barometric Pressure for Blood Gas 745.0000 mmHg      Modality Cannula     FIO2 32 %      Flow Rate 3.0000 lpm      Notified Who Dr. Guardado     Read Back Yes     Notified Time --     Hemodilution No     PO2/FIO2 204    COVID PRE-OP / PRE-PROCEDURE SCREENING ORDER (NO ISOLATION) - Swab, Nasopharynx [[id number]]  (Normal) Collected: 07/11/24 1313    Specimen: Swab from Nasopharynx Updated: 07/11/24 1400    Narrative:      The following orders were created for panel order COVID PRE-OP / PRE-PROCEDURE SCREENING ORDER (NO ISOLATION) - Swab, Nasopharynx.  Procedure                               Abnormality         Status                     ---------                               -----------         ------                     COVID-19, FLU A/B, RSV P...[[id number]]  Normal              Final result                 Please view results for these tests on the individual orders.    COVID-19, FLU A/B, RSV PCR 1 HR TAT - Swab, Nasopharynx [[id number]]  (Normal) Collected: 07/11/24 1313    Specimen: Swab from Nasopharynx Updated: 07/11/24 1400     COVID19 Not Detected     Influenza A PCR Not Detected     Influenza B PCR Not Detected     RSV, PCR Not Detected    Narrative:      Fact sheet for providers: https://www.fda.gov/media/830044/download    Fact sheet for patients: https://www.fda.gov/media/819959/download    Test performed by PCR.    Blood Culture - Blood, Arm, Left [[id number]] Collected: 07/11/24 1329    Specimen: Blood from Arm, Left Updated: 07/11/24 1334    Comprehensive Metabolic Panel  [[id number]]  (Abnormal) Collected: 07/11/24 1149    Specimen: Blood from Arm, Right Updated: 07/11/24 1217     Glucose 125 mg/dL      BUN 11 mg/dL      Creatinine 0.68 mg/dL      Sodium 137 mmol/L      Potassium 4.9 mmol/L      Chloride 97 mmol/L      CO2 27.8 mmol/L      Calcium 9.7 mg/dL      Total Protein 7.9 g/dL      Albumin 4.5 g/dL      ALT (SGPT) 15 U/L      AST (SGOT) 18 U/L      Alkaline Phosphatase 112 U/L      Total Bilirubin 0.3 mg/dL      Globulin 3.4 gm/dL      A/G Ratio 1.3 g/dL      BUN/Creatinine Ratio 16.2     Anion Gap 12.2 mmol/L      eGFR 98.1 mL/min/1.73     Narrative:      GFR Normal >60  Chronic Kidney Disease <60  Kidney Failure <15    The GFR formula is only valid for adults with stable renal function between ages 18 and 70.    Single High Sensitivity Troponin T [[id number]]  (Normal) Collected: 07/11/24 1149    Specimen: Blood from Arm, Right Updated: 07/11/24 1217     HS Troponin T 12 ng/L     Narrative:      High Sensitive Troponin T Reference Range:  <14.0 ng/L- Negative Female for AMI  <22.0 ng/L- Negative Male for AMI  >=14 - Abnormal Female indicating possible myocardial injury.  >=22 - Abnormal Male indicating possible myocardial injury.   Clinicians would have to utilize clinical acumen, EKG, Troponin, and serial changes to determine if it is an Acute Myocardial Infarction or myocardial injury due to an underlying chronic condition.         BNP [[id number]]  (Normal) Collected: 07/11/24 1149    Specimen: Blood from Arm, Right Updated: 07/11/24 1215     proBNP 83.5 pg/mL     Narrative:      This assay is used as an aid in the diagnosis of individuals suspected of having heart failure. It can be used as an aid in the diagnosis of acute decompensated heart failure (ADHF) in patients presenting with signs and symptoms of ADHF to the emergency department (ED). In addition, NT-proBNP of <300 pg/mL indicates ADHF is not likely.    Age Range Result Interpretation  NT-proBNP Concentration  (pg/mL:      <50             Positive            >450                   Gray                 300-450                    Negative             <300    50-75           Positive            >900                  Gray                300-900                  Negative            <300      >75             Positive            >1800                  Gray                300-1800                  Negative            <300    Lactic Acid, Plasma [[id number]]  (Normal) Collected: 07/11/24 1149    Specimen: Blood from Arm, Right Updated: 07/11/24 1215     Lactate 1.4 mmol/L     [place] Draw [[id number]] Collected: 07/11/24 1149    Specimen: Blood from Arm, Right Updated: 07/11/24 1202    Narrative:      The following orders were created for panel order [place] Draw.  Procedure                               Abnormality         Status                     ---------                               -----------         ------                     Green Top (Gel)[[id number]]                                  Final result               Lavender Top[[id number]]                                     Final result               Gold Top - SST[[id number]]                                   Final result               Light Blue Top[[id number]]                                   Final result                 Please view results for these tests on the individual orders.    Light Blue Top [[id number]] Collected: 07/11/24 1149    Specimen: Blood from Arm, Right Updated: 07/11/24 1202     Extra Tube Hold for add-ons.     Comment: Auto resulted       Green Top (Gel) [[id number]] Collected: 07/11/24 1149    Specimen: Blood from Arm, Right Updated: 07/11/24 1202     Extra Tube Hold for add-ons.     Comment: Auto resulted.       Lavender Top [[id number]] Collected: 07/11/24 1149    Specimen: Blood from Arm, Right Updated: 07/11/24 1202     Extra Tube hold for add-on     Comment: Auto resulted       Gold Top - SST [[id number]] Collected: 07/11/24 1149    Specimen: Blood from  Arm, Right Updated: 07/11/24 1202     Extra Tube Hold for add-ons.     Comment: Auto resulted.       CBC & Differential [[id number]]  (Abnormal) Collected: 07/11/24 1149    Specimen: Blood from Arm, Right Updated: 07/11/24 1158    Narrative:      The following orders were created for panel order CBC & Differential.  Procedure                               Abnormality         Status                     ---------                               -----------         ------                     CBC Auto Differential[[id number]]        Abnormal            Final result                 Please view results for these tests on the individual orders.    CBC Auto Differential [[id number]]  (Abnormal) Collected: 07/11/24 1149    Specimen: Blood from Arm, Right Updated: 07/11/24 1158     WBC 9.37 10*3/mm3      RBC 6.36 10*6/mm3      Hemoglobin 19.2 g/dL      Hematocrit 57.3 %      MCV 90.1 fL      MCH 30.2 pg      MCHC 33.5 g/dL      RDW 13.9 %      RDW-SD 45.7 fl      MPV 10.8 fL      Platelets 176 10*3/mm3      Neutrophil % 81.1 %      Lymphocyte % 11.0 %      Monocyte % 7.2 %      Eosinophil % 0.1 %      Basophil % 0.4 %      Immature Grans % 0.2 %      Neutrophils, Absolute 7.60 10*3/mm3      Lymphocytes, Absolute 1.03 10*3/mm3      Monocytes, Absolute 0.67 10*3/mm3      Eosinophils, Absolute 0.01 10*3/mm3      Basophils, Absolute 0.04 10*3/mm3      Immature Grans, Absolute 0.02 10*3/mm3      nRBC 0.0 /100 WBC     Blood Culture - Blood, Arm, Right [[id number]] Collected: 07/11/24 1149    Specimen: Blood from Arm, Right Updated: 07/11/24 1155             Imaging: I personally visualized the relevant images of scans/x-rays performed.  Imaging Results (Last 24 Hours)       Procedure Component Value Units Date/Time    XR Chest 1 View [[id number]] Collected: 07/11/24 1159     Updated: 07/11/24 1203    Narrative:      XR CHEST 1 VW    Date of Exam: 7/11/2024 11:45 AM EDT    Indication: SOA Triage Protocol    Comparison  12/7/2023..    Findings:  Lung fields remain moderately severely hyperinflated. Mildly prominent interstitial pattern appears stable and chronic. Heart size is normal. There are no acute infiltrates or effusions.      Impression:      Impression:  Evidence of emphysema. No acute infiltrate.      Electronically Signed: Angeline Hyde MD    7/11/2024 12:00 PM EDT    Workstation ID: [id number]            Assessment & Plan     Assessment:  Acute hypoxic hypercapnic failure requiring NIPPV  Acute exacerbation of severe COPD: FEV1 25%  Ongoing tobacco abuse  History R LL squamous cell lung cancer: S/p SBRT  History of prostate cancer: S/p prostatectomy    Recommendations:  -Continue O2 to keep sats greater than 90%.  Continue BiPAP with a setting of 15 cm inspiratory pressure and 8 cm expiratory pressure  -Need 6-minute walk test prior to discharge for home O2 use.  -ABG reviewed shows hypoxic and hypercapnic respiratory failure  .  Continue NIPPV at night and as needed days with respiratory distress.  -Would benefit from NIPPV at home.  I have discussed this in detail with the patient   consult RT  to help arrange.  -Will check sputum culture, strep and Legionella, and Pro-Javier.  -Will start on azithromycin for COPD exacerbation.  -Continue IV steroids and nebulizers.  -Would benefit from pulmonary rehab.  -Discussed smoking cessation.  Continue nicotine patch for smoking cessation.      Thank you for allowing me to participate in the care of Colin Nevarez. Please contact me with any questions.     Total time spent: 38 minutes    Electronically signed by CHEN Turk, 07/12/24, 1:08 PM EDT.  This visit was performed by both a physician and an APC.  I personally evaluated and examined the patient.  I performed all aspects of MDM as documented.  I have reviewed and confirmed the accuracy of the patient's history as documented in this note and I have reexamined the patient and the results are consistent with  the previously documented exam.  I have updated the documentation as necessary. Electronically signed by Isidro Armijo MD, 07/12/24, 1:40 PM EDT.     This document has been electronically signed by Isidro Armijo MD on July 12, 2024 10:00 EDT

## 2024-07-12 NOTE — CONSULTS
Pulmonary Rehab Phase I - Occurrence #1    Education Topics:  Pulmonary Rehab yes     Topic Components:  Exercise yes     Teaching Aids:  Phase 2 Brochure yes     Teaching Method:  Written Instruction yes     Teaching Recipient:  Patient yes       Recovery/Discharge Instructions:  Pulmonary Rehab Phase 2 yes       Patient Qualification:  Pulmonary Rehab Phase 2 yes   Education on patients lung disease and pulmonary rehab with written material given to patient.

## 2024-07-12 NOTE — CONSULTS
COPD Education    Age: 74 y.o.  Sex: male  BMI:Body mass index is 20.02 kg/m².     Past Medical Hx: squamous cell carcinoma of right lung,prostate ca, hypertension, hyperlipidemia     Smoking Hx: Social History    Tobacco Use      Smoking status: Every Day        Packs/day: 1.00        Years: 1 pack/day for 65.5 years (65.5 ttl pk-yrs)        Types: Cigarettes        Start date: 1959        Passive exposure: Current      Smokeless tobacco: Never      Tobacco comments: States that he is cutting back and is smoking less than 1ppd.    Social History     Substance and Sexual Activity   Drug Use Defer      Home Equipment Used:  neb  DME:  none    Lives:  home alone    Daily symptoms: SOA at rest w/exertion, nonproductive cough, wheeze    Airway Clearance methods utilized: none previously; OPEP instruction provided    Have you ever attended Pulmonary Rehab?  no    Last PFT:  5/15/23 POST RESULTS  PARAMETER BEST % PREDICTED   FVC L 2.08 50   FEV1 L 0.79 25   FEV1/FVC % 38    RV 6.90 267   DLCO 6.43 25     Classification of Airflow Limitation Severity in COPD (Based on Post-Bronchodilator FEV1)  Gold 1: Mild FEV1 ? 80% predicted   Gold 2:  Moderate 50% ? FEV1 < 80% predicted   Gold 3: Severe 30% ? FEV1 < 50% predicted   Gold 4: Very Severe FEV1 < 30% predicted     Have you ever had a sleep study/PSG? None on file    Last Arterial Blood Gas:   Lab Results   Component Value Date    PHART 7.299 (L) 07/12/2024    XGE4BJJ 64.4 (C) 07/12/2024    PO2ART 86.4 07/12/2024    MFV4FPB 31.6 (H) 07/12/2024    BASEEXCESS 2.2 (H) 07/12/2024    W2URQDGP 95.0 07/12/2024      Chest X-Ray findings: Lung fields remain moderately severely hyperinflated. Mildly prominent interstitial pattern appears stable and chronic.     Chest CT findings: 2/6/24. Peribronchial thickening, multifocal tree-in-bud and patchy infiltrate most notable within right lower lobe new from prior comparison.  This likely represents an infectious/inflammatory process such  as bronchiolitis or microaspiration with developing pneumonia possible.    Home Medications:  Medications Prior to Admission   Medication Sig Dispense Refill Last Dose    albuterol (PROVENTIL) (2.5 MG/3ML) 0.083% nebulizer solution Take 2.5 mg by nebulization Every 4 (Four) Hours As Needed for Wheezing. 100 each 12     albuterol sulfate  (90 Base) MCG/ACT inhaler Inhale 2 puffs Every 4 (Four) Hours As Needed for Wheezing or Shortness of Air. 18 g 2     amLODIPine (NORVASC) 10 MG tablet Take 1 tablet by mouth Daily. 90 tablet 1 7/11/2024    aspirin 81 MG chewable tablet Chew 1 tablet Daily.   7/11/2024    Fluticasone-Umeclidin-Vilant (Trelegy Ellipta) 200-62.5-25 MCG/ACT aerosol powder  Inhale 200 mcg Daily. 3 each 0 7/11/2024    Multiple Vitamins-Minerals (MULTIVITAMIN GUMMIES ADULTS PO) Take 1 tablet by mouth Daily. 1 gummie/day   7/11/2024     Do you use a Spacer/Holding Chamber with your MDI?: no    COPD disease process and management, smoking cessation chronic respiratory failure and NIV use discussed with Mr. Nevarez and his sister.  Patient does not have home oxygen and currently is on Trelegy for maintenance. Patient has stage IV COPD with poor inspiratory capacity and flow and would benefit from transitioning to nebulized maintenance.  Patient noted to be SOA at rest, tripoding with accessory muscle use and coughing without mucous production. [employer] training providing and patient was able to cough up quarter sized amount of thick yellow secretions. Mr. Nevarez used bilevel for acute on chronic respiratory failure, CO2 64.4.     Florinda Gurerero, RRT   RT   07/12/24  08:35 EDT

## 2024-07-12 NOTE — PROGRESS NOTES
[facility]   Hospitalist Progress Note  Date: 2024  Patient Name: Colin Nevarez  : 1950  MRN: [id number]  Date of admission: 2024  Room/Bed: 405/1      Subjective   Subjective     Chief Complaint:   Shortness of breath    Summary:  Colin Nevarez is a 74 y.o. male with a past medical history of COPD, hypertension, hyperlipidemia, lung cancer, and prostate cancer     Patient presents the emergency department on 2024 for complaints of worsening shortness of breath.  In the room patient tripoding with accessory muscle use, having conversational dyspnea.  In the ED patient found to have acute hypercapnic hypoxic respiratory failure.  Patient initially saturating in the 70s on room air, started on 3 L nasal cannula with improvement in O2.  ABG shows patient hypercapnic with a pCO2 of 68.6, pH of 7.255.  Patient has a hemoglobin of 19.2.  Patient's chest x-ray shows evidence of emphysema with no acute infiltrate.  Initially on meeting patient, he is apprehensive to be admitted.  ED physician and internal medicine doctor discussed extensively with patient, neighbors who brought patient into the hospital, as well as son over the phone.  Patient eventually agreed to admission in the hospital.  Patient initiated on BiPAP    Interval Followup:   Patient's ABG continues to show hypercapnic respiratory acidosis.  Patient is agreeable to staying in the hospital until he feels better, currently agreeing to stay over the weekend.  As patient is staying in the hospital will consult pulmonologist today.  RT case management also being consulted for possible NIPPV at home.  Continues to have shortness of breath and increased work of breathing      Objective   Objective     Vitals:   Temp:  [97.2 °F (36.2 °C)-98.2 °F (36.8 °C)] 98.1 °F (36.7 °C)  Heart Rate:  [72-98] 95  Resp:  [18-24] 18  BP: (123-160)/(64-97) 145/77  Flow (L/min):  [3] 3    Physical Exam               Constitutional: Awake, alert, increased  work of breathing, tripoding, conversational dyspnea              Eyes: Pupils equal, sclerae anicteric, no conjunctival injection              HENT: NCAT, mucous membranes moist              Neck: Supple, no thyromegaly, no lymphadenopathy, trachea midline              Respiratory: Diminished breath sounds heard throughout, prolonged expiratory phase with end expiratory wheezing              Cardiovascular: RRR, no murmurs, rubs, or gallops, palpable pedal pulses bilaterally              Gastrointestinal: Positive bowel sounds, soft, nontender, nondistended              Musculoskeletal: No bilateral ankle edema, no clubbing or cyanosis to extremities              Neurologic: Oriented x 3, strength symmetric in all extremities, Cranial Nerves grossly intact to confrontation, speech clear    Result Review    Result Review:  I have personally reviewed these results:  [x]  Laboratory      Lab 07/12/24  0432 07/11/24  1149   WBC 5.60 9.37   HEMOGLOBIN 17.8* 19.2*   HEMATOCRIT 54.8* 57.3*   PLATELETS 162 176   NEUTROS ABS 5.03 7.60*   IMMATURE GRANS (ABS) 0.02 0.02   LYMPHS ABS 0.48* 1.03   MONOS ABS 0.07* 0.67   EOS ABS 0.00 0.01   MCV 91.9 90.1   PROCALCITONIN 0.03  --    LACTATE  --  1.4         Lab 07/12/24  0432 07/11/24  1149   SODIUM 134* 137   POTASSIUM 4.8 4.9   CHLORIDE 98 97*   CO2 27.2 27.8   ANION GAP 8.8 12.2   BUN 17 11   CREATININE 0.58* 0.68*   EGFR 103.0 98.1   GLUCOSE 167* 125*   CALCIUM 9.1 9.7   MAGNESIUM 2.1  --          Lab 07/11/24  1149   TOTAL PROTEIN 7.9   ALBUMIN 4.5   GLOBULIN 3.4   ALT (SGPT) 15   AST (SGOT) 18   BILIRUBIN 0.3   ALK PHOS 112         Lab 07/11/24  1149   PROBNP 83.5   HSTROP T 12                 Lab 07/12/24  0616 07/11/24  1428   PH, ARTERIAL 7.299* 7.255*   PCO2, ARTERIAL 64.4* 68.6*   PO2 ART 86.4 65.2*   O2 SATURATION ART 95.0 88.0*   FIO2 35 32   HCO3 ART 31.6* 30.4*   BASE EXCESS ART 2.2* -0.1     Brief Urine Lab Results  (Last result in the past 365 days)        Color    Clarity   Blood   Leuk Est   Nitrite   Protein   CREAT   Urine HCG        01/25/24 1123 Yellow   Clear   Large   Moderate (2+)   Negative   Trace                 [x]  Microbiology   Microbiology Results (last 10 days)       Procedure Component Value - Date/Time    Blood Culture - Blood, Arm, Left [[id number]]  (Normal) Collected: 07/11/24 1329    Lab Status: Preliminary result Specimen: Blood from Arm, Left Updated: 07/12/24 1345     Blood Culture No growth at 24 hours    COVID PRE-OP / PRE-PROCEDURE SCREENING ORDER (NO ISOLATION) - Swab, Nasopharynx [[id number]]  (Normal) Collected: 07/11/24 1313    Lab Status: Final result Specimen: Swab from Nasopharynx Updated: 07/11/24 1400    Narrative:      The following orders were created for panel order COVID PRE-OP / PRE-PROCEDURE SCREENING ORDER (NO ISOLATION) - Swab, Nasopharynx.  Procedure                               Abnormality         Status                     ---------                               -----------         ------                     COVID-19, FLU A/B, RSV P...[[id number]]  Normal              Final result                 Please view results for these tests on the individual orders.    COVID-19, FLU A/B, RSV PCR 1 HR TAT - Swab, Nasopharynx [[id number]]  (Normal) Collected: 07/11/24 1313    Lab Status: Final result Specimen: Swab from Nasopharynx Updated: 07/11/24 1400     COVID19 Not Detected     Influenza A PCR Not Detected     Influenza B PCR Not Detected     RSV, PCR Not Detected    Narrative:      Fact sheet for providers: https://www.fda.gov/media/207657/download    Fact sheet for patients: https://www.fda.gov/media/743507/download    Test performed by PCR.    Blood Culture - Blood, Arm, Right [[id number]]  (Normal) Collected: 07/11/24 1149    Lab Status: Preliminary result Specimen: Blood from Arm, Right Updated: 07/12/24 1202     Blood Culture No growth at 24 hours          [x]  Radiology  XR Chest 1 View    Result Date: 7/11/2024  Impression:  Evidence of emphysema. No acute infiltrate. Electronically Signed: Angeline Hyde MD  7/11/2024 12:00 PM EDT  Workstation ID: [id number]   []  EKG/Telemetry   []  Cardiology/Vascular   []  Pathology  []  Old records  []  Other:    Assessment & Plan   Assessment / Plan     Assessment:  Acute hypoxic hypercapnic respiratory failure  COPD in acute exacerbation  History of squamous cell carcinoma of the right lung  Ongoing tobacco abuse, 1 pack/day  Hypertension  Hyperlipidemia  History of prostate cancer     Plan:  Patient remains admitted to hospital for further care and management  Consulting pulmonologist today, appreciate assistance  Given hypercapnic respiratory acidosis, continue with BiPAP, settings adjusted by pulmonology  RT case management consulted for possible NIPPV at home  Continue with IV Solu-Medrol at this time  Scheduled and as needed breathing treatments  Incentive spirometer/flutter valve  NRT ordered  Started on azithromycin for COPD exacerbation     Discussed with RN.  Discussed with pulmonologist    VTE Prophylaxis:  Pharmacologic VTE prophylaxis orders are present.        CODE STATUS:   Medical Intervention Limits: No intubation (DNI)  Code Status (Patient has no pulse and is not breathing): No CPR (Do Not Attempt to Resuscitate)  Medical Interventions (Patient has pulse or is breathing): Limited Support      Electronically signed by Orlin Harry MD, 7/12/2024, 15:49 EDT.

## 2024-07-12 NOTE — PLAN OF CARE
Goal Outcome Evaluation:  Plan of Care Reviewed With: patient           Outcome Evaluation: Pt presents with a decreased in baseline function.  Pt demonstrated difficulties with breathing, cardiopulomary endurance, and safety with ambulation.  Skilled PT is recommended at this time to improve deficits and maximize functional independence.      Anticipated Discharge Disposition (PT): home with home health

## 2024-07-12 NOTE — CONSULTS
Mr. Nevarez will need noninvasive ventilation for acute on chronic hypercapnic respiratory failure (CO2 68.6) secondary to very severe COPD, FEV1 25% of predicted. Bilevel was initiated but  CO2 remained elevated. Bilevel ST, ASV and Bilevel with VAPS have been considered and ruled out. The patient requires specific mode of ventilation that is not available on less costly bilevel devices. A noninvasive ventilator is necessary to deliver targeted tidal volume and minute ventilation to ensure adequate ventilation, provide backup rate and address any underlying MINDI components. Alarms and battery backup (only available on noninvasive  ventilator) are necessary for patient safety. GOLD guidelines state noninvasive mechanical ventilation should be the first mode of ventilation used in COPD with respiratory failure to decrease hospitalization and improve survival. Failure to follow recommended guidelines will place the patient at increased risk of  acute on chronic respiratory failure,unplanned expensive rehospitalization, and mortality. Mr. Nevarez will also need to transition to nebulized maintenance with Arformoterol, Budesonide and Yupelri on discharge due to sub optimal inspiratory flow for ellipta device and severe SOA.

## 2024-07-12 NOTE — THERAPY EVALUATION
Patient Name: Colin Nevarez  : 1950    MRN: [id number]                              Today's Date: 2024       Admit Date: 2024    Visit Dx:     ICD-10-CM ICD-9-CM   1. COPD exacerbation  J44.1 491.21     Patient Active Problem List   Diagnosis    Arthritis    Chronic obstructive pulmonary disease    Heart flutter, ventricular    High cholesterol    Prostate cancer    Essential hypertension    Urinary incontinence, stress, male    Cigarette nicotine dependence with nicotine-induced disorder    Hematuria    Ascending aortic aneurysm    Squamous cell carcinoma lung    Acute URI    COPD (chronic obstructive pulmonary disease)     Past Medical History:   Diagnosis Date    Arthritis     COPD (chronic obstructive pulmonary disease)     Essential hypertension 2021    Forgetfulness     Heart flutter, ventricular     High cholesterol     Lung cancer     Prostate cancer     Ringing in ear     Shortness of Air     SOB (shortness of breath)      Past Surgical History:   Procedure Laterality Date    APPENDECTOMY      COLONOSCOPY  2019    COLONOSCOPY N/A 2023    Procedure: COLONOSCOPY;  Surgeon: Geoffrey Carey MD;  Location: [facility] ENDOSCOPY;  Service: General;  Laterality: N/A;  NORMAL    LUNG BIOPSY      PROSTATECTOMY      TONSILLECTOMY      Per PT      General Information       Row Name 24 1309          OT Time and Intention    Document Type evaluation  -SC     Mode of Treatment individual therapy;occupational therapy  -SC       Row Name 24 1303          General Information    Patient Profile Reviewed yes  -SC     Prior Level of Function independent:  Patient lives independently and prior level of function is independence in the community. He has a walk in shower (no GB, no chair)  and did not previously use a device for ambulation.  -SC     Existing Precautions/Restrictions oxygen therapy device and L/min  -SC       Row Name 24 4427          Occupational Profile     Reason for Services/Referral (Occupational Profile) Patient is a 74 year old male admitted to [facility] on 7/11/24 . OT consulted due to a recent decline in ADL/mobility independence. No previous OT for current condition.  -SC     Successful Occupations (Occupational Profile) Patient is a retired [employer].  -SC     Patient Goals (Occupational Profile) Patient wishes to return home to live independently.  -SC       Row Name 07/12/24 1309          Living Environment    People in Home alone  -SC       Row Name 07/12/24 1309          Home Main Entrance    Number of Stairs, Main Entrance two  -SC     Stair Railings, Main Entrance none  -SC       Row Name 07/12/24 1309          Cognition    Orientation Status (Cognition) oriented x 3  -SC       Row Name 07/12/24 1309          Safety Issues, Functional Mobility    Safety Issues Affecting Function (Mobility) judgment  Pt with decreased awareness of importance of wearing oxygen at all times to include when walking to the bathroom.  -SC               User Key  (r) = Recorded By, (t) = Taken By, (c) = Cosigned By      Initials Name Provider Type    SC Daysi Rose, OT Occupational Therapist                     Mobility/ADL's       Row Name 07/12/24 1313          Bed Mobility    Bed Mobility bed mobility (all) activities  -SC     All Activities, [place] (Bed Mobility) modified independence  -SC     Assistive Device (Bed Mobility) bed rails  -SC       Row Name 07/12/24 1313          Transfers    Transfers bed-chair transfer;sit-stand transfer;stand-sit transfer;toilet transfer  -SC       Row Name 07/12/24 1313          Bed-Chair Transfer    Bed-Chair [place] (Transfers) standby assist  -SC     Assistive Device (Bed-Chair Transfers) walker, front-wheeled  -SC       Row Name 07/12/24 1313          Sit-Stand Transfer    Sit-Stand [place] (Transfers) standby assist  -SC     Assistive Device (Sit-Stand Transfers) walker, front-wheeled  -SC       Row Name 07/12/24 1313           Stand-Sit Transfer    Stand-Sit [place] (Transfers) standby assist  -SC     Assistive Device (Stand-Sit Transfers) walker, front-wheeled  -SC       Row Name 07/12/24 1313          Toilet Transfer    [place] Level (Toilet Transfer) contact guard  -SC     Comment, (Toilet Transfer) CGA; See functional mobility note  -SC       Row Name 07/12/24 1313          Functional Mobility    Functional Mobility- Comment CGA; Patient unsteady walking back from bathroom without oxygen upon clinician arrival. Oxygen saturation at 71%. Patient recovered quickly with correct breathing techniques and rest. Clinician requested extension of oxygen cord from RT and educated patient on the importance of wearing oxygen at all times until physician discharges O2.  -SC       Row Name 07/12/24 1313          Activities of Daily Living    BADL Assessment/Intervention bathing;upper body dressing;lower body dressing;grooming;toileting  -SC       Row Name 07/12/24 1313          Bathing Assessment/Intervention    [place] Level (Bathing) standby assist  -SC       Row Name 07/12/24 1313          Upper Body Dressing Assessment/Training    [place] Level (Upper Body Dressing) standby assist  -SC       Row Name 07/12/24 1313          Lower Body Dressing Assessment/Training    [place] Level (Lower Body Dressing) standby assist  -SC       Row Name 07/12/24 1313          Grooming Assessment/Training    [place] Level (Grooming) standby assist  -SC       Row Name 07/12/24 1313          Toileting Assessment/Training    [place] Level (Toileting) standby assist  -SC     Assistive Devices (Toileting) commode  -SC               User Key  (r) = Recorded By, (t) = Taken By, (c) = Cosigned By      Initials Name Provider Type    Daysi Caraballo OT Occupational Therapist                   Obj/Interventions       Row Name 07/12/24 1317          Sensory Assessment (Somatosensory)    Sensory Assessment (Somatosensory) sensation  intact  -SC       Row Name 07/12/24 1317          Vision Assessment/Intervention    Visual Impairment/Limitations WFL  -[facility] 07/12/24 1317          Range of Motion Comprehensive    General Range of Motion upper extremity range of motion deficits identified  -SC     Comment, General Range of Motion Proximal UB AROM WFL; Note left 5th digit flexion contracture, and right 4th &5th digit  contracture with mp's at approx 45 degrees flexion.  -SC       Row Name 07/12/24 1317          Strength Comprehensive (MMT)    Comment, General Manual Muscle Testing (MMT) Assessment Gross UB strength 4/5  -SC       Row Name 07/12/24 1317          Motor Skills    Motor Skills functional endurance  -SC     Functional Endurance fair-  -SC               User Key  (r) = Recorded By, (t) = Taken By, (c) = Cosigned By      Initials Name Provider Type    Daysi Caraballo, OT Occupational Therapist                   Goals/Plan       Row Name 07/12/24 1323          Transfer Goal 1 (OT)    Activity/Assistive Device (Transfer Goal 1, OT) transfers, all  -SC     [place] Level/Cues Needed (Transfer Goal 1, OT) modified independence  -SC     Time Frame (Transfer Goal 1, OT) long term goal (LTG);10 days  -SC       Row Name 07/12/24 1323          Bathing Goal 1 (OT)    Activity/Device (Bathing Goal 1, OT) bathing skills, all  -SC     [place] Level/Cues Needed (Bathing Goal 1, OT) modified independence  -SC     Time Frame (Bathing Goal 1, OT) long term goal (LTG);10 days  -SC       Row Name 07/12/24 1323          Dressing Goal 1 (OT)    Activity/Device (Dressing Goal 1, OT) dressing skills, all  -SC     [place]/Cues Needed (Dressing Goal 1, OT) modified independence  -SC     Time Frame (Dressing Goal 1, OT) long term goal (LTG);10 days  -SC       Row Name 07/12/24 1323          Toileting Goal 1 (OT)    Activity/Device (Toileting Goal 1, OT) toileting skills, all  -SC     [place] Level/Cues Needed (Toileting Goal 1, OT)  modified independence  -SC     Time Frame (Toileting Goal 1, OT) long term goal (LTG);10 days  -[facility] Name 07/12/24 1323          Strength Goal 1 (OT)    Strength Goal 1 (OT) Patient will increase strength 4+/5 for support of ADL/mobility.  -SC     Time Frame (Strength Goal 1, OT) 10 days;long term goal (LTG)  -SC       Row Name 07/12/24 1323          Problem Specific Goal 1 (OT)    Problem Specific Goal 1 (OT) Patient will increase endurance to fair+ for support of safe AdL/mobility function.  -SC     Time Frame (Problem Specific Goal 1, OT) 10 days;long term goal (LTG)  -[facility] Name 07/12/24 1323          Therapy Assessment/Plan (OT)    Planned Therapy Interventions (OT) activity tolerance training;BADL retraining;functional balance retraining;occupation/activity based interventions;patient/caregiver education/training;strengthening exercise  -SC               User Key  (r) = Recorded By, (t) = Taken By, (c) = Cosigned By      Initials Name Provider Type    Daysi Caraballo OT Occupational Therapist                   Clinical Impression       [facility] Name 07/12/24 1320          Pain Assessment    Pretreatment Pain Rating 0/10 - no pain  -SC     Posttreatment Pain Rating 0/10 - no pain  -SC       Row Name 07/12/24 1320          Plan of Care Review    Plan of Care Reviewed With patient  -SC     Progress no change  Evaluation  -SC     Outcome Evaluation Patient presents with limitations of activity tolerance, and strength which are impacting ADL/mobility independence. Skilled OT is indicated to remediate/compensate for defecits to maximize independence and safety with functional tasks.  -[facility] Name 07/12/24 1320          Therapy Assessment/Plan (OT)    Rehab Potential (OT) good, to achieve stated therapy goals  -SC     Criteria for Skilled Therapeutic Interventions Met (OT) yes;meets criteria;skilled treatment is necessary  -SC     Therapy Frequency (OT) 5 times/wk  -[facility] Name 07/12/24 1323           Therapy Plan Review/Discharge Plan (OT)    Equipment Needs Upon Discharge (OT) shower chair  -SC     Anticipated Discharge Disposition (OT) home with home health  -SC       Row Name 07/12/24 1320          Positioning and Restraints    Pre-Treatment Position in bed  -SC     Post Treatment Position bed  -SC     In Bed call light within reach;encouraged to call for assist;exit alarm on  -SC               User Key  (r) = Recorded By, (t) = Taken By, (c) = Cosigned By      Initials Name Provider Type    SC Daysi Rose OT Occupational Therapist                   Outcome Measures       Row Name 07/12/24 1324          How much help from another is currently needed...    Putting on and taking off regular lower body clothing? 4  -SC     Bathing (including washing, rinsing, and drying) 4  -SC     Toileting (which includes using toilet bed pan or urinal) 4  -SC     Putting on and taking off regular upper body clothing 4  -SC     Taking care of personal grooming (such as brushing teeth) 4  -SC     Eating meals 4  -SC     AM-PAC 6 Clicks Score (OT) 24  -SC       Row Name 07/12/24 0900          How much help from another person do you currently need...    Turning from your back to your side while in flat bed without using bedrails? 4  -BB     Moving from lying on back to sitting on the side of a flat bed without bedrails? 4  -BB     Moving to and from a bed to a chair (including a wheelchair)? 3  -BB     Standing up from a chair using your arms (e.g., wheelchair, bedside chair)? 3  -BB     Climbing 3-5 steps with a railing? 3  -BB     To walk in hospital room? 3  -BB     AM-PAC 6 Clicks Score (PT) 20  -BB     Highest Level of Mobility Goal 6 --> Walk 10 steps or more  -BB       Row Name 07/12/24 1324          Functional Assessment    Outcome Measure Options AM-PAC 6 Clicks Daily Activity (OT);Optimal Instrument  -SC       Row Name 07/12/24 1324          Optimal Instrument    Optimal Instrument Optimal - 3  -SC      Bending/Stooping 2  -SC     Standing 2  -SC     Reaching 1  -SC     From the list, choose the 3 activities you would most like to be able to do without any difficulty Bending/stooping;Standing;Reaching  -SC     Total Score Optimal - 3 5  -SC               User Key  (r) = Recorded By, (t) = Taken By, (c) = Cosigned By      Initials Name Provider Type     Kristie Cedeno, RN Registered Nurse    Daysi Caraballo OT Occupational Therapist                    Occupational Therapy Education       Title: PT OT SLP Therapies (Done)       Topic: Occupational Therapy (Done)       Point: ADL training (Done)       Description:   Instruct learner(s) on proper safety adaptation and remediation techniques during self care or transfers.   Instruct in proper use of assistive devices.                  Learning Progress Summary             Patient Acceptance, E, VU by SC at 7/12/2024 1326                         Point: Home exercise program (Done)       Description:   Instruct learner(s) on appropriate technique for monitoring, assisting and/or progressing therapeutic exercises/activities.                  Learning Progress Summary             Patient Acceptance, E, VU by SC at 7/12/2024 1326                         Point: Precautions (Done)       Description:   Instruct learner(s) on prescribed precautions during self-care and functional transfers.                  Learning Progress Summary             Patient Acceptance, E, VU by SC at 7/12/2024 1326                         Point: Body mechanics (Done)       Description:   Instruct learner(s) on proper positioning and spine alignment during self-care, functional mobility activities and/or exercises.                  Learning Progress Summary             Patient Acceptance, E, VU by SC at 7/12/2024 1326                                         User Key       Initials Effective Dates Name Provider Type [facility] 02/05/24 -  Daysi Rose OT Occupational Therapist OT                   OT Recommendation and Plan  Planned Therapy Interventions (OT): activity tolerance training, BADL retraining, functional balance retraining, occupation/activity based interventions, patient/caregiver education/training, strengthening exercise  Therapy Frequency (OT): 5 times/wk  Plan of Care Review  Plan of Care Reviewed With: patient  Progress: no change (Evaluation)  Outcome Evaluation: Patient presents with limitations of activity tolerance, and strength which are impacting ADL/mobility independence. Skilled OT is indicated to remediate/compensate for defecits to maximize independence and safety with functional tasks.     Time Calculation:   Evaluation Complexity (OT)  Review Occupational Profile/Medical/Therapy History Complexity: expanded/moderate complexity  Assessment, Occupational Performance/Identification of Deficit Complexity: 1-3 performance deficits  Clinical Decision Making Complexity (OT): problem focused assessment/low complexity  Overall Complexity of Evaluation (OT): low complexity     Time Calculation- OT       Row Name 07/12/24 1328             Time Calculation- OT    OT Received On 07/12/24  -SC      OT Goal Re-Cert Due Date 07/21/24  -SC         Untimed Charges    OT Eval/Re-eval Minutes 33  -SC         Total Minutes    Untimed Charges Total Minutes 33  -SC       Total Minutes 33  -SC                User Key  (r) = Recorded By, (t) = Taken By, (c) = Cosigned By      Initials Name Provider Type    SC Daysi Rose OT Occupational Therapist                  Therapy Charges for Today       Code Description Service Date Service Provider Modifiers Qty    [id number]  OT EVAL LOW COMPLEXITY 3 7/12/2024 Daysi Rose OT GO 1                 Daysi Rose OT  7/12/2024

## 2024-07-12 NOTE — PROGRESS NOTES
RT EQUIPMENT DEVICE RELATED - SKIN ASSESSMENT    RT Medical Equipment/Device:     NIV Mask:  Under-the-nose   size:  B    Skin Assessment:      MOUTH, CHEEKS, NOSE:  Intact    Device Skin Pressure Protection:  Positioning supports utilized    Nurse Notification:  Rocio Valdez, RRT

## 2024-07-12 NOTE — PLAN OF CARE
Goal Outcome Evaluation:  Plan of Care Reviewed With: patient        Progress: no change  Outcome Evaluation: Patient has been on BiPAP for the majority of the night. When off BiPAP he is on 3L NC.

## 2024-07-12 NOTE — PLAN OF CARE
Goal Outcome Evaluation:   Patient wore BIPAP last night with settings of 10/5 35%.  Removed this morning and place pt on 3LPM NC.

## 2024-07-12 NOTE — PLAN OF CARE
Goal Outcome Evaluation:           Progress: no change  Outcome Evaluation: No acute changes throughout shift, vss, no complaints, home medications restarted per request. continuing plan of care.

## 2024-07-12 NOTE — THERAPY EVALUATION
Acute Care - Physical Therapy Initial Evaluation   [place]     Patient Name: Colin Nevarez  : 1950  MRN: [id number]  Today's Date: 2024    Admit date: 2024     Referring Physician: Orlin Harry MD     Surgery Date:* No surgery found *           Visit Dx:     ICD-10-CM ICD-9-CM   1. COPD exacerbation  J44.1 491.21   2. Difficulty walking  R26.2 719.7     Patient Active Problem List   Diagnosis    Arthritis    Chronic obstructive pulmonary disease    Heart flutter, ventricular    High cholesterol    Prostate cancer    Essential hypertension    Urinary incontinence, stress, male    Cigarette nicotine dependence with nicotine-induced disorder    Hematuria    Ascending aortic aneurysm    Squamous cell carcinoma lung    Acute URI    COPD (chronic obstructive pulmonary disease)     Past Medical History:   Diagnosis Date    Arthritis     COPD (chronic obstructive pulmonary disease)     Essential hypertension 2021    Forgetfulness     Heart flutter, ventricular     High cholesterol     Lung cancer     Prostate cancer     Ringing in ear     Shortness of Air     SOB (shortness of breath)      Past Surgical History:   Procedure Laterality Date    APPENDECTOMY      COLONOSCOPY  2019    COLONOSCOPY N/A 2023    Procedure: COLONOSCOPY;  Surgeon: Geoffrey Carey MD;  Location: [facility] ENDOSCOPY;  Service: General;  Laterality: N/A;  NORMAL    LUNG BIOPSY      PROSTATECTOMY      TONSILLECTOMY      Per PT     PT Assessment (Last 12 Hours)       PT Evaluation and Treatment       Row Name 24 1316          Physical Therapy Time and Intention    Subjective Information no complaints;pain  -DP     Document Type evaluation  -DP     Mode of Treatment individual therapy;physical therapy  -DP     Patient Effort good  -DP     Comment Pt stated that he is having just a little bit of trouble breathing but otherwise doing okay.  -DP       Row Name 24 1316          General Information    Patient  Profile Reviewed yes  -DP     Patient Observations alert;cooperative;agree to therapy  -DP     Prior Level of Function independent:;all household mobility;community mobility;gait;transfer;bed mobility;ADL's  -DP     Equipment Currently Used at Home none  -DP     Existing Precautions/Restrictions oxygen therapy device and L/min  3 L  -DP     Barriers to Rehab none identified  -DP       Row Name 07/12/24 1316          Living Environment    Current Living Arrangements home  -DP     People in Home alone  -DP     Primary Care Provided by self  -DP       Row Name 07/12/24 1316          Home Use of Assistive/Adaptive Equipment    Equipment Currently Used at Home none  -DP       Row Name 07/12/24 1316          Pain    Additional Documentation Pain Scale: FACES Pre/Post-Treatment (Group)  -DP       Row Name 07/12/24 1316          Pain Scale: FACES Pre/Post-Treatment    Pain: FACES Scale, Pretreatment 0-->no hurt  -DP     Posttreatment Pain Rating 0-->no hurt  -DP       Row Name 07/12/24 1316          Cognition    Orientation Status (Cognition) oriented x 4  -DP       Row Name 07/12/24 1316          Range of Motion (ROM)    Range of Motion ROM is WFL;bilateral lower extremities  -DP       Row Name 07/12/24 1316          Strength (Manual Muscle Testing)    Strength (Manual Muscle Testing) bilateral lower extremities;strength is WFL  -DP       [facility] Name 07/12/24 1316          Bed Mobility    Comment, (Bed Mobility) Pt was seated EOB with sister present upon entry into the room.  -DP       Row Name 07/12/24 1316          Transfers    Transfers sit-stand transfer;stand-sit transfer  -DP       Row Name 07/12/24 1316          Sit-Stand Transfer    Sit-Stand [place] (Transfers) standby assist  -DP     Assistive Device (Sit-Stand Transfers) walker, front-wheeled  -DP       Row Name 07/12/24 1316          Stand-Sit Transfer    Stand-Sit [place] (Transfers) standby assist  -DP     Assistive Device (Stand-Sit Transfers) walker,  front-wheeled  -DP       Row Name 07/12/24 1316          Gait/Stairs (Locomotion)    Gait/Stairs Locomotion gait/ambulation assistive device  -DP     [place] Level (Gait) contact guard  -DP     Assistive Device (Gait) walker, front-wheeled  -DP     Patient was able to Ambulate yes  -DP     Distance in Feet (Gait) 80  -DP     Pattern (Gait) step-through  -DP     Deviations/Abnormal Patterns (Gait) base of support, narrow;levi decreased;gait speed decreased;stride length decreased  -DP     Comment, (Gait/Stairs) Pt was able to ambulate approximately 20 feet with no use of an AD but became SOA.  -DP       Row Name 07/12/24 1316          Safety Issues, Functional Mobility    Impairments Affecting Function (Mobility) balance;endurance/activity tolerance;shortness of breath  -DP       Row Name 07/12/24 1316          Balance    Balance Assessment standing dynamic balance  -DP     Dynamic Standing Balance contact guard  -DP     Position/Device Used, Standing Balance supported;walker, front-wheeled  -DP       Row Name 07/12/24 1316          Plan of Care Review    Plan of Care Reviewed With patient  -DP     Outcome Evaluation Pt presents with a decreased in baseline function.  Pt demonstrated difficulties with breathing, cardiopulomary endurance, and safety with ambulation.  Skilled PT is recommended at this time to improve deficits and maximize functional independence.  -DP       Row Name 07/12/24 1316          Vital Signs    O2 Delivery Pre Treatment nasal cannula  3L  -DP     O2 Delivery Intra Treatment nasal cannula  3L  -DP     O2 Delivery Post Treatment nasal cannula  3L  -DP       Row Name 07/12/24 1316          Positioning and Restraints    Pre-Treatment Position --  seated EOB  -DP     Post Treatment Position --  seated EOB  -DP     In Bed call light within reach;encouraged to call for assist;exit alarm on;with family/caregiver;with other staff  -DP       Row Name 07/12/24 1316          Therapy  Assessment/Plan (PT)    Rehab Potential (PT) good, to achieve stated therapy goals  -DP     Therapy Frequency (PT) daily  -DP     Predicted Duration of Therapy Intervention (PT) 10  -DP     Problem List (PT) problems related to;balance;coordination;motor control;mobility  -DP     Activity Limitations Related to Problem List (PT) unable to ambulate safely  -DP       Row Name 07/12/24 1316          PT Evaluation Complexity    History, PT Evaluation Complexity 3 or more personal factors and/or comorbidities  -DP     Examination of Body Systems (PT Eval Complexity) total of 4 or more elements  -DP     Clinical Presentation (PT Evaluation Complexity) stable  -DP     Clinical Decision Making (PT Evaluation Complexity) low complexity  -DP     Overall Complexity (PT Evaluation Complexity) low complexity  -DP       Row Name 07/12/24 1316          Therapy Plan Review/Discharge Plan (PT)    Therapy Plan Review (PT) evaluation/treatment results reviewed  -DP       Row Name 07/12/24 1316          Physical Therapy Goals    Gait Training Goal Selection (PT) gait training, PT goal 1  -DP     Balance Goal Selection (PT) balance, PT goal 1  -DP       Row Name 07/12/24 1316          Gait Training Goal 1 (PT)    Activity/Assistive Device (Gait Training Goal 1, PT) gait (walking locomotion)  -DP     [place] Level (Gait Training Goal 1, PT) supervision required  -DP     Distance (Gait Training Goal 1, PT) 200  -DP     Time Frame (Gait Training Goal 1, PT) 10 days  -DP       Row Name 07/12/24 1316          Balance Goal 1 (PT)    Activity/Assistive Device (Balance Goal 1, PT) standing, dynamic  -DP     [place] Level/Cues Needed (Balance Goal 1, PT) supervision required  -DP     Time Frame (Balance Goal 1, PT) 10 days  -DP               User Key  (r) = Recorded By, (t) = Taken By, (c) = Cosigned By      Initials Name Provider Type    Carlos Enrique Thomas, PT Physical Therapist                    Physical Therapy Education        Title: PT OT SLP Therapies (Done)       Topic: Physical Therapy (Done)       Point: Mobility training (Done)       Learning Progress Summary             Patient Acceptance, E, VU by SC at 7/12/2024 1326                         Point: Home exercise program (Done)       Learning Progress Summary             Patient Acceptance, E, VU by SC at 7/12/2024 1326                         Point: Body mechanics (Done)       Learning Progress Summary             Patient Acceptance, E, VU by SC at 7/12/2024 1326                         Point: Precautions (Done)       Learning Progress Summary             Patient Acceptance, E, VU by SC at 7/12/2024 1326                                         User Key       Initials Effective Dates Name Provider Type Discipline    SC 02/05/24 -  Daysi Rose OT Occupational Therapist OT                  PT Recommendation and Plan  Anticipated Discharge Disposition (PT): home with home health  Planned Therapy Interventions (PT): balance training, bed mobility training, gait training, neuromuscular re-education, patient/family education, postural re-education, stair training, strengthening, transfer training  Therapy Frequency (PT): daily  Plan of Care Reviewed With: patient  Outcome Evaluation: Pt presents with a decreased in baseline function.  Pt demonstrated difficulties with breathing, cardiopulomary endurance, and safety with ambulation.  Skilled PT is recommended at this time to improve deficits and maximize functional independence.   Outcome Measures       Row Name 07/12/24 1331             How much help from another person do you currently need...    Turning from your back to your side while in flat bed without using bedrails? 4  -DP      Moving from lying on back to sitting on the side of a flat bed without bedrails? 4  -DP      Moving to and from a bed to a chair (including a wheelchair)? 3  -DP      Standing up from a chair using your arms (e.g., wheelchair, bedside chair)? 3  -DP       Climbing 3-5 steps with a railing? 3  -DP      To walk in hospital room? 3  -DP      AM-PAC 6 Clicks Score (PT) 20  -DP      Highest Level of Mobility Goal 6 --> Walk 10 steps or more  -DP         Functional Assessment    Outcome Measure Options AM-PAC 6 Clicks Basic Mobility (PT)  -DP                User Key  (r) = Recorded By, (t) = Taken By, (c) = Cosigned By      Initials Name Provider Type    Carlos Enrique Thomas, PT Physical Therapist                     Time Calculation:    PT Charges       Row Name 07/12/24 1332             Time Calculation    PT Received On 07/12/24  -DP      PT Goal Re-Cert Due Date 07/21/24  -DP         Untimed Charges    PT Eval/Re-eval Minutes 40  -DP         Total Minutes    Untimed Charges Total Minutes 40  -DP       Total Minutes 40  -DP                User Key  (r) = Recorded By, (t) = Taken By, (c) = Cosigned By      Initials Name Provider Type    Carlos Enrique Thomas, PT Physical Therapist                  Therapy Charges for Today       Code Description Service Date Service Provider Modifiers Qty    88671024043 HC PT EVAL LOW COMPLEXITY 3 7/12/2024 Carlos Enrique Conklin, PT GP 1            PT G-Codes  Outcome Measure Options: AM-PAC 6 Clicks Basic Mobility (PT)  AM-PAC 6 Clicks Score (PT): 20  AM-PAC 6 Clicks Score (OT): 24    Carlos Enrique Conklin PT  7/12/2024

## 2024-07-13 LAB
ANION GAP SERPL CALCULATED.3IONS-SCNC: 8.6 MMOL/L (ref 5–15)
BUN SERPL-MCNC: 19 MG/DL (ref 8–23)
BUN/CREAT SERPL: 33.9 (ref 7–25)
CALCIUM SPEC-SCNC: 9 MG/DL (ref 8.6–10.5)
CHLORIDE SERPL-SCNC: 100 MMOL/L (ref 98–107)
CO2 SERPL-SCNC: 31.4 MMOL/L (ref 22–29)
CREAT SERPL-MCNC: 0.56 MG/DL (ref 0.76–1.27)
DEPRECATED RDW RBC AUTO: 47.7 FL (ref 37–54)
EGFRCR SERPLBLD CKD-EPI 2021: 104.1 ML/MIN/1.73
ERYTHROCYTE [DISTWIDTH] IN BLOOD BY AUTOMATED COUNT: 13.8 % (ref 12.3–15.4)
GLUCOSE SERPL-MCNC: 150 MG/DL (ref 65–99)
HCT VFR BLD AUTO: 56.2 % (ref 37.5–51)
HGB BLD-MCNC: 18.2 G/DL (ref 13–17.7)
MAGNESIUM SERPL-MCNC: 2.2 MG/DL (ref 1.6–2.4)
MCH RBC QN AUTO: 30.1 PG (ref 26.6–33)
MCHC RBC AUTO-ENTMCNC: 32.4 G/DL (ref 31.5–35.7)
MCV RBC AUTO: 93 FL (ref 79–97)
PLATELET # BLD AUTO: 173 10*3/MM3 (ref 140–450)
PMV BLD AUTO: 11 FL (ref 6–12)
POTASSIUM SERPL-SCNC: 4.7 MMOL/L (ref 3.5–5.2)
RBC # BLD AUTO: 6.04 10*6/MM3 (ref 4.14–5.8)
SODIUM SERPL-SCNC: 140 MMOL/L (ref 136–145)
WBC NRBC COR # BLD AUTO: 16.55 10*3/MM3 (ref 3.4–10.8)

## 2024-07-13 PROCEDURE — 85027 COMPLETE CBC AUTOMATED: CPT | Performed by: INTERNAL MEDICINE

## 2024-07-13 PROCEDURE — 94799 UNLISTED PULMONARY SVC/PX: CPT

## 2024-07-13 PROCEDURE — 99232 SBSQ HOSP IP/OBS MODERATE 35: CPT | Performed by: INTERNAL MEDICINE

## 2024-07-13 PROCEDURE — 94664 DEMO&/EVAL PT USE INHALER: CPT

## 2024-07-13 PROCEDURE — 25010000002 METHYLPREDNISOLONE PER 125 MG: Performed by: INTERNAL MEDICINE

## 2024-07-13 PROCEDURE — 94761 N-INVAS EAR/PLS OXIMETRY MLT: CPT

## 2024-07-13 PROCEDURE — 80048 BASIC METABOLIC PNL TOTAL CA: CPT | Performed by: INTERNAL MEDICINE

## 2024-07-13 PROCEDURE — 94660 CPAP INITIATION&MGMT: CPT

## 2024-07-13 PROCEDURE — 83735 ASSAY OF MAGNESIUM: CPT | Performed by: INTERNAL MEDICINE

## 2024-07-13 PROCEDURE — 25010000002 ENOXAPARIN PER 10 MG: Performed by: INTERNAL MEDICINE

## 2024-07-13 RX ADMIN — NICOTINE 1 PATCH: 21 PATCH, EXTENDED RELEASE TRANSDERMAL at 20:27

## 2024-07-13 RX ADMIN — BUDESONIDE 0.5 MG: 0.5 INHALANT ORAL at 19:08

## 2024-07-13 RX ADMIN — BUDESONIDE 0.5 MG: 0.5 INHALANT ORAL at 09:06

## 2024-07-13 RX ADMIN — Medication 10 ML: at 20:32

## 2024-07-13 RX ADMIN — Medication 10 ML: at 08:42

## 2024-07-13 RX ADMIN — ENOXAPARIN SODIUM 40 MG: 100 INJECTION SUBCUTANEOUS at 08:42

## 2024-07-13 RX ADMIN — ARFORMOTEROL TARTRATE 15 MCG: 15 SOLUTION RESPIRATORY (INHALATION) at 19:08

## 2024-07-13 RX ADMIN — WATER 60 MG: 1 INJECTION INTRAMUSCULAR; INTRAVENOUS; SUBCUTANEOUS at 14:43

## 2024-07-13 RX ADMIN — WATER 60 MG: 1 INJECTION INTRAMUSCULAR; INTRAVENOUS; SUBCUTANEOUS at 20:30

## 2024-07-13 RX ADMIN — ARFORMOTEROL TARTRATE 15 MCG: 15 SOLUTION RESPIRATORY (INHALATION) at 09:06

## 2024-07-13 RX ADMIN — ASPIRIN 81 MG: 81 TABLET, CHEWABLE ORAL at 08:47

## 2024-07-13 RX ADMIN — WATER 60 MG: 1 INJECTION INTRAMUSCULAR; INTRAVENOUS; SUBCUTANEOUS at 04:42

## 2024-07-13 RX ADMIN — AMLODIPINE BESYLATE 10 MG: 10 TABLET ORAL at 08:42

## 2024-07-13 RX ADMIN — WATER 60 MG: 1 INJECTION INTRAMUSCULAR; INTRAVENOUS; SUBCUTANEOUS at 08:42

## 2024-07-13 RX ADMIN — AZITHROMYCIN DIHYDRATE 250 MG: 250 TABLET ORAL at 08:42

## 2024-07-13 NOTE — PROGRESS NOTES
PULMONARY PROGRESS NOTE      [facility] 4TH FLOOR MEDICAL TELEMETRY UNIT  7/13/2024        Colin Nevarez  [id number]  1950  74 y.o. male             LOS: 2 days   Orlin Harry MD    Subjective     CC/Reason for visit: COPD exacerbation    HPI: 74-year-old white male with history of severe COPD lung cancer and prostate cancer admitted with COPD exacerbation and respiratory failure  Patient continues to have severe breathlessness along with cough and wheeze and is on BiPAP  Patient denies any chest pain fever or hemoptysis he continues to have severe wheezing even on mild activity    Review of Systems:  Respiratory ROS:  Review of Systems   Respiratory:  Positive for cough, shortness of breath and wheezing.    All other systems reviewed and are negative.    All other systems were reviewed and were negative except as above in the HPI.    Objective     Vital Sign Min/Max for last 24 hours:  Temp  Min: 97.3 °F (36.3 °C)  Max: 98.3 °F (36.8 °C)   BP  Min: 120/67  Max: 148/75   Pulse  Min: 67  Max: 92   Resp  Min: 18  Max: 22   SpO2  Min: 86 %  Max: 98 %   Flow (L/min)  Min: 3  Max: 4   No data recorded     Physical Exam:  97.3 °F (36.3 °C) (Oral) 89 136/70 22 (!) 89% 63.3 kg (139 lb 8.8 oz) Body mass index is 20.02 kg/m².  Physical Exam  Vitals and nursing note reviewed.   Constitutional:       Appearance: He is ill-appearing.   HENT:      Head: Normocephalic and atraumatic.      Nose: Nose normal.      Mouth/Throat:      Mouth: Mucous membranes are moist.   Eyes:      Extraocular Movements: Extraocular movements intact.      Pupils: Pupils are equal, round, and reactive to light.   Cardiovascular:      Rate and Rhythm: Normal rate and regular rhythm.      Pulses: Normal pulses.      Heart sounds: Normal heart sounds.   Pulmonary:      Effort: Respiratory distress present.      Breath sounds: Wheezing and rhonchi present.   Abdominal:      General: Bowel sounds are normal.      Palpations: Abdomen is  soft.   Musculoskeletal:         General: Normal range of motion.      Cervical back: Normal range of motion and neck supple.   Skin:     General: Skin is warm.      Capillary Refill: Capillary refill takes 2 to 3 seconds.   Neurological:      General: No focal deficit present.      Mental Status: He is alert and oriented to person, place, and time.   Psychiatric:         Mood and Affect: Mood normal.         Scheduled meds:    amLODIPine, 10 mg, Oral, Daily  arformoterol, 15 mcg, Nebulization, BID - RT  aspirin, 81 mg, Oral, Daily  azithromycin, 250 mg, Oral, Q24H  budesonide, 0.5 mg, Nebulization, BID - RT  enoxaparin, 40 mg, Subcutaneous, Q24H  methylPREDNISolone sodium succinate, 60 mg, Intravenous, Q6H  nicotine, 1 patch, Transdermal, Q24H  sodium chloride, 10 mL, Intravenous, Q12H        IV meds:                       Pharmacy to Dose enoxaparin (LOVENOX),         Data Review: I personally reviewed the patient's medications and new clinical results.  Lab Results   Component Value Date    CALCIUM 9.0 07/13/2024     Results from last 7 days   Lab Units 07/13/24  0534 07/12/24  0432 07/11/24  1149   AST (SGOT) U/L  --   --  18   ALT (SGPT) U/L  --   --  15   MAGNESIUM mg/dL 2.2 2.1  --    SODIUM mmol/L 140 134* 137   POTASSIUM mmol/L 4.7 4.8 4.9   CHLORIDE mmol/L 100 98 97*   CO2 mmol/L 31.4* 27.2 27.8   BUN mg/dL 19 17 11   CREATININE mg/dL 0.56* 0.58* 0.68*   GLUCOSE mg/dL 150* 167* 125*   CALCIUM mg/dL 9.0 9.1 9.7   WBC 10*3/mm3 16.55* 5.60 9.37   HEMOGLOBIN g/dL 18.2* 17.8* 19.2*   PLATELETS 10*3/mm3 173 162 176     Results from last 7 days   Lab Units 07/11/24  1149   HSTROP T ng/L 12     Results from last 7 days   Lab Units 07/12/24  0616   PH, ARTERIAL pH units 7.299*   PO2 ART mm Hg 86.4   PCO2, ARTERIAL mm Hg 64.4*   HCO3 ART mmol/L 31.6*       Radiology: I independently reviewed the patient's new imaging, including images and reports.  Imaging Results (Last 24 Hours)       ** No results found for the  last 24 hours. **            Assessment & Plan   Assessment / Plan       ASSESSMENT:   Acute hypoxic hypercapnic respiratory failure  COPD exacerbation  Emphysema  Tobacco abuse  History of lung cancer  History of prostate cancer    PLAN/ RECOMMENDATIONS:  Will continue BiPAP for now  Continue nebulizer treatments and IV steroids  Continue IV Zithromax  Possible pulmonary rehab  Will require NIPPV at home  Mr. Nevarez will need noninvasive ventilation for acute on chronic hypercapnic respiratory failure (CO2 68.6) secondary to very severe COPD, FEV1 25% of predicted. Bilevel was initiated but  CO2 remained elevated. Bilevel ST, ASV and Bilevel with VAPS have been considered and ruled out. The patient requires specific mode of ventilation that is not available on less costly bilevel devices. A noninvasive ventilator is necessary to deliver targeted tidal volume and minute ventilation to ensure adequate ventilation, provide backup rate and address any underlying MINDI components. Alarms and battery backup (only available on noninvasive  ventilator) are necessary for patient safety. GOLD guidelines state noninvasive mechanical ventilation should be the first mode of ventilation used in COPD with respiratory failure to decrease hospitalization and improve survival. Failure to follow recommended guidelines will place the patient at increased risk of  acute on chronic respiratory failure,unplanned expensive rehospitalization, and mortality. Mr. Nevarez will also need to transition to nebulized maintenance with Arformoterol, Budesonide and Yupelri on discharge due to sub optimal inspiratory flow for ellipta device and severe SOA.        Total time spent: 26 minutes      This document has been electronically signed by Isidro Armijo MD on July 13, 2024 11:54 EDT

## 2024-07-13 NOTE — PROGRESS NOTES
[facility]   Hospitalist Progress Note  Date: 2024  Patient Name: Colin Nevarez  : 1950  MRN: [id number]  Date of admission: 2024  Room/Bed: [facility]/      Subjective   Subjective     Chief Complaint:   Shortness of breath    Summary:  Colin Nevarez is a 74 y.o. male with a past medical history of COPD, hypertension, hyperlipidemia, lung cancer, and prostate cancer     Patient presents the emergency department on 2024 for complaints of worsening shortness of breath.  In the room patient tripoding with accessory muscle use, having conversational dyspnea.  In the ED patient found to have acute hypercapnic hypoxic respiratory failure.  Patient initially saturating in the 70s on room air, started on 3 L nasal cannula with improvement in O2.  ABG shows patient hypercapnic with a pCO2 of 68.6, pH of 7.255.  Patient has a hemoglobin of 19.2.  Patient's chest x-ray shows evidence of emphysema with no acute infiltrate.  Initially on meeting patient, he is apprehensive to be admitted.  ED physician and internal medicine doctor discussed extensively with patient, neighbors who brought patient into the hospital, as well as son over the phone.  Patient eventually agreed to admission in the hospital.  Patient initiated on BiPAP.  Patient now agreeable to staying over the weekend, pulmonologist consulted    Interval Followup:   Patient on rounds still with increased work of breathing, tripoding.  Patient remains on 3 to 4 L nasal cannula.  Continues to use NIPPV at night.  Patient agreeable to case management trying to set up home oxygen and NIPPV as well.      Objective   Objective     Vitals:   Temp:  [97.3 °F (36.3 °C)-98.3 °F (36.8 °C)] 97.5 °F (36.4 °C)  Heart Rate:  [67-98] 98  Resp:  [18-22] 20  BP: (120-153)/(67-80) 153/72  Flow (L/min):  [3-4] 4    Physical Exam               Constitutional: Awake, alert, increased work of breathing, tripoding, conversational dyspnea              Eyes: Pupils  equal, sclerae anicteric, no conjunctival injection              HENT: NCAT, mucous membranes moist              Neck: Supple, no thyromegaly, no lymphadenopathy, trachea midline              Respiratory: Diminished breath sounds heard throughout, prolonged expiratory phase with end expiratory wheezing              Cardiovascular: RRR, no murmurs, rubs, or gallops, palpable pedal pulses bilaterally              Gastrointestinal: Positive bowel sounds, soft, nontender, nondistended              Musculoskeletal: No bilateral ankle edema, no clubbing or cyanosis to extremities              Neurologic: Oriented x 3, strength symmetric in all extremities, Cranial Nerves grossly intact to confrontation, speech clear    Result Review    Result Review:  I have personally reviewed these results:  [x]  Laboratory      Lab 07/13/24  0534 07/12/24  0432 07/11/24  1149   WBC 16.55* 5.60 9.37   HEMOGLOBIN 18.2* 17.8* 19.2*   HEMATOCRIT 56.2* 54.8* 57.3*   PLATELETS 173 162 176   NEUTROS ABS  --  5.03 7.60*   IMMATURE GRANS (ABS)  --  0.02 0.02   LYMPHS ABS  --  0.48* 1.03   MONOS ABS  --  0.07* 0.67   EOS ABS  --  0.00 0.01   MCV 93.0 91.9 90.1   PROCALCITONIN  --  0.03  --    LACTATE  --   --  1.4         Lab 07/13/24  0534 07/12/24  0432 07/11/24  1149   SODIUM 140 134* 137   POTASSIUM 4.7 4.8 4.9   CHLORIDE 100 98 97*   CO2 31.4* 27.2 27.8   ANION GAP 8.6 8.8 12.2   BUN 19 17 11   CREATININE 0.56* 0.58* 0.68*   EGFR 104.1 103.0 98.1   GLUCOSE 150* 167* 125*   CALCIUM 9.0 9.1 9.7   MAGNESIUM 2.2 2.1  --          Lab 07/11/24  1149   TOTAL PROTEIN 7.9   ALBUMIN 4.5   GLOBULIN 3.4   ALT (SGPT) 15   AST (SGOT) 18   BILIRUBIN 0.3   ALK PHOS 112         Lab 07/11/24  1149   PROBNP 83.5   HSTROP T 12                 Lab 07/12/24  0616 07/11/24  1428   PH, ARTERIAL 7.299* 7.255*   PCO2, ARTERIAL 64.4* 68.6*   PO2 ART 86.4 65.2*   O2 SATURATION ART 95.0 88.0*   FIO2 35 32   HCO3 ART 31.6* 30.4*   BASE EXCESS ART 2.2* -0.1     Brief  Urine Lab Results  (Last result in the past 365 days)        Color   Clarity   Blood   Leuk Est   Nitrite   Protein   CREAT   Urine HCG        01/25/24 1123 Yellow   Clear   Large   Moderate (2+)   Negative   Trace                 [x]  Microbiology   Microbiology Results (last 10 days)       Procedure Component Value - Date/Time    S. Pneumo Ag Urine or CSF - Urine, Urine, Clean Catch [[id number]]  (Normal) Collected: 07/12/24 1705    Lab Status: Final result Specimen: Urine, Clean Catch Updated: 07/12/24 1730     Strep Pneumo Ag Negative    Legionella Antigen, Urine - Urine, Urine, Clean Catch [[id number]]  (Normal) Collected: 07/12/24 1705    Lab Status: Final result Specimen: Urine, Clean Catch Updated: 07/12/24 1730     LEGIONELLA ANTIGEN, URINE Negative    Blood Culture - Blood, Arm, Left [[id number]]  (Normal) Collected: 07/11/24 1329    Lab Status: Preliminary result Specimen: Blood from Arm, Left Updated: 07/13/24 1345     Blood Culture No growth at 2 days    COVID PRE-OP / PRE-PROCEDURE SCREENING ORDER (NO ISOLATION) - Swab, Nasopharynx [[id number]]  (Normal) Collected: 07/11/24 1313    Lab Status: Final result Specimen: Swab from Nasopharynx Updated: 07/11/24 1400    Narrative:      The following orders were created for panel order COVID PRE-OP / PRE-PROCEDURE SCREENING ORDER (NO ISOLATION) - Swab, Nasopharynx.  Procedure                               Abnormality         Status                     ---------                               -----------         ------                     COVID-19, FLU A/B, RSV P...[[id number]]  Normal              Final result                 Please view results for these tests on the individual orders.    COVID-19, FLU A/B, RSV PCR 1 HR TAT - Swab, Nasopharynx [[id number]]  (Normal) Collected: 07/11/24 1313    Lab Status: Final result Specimen: Swab from Nasopharynx Updated: 07/11/24 1400     COVID19 Not Detected     Influenza A PCR Not Detected     Influenza B PCR Not Detected      RSV, PCR Not Detected    Narrative:      Fact sheet for providers: https://www.fda.gov/media/892602/download    Fact sheet for patients: https://www.fda.gov/media/737677/download    Test performed by PCR.    Blood Culture - Blood, Arm, Right [[id number]]  (Normal) Collected: 07/11/24 1149    Lab Status: Preliminary result Specimen: Blood from Arm, Right Updated: 07/13/24 1200     Blood Culture No growth at 2 days          [x]  Radiology  XR Chest 1 View    Result Date: 7/11/2024  Impression: Evidence of emphysema. No acute infiltrate. Electronically Signed: Angeline Hyde MD  7/11/2024 12:00 PM EDT  Workstation ID: [id number]   []  EKG/Telemetry   []  Cardiology/Vascular   []  Pathology  []  Old records  []  Other:    Assessment & Plan   Assessment / Plan     Assessment:  Acute hypoxic hypercapnic respiratory failure  COPD in acute exacerbation  History of squamous cell carcinoma of the right lung  Ongoing tobacco abuse, 1 pack/day  Hypertension  Hyperlipidemia  History of prostate cancer     Plan:  Patient remains admitted to hospital for further care and management  Pulmonology service consulted, appreciate assistance  Given hypercapnic respiratory acidosis, continue with BiPAP at night and with naps, settings adjusted by pulmonology  RT case management consulted for possible NIPPV at home  Continue with IV Solu-Medrol at this time  Scheduled and as needed breathing treatments  Incentive spirometer/flutter valve  NRT ordered  Started on azithromycin for COPD exacerbation     Discussed with RN.  Discussed with the patient's family at bedside    VTE Prophylaxis:  Pharmacologic VTE prophylaxis orders are present.        CODE STATUS:   Medical Intervention Limits: No intubation (DNI)  Code Status (Patient has no pulse and is not breathing): No CPR (Do Not Attempt to Resuscitate)  Medical Interventions (Patient has pulse or is breathing): Limited Support      Electronically signed by Orlin Harry MD, 7/13/2024, 16:54  EDT.

## 2024-07-13 NOTE — PLAN OF CARE
Goal Outcome Evaluation:   Patient currently on 4Lnc tolerating well. Patient said he would wear v60 again tonight. No issues or changes at this time.

## 2024-07-13 NOTE — PLAN OF CARE
Goal Outcome Evaluation:              Outcome Evaluation: patient wore bipap last night with settings of 12/6 35%; patient is currently on a 4L nasal cannula and a continuous pulse oximeter

## 2024-07-13 NOTE — PLAN OF CARE
Goal Outcome Evaluation:              Outcome Evaluation: Patient alert and oriented with intermittent confusion, on 4L NC, NSR on monitor, no complaints of pain

## 2024-07-13 NOTE — PLAN OF CARE
Goal Outcome Evaluation:      Pt alert and oriented x4, pt on continuous pulse ox, pt compliant with calling out for assistance with ambulation to Bathroom, pt denies any pain, bipap placed on pt while asleep.

## 2024-07-13 NOTE — PROGRESS NOTES
RT EQUIPMENT DEVICE RELATED - SKIN ASSESSMENT    RT Medical Equipment/Device:     NIV Mask:  Under-the-nose   size:       Skin Assessment:      Cheek:  Intact  Chin:  Mass  Neck:  Intact  Nose:  Intact    Device Skin Pressure Protection:  Pressure points protected    Nurse Notification:  Rocio Denise, RRT

## 2024-07-14 LAB
ANION GAP SERPL CALCULATED.3IONS-SCNC: 4.3 MMOL/L (ref 5–15)
ARTERIAL PATENCY WRIST A: POSITIVE
ATMOSPHERIC PRESS: 745.8 MMHG
BASE EXCESS BLDA CALC-SCNC: 9.2 MMOL/L (ref -2–2)
BDY SITE: ABNORMAL
BUN SERPL-MCNC: 20 MG/DL (ref 8–23)
BUN/CREAT SERPL: 43.5 (ref 7–25)
CALCIUM SPEC-SCNC: 8.9 MG/DL (ref 8.6–10.5)
CHLORIDE SERPL-SCNC: 98 MMOL/L (ref 98–107)
CO2 SERPL-SCNC: 36.7 MMOL/L (ref 22–29)
CREAT SERPL-MCNC: 0.46 MG/DL (ref 0.76–1.27)
DEPRECATED RDW RBC AUTO: 49.1 FL (ref 37–54)
EGFRCR SERPLBLD CKD-EPI 2021: 110.4 ML/MIN/1.73
ERYTHROCYTE [DISTWIDTH] IN BLOOD BY AUTOMATED COUNT: 14 % (ref 12.3–15.4)
GAS FLOW AIRWAY: 3 LPM
GLUCOSE SERPL-MCNC: 143 MG/DL (ref 65–99)
HCO3 BLDA-SCNC: 40.1 MMOL/L (ref 22–26)
HCT VFR BLD AUTO: 55.5 % (ref 37.5–51)
HCT VFR BLD CALC: 58 % (ref 38–51)
HEMODILUTION: NO
HGB BLD-MCNC: 17.7 G/DL (ref 13–17.7)
HGB BLDA-MCNC: 19.8 G/DL (ref 12–18)
INHALED O2 CONCENTRATION: 32 %
Lab: ABNORMAL
MAGNESIUM SERPL-MCNC: 2.4 MG/DL (ref 1.6–2.4)
MCH RBC QN AUTO: 30.3 PG (ref 26.6–33)
MCHC RBC AUTO-ENTMCNC: 31.9 G/DL (ref 31.5–35.7)
MCV RBC AUTO: 94.9 FL (ref 79–97)
MODALITY: ABNORMAL
NOTIFIED WHO: ABNORMAL
PCO2 BLDA: 76.2 MM HG (ref 35–45)
PH BLDA: 7.33 PH UNITS (ref 7.35–7.45)
PLATELET # BLD AUTO: 165 10*3/MM3 (ref 140–450)
PMV BLD AUTO: 11.4 FL (ref 6–12)
PO2 BLD: 185 MM[HG] (ref 0–500)
PO2 BLDA: 59.1 MM HG (ref 80–100)
POTASSIUM SERPL-SCNC: 4.6 MMOL/L (ref 3.5–5.2)
RBC # BLD AUTO: 5.85 10*6/MM3 (ref 4.14–5.8)
READ BACK: YES
SAO2 % BLDCOA: 86.5 % (ref 95–99)
SODIUM SERPL-SCNC: 139 MMOL/L (ref 136–145)
WBC NRBC COR # BLD AUTO: 15.84 10*3/MM3 (ref 3.4–10.8)

## 2024-07-14 PROCEDURE — 36600 WITHDRAWAL OF ARTERIAL BLOOD: CPT | Performed by: INTERNAL MEDICINE

## 2024-07-14 PROCEDURE — 87077 CULTURE AEROBIC IDENTIFY: CPT | Performed by: NURSE PRACTITIONER

## 2024-07-14 PROCEDURE — 80048 BASIC METABOLIC PNL TOTAL CA: CPT | Performed by: INTERNAL MEDICINE

## 2024-07-14 PROCEDURE — 25010000002 ENOXAPARIN PER 10 MG: Performed by: INTERNAL MEDICINE

## 2024-07-14 PROCEDURE — 99232 SBSQ HOSP IP/OBS MODERATE 35: CPT | Performed by: INTERNAL MEDICINE

## 2024-07-14 PROCEDURE — 25010000002 METHYLPREDNISOLONE PER 125 MG: Performed by: INTERNAL MEDICINE

## 2024-07-14 PROCEDURE — 94660 CPAP INITIATION&MGMT: CPT

## 2024-07-14 PROCEDURE — 94799 UNLISTED PULMONARY SVC/PX: CPT

## 2024-07-14 PROCEDURE — 87205 SMEAR GRAM STAIN: CPT | Performed by: NURSE PRACTITIONER

## 2024-07-14 PROCEDURE — 87070 CULTURE OTHR SPECIMN AEROBIC: CPT | Performed by: NURSE PRACTITIONER

## 2024-07-14 PROCEDURE — 83735 ASSAY OF MAGNESIUM: CPT | Performed by: INTERNAL MEDICINE

## 2024-07-14 PROCEDURE — 82803 BLOOD GASES ANY COMBINATION: CPT | Performed by: INTERNAL MEDICINE

## 2024-07-14 PROCEDURE — 85027 COMPLETE CBC AUTOMATED: CPT | Performed by: INTERNAL MEDICINE

## 2024-07-14 RX ORDER — NICOTINE 21 MG/24HR
1 PATCH, TRANSDERMAL 24 HOURS TRANSDERMAL EVERY 24 HOURS
Status: DISCONTINUED | OUTPATIENT
Start: 2024-07-14 | End: 2024-07-21 | Stop reason: HOSPADM

## 2024-07-14 RX ORDER — ECHINACEA PURPUREA EXTRACT 125 MG
2 TABLET ORAL AS NEEDED
Status: DISCONTINUED | OUTPATIENT
Start: 2024-07-14 | End: 2024-07-21 | Stop reason: HOSPADM

## 2024-07-14 RX ADMIN — ASPIRIN 81 MG: 81 TABLET, CHEWABLE ORAL at 09:01

## 2024-07-14 RX ADMIN — IPRATROPIUM BROMIDE AND ALBUTEROL SULFATE 3 ML: .5; 3 SOLUTION RESPIRATORY (INHALATION) at 16:26

## 2024-07-14 RX ADMIN — ARFORMOTEROL TARTRATE 15 MCG: 15 SOLUTION RESPIRATORY (INHALATION) at 09:32

## 2024-07-14 RX ADMIN — NICOTINE 1 PATCH: 21 PATCH, EXTENDED RELEASE TRANSDERMAL at 15:55

## 2024-07-14 RX ADMIN — Medication 10 ML: at 09:01

## 2024-07-14 RX ADMIN — WATER 60 MG: 1 INJECTION INTRAMUSCULAR; INTRAVENOUS; SUBCUTANEOUS at 14:42

## 2024-07-14 RX ADMIN — WATER 60 MG: 1 INJECTION INTRAMUSCULAR; INTRAVENOUS; SUBCUTANEOUS at 20:40

## 2024-07-14 RX ADMIN — BUDESONIDE 0.5 MG: 0.5 INHALANT ORAL at 09:32

## 2024-07-14 RX ADMIN — BUDESONIDE 0.5 MG: 0.5 INHALANT ORAL at 19:36

## 2024-07-14 RX ADMIN — ARFORMOTEROL TARTRATE 15 MCG: 15 SOLUTION RESPIRATORY (INHALATION) at 19:36

## 2024-07-14 RX ADMIN — AZITHROMYCIN DIHYDRATE 250 MG: 250 TABLET ORAL at 09:01

## 2024-07-14 RX ADMIN — SALINE NASAL SPRAY 2 SPRAY: 1.5 SOLUTION NASAL at 16:06

## 2024-07-14 RX ADMIN — AMLODIPINE BESYLATE 10 MG: 10 TABLET ORAL at 09:01

## 2024-07-14 RX ADMIN — WATER 60 MG: 1 INJECTION INTRAMUSCULAR; INTRAVENOUS; SUBCUTANEOUS at 09:01

## 2024-07-14 RX ADMIN — Medication 10 ML: at 20:40

## 2024-07-14 RX ADMIN — ENOXAPARIN SODIUM 40 MG: 100 INJECTION SUBCUTANEOUS at 09:01

## 2024-07-14 RX ADMIN — WATER 60 MG: 1 INJECTION INTRAMUSCULAR; INTRAVENOUS; SUBCUTANEOUS at 04:23

## 2024-07-14 NOTE — PROGRESS NOTES
PULMONARY PROGRESS NOTE      [facility] 4TH FLOOR MEDICAL TELEMETRY UNIT  7/14/2024        Colin Nevarez  [id number]  1950  74 y.o. male             LOS: 3 days   Orlin Harry MD    Subjective     CC/Reason for visit: COPD exacerbation with respiratory failure    HPI: 74 years old white male with history of severe COPD and lung cancer with prostate cancer who was admitted with COPD exacerbation and respiratory failure  Overnight patient shows a little improvement he is presently on oxygen by nasal cannula and used BiPAP overnight patient continues to have severe breathlessness on mild activity    Review of Systems:  Respiratory ROS:  Review of Systems   Respiratory:  Positive for cough, shortness of breath and wheezing.    All other systems reviewed and are negative.    All other systems were reviewed and were negative except as above in the HPI.    Objective     Vital Sign Min/Max for last 24 hours:  Temp  Min: 97.3 °F (36.3 °C)  Max: 97.7 °F (36.5 °C)   BP  Min: 119/76  Max: 153/72   Pulse  Min: 78  Max: 86   Resp  Min: 18  Max: 24   SpO2  Min: 91 %  Max: 96 %   Flow (L/min)  Min: 3  Max: 4   No data recorded     Physical Exam:  97.7 °F (36.5 °C) (Oral) 78 136/77 18 91% 63.3 kg (139 lb 8.8 oz) Body mass index is 20.02 kg/m².  Physical Exam  Vitals and nursing note reviewed.   Constitutional:       Appearance: He is ill-appearing.   HENT:      Head: Normocephalic and atraumatic.      Nose: Nose normal.      Mouth/Throat:      Mouth: Mucous membranes are moist.      Pharynx: Oropharynx is clear.   Eyes:      Extraocular Movements: Extraocular movements intact.      Conjunctiva/sclera: Conjunctivae normal.      Pupils: Pupils are equal, round, and reactive to light.   Cardiovascular:      Rate and Rhythm: Normal rate and regular rhythm.      Pulses: Normal pulses.      Heart sounds: Normal heart sounds.   Pulmonary:      Effort: Respiratory distress present.      Breath sounds: Wheezing and  rhonchi present.   Abdominal:      General: Abdomen is flat. Bowel sounds are normal.      Palpations: Abdomen is soft.   Musculoskeletal:         General: Normal range of motion.      Cervical back: Normal range of motion and neck supple.   Skin:     General: Skin is warm.      Capillary Refill: Capillary refill takes 2 to 3 seconds.   Neurological:      General: No focal deficit present.      Mental Status: He is alert and oriented to person, place, and time.   Psychiatric:         Mood and Affect: Mood normal.         Behavior: Behavior normal.         Scheduled meds:    amLODIPine, 10 mg, Oral, Daily  arformoterol, 15 mcg, Nebulization, BID - RT  aspirin, 81 mg, Oral, Daily  azithromycin, 250 mg, Oral, Q24H  budesonide, 0.5 mg, Nebulization, BID - RT  enoxaparin, 40 mg, Subcutaneous, Q24H  methylPREDNISolone sodium succinate, 60 mg, Intravenous, Q6H  nicotine, 1 patch, Transdermal, Q24H  sodium chloride, 10 mL, Intravenous, Q12H        IV meds:                       Pharmacy to Dose enoxaparin (LOVENOX),         Data Review: I personally reviewed the patient's medications and new clinical results.  Lab Results   Component Value Date    CALCIUM 8.9 07/14/2024     Results from last 7 days   Lab Units 07/14/24  0508 07/13/24  0534 07/12/24  0432 07/11/24  1149   AST (SGOT) U/L  --   --   --  18   ALT (SGPT) U/L  --   --   --  15   MAGNESIUM mg/dL 2.4 2.2 2.1  --    SODIUM mmol/L 139 140 134* 137   POTASSIUM mmol/L 4.6 4.7 4.8 4.9   CHLORIDE mmol/L 98 100 98 97*   CO2 mmol/L 36.7* 31.4* 27.2 27.8   BUN mg/dL 20 19 17 11   CREATININE mg/dL 0.46* 0.56* 0.58* 0.68*   GLUCOSE mg/dL 143* 150* 167* 125*   CALCIUM mg/dL 8.9 9.0 9.1 9.7   WBC 10*3/mm3 15.84* 16.55* 5.60 9.37   HEMOGLOBIN g/dL 17.7 18.2* 17.8* 19.2*   PLATELETS 10*3/mm3 165 173 162 176     Results from last 7 days   Lab Units 07/11/24  1149   HSTROP T ng/L 12     Results from last 7 days   Lab Units 07/14/24  0650   PH, ARTERIAL pH units 7.329*   PO2 ART mm  Hg 59.1*   PCO2, ARTERIAL mm Hg 76.2*   HCO3 ART mmol/L 40.1*       Radiology: I independently reviewed the patient's new imaging, including images and reports.  Imaging Results (Last 24 Hours)       ** No results found for the last 24 hours. **            Assessment & Plan   Assessment / Plan       ASSESSMENT:   Acute hypoxemic hypercapnic respiratory failure  COPD exacerbation  History of lung cancer      PLAN/ RECOMMENDATIONS:  Continue BiPAP nightly and during the day as needed  Continue IV antibiotics and steroids  Will start weaning off his steroids from tomorrow if continues to show improvement  Continue incentive spirometry  Continue Zithromax      Total time spent: 28 minutes      This document has been electronically signed by Isidro Armijo MD on July 14, 2024 11:48 EDT

## 2024-07-14 NOTE — PLAN OF CARE
Goal Outcome Evaluation:              Outcome Evaluation: Patient alert and oriented, on 3L NC, NSR on monitor, no complaints of pain, wore bipap for a few hours today, no other changes

## 2024-07-14 NOTE — PLAN OF CARE
Goal Outcome Evaluation:         Patient alert to self and situation with some intermittent confusion, pt refused Bipap, patient refused bed alarm, pt educated on reason for bed alarm usage, pt denies pain, urine output noted, oral rehydration promoted.

## 2024-07-14 NOTE — PROGRESS NOTES
[facility]   Hospitalist Progress Note  Date: 2024  Patient Name: Colin Nevarez  : 1950  MRN: [id number]  Date of admission: 2024  Room/Bed: 405/      Subjective   Subjective     Chief Complaint:   Shortness of breath    Summary:  Colin Nevarez is a 74 y.o. male with a past medical history of COPD, hypertension, hyperlipidemia, lung cancer, and prostate cancer     Patient presents the emergency department on 2024 for complaints of worsening shortness of breath.  In the room patient tripoding with accessory muscle use, having conversational dyspnea.  In the ED patient found to have acute hypercapnic hypoxic respiratory failure.  Patient initially saturating in the 70s on room air, started on 3 L nasal cannula with improvement in O2.  ABG shows patient hypercapnic with a pCO2 of 68.6, pH of 7.255.  Patient has a hemoglobin of 19.2.  Patient's chest x-ray shows evidence of emphysema with no acute infiltrate.  Initially on meeting patient, he is apprehensive to be admitted.  ED physician and internal medicine doctor discussed extensively with patient, neighbors who brought patient into the hospital, as well as son over the phone.  Patient eventually agreed to admission in the hospital.  Patient initiated on BiPAP.  Patient now agreeable to staying over the weekend, pulmonologist consulted    Interval Followup:   Patient's breathing slowly improving at rest.  Had difficulty tolerating IPV overnight, continue to encourage patient.  ABG still shows hypercapnia.  Will discuss with pulmonologist tomorrow trying to get home device is set up      Objective   Objective     Vitals:   Temp:  [97.3 °F (36.3 °C)-97.7 °F (36.5 °C)] 97.3 °F (36.3 °C)  Heart Rate:  [78-90] 90  Resp:  [18-24] 18  BP: (119-153)/() 143/82  Flow (L/min):  [3-4] 3    Physical Exam               Constitutional: Awake, alert, improving work of breathing, however still increased.              Eyes: Pupils equal, sclerae  anicteric, no conjunctival injection              HENT: NCAT, mucous membranes moist              Neck: Supple, no thyromegaly, no lymphadenopathy, trachea midline              Respiratory: Improving aeration, prolonged expiratory phase with end expiratory wheezing              Cardiovascular: RRR, no murmurs, rubs, or gallops, palpable pedal pulses bilaterally              Gastrointestinal: Positive bowel sounds, soft, nontender, nondistended              Musculoskeletal: No bilateral ankle edema, no clubbing or cyanosis to extremities              Neurologic: Oriented x 3, strength symmetric in all extremities, Cranial Nerves grossly intact to confrontation, speech clear    Result Review    Result Review:  I have personally reviewed these results:  [x]  Laboratory      Lab 07/14/24  0508 07/13/24  0534 07/12/24  0432 07/11/24  1149   WBC 15.84* 16.55* 5.60 9.37   HEMOGLOBIN 17.7 18.2* 17.8* 19.2*   HEMATOCRIT 55.5* 56.2* 54.8* 57.3*   PLATELETS 165 173 162 176   NEUTROS ABS  --   --  5.03 7.60*   IMMATURE GRANS (ABS)  --   --  0.02 0.02   LYMPHS ABS  --   --  0.48* 1.03   MONOS ABS  --   --  0.07* 0.67   EOS ABS  --   --  0.00 0.01   MCV 94.9 93.0 91.9 90.1   PROCALCITONIN  --   --  0.03  --    LACTATE  --   --   --  1.4         Lab 07/14/24  0508 07/13/24  0534 07/12/24  0432   SODIUM 139 140 134*   POTASSIUM 4.6 4.7 4.8   CHLORIDE 98 100 98   CO2 36.7* 31.4* 27.2   ANION GAP 4.3* 8.6 8.8   BUN 20 19 17   CREATININE 0.46* 0.56* 0.58*   EGFR 110.4 104.1 103.0   GLUCOSE 143* 150* 167*   CALCIUM 8.9 9.0 9.1   MAGNESIUM 2.4 2.2 2.1         Lab 07/11/24  1149   TOTAL PROTEIN 7.9   ALBUMIN 4.5   GLOBULIN 3.4   ALT (SGPT) 15   AST (SGOT) 18   BILIRUBIN 0.3   ALK PHOS 112         Lab 07/11/24  1149   PROBNP 83.5   HSTROP T 12                 Lab 07/14/24  0650 07/12/24  0616 07/11/24  1428   PH, ARTERIAL 7.329* 7.299* 7.255*   PCO2, ARTERIAL 76.2* 64.4* 68.6*   PO2 ART 59.1* 86.4 65.2*   O2 SATURATION ART 86.5* 95.0  88.0*   FIO2 32 35 32   HCO3 ART 40.1* 31.6* 30.4*   BASE EXCESS ART 9.2* 2.2* -0.1     Brief Urine Lab Results  (Last result in the past 365 days)        Color   Clarity   Blood   Leuk Est   Nitrite   Protein   CREAT   Urine HCG        01/25/24 1123 Yellow   Clear   Large   Moderate (2+)   Negative   Trace                 [x]  Microbiology   Microbiology Results (last 10 days)       Procedure Component Value - Date/Time    S. Pneumo Ag Urine or CSF - Urine, Urine, Clean Catch [[id number]]  (Normal) Collected: 07/12/24 1705    Lab Status: Final result Specimen: Urine, Clean Catch Updated: 07/12/24 1730     Strep Pneumo Ag Negative    Legionella Antigen, Urine - Urine, Urine, Clean Catch [[id number]]  (Normal) Collected: 07/12/24 1705    Lab Status: Final result Specimen: Urine, Clean Catch Updated: 07/12/24 1730     LEGIONELLA ANTIGEN, URINE Negative    Blood Culture - Blood, Arm, Left [[id number]]  (Normal) Collected: 07/11/24 1329    Lab Status: Preliminary result Specimen: Blood from Arm, Left Updated: 07/14/24 1345     Blood Culture No growth at 3 days    COVID PRE-OP / PRE-PROCEDURE SCREENING ORDER (NO ISOLATION) - Swab, Nasopharynx [[id number]]  (Normal) Collected: 07/11/24 1313    Lab Status: Final result Specimen: Swab from Nasopharynx Updated: 07/11/24 1400    Narrative:      The following orders were created for panel order COVID PRE-OP / PRE-PROCEDURE SCREENING ORDER (NO ISOLATION) - Swab, Nasopharynx.  Procedure                               Abnormality         Status                     ---------                               -----------         ------                     COVID-19, FLU A/B, RSV P...[[id number]]  Normal              Final result                 Please view results for these tests on the individual orders.    COVID-19, FLU A/B, RSV PCR 1 HR TAT - Swab, Nasopharynx [[id number]]  (Normal) Collected: 07/11/24 1313    Lab Status: Final result Specimen: Swab from Nasopharynx Updated: 07/11/24 1400      COVID19 Not Detected     Influenza A PCR Not Detected     Influenza B PCR Not Detected     RSV, PCR Not Detected    Narrative:      Fact sheet for providers: https://www.fda.gov/media/484884/download    Fact sheet for patients: https://www.fda.gov/media/444229/download    Test performed by PCR.    Blood Culture - Blood, Arm, Right [[id number]]  (Normal) Collected: 07/11/24 1149    Lab Status: Preliminary result Specimen: Blood from Arm, Right Updated: 07/14/24 1201     Blood Culture No growth at 3 days          [x]  Radiology  XR Chest 1 View    Result Date: 7/11/2024  Impression: Evidence of emphysema. No acute infiltrate. Electronically Signed: Angeline Hyde MD  7/11/2024 12:00 PM EDT  Workstation ID: [id number]   []  EKG/Telemetry   []  Cardiology/Vascular   []  Pathology  []  Old records  []  Other:    Assessment & Plan   Assessment / Plan     Assessment:  Acute hypoxic hypercapnic respiratory failure  COPD in acute exacerbation  History of squamous cell carcinoma of the right lung  Ongoing tobacco abuse, 1 pack/day  Hypertension  Hyperlipidemia  History of prostate cancer     Plan:  Patient remains admitted to hospital for further care and management  Pulmonology service consulted, appreciate assistance  Given hypercapnic respiratory acidosis, continue with BiPAP at night and with naps, settings adjusted by pulmonology  RT case management consulted for possible NIPPV at home  Continue with IV Solu-Medrol at this time, possibility of transitioning to oral in the near future  Scheduled and as needed breathing treatments  Incentive spirometer/flutter valve  NRT ordered  Started on azithromycin for COPD exacerbation     Discussed with RN.      VTE Prophylaxis:  Pharmacologic VTE prophylaxis orders are present.        CODE STATUS:   Medical Intervention Limits: No intubation (DNI)  Code Status (Patient has no pulse and is not breathing): No CPR (Do Not Attempt to Resuscitate)  Medical Interventions (Patient has  pulse or is breathing): Limited Support      Electronically signed by Orlin Harry MD, 7/14/2024, 14:42 EDT.

## 2024-07-14 NOTE — PROGRESS NOTES
RT EQUIPMENT DEVICE RELATED - SKIN ASSESSMENT    RT Medical Equipment/Device:     NIV Mask:  Under-the-nose   size:       Skin Assessment:      Cheek:  Intact  Chin:  Intact  Nose:  Intact  Mouth:  Intact    Device Skin Pressure Protection:  Positioning supports utilized    Nurse Notification:  Rocio Choi, RRT

## 2024-07-14 NOTE — PLAN OF CARE
Goal Outcome Evaluation:  Plan of Care Reviewed With: patient           Outcome Evaluation: patient wore bipap 12/6 for about an hour last night. He was on 3l when AM gas was done. CO2 went up to 76 and patient is placed back on bipap.

## 2024-07-15 PROBLEM — E44.0 MODERATE MALNUTRITION: Status: ACTIVE | Noted: 2024-07-15

## 2024-07-15 LAB
ANION GAP SERPL CALCULATED.3IONS-SCNC: 7.6 MMOL/L (ref 5–15)
BUN SERPL-MCNC: 20 MG/DL (ref 8–23)
BUN/CREAT SERPL: 40 (ref 7–25)
CALCIUM SPEC-SCNC: 9.4 MG/DL (ref 8.6–10.5)
CHLORIDE SERPL-SCNC: 97 MMOL/L (ref 98–107)
CO2 SERPL-SCNC: 36.4 MMOL/L (ref 22–29)
CREAT SERPL-MCNC: 0.5 MG/DL (ref 0.76–1.27)
DEPRECATED RDW RBC AUTO: 47.6 FL (ref 37–54)
EGFRCR SERPLBLD CKD-EPI 2021: 107.7 ML/MIN/1.73
ERYTHROCYTE [DISTWIDTH] IN BLOOD BY AUTOMATED COUNT: 13.9 % (ref 12.3–15.4)
GLUCOSE SERPL-MCNC: 139 MG/DL (ref 65–99)
HCT VFR BLD AUTO: 58.2 % (ref 37.5–51)
HGB BLD-MCNC: 18.5 G/DL (ref 13–17.7)
MAGNESIUM SERPL-MCNC: 2.5 MG/DL (ref 1.6–2.4)
MCH RBC QN AUTO: 29.9 PG (ref 26.6–33)
MCHC RBC AUTO-ENTMCNC: 31.8 G/DL (ref 31.5–35.7)
MCV RBC AUTO: 94.2 FL (ref 79–97)
PLATELET # BLD AUTO: 171 10*3/MM3 (ref 140–450)
PMV BLD AUTO: 11.1 FL (ref 6–12)
POTASSIUM SERPL-SCNC: 4.3 MMOL/L (ref 3.5–5.2)
RBC # BLD AUTO: 6.18 10*6/MM3 (ref 4.14–5.8)
SODIUM SERPL-SCNC: 141 MMOL/L (ref 136–145)
WBC NRBC COR # BLD AUTO: 12.64 10*3/MM3 (ref 3.4–10.8)

## 2024-07-15 PROCEDURE — 94799 UNLISTED PULMONARY SVC/PX: CPT

## 2024-07-15 PROCEDURE — 25010000002 ENOXAPARIN PER 10 MG: Performed by: INTERNAL MEDICINE

## 2024-07-15 PROCEDURE — 83735 ASSAY OF MAGNESIUM: CPT | Performed by: INTERNAL MEDICINE

## 2024-07-15 PROCEDURE — 99232 SBSQ HOSP IP/OBS MODERATE 35: CPT | Performed by: INTERNAL MEDICINE

## 2024-07-15 PROCEDURE — 25010000002 METHYLPREDNISOLONE PER 40 MG: Performed by: NURSE PRACTITIONER

## 2024-07-15 PROCEDURE — 94664 DEMO&/EVAL PT USE INHALER: CPT

## 2024-07-15 PROCEDURE — 94761 N-INVAS EAR/PLS OXIMETRY MLT: CPT

## 2024-07-15 PROCEDURE — 80048 BASIC METABOLIC PNL TOTAL CA: CPT | Performed by: INTERNAL MEDICINE

## 2024-07-15 PROCEDURE — 99233 SBSQ HOSP IP/OBS HIGH 50: CPT | Performed by: STUDENT IN AN ORGANIZED HEALTH CARE EDUCATION/TRAINING PROGRAM

## 2024-07-15 PROCEDURE — 25010000002 METHYLPREDNISOLONE PER 125 MG: Performed by: INTERNAL MEDICINE

## 2024-07-15 PROCEDURE — 85027 COMPLETE CBC AUTOMATED: CPT | Performed by: INTERNAL MEDICINE

## 2024-07-15 PROCEDURE — 97110 THERAPEUTIC EXERCISES: CPT

## 2024-07-15 RX ORDER — HYDROXYZINE HYDROCHLORIDE 25 MG/1
25 TABLET, FILM COATED ORAL ONCE AS NEEDED
Status: COMPLETED | OUTPATIENT
Start: 2024-07-15 | End: 2024-07-15

## 2024-07-15 RX ADMIN — HYDROXYZINE HYDROCHLORIDE 25 MG: 25 TABLET ORAL at 23:29

## 2024-07-15 RX ADMIN — METHYLPREDNISOLONE SODIUM SUCCINATE 40 MG: 40 INJECTION INTRAMUSCULAR; INTRAVENOUS at 21:02

## 2024-07-15 RX ADMIN — ARFORMOTEROL TARTRATE 15 MCG: 15 SOLUTION RESPIRATORY (INHALATION) at 09:07

## 2024-07-15 RX ADMIN — AZITHROMYCIN DIHYDRATE 250 MG: 250 TABLET ORAL at 08:52

## 2024-07-15 RX ADMIN — AMLODIPINE BESYLATE 10 MG: 10 TABLET ORAL at 08:52

## 2024-07-15 RX ADMIN — ASPIRIN 81 MG: 81 TABLET, CHEWABLE ORAL at 08:52

## 2024-07-15 RX ADMIN — WATER 60 MG: 1 INJECTION INTRAMUSCULAR; INTRAVENOUS; SUBCUTANEOUS at 04:25

## 2024-07-15 RX ADMIN — ENOXAPARIN SODIUM 40 MG: 100 INJECTION SUBCUTANEOUS at 08:52

## 2024-07-15 RX ADMIN — BUDESONIDE 0.5 MG: 0.5 INHALANT ORAL at 19:09

## 2024-07-15 RX ADMIN — NICOTINE 1 PATCH: 21 PATCH, EXTENDED RELEASE TRANSDERMAL at 15:39

## 2024-07-15 RX ADMIN — Medication 10 ML: at 08:53

## 2024-07-15 RX ADMIN — Medication 10 ML: at 21:02

## 2024-07-15 RX ADMIN — HYDROXYZINE HYDROCHLORIDE 25 MG: 25 TABLET ORAL at 01:54

## 2024-07-15 RX ADMIN — BUDESONIDE 0.5 MG: 0.5 INHALANT ORAL at 09:07

## 2024-07-15 RX ADMIN — WATER 60 MG: 1 INJECTION INTRAMUSCULAR; INTRAVENOUS; SUBCUTANEOUS at 08:52

## 2024-07-15 RX ADMIN — ARFORMOTEROL TARTRATE 15 MCG: 15 SOLUTION RESPIRATORY (INHALATION) at 19:10

## 2024-07-15 NOTE — PROGRESS NOTES
[facility]   Hospitalist Progress Note  Date: 7/15/2024  Patient Name: Colin Nevarez  : 1950  MRN: [id number]  Date of admission: 2024  Room/Bed: [facility]/      Subjective   Subjective     Chief Complaint:   Shortness of breath    Summary:  Colin Nevarez is a 74 y.o. male with a past medical history of COPD, hypertension, hyperlipidemia, lung cancer, and prostate cancer     Patient presents the emergency department on 2024 for complaints of worsening shortness of breath.  In the room patient tripoding with accessory muscle use, having conversational dyspnea.  In the ED patient found to have acute hypercapnic hypoxic respiratory failure.  Patient initially saturating in the 70s on room air, started on 3 L nasal cannula with improvement in O2.  ABG shows patient hypercapnic with a pCO2 of 68.6, pH of 7.255.  Patient has a hemoglobin of 19.2.  Patient's chest x-ray shows evidence of emphysema with no acute infiltrate.  Initially on meeting patient, he is apprehensive to be admitted.  ED physician and internal medicine doctor discussed extensively with patient, neighbors who brought patient into the hospital, as well as son over the phone.  Patient eventually agreed to admission in the hospital.  Patient initiated on BiPAP.  Patient now agreeable to staying inpatient, pulmonologist consulted.  Working on securing home NIPPV and oxygen for patient.    Interval Followup:   Additional settings to NIPPV per pulmonology service.  Patient still having trouble using the device prolonged periods overnight.  Still increased work of breathing on rounds      Objective   Objective     Vitals:   Temp:  [97.6 °F (36.4 °C)-98.2 °F (36.8 °C)] 97.9 °F (36.6 °C)  Heart Rate:  [82-94] 92  Resp:  [18-20] 18  BP: (122-143)/(71-85) 122/71  Flow (L/min):  [4] 4    Physical Exam               Constitutional: Awake, alert, still with increased work of breathing, retractions on ribs noted, tripoding              Eyes: Pupils  equal, sclerae anicteric, no conjunctival injection              HENT: NCAT, mucous membranes moist              Neck: Supple, no thyromegaly, no lymphadenopathy, trachea midline              Respiratory: Improving aeration, prolonged expiratory phase with end expiratory wheezing              Cardiovascular: RRR, no murmurs, rubs, or gallops, palpable pedal pulses bilaterally              Gastrointestinal: Positive bowel sounds, soft, nontender, nondistended              Musculoskeletal: No bilateral ankle edema, no clubbing or cyanosis to extremities              Neurologic: Oriented x 3, strength symmetric in all extremities, Cranial Nerves grossly intact to confrontation, speech clear    Result Review    Result Review:  I have personally reviewed these results:  [x]  Laboratory      Lab 07/15/24  0504 07/14/24  0508 07/13/24  0534 07/12/24  0432 07/11/24  1149   WBC 12.64* 15.84* 16.55* 5.60 9.37   HEMOGLOBIN 18.5* 17.7 18.2* 17.8* 19.2*   HEMATOCRIT 58.2* 55.5* 56.2* 54.8* 57.3*   PLATELETS 171 165 173 162 176   NEUTROS ABS  --   --   --  5.03 7.60*   IMMATURE GRANS (ABS)  --   --   --  0.02 0.02   LYMPHS ABS  --   --   --  0.48* 1.03   MONOS ABS  --   --   --  0.07* 0.67   EOS ABS  --   --   --  0.00 0.01   MCV 94.2 94.9 93.0 91.9 90.1   PROCALCITONIN  --   --   --  0.03  --    LACTATE  --   --   --   --  1.4         Lab 07/15/24  0504 07/14/24  0508 07/13/24  0534   SODIUM 141 139 140   POTASSIUM 4.3 4.6 4.7   CHLORIDE 97* 98 100   CO2 36.4* 36.7* 31.4*   ANION GAP 7.6 4.3* 8.6   BUN 20 20 19   CREATININE 0.50* 0.46* 0.56*   EGFR 107.7 110.4 104.1   GLUCOSE 139* 143* 150*   CALCIUM 9.4 8.9 9.0   MAGNESIUM 2.5* 2.4 2.2         Lab 07/11/24  1149   TOTAL PROTEIN 7.9   ALBUMIN 4.5   GLOBULIN 3.4   ALT (SGPT) 15   AST (SGOT) 18   BILIRUBIN 0.3   ALK PHOS 112         Lab 07/11/24  1149   PROBNP 83.5   HSTROP T 12                 Lab 07/14/24  0650 07/12/24  0616 07/11/24  1428   PH, ARTERIAL 7.329* 7.299* 7.255*    PCO2, ARTERIAL 76.2* 64.4* 68.6*   PO2 ART 59.1* 86.4 65.2*   O2 SATURATION ART 86.5* 95.0 88.0*   FIO2 32 35 32   HCO3 ART 40.1* 31.6* 30.4*   BASE EXCESS ART 9.2* 2.2* -0.1     Brief Urine Lab Results  (Last result in the past 365 days)        Color   Clarity   Blood   Leuk Est   Nitrite   Protein   CREAT   Urine HCG        01/25/24 1123 Yellow   Clear   Large   Moderate (2+)   Negative   Trace                 [x]  Microbiology   Microbiology Results (last 10 days)       Procedure Component Value - Date/Time    Respiratory Culture - Sputum, Cough [[id number]] Collected: 07/14/24 1743    Lab Status: Preliminary result Specimen: Sputum from Cough Updated: 07/14/24 1924     Gram Stain Moderate (3+) WBCs observed - predominantly neutrophils      Rare (1+) Gram positive cocci in clusters      Few (2+) Squamous epithelial cells    S. Pneumo Ag Urine or CSF - Urine, Urine, Clean Catch [[id number]]  (Normal) Collected: 07/12/24 1705    Lab Status: Final result Specimen: Urine, Clean Catch Updated: 07/12/24 1730     Strep Pneumo Ag Negative    Legionella Antigen, Urine - Urine, Urine, Clean Catch [[id number]]  (Normal) Collected: 07/12/24 1705    Lab Status: Final result Specimen: Urine, Clean Catch Updated: 07/12/24 1730     LEGIONELLA ANTIGEN, URINE Negative    Blood Culture - Blood, Arm, Left [[id number]]  (Normal) Collected: 07/11/24 1329    Lab Status: Preliminary result Specimen: Blood from Arm, Left Updated: 07/15/24 1345     Blood Culture No growth at 4 days    COVID PRE-OP / PRE-PROCEDURE SCREENING ORDER (NO ISOLATION) - Swab, Nasopharynx [[id number]]  (Normal) Collected: 07/11/24 1313    Lab Status: Final result Specimen: Swab from Nasopharynx Updated: 07/11/24 1400    Narrative:      The following orders were created for panel order COVID PRE-OP / PRE-PROCEDURE SCREENING ORDER (NO ISOLATION) - Swab, Nasopharynx.  Procedure                               Abnormality         Status                     ---------                                -----------         ------                     COVID-19, FLU A/B, RSV P...[[id number]]  Normal              Final result                 Please view results for these tests on the individual orders.    COVID-19, FLU A/B, RSV PCR 1 HR TAT - Swab, Nasopharynx [[id number]]  (Normal) Collected: 07/11/24 1313    Lab Status: Final result Specimen: Swab from Nasopharynx Updated: 07/11/24 1400     COVID19 Not Detected     Influenza A PCR Not Detected     Influenza B PCR Not Detected     RSV, PCR Not Detected    Narrative:      Fact sheet for providers: https://www.fda.gov/media/459174/download    Fact sheet for patients: https://www.fda.gov/media/935025/download    Test performed by PCR.    Blood Culture - Blood, Arm, Right [[id number]]  (Normal) Collected: 07/11/24 1149    Lab Status: Preliminary result Specimen: Blood from Arm, Right Updated: 07/15/24 1201     Blood Culture No growth at 4 days          [x]  Radiology  XR Chest 1 View    Result Date: 7/11/2024  Impression: Evidence of emphysema. No acute infiltrate. Electronically Signed: Angeline Hyde MD  7/11/2024 12:00 PM EDT  Workstation ID: [id number]   []  EKG/Telemetry   []  Cardiology/Vascular   []  Pathology  []  Old records  []  Other:    Assessment & Plan   Assessment / Plan     Assessment:  Acute hypoxic hypercapnic respiratory failure  COPD in acute exacerbation  History of squamous cell carcinoma of the right lung  Ongoing tobacco abuse, 1 pack/day  Hypertension  Hyperlipidemia  History of prostate cancer     Plan:  Patient remains admitted to the hospital for further care and management  Pulmonology service consulted, appreciate assistance  Given hypercapnic respiratory acidosis, continue with BiPAP at night and with naps, settings adjusted by pulmonology  RT case management consulted for possible NIPPV at home, oxygen  Continue with IV Solu-Medrol at this time  Scheduled and as needed breathing treatments  Incentive spirometer/flutter  valve  NRT ordered  Started on azithromycin for COPD exacerbation     Discussed with RN, discussed with pulmonologist    VTE Prophylaxis:  Pharmacologic VTE prophylaxis orders are present.        CODE STATUS:   Medical Intervention Limits: No intubation (DNI)  Code Status (Patient has no pulse and is not breathing): No CPR (Do Not Attempt to Resuscitate)  Medical Interventions (Patient has pulse or is breathing): Limited Support      Electronically signed by Orlin Harry MD, 7/15/2024, 15:59 EDT.

## 2024-07-15 NOTE — NURSING NOTE
Was called to patient room by family and patient son, Angel Nevarez, asked what DNR meant on patient white board. This RN informed him of Do Not Resuscitate order. Son and patient both stated that he should be a Full Code and wanted it changed. This RN notified Dr. Harry and code status changed to Full Code. Discussed with patient and son the need to have a living will drawn up an they are agreeable to this.  Family informed that  will be back tomorrow and referral will be made. Patient request that his son, Angel Nevarez, be notified of any changes before anyone else. Patient and son also request that sign be placed on door for visitors to see RN before entering room.

## 2024-07-15 NOTE — PLAN OF CARE
Goal Outcome Evaluation:      Pt alert to self and place, pt frequently reoriented to keep nasal cannula on, pt unable to rest much and has been sitting on side of bed, method to help pt relax put in place, continuous pulse ox on, urine output noted, oral rehydration promoted, pt denies any pain.

## 2024-07-15 NOTE — PROGRESS NOTES
Pulmonary / Critical Care Progress Note      Patient Name: Colin Nevarez  : 1950  MRN: [id number]  Primary Care Physician:  Liz Roper DO  Date of admission: 2024    Subjective   Subjective   Follow-up for acute COPD exacerbation requiring NIPPV.    No acute events overnight.  Did not wear NIPPV.    This morning,  Sitting up on side of bed eating breakfast  On 4 L nasal cannula with O2 sats 88%  Short of air at rest  States he is feeling better  Continues with productive cough  No chest pain  No fever or chills    Review of Systems  General:  No Fatigue, No Fever  HEENT:  No Dysphagia, No Visual Changes  Respiratory:  + Cough, + Dyspnea, No Pleuritic Pain  Cardiovascular:  No Chest Pain, No Palpitations, + SENA, No Chest Pressure  Gastrointestinal:  No Abdominal Pain, No Nausea, No Vomiting, No Diarrhea  Musculoskeletal:  No Joint Tenderness, No Joint Stiffness    Objective   Objective     Vitals:   Temp:  [97.3 °F (36.3 °C)-98.2 °F (36.8 °C)] 97.6 °F (36.4 °C)  Heart Rate:  [82-94] 90  Resp:  [18-20] 18  BP: (125-143)/(71-90) 143/85  Flow (L/min):  [3-4] 4    Physical Exam   Vital Signs Reviewed   General: Thin male, Alert, sitting up on side of bed, NAD on 4L NC    HEENT:  PERRL, EOMI.  OP, nares clear, no sinus tenderness  Neck:  Supple, no JVD, no thyromegaly  Chest:  poor aeration, wheezing and rhonchi bilaterally, tympanic to percussion bilaterally, increased work of breathing noted  CV: RRR, no MGR, pulses 2+, equal  Abd:  Soft, NT, ND, + BS, no HSM  EXT:  no clubbing, no cyanosis, no edema  Neuro:  A&Ox3, CN grossly intact, no focal deficits  Skin: No rashes or lesions noted    Result Review    Result Review:  I have personally reviewed the results from the time of this admission to 7/15/2024 09:15 EDT and agree with these findings:  [x]  Laboratory  [x]  Microbiology  [x]  Radiology  []  EKG/Telemetry   []  Cardiology/Vascular   []  Pathology  []  Old records  []  Other:  Most notable  findings include:         Lab 07/15/24  0504 07/14/24  0508 07/13/24  0534 07/12/24  0432 07/11/24  1149   WBC 12.64* 15.84* 16.55* 5.60 9.37   HEMOGLOBIN 18.5* 17.7 18.2* 17.8* 19.2*   HEMATOCRIT 58.2* 55.5* 56.2* 54.8* 57.3*   PLATELETS 171 165 173 162 176   SODIUM 141 139 140 134* 137   POTASSIUM 4.3 4.6 4.7 4.8 4.9   CHLORIDE 97* 98 100 98 97*   CO2 36.4* 36.7* 31.4* 27.2 27.8   BUN 20 20 19 17 11   CREATININE 0.50* 0.46* 0.56* 0.58* 0.68*   GLUCOSE 139* 143* 150* 167* 125*   CALCIUM 9.4 8.9 9.0 9.1 9.7   TOTAL PROTEIN  --   --   --   --  7.9   ALBUMIN  --   --   --   --  4.5   GLOBULIN  --   --   --   --  3.4     7/14 ABG 7.32/76.2/59.1/40.1 on 4 L NC    Procal 0.03, cultures negative to date    7/11 CXR severely hyperinflated, no acute infiltrate or effusions.    Assessment & Plan   Assessment / Plan     Active Hospital Problems:  Active Hospital Problems    Diagnosis     **COPD (chronic obstructive pulmonary disease)      Impression:  Acute hypoxic hypercapnic failure requiring NIPPV  Acute exacerbation of severe COPD: FEV1 25%  Ongoing tobacco abuse  History R LL squamous cell lung cancer: S/p SBRT  History of prostate cancer: S/p prostatectomy    Plan:  -On 4 L NC.  Continue to wean O2 to keep sats greater than 90%.  -Need 6-minute walk test prior to discharge for home O2 use.  -Will change NIPPV settings to 14/6.  Encourage use at night and as needed days with distress.  -Will repeat ABG in AM.  -Appreciate RT  assistance in helping to arrange NIPPV for home.  -Cultures negative to date.  To complete azithromycin.  -Decrease IV steroids.  -Continue nebulizers and bronchopulmonary hygiene.  -Encourage I-S and flutter valve.  -Continue nicotine patch for smoking cessation.  -Would benefit from pulmonary rehab.  -Need to follow-up in our clinic 1 week after discharge in COPD clinic.    VTE Prophylaxis:  Pharmacologic VTE prophylaxis orders are present.    CODE STATUS:   Medical Intervention  Limits: No intubation (DNI)  Code Status (Patient has no pulse and is not breathing): No CPR (Do Not Attempt to Resuscitate)  Medical Interventions (Patient has pulse or is breathing): Limited Support    Labs, microbiology, radiology, medications, and provider notes personally reviewed.  Discussed with primary services and bedside RN.    Electronically signed by CHEN Turk, 07/15/24, 9:15 AM EDT.  This patient was seen by both a physician and a NP. I, Lily Roland MD, spent >50% of time in accordance with split shared billing. This included personally reviewing all pertinent labs, imaging, microbiology and documentation. Also discussing the case with the patient and any available family, the admitting physician and any available ancillary staff.   Electronically signed by Lily Roland MD, 07/15/24, 5:00 PM EDT.

## 2024-07-15 NOTE — CASE MANAGEMENT/SOCIAL WORK
Mr. Nevarez is still SOA with minimal exertion and has not been able to participate in PT. CO2 has increased since admission.  Patient's sister states she feels his mentation is slightly worse than yesterday, citing he was unable to recall how to perform his [employer]. Patient and sister state they feel he will need rehab on discharge now; they are interested in [facility] or [facility]. RT CM has asked [facility] to set home NIV up ASAP since patient's CO2 has been increasing.  I will continue to follow.

## 2024-07-15 NOTE — THERAPY TREATMENT NOTE
Patient Name: Colin Nevarez  : 1950    MRN: [id number]                              Today's Date: 7/15/2024       Admit Date: 2024    Visit Dx:     ICD-10-CM ICD-9-CM   1. COPD exacerbation  J44.1 491.21   2. Difficulty walking  R26.2 719.7   3. Chronic obstructive pulmonary disease, unspecified COPD type  J44.9 496   4. Chronic respiratory failure, unspecified whether with hypoxia or hypercapnia  J96.10 518.83     Patient Active Problem List   Diagnosis    Arthritis    Chronic obstructive pulmonary disease    Heart flutter, ventricular    High cholesterol    Prostate cancer    Essential hypertension    Urinary incontinence, stress, male    Cigarette nicotine dependence with nicotine-induced disorder    Hematuria    Ascending aortic aneurysm    Squamous cell carcinoma lung    Acute URI    COPD (chronic obstructive pulmonary disease)     Past Medical History:   Diagnosis Date    Arthritis     COPD (chronic obstructive pulmonary disease)     Essential hypertension 2021    Forgetfulness     Heart flutter, ventricular     High cholesterol     Lung cancer     Prostate cancer     Ringing in ear     Shortness of Air     SOB (shortness of breath)      Past Surgical History:   Procedure Laterality Date    APPENDECTOMY      COLONOSCOPY  2019    COLONOSCOPY N/A 2023    Procedure: COLONOSCOPY;  Surgeon: Geoffrey Carey MD;  Location: [facility] ENDOSCOPY;  Service: General;  Laterality: N/A;  NORMAL    LUNG BIOPSY      PROSTATECTOMY      TONSILLECTOMY      Per PT      General Information       Row Name 07/15/24 1415          OT Time and Intention    Document Type therapy note (daily note)  -SC     Mode of Treatment individual therapy;occupational therapy  -SC       Row Name 07/15/24 1415          General Information    Patient Profile Reviewed yes  -SC     Existing Precautions/Restrictions oxygen therapy device and L/min  -SC       Row Name 07/15/24 1415          Cognition    Orientation Status  (Cognition) oriented to;person;situation;place  Sister reports patient has had increased confusion this a.m.  -SC               User Key  (r) = Recorded By, (t) = Taken By, (c) = Cosigned By      Initials Name Provider Type    SC Daysi Rose OT Occupational Therapist                     Mobility/ADL's       Row Name 07/15/24 1416          Bed Mobility    Comment, (Bed Mobility) Patient sitting at EOB upon clinician arrival.  -SC       Row Name 07/15/24 1416          Functional Mobility    Functional Mobility- Comment Mobility deferred due to patient oxygen saturation unstable.  -SC               User Key  (r) = Recorded By, (t) = Taken By, (c) = Cosigned By      Initials Name Provider Type    SC Daysi Rose OT Occupational Therapist                   Obj/Interventions       Row Name 07/15/24 1417          Shoulder (Therapeutic Exercise)    Shoulder (Therapeutic Exercise) strengthening exercise;AROM (active range of motion)  -SC     Shoulder AROM (Therapeutic Exercise) scapular elevation;scapular protraction;scapular retraction  -SC     Shoulder Strengthening (Therapeutic Exercise) bilateral;flexion;extension;scapular stabilization;sitting  Despite extended and multiple rest breaks, and use of correct breathing techniques, patient oxygen saturation kept falling with UB strength exercise.Nsg aware.  -SC       Row Name 07/15/24 1417          Elbow/Forearm (Therapeutic Exercise)    Elbow/Forearm (Therapeutic Exercise) strengthening exercise  -SC     Elbow/Forearm Strengthening (Therapeutic Exercise) 10 repetitions;bilateral;flexion;extension;sitting  Instructed patient in diaphragmatic breathing and provided demonstration. Patient struggled to perform correctly and will benefit from carry over training.  -SC       Row Name 07/15/24 1417          Motor Skills    Motor Skills functional endurance  -SC     Functional Endurance fair-  -SC     Therapeutic Exercise shoulder;elbow/forearm  -SC               User Key  (r)  = Recorded By, (t) = Taken By, (c) = Cosigned By      Initials Name Provider Type    SC Daysi Rose, DELFINA Occupational Therapist                   Goals/Plan    No documentation.                  Clinical Impression       Row Name 07/15/24 1420          Pain Assessment    Pretreatment Pain Rating 0/10 - no pain  -SC     Posttreatment Pain Rating 0/10 - no pain  -SC       Row Name 07/15/24 1420          Plan of Care Review    Plan of Care Reviewed With patient  -SC     Progress no change  -SC     Outcome Evaluation Patient frustrated this a.m. He is motivated to participate in therapy but was only able to tolerate min exercise due to unstable oxygen saturation. Despite seated position, multiple and extended rest breaks and instruction of deep breathing techniques, patient oxgyen saturation kept falling to 82%. Unable to continue therapy due to pt unable to maintain sats at a safe range.  Will continue per POC as patient medical status allows.  -[facility] Name 07/15/24 1420          Positioning and Restraints    Pre-Treatment Position in bed  -SC     Post Treatment Position bed  -SC     In Bed call light within reach;encouraged to call for assist;exit alarm on  -SC               User Key  (r) = Recorded By, (t) = Taken By, (c) = Cosigned By      Initials Name Provider Type    SC Daysi Rose, DELFINA Occupational Therapist                   Outcome Measures       Row Name 07/15/24 1423          How much help from another is currently needed...    Putting on and taking off regular lower body clothing? 3  -SC     Bathing (including washing, rinsing, and drying) 3  -SC     Toileting (which includes using toilet bed pan or urinal) 3  -SC     Putting on and taking off regular upper body clothing 4  -SC     Taking care of personal grooming (such as brushing teeth) 4  -SC     Eating meals 4  -SC     AM-PAC 6 Clicks Score (OT) 21  -SC       Row Name 07/15/24 0800          How much help from another person do you currently  need...    Turning from your back to your side while in flat bed without using bedrails? 4  -NR     Moving from lying on back to sitting on the side of a flat bed without bedrails? 4  -NR     Moving to and from a bed to a chair (including a wheelchair)? 4  -NR     Standing up from a chair using your arms (e.g., wheelchair, bedside chair)? 3  -NR     Climbing 3-5 steps with a railing? 2  -NR     To walk in hospital room? 4  -NR     AM-PAC 6 Clicks Score (PT) 21  -NR     Highest Level of Mobility Goal 6 --> Walk 10 steps or more  -NR       Row Name 07/15/24 1423          Functional Assessment    Outcome Measure Options AM-PAC 6 Clicks Daily Activity (OT);Optimal Instrument  -SC       Row Name 07/15/24 1423          Optimal Instrument    Optimal Instrument Optimal - 3  -SC     Bending/Stooping 3  -SC     Standing 2  -SC     Reaching 2  -SC               User Key  (r) = Recorded By, (t) = Taken By, (c) = Cosigned By      Initials Name Provider Type    NR Ashleigh Rivera, RN Registered Nurse    Daysi Caraballo OT Occupational Therapist                    Occupational Therapy Education       Title: PT OT SLP Therapies (Done)       Topic: Occupational Therapy (Done)       Point: ADL training (Done)       Description:   Instruct learner(s) on proper safety adaptation and remediation techniques during self care or transfers.   Instruct in proper use of assistive devices.                  Learning Progress Summary             Patient Acceptance, E,TB, VU by TD at 7/15/2024 0228    Acceptance, E,TB, VU by TD at 7/14/2024 0351    Acceptance, E,TB, VU by TD at 7/13/2024 0113    Acceptance, E, VU by SC at 7/12/2024 1326   Family Acceptance, E,TB, VU by TD at 7/14/2024 0351                         Point: Home exercise program (Done)       Description:   Instruct learner(s) on appropriate technique for monitoring, assisting and/or progressing therapeutic exercises/activities.                  Learning Progress Summary              Patient Acceptance, E,TB, VU by TD at 7/15/2024 0228    Acceptance, E,TB, VU by TD at 7/14/2024 0351    Acceptance, E,TB, VU by TD at 7/13/2024 0113    Acceptance, E, VU by SC at 7/12/2024 1326   Family Acceptance, E,TB, VU by TD at 7/14/2024 0351                         Point: Precautions (Done)       Description:   Instruct learner(s) on prescribed precautions during self-care and functional transfers.                  Learning Progress Summary             Patient Acceptance, E,TB, VU by TD at 7/15/2024 0228    Acceptance, E,TB, VU by TD at 7/14/2024 0351    Acceptance, E,TB, VU by TD at 7/13/2024 0113    Acceptance, E, VU by SC at 7/12/2024 1326   Family Acceptance, E,TB, VU by TD at 7/14/2024 0351                         Point: Body mechanics (Done)       Description:   Instruct learner(s) on proper positioning and spine alignment during self-care, functional mobility activities and/or exercises.                  Learning Progress Summary             Patient Acceptance, E,TB, VU by TD at 7/15/2024 0228    Acceptance, E,TB, VU by TD at 7/14/2024 0351    Acceptance, E,TB, VU by TD at 7/13/2024 0113    Acceptance, E, VU by SC at 7/12/2024 1326   Family Acceptance, E,TB, VU by TD at 7/14/2024 0351                                         User Key       Initials Effective Dates Name Provider Type Discipline     01/25/24 -  Ally Maldonado, RN Registered Nurse Nurse    SC 02/05/24 -  Daysi Rose OT Occupational Therapist OT                  OT Recommendation and Plan  Planned Therapy Interventions (OT): activity tolerance training, BADL retraining, functional balance retraining, occupation/activity based interventions, patient/caregiver education/training, strengthening exercise  Therapy Frequency (OT): 5 times/wk  Plan of Care Review  Plan of Care Reviewed With: patient  Progress: no change  Outcome Evaluation: Patient frustrated this a.m. He is motivated to participate in therapy but was only able to  tolerate min exercise due to unstable oxygen saturation. Despite seated position, multiple and extended rest breaks and instruction of deep breathing techniques, patient oxgyen saturation kept falling to 82%. Unable to continue therapy due to pt unable to maintain sats at a safe range.  Will continue per POC as patient medical status allows.     Time Calculation:   Evaluation Complexity (OT)  Review Occupational Profile/Medical/Therapy History Complexity: expanded/moderate complexity  Assessment, Occupational Performance/Identification of Deficit Complexity: 1-3 performance deficits  Clinical Decision Making Complexity (OT): problem focused assessment/low complexity  Overall Complexity of Evaluation (OT): low complexity     Time Calculation- OT       Row Name 07/15/24 1424             Time Calculation- OT    OT Received On 07/15/24  -SC         Timed Charges    19092 - OT Therapeutic Exercise Minutes 10  -SC         Total Minutes    Timed Charges Total Minutes 10  -SC       Total Minutes 10  -SC                User Key  (r) = Recorded By, (t) = Taken By, (c) = Cosigned By      Initials Name Provider Type    SC Daysi Rose OT Occupational Therapist                  Therapy Charges for Today       Code Description Service Date Service Provider Modifiers Qty    [id number]  OT THER PROC EA 15 MIN 7/15/2024 Daysi Rose OT GO 1                 Daysi Rose OT  7/15/2024

## 2024-07-15 NOTE — PLAN OF CARE
Goal Outcome Evaluation:   Patient wearing bipap for 1 hour at a time. Unable to tolerate longer than that. Sats good. Will continue to offer bipap.

## 2024-07-15 NOTE — PROGRESS NOTES
RT EQUIPMENT DEVICE RELATED - SKIN ASSESSMENT    RT Medical Equipment/Device:     Nasal Cannula    Skin Assessment:      Cheek:  Intact  Nares:  Intact    Device Skin Pressure Protection:  Positioning supports utilized and Pressure points protected    Nurse Notification:  Rocio Donald RRT

## 2024-07-15 NOTE — CONSULTS
"Nutrition Services    Patient Name: Colin Nevarez  YOB: 1950  MRN: [id number]  Admission date: 7/11/2024      CLINICAL NUTRITION ASSESSMENT      Reason for Assessment  Chronic Poor Intake     H&P:  Past Medical History:   Diagnosis Date    Arthritis     COPD (chronic obstructive pulmonary disease)     Essential hypertension 07/12/2021    Forgetfulness     Heart flutter, ventricular     High cholesterol     Lung cancer     Prostate cancer     Ringing in ear     Shortness of Air     SOB (shortness of breath)         Current Problems:   Active Hospital Problems    Diagnosis     **COPD (chronic obstructive pulmonary disease)         Nutrition/Diet History         Narrative   Pt is alert in the RD's visit. Pt denies any chewing or swallowing difficulties at this time. Pt reports fair appetite with consuming 60% x 5 meals. Noted pt is COPD in acute exacerbation and hx of prostate cancer. Pt agreed to trial ONS. RD to follow.      Anthropometrics        Current Height, Weight Height: 177.8 cm (70\")  Weight: 63.3 kg (139 lb 8.8 oz)   Current BMI Body mass index is 20.02 kg/m².   BMI Classification Normal range   % IBW 86%   Adjusted Body Weight (ABW)    Weight Hx  Wt Readings from Last 30 Encounters:   07/11/24 1136 63.3 kg (139 lb 8.8 oz)   06/20/24 1304 63.3 kg (139 lb 9.6 oz)   02/22/24 0945 64.2 kg (141 lb 8.6 oz)   02/15/24 1007 63.3 kg (139 lb 9.6 oz)   02/08/24 1031 64.8 kg (142 lb 13.7 oz)   02/08/24 0859 64.8 kg (142 lb 13.7 oz)   01/25/24 1004 67 kg (147 lb 9.6 oz)   12/07/23 1035 70.6 kg (155 lb 9.6 oz)   11/07/23 0935 69.2 kg (152 lb 9.6 oz)   08/15/23 0841 70.5 kg (155 lb 6.4 oz)   08/11/23 1109 68.9 kg (152 lb)   08/07/23 0836 68.9 kg (151 lb 14.4 oz)   08/03/23 0903 69.1 kg (152 lb 5.4 oz)   06/14/23 0754 67.6 kg (149 lb 0.5 oz)   06/05/23 0938 67.4 kg (148 lb 9.4 oz)   06/02/23 0926 67.5 kg (148 lb 13 oz)   06/01/23 0905 67.1 kg (147 lb 14.9 oz)   05/31/23 0933 67 kg (147 lb 11.3 oz) " "  05/30/23 0939 67.3 kg (148 lb 5.9 oz)   05/19/23 0953 66 kg (145 lb 8.1 oz)   05/16/23 1008 65.8 kg (145 lb)   05/11/23 0811 65.3 kg (143 lb 15.4 oz)   05/03/23 0848 66.2 kg (146 lb)   04/04/23 1245 65.8 kg (145 lb 1.6 oz)   03/27/23 1248 66.5 kg (146 lb 11.2 oz)   02/24/23 1412 66.2 kg (146 lb)   07/14/22 0817 66.2 kg (146 lb)   04/26/22 1527 67.6 kg (149 lb)   03/29/22 1134 70.2 kg (154 lb 11.2 oz)   11/29/21 1351 70.8 kg (156 lb)          Wt Change Observation Wt loss of 5.5% x 6 months per EMR.      Estimated/Assessed Needs  Estimated Needs based on: Current Body Weight       Energy Requirements 30-35 kcal/kg    EST Needs (kcal/day) 6970-2520       Protein Requirements 1.2-1.3 g/kg   EST Daily Needs (g/day) 76-82       Fluid Requirements 1 ml/kcal    Estimated Needs (mL/day) 7669-5341     Labs/Medications         Pertinent Labs Reviewed.   Results from last 7 days   Lab Units 07/15/24  0504 07/14/24  0508 07/13/24  0534 07/12/24  0432 07/11/24  1149   SODIUM mmol/L 141 139 140   < > 137   POTASSIUM mmol/L 4.3 4.6 4.7   < > 4.9   CHLORIDE mmol/L 97* 98 100   < > 97*   CO2 mmol/L 36.4* 36.7* 31.4*   < > 27.8   BUN mg/dL 20 20 19   < > 11   CREATININE mg/dL 0.50* 0.46* 0.56*   < > 0.68*   CALCIUM mg/dL 9.4 8.9 9.0   < > 9.7   BILIRUBIN mg/dL  --   --   --   --  0.3   ALK PHOS U/L  --   --   --   --  112   ALT (SGPT) U/L  --   --   --   --  15   AST (SGOT) U/L  --   --   --   --  18   GLUCOSE mg/dL 139* 143* 150*   < > 125*    < > = values in this interval not displayed.     Results from last 7 days   Lab Units 07/15/24  0504 07/14/24  0508 07/13/24  0534   MAGNESIUM mg/dL 2.5* 2.4 2.2   HEMOGLOBIN g/dL 18.5* 17.7 18.2*   HEMATOCRIT % 58.2* 55.5* 56.2*     COVID19   Date Value Ref Range Status   07/11/2024 Not Detected Not Detected - Ref. Range Final     No results found for: \"HGBA1C\"      Pertinent Medications Reviewed.     Malnutrition Severity Assessment      Patient meets criteria for : Moderate (non-severe) " Malnutrition  Malnutrition Type (Last 8 Hours)       Malnutrition Severity Assessment       Row Name 07/15/24 1332       Malnutrition Severity Assessment    Malnutrition Type Chronic Disease - Related Malnutrition      Row Name 07/15/24 1332       Insufficient Energy Intake     Insufficient Energy Intake Findings Moderate    Insufficient Energy Intake  <75% of est. energy requirement for > or equal to 3 months      Row Name 07/15/24 1332       Unintentional Weight Loss     Unintentional Weight Loss Findings Mild      Row Name 07/15/24 1332       Muscle Loss    Loss of Muscle Mass Findings Moderate    Latter day Region Moderate - slight depression    Clavicle Bone Region Moderate - some protrusion in females, visible in males    Acromion Bone Region Moderate - acromion may slightly protrude    Scapular Bone Region Moderate - mild depression, bones may show slightly    Dorsal Hand Region Moderate - slight depression    Patellar Region Moderate - patella more prominent, less muscle definition around patella    Anterior Thigh Region Moderate - mild depression on inner thigh      Row Name 07/15/24 1332       Fat Loss    Subcutaneous Fat Loss Findings Moderate    Orbital Region  Moderate -  somewhat hollowness, slightly dark circles    Upper Arm Region Moderate - some fat tissue, not ample    Thoracic & Lumbar Region Moderate - ribs visible with mild depressions, iliac crest somewhat prominent      Row Name 07/15/24 1332       Criteria Met (Must meet criteria for severity in at least 2 of these categories: M Wasting, Fat Loss, Fluid, Secondary Signs, Wt. Status, Intake)    Patient meets criteria for  Moderate (non-severe) Malnutrition                     Nutrition Diagnosis         Nutrition Dx Problem 1 Moderate malnutrition related to inadequate energy Intake as evidenced by unintended weight change. and patient report.     Nutrition Intervention           Current Nutrition Orders & Evaluation of Intake\       Current PO  Diet Diet: Regular/House; Fluid Consistency: Thin (IDDSI 0)   Supplement No active supplement orders           Nutrition Intervention/Prescription        Recommend to add Boost Plus TID with meals to aid with caloric intake. (Provides 360kcal, 14g protein each)  Continue encourage po intake.         Medical Nutrition Therapy/Nutrition Education          Learner     Readiness Patient  Acceptance     Method     Response Explanation  Verbalizes understanding     Monitor/Evaluation        Monitor Per protocol, PO intake, Supplement intake, Weight, POC/GOC     Nutrition Discharge Plan         Recommend to continue oral nutrition supplements on discharge.      Electronically signed by:  Cathy Evangelista RD  07/15/24 13:36 EDT

## 2024-07-15 NOTE — PLAN OF CARE
Goal Outcome Evaluation:           Progress: no change  Outcome Evaluation: Patient took his breathing treatment this morning via mouthpiece while sitting up on edge of bed, no issues. he remains on a 4 L nasal cannula, with desaturations with exertion. He only tolerated BIPAP for one hour intervals last night. RT will try to encourage prolonged BIPAP use for naps and bedtime, plan is to get abg in am.

## 2024-07-15 NOTE — PLAN OF CARE
Goal Outcome Evaluation:   VSS. Lungs with scattered wheezes and rhonchi throughout. No c/o pain voiced. Plan for rehab upon discharge, awaiting referrals. Continue with current POC.

## 2024-07-16 LAB
ANION GAP SERPL CALCULATED.3IONS-SCNC: 3.9 MMOL/L (ref 5–15)
ARTERIAL PATENCY WRIST A: POSITIVE
ATMOSPHERIC PRESS: 741.2 MMHG
BACTERIA SPEC AEROBE CULT: NORMAL
BACTERIA SPEC AEROBE CULT: NORMAL
BASE EXCESS BLDA CALC-SCNC: 12.4 MMOL/L (ref -2–2)
BDY SITE: ABNORMAL
BUN SERPL-MCNC: 25 MG/DL (ref 8–23)
BUN/CREAT SERPL: 53.2 (ref 7–25)
CALCIUM SPEC-SCNC: 9.2 MG/DL (ref 8.6–10.5)
CHLORIDE SERPL-SCNC: 98 MMOL/L (ref 98–107)
CO2 SERPL-SCNC: 39.1 MMOL/L (ref 22–29)
CREAT SERPL-MCNC: 0.47 MG/DL (ref 0.76–1.27)
DEPRECATED RDW RBC AUTO: 46.6 FL (ref 37–54)
EGFRCR SERPLBLD CKD-EPI 2021: 109.7 ML/MIN/1.73
ERYTHROCYTE [DISTWIDTH] IN BLOOD BY AUTOMATED COUNT: 13.8 % (ref 12.3–15.4)
GAS FLOW AIRWAY: 5 LPM
GLUCOSE SERPL-MCNC: 141 MG/DL (ref 65–99)
HCO3 BLDA-SCNC: 42.3 MMOL/L (ref 22–26)
HCT VFR BLD AUTO: 58.7 % (ref 37.5–51)
HCT VFR BLD CALC: 63 % (ref 38–51)
HEMODILUTION: NO
HGB BLD-MCNC: 19.1 G/DL (ref 13–17.7)
HGB BLDA-MCNC: 21.4 G/DL (ref 12–18)
INHALED O2 CONCENTRATION: 40 %
Lab: ABNORMAL
MAGNESIUM SERPL-MCNC: 2.5 MG/DL (ref 1.6–2.4)
MCH RBC QN AUTO: 30.1 PG (ref 26.6–33)
MCHC RBC AUTO-ENTMCNC: 32.5 G/DL (ref 31.5–35.7)
MCV RBC AUTO: 92.6 FL (ref 79–97)
MODALITY: ABNORMAL
NOTIFIED WHO: ABNORMAL
PCO2 BLDA: 67.8 MM HG (ref 35–45)
PH BLDA: 7.4 PH UNITS (ref 7.35–7.45)
PLATELET # BLD AUTO: 140 10*3/MM3 (ref 140–450)
PMV BLD AUTO: 11.3 FL (ref 6–12)
PO2 BLD: 146 MM[HG] (ref 0–500)
PO2 BLDA: 58.2 MM HG (ref 80–100)
POTASSIUM SERPL-SCNC: 4.2 MMOL/L (ref 3.5–5.2)
RBC # BLD AUTO: 6.34 10*6/MM3 (ref 4.14–5.8)
READ BACK: YES
SAO2 % BLDCOA: 88.4 % (ref 95–99)
SODIUM SERPL-SCNC: 141 MMOL/L (ref 136–145)
WBC NRBC COR # BLD AUTO: 12.43 10*3/MM3 (ref 3.4–10.8)

## 2024-07-16 PROCEDURE — 25010000002 METHYLPREDNISOLONE PER 40 MG: Performed by: NURSE PRACTITIONER

## 2024-07-16 PROCEDURE — 85027 COMPLETE CBC AUTOMATED: CPT | Performed by: INTERNAL MEDICINE

## 2024-07-16 PROCEDURE — 83735 ASSAY OF MAGNESIUM: CPT | Performed by: INTERNAL MEDICINE

## 2024-07-16 PROCEDURE — 94799 UNLISTED PULMONARY SVC/PX: CPT

## 2024-07-16 PROCEDURE — 97530 THERAPEUTIC ACTIVITIES: CPT | Performed by: PHYSICAL THERAPIST

## 2024-07-16 PROCEDURE — 36600 WITHDRAWAL OF ARTERIAL BLOOD: CPT | Performed by: STUDENT IN AN ORGANIZED HEALTH CARE EDUCATION/TRAINING PROGRAM

## 2024-07-16 PROCEDURE — 25010000002 ENOXAPARIN PER 10 MG: Performed by: INTERNAL MEDICINE

## 2024-07-16 PROCEDURE — 94664 DEMO&/EVAL PT USE INHALER: CPT

## 2024-07-16 PROCEDURE — 82803 BLOOD GASES ANY COMBINATION: CPT | Performed by: STUDENT IN AN ORGANIZED HEALTH CARE EDUCATION/TRAINING PROGRAM

## 2024-07-16 PROCEDURE — 99233 SBSQ HOSP IP/OBS HIGH 50: CPT | Performed by: STUDENT IN AN ORGANIZED HEALTH CARE EDUCATION/TRAINING PROGRAM

## 2024-07-16 PROCEDURE — 80048 BASIC METABOLIC PNL TOTAL CA: CPT | Performed by: INTERNAL MEDICINE

## 2024-07-16 PROCEDURE — 94660 CPAP INITIATION&MGMT: CPT

## 2024-07-16 PROCEDURE — 99232 SBSQ HOSP IP/OBS MODERATE 35: CPT | Performed by: STUDENT IN AN ORGANIZED HEALTH CARE EDUCATION/TRAINING PROGRAM

## 2024-07-16 PROCEDURE — 94761 N-INVAS EAR/PLS OXIMETRY MLT: CPT

## 2024-07-16 RX ORDER — QUETIAPINE FUMARATE 25 MG/1
12.5 TABLET, FILM COATED ORAL ONCE
Status: COMPLETED | OUTPATIENT
Start: 2024-07-17 | End: 2024-07-16

## 2024-07-16 RX ORDER — QUETIAPINE FUMARATE 25 MG/1
12.5 TABLET, FILM COATED ORAL ONCE
Status: COMPLETED | OUTPATIENT
Start: 2024-07-16 | End: 2024-07-16

## 2024-07-16 RX ORDER — ACETAMINOPHEN 325 MG/1
650 TABLET ORAL EVERY 6 HOURS PRN
Status: DISCONTINUED | OUTPATIENT
Start: 2024-07-16 | End: 2024-07-21 | Stop reason: HOSPADM

## 2024-07-16 RX ADMIN — BUDESONIDE 0.5 MG: 0.5 INHALANT ORAL at 07:37

## 2024-07-16 RX ADMIN — ENOXAPARIN SODIUM 40 MG: 100 INJECTION SUBCUTANEOUS at 08:33

## 2024-07-16 RX ADMIN — NICOTINE 1 PATCH: 21 PATCH, EXTENDED RELEASE TRANSDERMAL at 16:10

## 2024-07-16 RX ADMIN — ARFORMOTEROL TARTRATE 15 MCG: 15 SOLUTION RESPIRATORY (INHALATION) at 19:14

## 2024-07-16 RX ADMIN — Medication 10 ML: at 20:35

## 2024-07-16 RX ADMIN — Medication 10 ML: at 08:36

## 2024-07-16 RX ADMIN — QUETIAPINE FUMARATE 12.5 MG: 25 TABLET ORAL at 23:13

## 2024-07-16 RX ADMIN — ASPIRIN 81 MG: 81 TABLET, CHEWABLE ORAL at 08:34

## 2024-07-16 RX ADMIN — AZITHROMYCIN DIHYDRATE 250 MG: 250 TABLET ORAL at 08:35

## 2024-07-16 RX ADMIN — ACETAMINOPHEN 650 MG: 325 TABLET ORAL at 23:13

## 2024-07-16 RX ADMIN — METHYLPREDNISOLONE SODIUM SUCCINATE 40 MG: 40 INJECTION INTRAMUSCULAR; INTRAVENOUS at 20:34

## 2024-07-16 RX ADMIN — BUDESONIDE 0.5 MG: 0.5 INHALANT ORAL at 19:14

## 2024-07-16 RX ADMIN — AMLODIPINE BESYLATE 10 MG: 10 TABLET ORAL at 08:35

## 2024-07-16 RX ADMIN — ARFORMOTEROL TARTRATE 15 MCG: 15 SOLUTION RESPIRATORY (INHALATION) at 07:36

## 2024-07-16 RX ADMIN — METHYLPREDNISOLONE SODIUM SUCCINATE 40 MG: 40 INJECTION INTRAMUSCULAR; INTRAVENOUS at 08:34

## 2024-07-16 RX ADMIN — QUETIAPINE FUMARATE 12.5 MG: 25 TABLET ORAL at 01:46

## 2024-07-16 NOTE — PROGRESS NOTES
RT EQUIPMENT DEVICE RELATED - SKIN ASSESSMENT    RT Medical Equipment/Device:     NIV Mask:  Full-face    size: m    Skin Assessment:      Cheek:  Intact  Chin:  Intact  Nose:  Intact  Lips:  Intact    Device Skin Pressure Protection:  Pressure points protected    Nurse Notification:  Rocio Alejandre, RRT

## 2024-07-16 NOTE — PLAN OF CARE
Goal Outcome Evaluation:  VSS. Patient confused throughout the shift. Removing 02 and refusing to use CPAP.  Getting  up without assistance. 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  Order received for safety sitter.  Plan is for patient to discharge home with 02 and cpap instead of going to rehab.

## 2024-07-16 NOTE — PLAN OF CARE
Goal Outcome Evaluation:  Plan of Care Reviewed With: patient        Progress: no change  Outcome Evaluation: Patient did not tolerate bipap last night. Unit is on standby in the room. Currently on 5L NC with humidification. Obtained ABG this morning, compensated. Patient became agitated with breathing treatment this morning.

## 2024-07-16 NOTE — PROGRESS NOTES
RT EQUIPMENT DEVICE RELATED - SKIN ASSESSMENT    RT Medical Equipment/Device:     NIV Mask:  Full-face    size: b    Skin Assessment:      Cheek:  Intact  Nose:  Intact    Device Skin Pressure Protection:  Pressure points protected    Nurse Notification:  Rocio Goyal RRT

## 2024-07-16 NOTE — PROGRESS NOTES
Pulmonary / Critical Care Progress Note      Patient Name: Colin Nevarez  : 1950  MRN: [id number]  Primary Care Physician:  Liz Roper DO  Date of admission: 2024    Subjective   Subjective   Follow-up for acute COPD exacerbation requiring NIPPV.    No acute events overnight.    Or in PPV for very short amount of time  Lying in bed this afternoon  Reports mild improvement in his symptoms  On 5 L nasal cannula  1.5 L urine output over last 24 hours  Respiratory culture positive for gram negative coccobacilli  ABG improved  Wheezing improved  Has home vocsyn machine brought in but not used    Review of Systems  General:  No Fatigue, No Fever  HEENT:  No Dysphagia, No Visual Changes  Respiratory:  + Cough, + Dyspnea, No Pleuritic Pain  Cardiovascular:  No Chest Pain, No Palpitations, + SENA, No Chest Pressure  Gastrointestinal:  No Abdominal Pain, No Nausea, No Vomiting, No Diarrhea  Musculoskeletal:  No Joint Tenderness, No Joint Stiffness    Objective   Objective     Vitals:   Temp:  [97.3 °F (36.3 °C)-98.2 °F (36.8 °C)] 97.3 °F (36.3 °C)  Heart Rate:  [88-98] 95  Resp:  [18-20] 20  BP: (140-158)/(73-86) 150/85  Flow (L/min):  [4-5] 4    Physical Exam   Vital Signs Reviewed   General: Thin male, Alert, sitting up on side of bed, NAD on 5L NC    HEENT:  PERRL, EOMI.  OP, nares clear, no sinus tenderness  Neck:  Supple, no JVD, no thyromegaly  Chest:  poor aeration, wheezing and rhonchi bilaterally, increased work of breathing noted on 5L NC  CV: RRR, no MGR, pulses 2+, equal  Abd:  Soft, NT, ND, + BS, no HSM  EXT:  no clubbing, no cyanosis, no edema  Neuro:  A&Ox3, CN grossly intact, no focal deficits  Skin: No rashes or lesions noted    Result Review    Result Review:  I have personally reviewed the results from the time of this admission to 2024 16:35 EDT and agree with these findings:  [x]  Laboratory  [x]  Microbiology  [x]  Radiology  []  EKG/Telemetry   []  Cardiology/Vascular   []   Pathology  []  Old records  []  Other:  Most notable findings include:         Lab 07/16/24  0402 07/15/24  0504 07/14/24  0508 07/13/24  0534 07/12/24  0432 07/11/24  1149   WBC 12.43* 12.64* 15.84* 16.55* 5.60 9.37   HEMOGLOBIN 19.1* 18.5* 17.7 18.2* 17.8* 19.2*   HEMATOCRIT 58.7* 58.2* 55.5* 56.2* 54.8* 57.3*   PLATELETS 140 171 165 173 162 176   SODIUM 141 141 139 140 134* 137   POTASSIUM 4.2 4.3 4.6 4.7 4.8 4.9   CHLORIDE 98 97* 98 100 98 97*   CO2 39.1* 36.4* 36.7* 31.4* 27.2 27.8   BUN 25* 20 20 19 17 11   CREATININE 0.47* 0.50* 0.46* 0.56* 0.58* 0.68*   GLUCOSE 141* 139* 143* 150* 167* 125*   CALCIUM 9.2 9.4 8.9 9.0 9.1 9.7   TOTAL PROTEIN  --   --   --   --   --  7.9   ALBUMIN  --   --   --   --   --  4.5   GLOBULIN  --   --   --   --   --  3.4     7/14 ABG 7.32/76.2/59.1/40.1 on 4 L NC    Procal 0.03, cultures negative to date    7/11 CXR severely hyperinflated, no acute infiltrate or effusions.    Assessment & Plan   Assessment / Plan     Active Hospital Problems:  Active Hospital Problems    Diagnosis     **COPD (chronic obstructive pulmonary disease)     Moderate malnutrition      Impression:  Acute hypoxic hypercapnic failure requiring NIPPV  Acute exacerbation of severe COPD: FEV1 25%  Ongoing tobacco abuse  History R LL squamous cell lung cancer: S/p SBRT  History of prostate cancer: S/p prostatectomy    Plan:  -On 5 L NC.  Continue to wean O2 to keep sats greater than 90%.  -Need 6-minute walk test prior to discharge for home O2 use.  -Continue NIPPV with settings 14/6.  ABG appropriate on the settings.  Encourage use at night and as needed days with distress.  -Appreciate RT  assistance in helping to arrange NIPPV for home.  -Cultures negative to date.  To complete azithromycin.  -Wheezing improving.  Continue with Solu-Medrol 40 mg every 12 hours.-Continue nebulizers and bronchopulmonary hygiene.  -Encourage I-S and flutter valve.  -Continue nicotine patch for smoking  cessation.  -Would benefit from pulmonary rehab.  -Need to follow-up in our clinic 1 week after discharge in COPD clinic.  -Nicotine patch provided as needed    VTE Prophylaxis:  Pharmacologic VTE prophylaxis orders are present.    CODE STATUS:   Code Status (Patient has no pulse and is not breathing): CPR (Attempt to Resuscitate)  Medical Interventions (Patient has pulse or is breathing): Full Support    Labs, microbiology, radiology, medications, and provider notes personally reviewed.  Discussed with primary services and bedside RN.    Electronically signed by Lily Roland MD, 07/16/24, 4:37 PM EDT.

## 2024-07-16 NOTE — PLAN OF CARE
Goal Outcome Evaluation:  Plan of Care Reviewed With: patient        Progress: no change  Outcome Evaluation: Patient did not tolerate bipap well. He ripped the mask and circuit apart. The RN worked with the patient as well to try and get him to wear the unit. Pt is on 4L nasal cannula

## 2024-07-16 NOTE — THERAPY PROGRESS REPORT/RE-CERT
Acute Care - Physical Therapy Progress Note   [facility]     Patient Name: Colin Nevarez  : 1950  MRN: [id number]  Today's Date: 2024   Onset of Illness/Injury or Date of Surgery:  (Pt reports feeling SOA and having difficulty breathing over long period of time. Does not wear supplemental O2 at home.)  Visit Dx:     ICD-10-CM ICD-9-CM   1. COPD exacerbation  J44.1 491.21   2. Difficulty walking  R26.2 719.7   3. Chronic obstructive pulmonary disease, unspecified COPD type  J44.9 496   4. Chronic respiratory failure, unspecified whether with hypoxia or hypercapnia  J96.10 518.83     Patient Active Problem List   Diagnosis    Arthritis    Chronic obstructive pulmonary disease    Heart flutter, ventricular    High cholesterol    Prostate cancer    Essential hypertension    Urinary incontinence, stress, male    Cigarette nicotine dependence with nicotine-induced disorder    Hematuria    Ascending aortic aneurysm    Squamous cell carcinoma lung    Acute URI    COPD (chronic obstructive pulmonary disease)    Moderate malnutrition     Past Medical History:   Diagnosis Date    Arthritis     COPD (chronic obstructive pulmonary disease)     Essential hypertension 2021    Forgetfulness     Heart flutter, ventricular     High cholesterol     Lung cancer     Prostate cancer     Ringing in ear     Shortness of Air     SOB (shortness of breath)      Past Surgical History:   Procedure Laterality Date    APPENDECTOMY      COLONOSCOPY  2019    COLONOSCOPY N/A 2023    Procedure: COLONOSCOPY;  Surgeon: Geoffrey Carey MD;  Location: [facility] ENDOSCOPY;  Service: General;  Laterality: N/A;  NORMAL    LUNG BIOPSY      PROSTATECTOMY      TONSILLECTOMY      Per PT     PT Assessment (Last 12 Hours)       PT Evaluation and Treatment       Row Name 24 1300          Physical Therapy Time and Intention    Subjective Information complains of;dyspnea  -TC     Document Type evaluation  -TC     Mode of Treatment  individual therapy  -TC     Patient Effort excellent  -TC     Symptoms Noted During/After Treatment shortness of breath  -TC       Row Name 07/16/24 1300          General Information    Patient Profile Reviewed yes  -TC     Onset of Illness/Injury or Date of Surgery --  Pt reports feeling SOA and having difficulty breathing over long period of time. Does not wear supplemental O2 at home.  -TC     Patient Observations alert;cooperative;agree to therapy  -TC     General Observations of Patient --  Pt sitting EOB leaning forward on side table tray with nasal cannula at 5L in place.  -TC     Prior Level of Function independent:;all household mobility;community mobility  -TC     Equipment Currently Used at Home none  -TC     Barriers to Rehab none identified  -TC       Row Name 07/16/24 1300          Living Environment    Current Living Arrangements home  -TC     People in Home alone  -TC     Primary Care Provided by self  -TC       Row Name 07/16/24 1300          Home Main Entrance    Number of Stairs, Main Entrance three  -TC     Stair Railings, Main Entrance none  -TC       Row Name 07/16/24 1300          Home Use of Assistive/Adaptive Equipment    Equipment Currently Used at Home none  -TC       Row Name 07/16/24 1300          Pain    Pretreatment Pain Rating 0/10 - no pain  -TC     Posttreatment Pain Rating 0/10 - no pain  -TC       Row Name 07/16/24 1300          Cognition    Orientation Status (Cognition) disoriented to;person;place;situation  -TC       Row Name 07/16/24 1300          Range of Motion (ROM)    Range of Motion ROM is WFL  -TC       Row Name 07/16/24 1300          Strength (Manual Muscle Testing)    Strength (Manual Muscle Testing) bilateral lower extremities  4+/5  -TC       Row Name 07/16/24 1300          Bed Mobility    Bed Mobility bed mobility (all) activities  -TC     All Activities, [place] (Bed Mobility) modified independence  -TC     Comment, (Bed Mobility) --  Pt sitting EOB upon PT  arrival.  -TC       Row Name 07/16/24 1300          Transfers    Transfers sit-stand transfer  -TC     Comment, (Transfers) --  SBA  -TC       Row Name 07/16/24 1300          Sit-Stand Transfer    Sit-Stand Ray (Transfers) standby assist  -TC     Assistive Device (Sit-Stand Transfers) walker, front-wheeled  VC to keep walker on floor due to preference for picking up walker  -TC       Row Name 07/16/24 1300          Stand-Sit Transfer    Stand-Sit Ray (Transfers) standby assist  -TC     Assistive Device (Stand-Sit Transfers) walker, front-wheeled  -TC       Row Name 07/16/24 1300          Gait/Stairs (Locomotion)    Gait/Stairs Locomotion gait/ambulation independence  -TC     Ray Level (Gait) contact guard  -TC     Assistive Device (Gait) walker, front-wheeled  -TC     Patient was able to Ambulate yes  -TC     Distance in Feet (Gait) 25  -TC     Pattern (Gait) step-through  -TC     Deviations/Abnormal Patterns (Gait) base of support, narrow  -       Row Name 07/16/24 1300          Safety Issues, Functional Mobility    Safety Issues Affecting Function (Mobility) impulsivity;judgment  -TC     Impairments Affecting Function (Mobility) balance;endurance/activity tolerance  -       Row Name 07/16/24 1300          Balance    Balance Assessment standing dynamic balance  -TC     Dynamic Standing Balance contact guard  -TC     Position/Device Used, Standing Balance supported;walker, front-wheeled  -TC     Balance Interventions standing  -TC     Comment, Balance --  x 5 reps marches with support, x 5 reps marches without support  -       Row Name 07/16/24 1300          Motor Skills    Motor Skills functional endurance  -TC     Functional Endurance fair  -TC     Additional Documentation --  Inc use of accessory muscle groups during inhalation, VC pursed liped breathing, diaphragmatic breathing  -       Row Name 07/16/24 1300          Plan of Care Review    Plan of Care Reviewed With  patient;family  -TC     Progress improving  -TC     Outcome Evaluation --  Pt presents with increased SOA during light activity. PT educated pt on importance of breath control and energy conservation strategies including frequent rest breaks. Pt may benefit from rollator walker with seat post discharge with  services.  -TC       Row Name 07/16/24 1300          Vital Signs    O2 Delivery Pre Treatment nasal cannula  O2 sats 90-92%  -TC     O2 Delivery Intra Treatment nasal cannula  -TC     O2 Delivery Post Treatment nasal cannula  O2 sats 90-92%  -TC     Pre Patient Position Sitting  -TC       Row Name 07/16/24 1300          Positioning and Restraints    Post Treatment Position other  -TC     In Bed sitting EOB;call light within reach  -TC       Row Name 07/16/24 1300          Therapy Assessment/Plan (PT)    Rehab Potential (PT) good, to achieve stated therapy goals  -TC     Criteria for Skilled Interventions Met (PT) yes  -TC     Therapy Frequency (PT) daily  -TC     Predicted Duration of Therapy Intervention (PT) 10  -TC     Problem List (PT) balance;coordination;motor control;mobility  -TC     Activity Limitations Related to Problem List (PT) unable to ambulate safely  -TC               User Key  (r) = Recorded By, (t) = Taken By, (c) = Cosigned By      Initials Name Provider Type    TC Gloria Mcclelland, PT Physical Therapist                    Physical Therapy Education       Title: PT OT SLP Therapies (Done)       Topic: Physical Therapy (Done)       Point: Mobility training (Done)       Learning Progress Summary             Patient Acceptance, E, VU by TC at 7/16/2024 1322    Acceptance, E,TB, VU by TD at 7/15/2024 0228    Acceptance, E,TB, VU by TD at 7/14/2024 0351    Acceptance, E,TB, VU by TD at 7/13/2024 0113    Acceptance, E, VU by SC at 7/12/2024 1326   Family Acceptance, E,TB, VU by TD at 7/14/2024 0351                         Point: Home exercise program (Done)       Learning Progress Summary              Patient Acceptance, E, VU by TC at 7/16/2024 1322    Acceptance, E,TB, VU by TD at 7/15/2024 0228    Acceptance, E,TB, VU by TD at 7/14/2024 0351    Acceptance, E,TB, VU by TD at 7/13/2024 0113    Acceptance, E, VU by SC at 7/12/2024 1326   Family Acceptance, E,TB, VU by TD at 7/14/2024 0351                         Point: Body mechanics (Done)       Learning Progress Summary             Patient Acceptance, E, VU by TC at 7/16/2024 1322    Acceptance, E,TB, VU by TD at 7/15/2024 0228    Acceptance, E,TB, VU by TD at 7/14/2024 0351    Acceptance, E,TB, VU by TD at 7/13/2024 0113    Acceptance, E, VU by SC at 7/12/2024 1326   Family Acceptance, E,TB, VU by TD at 7/14/2024 0351                         Point: Precautions (Done)       Learning Progress Summary             Patient Acceptance, E, VU by TC at 7/16/2024 1322    Acceptance, E,TB, VU by TD at 7/15/2024 0228    Acceptance, E,TB, VU by TD at 7/14/2024 0351    Acceptance, E,TB, VU by TD at 7/13/2024 0113    Acceptance, E, VU by SC at 7/12/2024 1326   Family Acceptance, E,TB, VU by TD at 7/14/2024 0351                                         User Key       Initials Effective Dates Name Provider Type Discipline     06/18/24 -  Gloria Mcclelland, PT Physical Therapist PT     01/25/24 -  Ally Maldonado, RN Registered Nurse Nurse    SC 02/05/24 -  Daysi Rose OT Occupational Therapist OT                  PT Recommendation and Plan  Planned Therapy Interventions (PT): balance training, gait training, neuromuscular re-education, bed mobility training, postural re-education, patient/family education, transfer training, strengthening, stair training  Therapy Frequency (PT): daily  Plan of Care Reviewed With: patient, family  Progress: improving  Outcome Evaluation:  (Pt presents with increased SOA during light activity. PT educated pt on importance of breath control and energy conservation strategies including frequent rest breaks. Pt may benefit from rollator  walker with seat post discharge with  services.)   Outcome Measures       Row Name 07/16/24 1300             How much help from another person do you currently need...    Turning from your back to your side while in flat bed without using bedrails? 4  -TC      Moving from lying on back to sitting on the side of a flat bed without bedrails? 4  -TC      Moving to and from a bed to a chair (including a wheelchair)? 4  -TC      Standing up from a chair using your arms (e.g., wheelchair, bedside chair)? 3  -TC      Climbing 3-5 steps with a railing? 3  -TC      To walk in hospital room? 3  -TC      AM-PAC 6 Clicks Score (PT) 21  -TC      Highest Level of Mobility Goal 6 --> Walk 10 steps or more  -TC         Functional Assessment    Outcome Measure Options AM-PAC 6 Clicks Basic Mobility (PT)  -TC                User Key  (r) = Recorded By, (t) = Taken By, (c) = Cosigned By      Initials Name Provider Type    Gloria Mathias, PT Physical Therapist                     Time Calculation:    PT Charges       Row Name 07/16/24 1325             Timed Charges    59168 - PT Therapeutic Activity Minutes 30  -TC         Total Minutes    Timed Charges Total Minutes 30  -TC       Total Minutes 30  -TC                User Key  (r) = Recorded By, (t) = Taken By, (c) = Cosigned By      Initials Name Provider Type    Gloria Mathias, SHIVANI Physical Therapist                  Therapy Charges for Today       Code Description Service Date Service Provider Modifiers Qty    94203 MI THERAPEUT ACTVITY DIRECT PT CONTACT EACH 15 MIN 7/16/2024 Gloria Mcclelland, PT  2            PT G-Codes  Outcome Measure Options: AM-PAC 6 Clicks Basic Mobility (PT)  AM-PAC 6 Clicks Score (PT): 21  AM-PAC 6 Clicks Score (OT): 21    Gloria Mcclelland PT  7/16/2024

## 2024-07-16 NOTE — PLAN OF CARE
Goal Outcome Evaluation:  Plan of Care Reviewed With: patient, family        Progress: improving  Outcome Evaluation:  (Pt presents with increased SOA during light activity. PT educated pt on importance of breath control and energy conservation strategies including frequent rest breaks. Pt may benefit from rollator walker with seat post discharge with  services.)

## 2024-07-16 NOTE — PROGRESS NOTES
[facility]   Hospitalist Progress Note  Date: 2024  Patient Name: Colin Nevarez  : 1950  MRN: [id number]  Date of admission: 2024  Room/Bed: 405/1      Subjective   Subjective     Chief Complaint:   Shortness of breath    Summary:  Colin Nevarez is a 74 y.o. male with a past medical history of COPD, hypertension, hyperlipidemia, lung cancer, and prostate cancer     Patient presents the emergency department on 2024 for complaints of worsening shortness of breath.  In the room patient tripoding with accessory muscle use, having conversational dyspnea.  In the ED patient found to have acute hypercapnic hypoxic respiratory failure.  Patient initially saturating in the 70s on room air, started on 3 L nasal cannula with improvement in O2.  ABG shows patient hypercapnic with a pCO2 of 68.6, pH of 7.255.  Patient has a hemoglobin of 19.2.  Patient's chest x-ray shows evidence of emphysema with no acute infiltrate.  Initially on meeting patient, he is apprehensive to be admitted.  ED physician and internal medicine doctor discussed extensively with patient, neighbors who brought patient into the hospital, as well as son over the phone.  Patient eventually agreed to admission in the hospital.  Patient initiated on BiPAP.  Patient now agreeable to staying inpatient, pulmonologist consulted.  Working on securing home NIPPV and oxygen for patient.    Interval Followup:   Patient's BiPAP arriving today.  Sitting in bed, on mild respiratory distress.  On 4 L/min of oxygen via NC.  Has been noncompliant with BiPAP and oxygen prior to presentation.  Denied any fever, chills, rigors, chest pain or difficulty breathing.  Stated his breathing is getting better clinically he still having conversational and mild dyspnea at rest.  Family at bedside.  Updated.    Objective   Objective     Vitals:   Temp:  [97.3 °F (36.3 °C)-98.2 °F (36.8 °C)] 97.3 °F (36.3 °C)  Heart Rate:  [86-98] 92  Resp:  [18] 18  BP:  (122-158)/(71-88) 140/76  Flow (L/min):  [4-5] 5    Physical Exam               Constitutional: Awake, alert, still with increased work of breathing, retractions on ribs noted              Respiratory: Improving aeration, prolonged expiratory phase with end expiratory wheezing              Cardiovascular: RRR, no murmurs, rubs, or gallops, palpable pedal pulses bilaterally              Gastrointestinal: Positive bowel sounds, soft, nontender, nondistended              Musculoskeletal: No bilateral ankle edema, no clubbing or cyanosis to extremities              Neurologic: Oriented x 3, speech clear    Result Review    Result Review:  I have personally reviewed these results:  [x]  Laboratory      Lab 07/16/24  0402 07/15/24  0504 07/14/24  0508 07/13/24  0534 07/12/24  0432 07/11/24  1149   WBC 12.43* 12.64* 15.84*   < > 5.60 9.37   HEMOGLOBIN 19.1* 18.5* 17.7   < > 17.8* 19.2*   HEMATOCRIT 58.7* 58.2* 55.5*   < > 54.8* 57.3*   PLATELETS 140 171 165   < > 162 176   NEUTROS ABS  --   --   --   --  5.03 7.60*   IMMATURE GRANS (ABS)  --   --   --   --  0.02 0.02   LYMPHS ABS  --   --   --   --  0.48* 1.03   MONOS ABS  --   --   --   --  0.07* 0.67   EOS ABS  --   --   --   --  0.00 0.01   MCV 92.6 94.2 94.9   < > 91.9 90.1   PROCALCITONIN  --   --   --   --  0.03  --    LACTATE  --   --   --   --   --  1.4    < > = values in this interval not displayed.         Lab 07/16/24  0402 07/15/24  0504 07/14/24  0508   SODIUM 141 141 139   POTASSIUM 4.2 4.3 4.6   CHLORIDE 98 97* 98   CO2 39.1* 36.4* 36.7*   ANION GAP 3.9* 7.6 4.3*   BUN 25* 20 20   CREATININE 0.47* 0.50* 0.46*   EGFR 109.7 107.7 110.4   GLUCOSE 141* 139* 143*   CALCIUM 9.2 9.4 8.9   MAGNESIUM 2.5* 2.5* 2.4         Lab 07/11/24  1149   TOTAL PROTEIN 7.9   ALBUMIN 4.5   GLOBULIN 3.4   ALT (SGPT) 15   AST (SGOT) 18   BILIRUBIN 0.3   ALK PHOS 112         Lab 07/11/24  1149   PROBNP 83.5   HSTROP T 12                 Lab 07/16/24  0653 07/14/24  0650  07/12/24  0616   PH, ARTERIAL 7.403 7.329* 7.299*   PCO2, ARTERIAL 67.8* 76.2* 64.4*   PO2 ART 58.2* 59.1* 86.4   O2 SATURATION ART 88.4* 86.5* 95.0   FIO2 40 32 35   HCO3 ART 42.3* 40.1* 31.6*   BASE EXCESS ART 12.4* 9.2* 2.2*     Brief Urine Lab Results  (Last result in the past 365 days)        Color   Clarity   Blood   Leuk Est   Nitrite   Protein   CREAT   Urine HCG        01/25/24 1123 Yellow   Clear   Large   Moderate (2+)   Negative   Trace                 [x]  Microbiology   Microbiology Results (last 10 days)       Procedure Component Value - Date/Time    Respiratory Culture - Sputum, Cough [[id number]] Collected: 07/14/24 1743    Lab Status: Preliminary result Specimen: Sputum from Cough Updated: 07/14/24 1924     Gram Stain Moderate (3+) WBCs observed - predominantly neutrophils      Rare (1+) Gram positive cocci in clusters      Few (2+) Squamous epithelial cells    S. Pneumo Ag Urine or CSF - Urine, Urine, Clean Catch [[id number]]  (Normal) Collected: 07/12/24 1705    Lab Status: Final result Specimen: Urine, Clean Catch Updated: 07/12/24 1730     Strep Pneumo Ag Negative    Legionella Antigen, Urine - Urine, Urine, Clean Catch [[id number]]  (Normal) Collected: 07/12/24 1705    Lab Status: Final result Specimen: Urine, Clean Catch Updated: 07/12/24 1730     LEGIONELLA ANTIGEN, URINE Negative    Blood Culture - Blood, Arm, Left [[id number]]  (Normal) Collected: 07/11/24 1329    Lab Status: Preliminary result Specimen: Blood from Arm, Left Updated: 07/15/24 1345     Blood Culture No growth at 4 days    COVID PRE-OP / PRE-PROCEDURE SCREENING ORDER (NO ISOLATION) - Swab, Nasopharynx [[id number]]  (Normal) Collected: 07/11/24 1313    Lab Status: Final result Specimen: Swab from Nasopharynx Updated: 07/11/24 1400    Narrative:      The following orders were created for panel order COVID PRE-OP / PRE-PROCEDURE SCREENING ORDER (NO ISOLATION) - Swab, Nasopharynx.  Procedure                               Abnormality          Status                     ---------                               -----------         ------                     COVID-19, FLU A/B, RSV P...[[id number]]  Normal              Final result                 Please view results for these tests on the individual orders.    COVID-19, FLU A/B, RSV PCR 1 HR TAT - Swab, Nasopharynx [[id number]]  (Normal) Collected: 07/11/24 1313    Lab Status: Final result Specimen: Swab from Nasopharynx Updated: 07/11/24 1400     COVID19 Not Detected     Influenza A PCR Not Detected     Influenza B PCR Not Detected     RSV, PCR Not Detected    Narrative:      Fact sheet for providers: https://www.fda.gov/media/882330/download    Fact sheet for patients: https://www.fda.gov/media/759485/download    Test performed by PCR.    Blood Culture - Blood, Arm, Right [[id number]]  (Normal) Collected: 07/11/24 1149    Lab Status: Preliminary result Specimen: Blood from Arm, Right Updated: 07/15/24 1201     Blood Culture No growth at 4 days          [x]  Radiology  XR Chest 1 View    Result Date: 7/11/2024  Impression: Evidence of emphysema. No acute infiltrate. Electronically Signed: Angeline Hyde MD  7/11/2024 12:00 PM EDT  Workstation ID: [id number]   []  EKG/Telemetry   []  Cardiology/Vascular   []  Pathology  []  Old records  []  Other:    Assessment & Plan   Assessment / Plan     Assessment:  Acute hypoxic hypercapnic respiratory failure  COPD in acute exacerbation  History of squamous cell carcinoma of the right lung  Ongoing tobacco abuse, 1 pack/day  Hypertension  Hyperlipidemia  History of prostate cancer     Plan:  Patient remains admitted to the hospital for further care and management  On 4 L of oxygen, continue to titrate as tolerated to maintain SpO2 88-92%.  Might require oxygen at the time of discharge.  NIPPV ordered, was delivered today.  Patient to use NIPPV at night and with naps.  Pulmonology service following the patient, appreciate further input.    Given hypercapnic  respiratory acidosis, continue with BiPAP at night and with naps, settings adjusted by pulmonology  RT case management consulted for possible NIPPV at home.  Appreciate input.  Continue with IV Solu-Medrol 40 mg every 12 hours.  Continue.  Completed course of azithromycin.  Scheduled and as needed breathing treatments  Incentive spirometer/flutter valve  NRT ordered  Patient has a history of noncompliance with oxygen and BiPAP at home.  Also has been removing oxygen here in the hospital and getting agitated easily.  Safety sitter at bedside to prevent him from removing oxygen so he does not become hypoxic.  Continue nicotine patch for smoking cessation.  Patient would benefit from rehab but family wants patient to go home with home health given patient will refuse to go to rehab.  Stated he has good family support; lived alone prior to presentation but family stated they will take turns to take care of him now.  Clinical course to determine disposition.  Likely next 48 hours.  Will obtain 6-minute walk test 24 hours prior to discharge.       VTE Prophylaxis:  Pharmacologic VTE prophylaxis orders are present.        CODE STATUS:   Code Status (Patient has no pulse and is not breathing): CPR (Attempt to Resuscitate)  Medical Interventions (Patient has pulse or is breathing): Full Support      Electronically signed by Satish Champagne MD, 7/16/2024, 09:01 EDT.

## 2024-07-16 NOTE — PLAN OF CARE
Goal Outcome Evaluation:  Plan of Care Reviewed With: patient        Progress: no change  Outcome Evaluation: Continues to have increased anxiety. Frequently removes nasal canula. Desats quickly with slow recovery time. Removed bipap multiole times. Attempts to self ambulate unassisted. Requires frequent redirection.

## 2024-07-17 LAB
ANION GAP SERPL CALCULATED.3IONS-SCNC: 9.4 MMOL/L (ref 5–15)
ARTERIAL PATENCY WRIST A: POSITIVE
ATMOSPHERIC PRESS: 739.8 MMHG
BACTERIA SPEC RESP CULT: ABNORMAL
BACTERIA SPEC RESP CULT: ABNORMAL
BASE EXCESS BLDA CALC-SCNC: 12.5 MMOL/L (ref -2–2)
BASOPHILS # BLD AUTO: 0.02 10*3/MM3 (ref 0–0.2)
BASOPHILS NFR BLD AUTO: 0.1 % (ref 0–1.5)
BDY SITE: ABNORMAL
BUN SERPL-MCNC: 30 MG/DL (ref 8–23)
BUN/CREAT SERPL: 49.2 (ref 7–25)
CALCIUM SPEC-SCNC: 9.5 MG/DL (ref 8.6–10.5)
CHLORIDE SERPL-SCNC: 97 MMOL/L (ref 98–107)
CO2 SERPL-SCNC: 37.6 MMOL/L (ref 22–29)
CREAT SERPL-MCNC: 0.61 MG/DL (ref 0.76–1.27)
DEPRECATED RDW RBC AUTO: 47.3 FL (ref 37–54)
DEVICE COMMENT: ABNORMAL
EGFRCR SERPLBLD CKD-EPI 2021: 101.4 ML/MIN/1.73
EOSINOPHIL # BLD AUTO: 0 10*3/MM3 (ref 0–0.4)
EOSINOPHIL NFR BLD AUTO: 0 % (ref 0.3–6.2)
ERYTHROCYTE [DISTWIDTH] IN BLOOD BY AUTOMATED COUNT: 14.3 % (ref 12.3–15.4)
GLUCOSE SERPL-MCNC: 146 MG/DL (ref 65–99)
GRAM STN SPEC: ABNORMAL
HCO3 BLDA-SCNC: 43.1 MMOL/L (ref 22–26)
HCT VFR BLD AUTO: 61.5 % (ref 37.5–51)
HCT VFR BLD CALC: 64 % (ref 38–51)
HEMODILUTION: NO
HGB BLD-MCNC: 19.7 G/DL (ref 13–17.7)
HGB BLDA-MCNC: 21.7 G/DL (ref 12–18)
IMM GRANULOCYTES # BLD AUTO: 0.06 10*3/MM3 (ref 0–0.05)
IMM GRANULOCYTES NFR BLD AUTO: 0.4 % (ref 0–0.5)
INHALED O2 CONCENTRATION: 36 %
LYMPHOCYTES # BLD AUTO: 0.63 10*3/MM3 (ref 0.7–3.1)
LYMPHOCYTES NFR BLD AUTO: 4.3 % (ref 19.6–45.3)
Lab: ABNORMAL
MAGNESIUM SERPL-MCNC: 2.6 MG/DL (ref 1.6–2.4)
MCH RBC QN AUTO: 29.9 PG (ref 26.6–33)
MCHC RBC AUTO-ENTMCNC: 32 G/DL (ref 31.5–35.7)
MCV RBC AUTO: 93.5 FL (ref 79–97)
MODALITY: ABNORMAL
MONOCYTES # BLD AUTO: 1.2 10*3/MM3 (ref 0.1–0.9)
MONOCYTES NFR BLD AUTO: 8.2 % (ref 5–12)
NEUTROPHILS NFR BLD AUTO: 12.73 10*3/MM3 (ref 1.7–7)
NEUTROPHILS NFR BLD AUTO: 87 % (ref 42.7–76)
NOTIFIED WHO: ABNORMAL
NRBC BLD AUTO-RTO: 0 /100 WBC (ref 0–0.2)
PCO2 BLDA: 71 MM HG (ref 35–45)
PH BLDA: 7.39 PH UNITS (ref 7.35–7.45)
PHOSPHATE SERPL-MCNC: 3.8 MG/DL (ref 2.5–4.5)
PLATELET # BLD AUTO: 156 10*3/MM3 (ref 140–450)
PMV BLD AUTO: 11.8 FL (ref 6–12)
PO2 BLD: 203 MM[HG] (ref 0–500)
PO2 BLDA: 73.1 MM HG (ref 80–100)
POTASSIUM SERPL-SCNC: 4.5 MMOL/L (ref 3.5–5.2)
RBC # BLD AUTO: 6.58 10*6/MM3 (ref 4.14–5.8)
READ BACK: YES
SAO2 % BLDCOA: 93.4 % (ref 95–99)
SODIUM SERPL-SCNC: 144 MMOL/L (ref 136–145)
WBC NRBC COR # BLD AUTO: 14.64 10*3/MM3 (ref 3.4–10.8)

## 2024-07-17 PROCEDURE — 36600 WITHDRAWAL OF ARTERIAL BLOOD: CPT

## 2024-07-17 PROCEDURE — 94760 N-INVAS EAR/PLS OXIMETRY 1: CPT

## 2024-07-17 PROCEDURE — 94664 DEMO&/EVAL PT USE INHALER: CPT

## 2024-07-17 PROCEDURE — 99232 SBSQ HOSP IP/OBS MODERATE 35: CPT | Performed by: STUDENT IN AN ORGANIZED HEALTH CARE EDUCATION/TRAINING PROGRAM

## 2024-07-17 PROCEDURE — 80048 BASIC METABOLIC PNL TOTAL CA: CPT | Performed by: STUDENT IN AN ORGANIZED HEALTH CARE EDUCATION/TRAINING PROGRAM

## 2024-07-17 PROCEDURE — 94799 UNLISTED PULMONARY SVC/PX: CPT

## 2024-07-17 PROCEDURE — 25010000002 ENOXAPARIN PER 10 MG: Performed by: INTERNAL MEDICINE

## 2024-07-17 PROCEDURE — 94761 N-INVAS EAR/PLS OXIMETRY MLT: CPT

## 2024-07-17 PROCEDURE — 82803 BLOOD GASES ANY COMBINATION: CPT

## 2024-07-17 PROCEDURE — 84100 ASSAY OF PHOSPHORUS: CPT | Performed by: STUDENT IN AN ORGANIZED HEALTH CARE EDUCATION/TRAINING PROGRAM

## 2024-07-17 PROCEDURE — 94660 CPAP INITIATION&MGMT: CPT

## 2024-07-17 PROCEDURE — 25010000002 CEFTRIAXONE PER 250 MG: Performed by: STUDENT IN AN ORGANIZED HEALTH CARE EDUCATION/TRAINING PROGRAM

## 2024-07-17 PROCEDURE — 85025 COMPLETE CBC W/AUTO DIFF WBC: CPT | Performed by: STUDENT IN AN ORGANIZED HEALTH CARE EDUCATION/TRAINING PROGRAM

## 2024-07-17 PROCEDURE — 83735 ASSAY OF MAGNESIUM: CPT | Performed by: STUDENT IN AN ORGANIZED HEALTH CARE EDUCATION/TRAINING PROGRAM

## 2024-07-17 PROCEDURE — 25010000002 METHYLPREDNISOLONE PER 40 MG: Performed by: NURSE PRACTITIONER

## 2024-07-17 PROCEDURE — 99233 SBSQ HOSP IP/OBS HIGH 50: CPT | Performed by: STUDENT IN AN ORGANIZED HEALTH CARE EDUCATION/TRAINING PROGRAM

## 2024-07-17 RX ORDER — LORAZEPAM 2 MG/ML
0.5 INJECTION INTRAMUSCULAR EVERY 4 HOURS PRN
Status: DISCONTINUED | OUTPATIENT
Start: 2024-07-17 | End: 2024-07-18

## 2024-07-17 RX ADMIN — Medication 10 ML: at 09:32

## 2024-07-17 RX ADMIN — IPRATROPIUM BROMIDE AND ALBUTEROL SULFATE 3 ML: .5; 3 SOLUTION RESPIRATORY (INHALATION) at 12:42

## 2024-07-17 RX ADMIN — METHYLPREDNISOLONE SODIUM SUCCINATE 40 MG: 40 INJECTION INTRAMUSCULAR; INTRAVENOUS at 09:29

## 2024-07-17 RX ADMIN — NICOTINE 1 PATCH: 21 PATCH, EXTENDED RELEASE TRANSDERMAL at 15:30

## 2024-07-17 RX ADMIN — ARFORMOTEROL TARTRATE 15 MCG: 15 SOLUTION RESPIRATORY (INHALATION) at 18:40

## 2024-07-17 RX ADMIN — BUDESONIDE 0.5 MG: 0.5 INHALANT ORAL at 08:47

## 2024-07-17 RX ADMIN — ARFORMOTEROL TARTRATE 15 MCG: 15 SOLUTION RESPIRATORY (INHALATION) at 08:47

## 2024-07-17 RX ADMIN — CEFTRIAXONE SODIUM 1000 MG: 1 INJECTION, POWDER, FOR SOLUTION INTRAMUSCULAR; INTRAVENOUS at 10:53

## 2024-07-17 RX ADMIN — ENOXAPARIN SODIUM 40 MG: 100 INJECTION SUBCUTANEOUS at 09:28

## 2024-07-17 RX ADMIN — ASPIRIN 81 MG: 81 TABLET, CHEWABLE ORAL at 09:29

## 2024-07-17 RX ADMIN — BUDESONIDE 0.5 MG: 0.5 INHALANT ORAL at 18:40

## 2024-07-17 RX ADMIN — Medication 10 ML: at 20:15

## 2024-07-17 RX ADMIN — SALINE NASAL SPRAY 2 SPRAY: 1.5 SOLUTION NASAL at 15:30

## 2024-07-17 RX ADMIN — AMLODIPINE BESYLATE 10 MG: 10 TABLET ORAL at 09:28

## 2024-07-17 NOTE — PROGRESS NOTES
RT EQUIPMENT DEVICE RELATED - SKIN ASSESSMENT    RT Medical Equipment/Device:     NIV Mask:  Full-face    size: .    Skin Assessment:      Cheek:  Intact  Chin:  Intact  Neck:  Intact  Nose:  Intact    Device Skin Pressure Protection:  Skin-to-device areas padded:  None Required    Nurse Notification:  Rocio Burciaga, RRT

## 2024-07-17 NOTE — PLAN OF CARE
Goal Outcome Evaluation:   Pt. Wore BIPAP last night at 14/6 32%.  Patient had a sitter and was in restraints during this time.  Removed BIPAP this morning and placed on 4L nasal cannula.  BIPAP will remain at bedside on standby.

## 2024-07-17 NOTE — PLAN OF CARE
Goal Outcome Evaluation:   PT WAS RESTRAINED AND PLACED ON BIPAP AT 0020 AT 14/6 32%. PT WEARS 4-5 LITERS NC WHEN NOT ON UNIT. PT HAS A SITTER.

## 2024-07-17 NOTE — PROGRESS NOTES
RT EQUIPMENT DEVICE RELATED - SKIN ASSESSMENT    RT Medical Equipment/Device:     NIV Mask:  Full-face    size:      Skin Assessment:      Cheek:  Intact  Chin:  Intact  Neck:  Intact  Nose:  Intact    Device Skin Pressure Protection:  Pressure points protected    Nurse Notification:  Rocio Cassidy, RRT

## 2024-07-17 NOTE — PROGRESS NOTES
Pulmonary / Critical Care Progress Note      Patient Name: Colin Nevarez  : 1950  MRN: [id number]  Primary Care Physician:  Liz Roper DO  Date of admission: 2024    Subjective   Subjective   Follow-up for acute COPD exacerbation requiring NIPPV.    No acute events overnight.  Wore NIPPV    This morning,  On 5 L nasal cannula  Lying in bed  Remains confused  Remains in restraints  Vocsyn machine at bedside  Diuresing well  Dyspnea continues to improve slowly  Patient continues to improve slowly    Objective   Objective     Vitals:   Temp:  [97.3 °F (36.3 °C)-98.2 °F (36.8 °C)] 97.3 °F (36.3 °C)  Heart Rate:  [82-99] 98  Resp:  [18-22] 22  BP: (129-158)/(79-94) 151/87  Flow (L/min):  [4-6] 4    Physical Exam   Vital Signs Reviewed   General: Thin male, Alert, sitting up on side of bed, NAD on 5L NC    HEENT:  PERRL, EOMI.  OP, nares clear, no sinus tenderness  Neck:  Supple, no JVD, no thyromegaly  Chest:  poor aeration, wheezing and rhonchi bilaterally, increased work of breathing noted on 5L NC  CV: RRR, no MGR, pulses 2+, equal  Abd:  Soft, NT, ND, + BS, no HSM  EXT:  no clubbing, no cyanosis, no edema  Neuro:  A&Ox3, CN grossly intact, no focal deficits  Skin: No rashes or lesions noted    Result Review    Result Review:  I have personally reviewed the results from the time of this admission to 2024 15:00 EDT and agree with these findings:  [x]  Laboratory  [x]  Microbiology  [x]  Radiology  []  EKG/Telemetry   []  Cardiology/Vascular   []  Pathology  []  Old records  []  Other:  Most notable findings include:         Lab 24  0509 24  0402 07/15/24  0504 24  0508 24  0534 24  0432 24  1149   WBC 14.64* 12.43* 12.64* 15.84* 16.55* 5.60 9.37   HEMOGLOBIN 19.7* 19.1* 18.5* 17.7 18.2* 17.8* 19.2*   HEMATOCRIT 61.5* 58.7* 58.2* 55.5* 56.2* 54.8* 57.3*   PLATELETS 156 140 171 165 173 162 176   SODIUM 144 141 141 139 140 134* 137   POTASSIUM 4.5 4.2 4.3 4.6 4.7  4.8 4.9   CHLORIDE 97* 98 97* 98 100 98 97*   CO2 37.6* 39.1* 36.4* 36.7* 31.4* 27.2 27.8   BUN 30* 25* 20 20 19 17 11   CREATININE 0.61* 0.47* 0.50* 0.46* 0.56* 0.58* 0.68*   GLUCOSE 146* 141* 139* 143* 150* 167* 125*   CALCIUM 9.5 9.2 9.4 8.9 9.0 9.1 9.7   PHOSPHORUS 3.8  --   --   --   --   --   --    TOTAL PROTEIN  --   --   --   --   --   --  7.9   ALBUMIN  --   --   --   --   --   --  4.5   GLOBULIN  --   --   --   --   --   --  3.4     7/14 ABG 7.32/76.2/59.1/40.1 on 4 L NC    Procal 0.03, cultures negative to date    7/11 CXR severely hyperinflated, no acute infiltrate or effusions.    Assessment & Plan   Assessment / Plan     Active Hospital Problems:  Active Hospital Problems    Diagnosis     **COPD (chronic obstructive pulmonary disease)     Moderate malnutrition      Impression:  Acute hypoxic hypercapnic failure requiring NIPPV  Acute exacerbation of severe COPD: FEV1 25%  H. influenzae infection  Ongoing tobacco abuse  History R LL squamous cell lung cancer: S/p SBRT  History of prostate cancer: S/p prostatectomy    Plan:  -Weaned to 4 L nasal cannula. Continue to wean O2 to keep sats greater than 90%.  -Encourage NIPPV wear at nighttime and as needed with naps  -Repeat ABG today due to increased lethargy.  7/17 ABG 7.3 9/71/73<--7.4 0/69/58  -Appreciate RT  assistance in helping to arrange NIPPV for home.  -Renal culture positive for H. influenzae.  Agree with ceftriaxone for 5 days  -Decrease Solu-Medrol to 40 mg once a day.  Will begin tapering steroids which may also be contributed to some of his confusion  -Continue nebulizers and bronchopulmonary hygiene.  -Encourage I-S and flutter valve.  -Continue nicotine patch for smoking cessation.  -Would benefit from pulmonary rehab.  -Recommend avoid sedating medications  -Need to follow-up in our clinic 1 week after discharge in COPD clinic.  -Nicotine patch provided as needed    VTE Prophylaxis:  Pharmacologic VTE prophylaxis orders are  present.    CODE STATUS:   Code Status (Patient has no pulse and is not breathing): CPR (Attempt to Resuscitate)  Medical Interventions (Patient has pulse or is breathing): Full Support    Labs, microbiology, radiology, medications, and provider notes personally reviewed.  Discussed with primary services and bedside RN.    Electronically signed by CHEN Childs, 07/17/24, 3:00 PM EDT.  This patient was seen by both a physician and a NP. I, Lily Roland MD, spent >50% of time in accordance with split shared billing. This included personally reviewing all pertinent labs, imaging, microbiology and documentation. Also discussing the case with the patient and any available family, the admitting physician and any available ancillary staff.   Electronically signed by Lily Roland MD, 07/17/24, 5:57 PM EDT.

## 2024-07-17 NOTE — PROGRESS NOTES
[facility]   Hospitalist Progress Note  Date: 2024  Patient Name: Colin Nevarez  : 1950  MRN: [id number]  Date of admission: 2024  Room/Bed: [facility]/      Subjective   Subjective     Chief Complaint:   Shortness of breath    Summary:  Colin Nevarez is a 74 y.o. male with a past medical history of COPD, hypertension, hyperlipidemia, lung cancer, and prostate cancer     Patient presents the emergency department on 2024 for complaints of worsening shortness of breath.  In the room patient tripoding with accessory muscle use, having conversational dyspnea.  In the ED patient found to have acute hypercapnic hypoxic respiratory failure.  Patient initially saturating in the 70s on room air, started on 3 L nasal cannula with improvement in O2.  ABG shows patient hypercapnic with a pCO2 of 68.6, pH of 7.255.  Patient has a hemoglobin of 19.2.  Patient's chest x-ray shows evidence of emphysema with no acute infiltrate.  Initially on meeting patient, he is apprehensive to be admitted.  ED physician and internal medicine doctor discussed extensively with patient, neighbors who brought patient into the hospital, as well as son over the phone.  Patient eventually agreed to admission in the hospital.  Patient initiated on BiPAP.  Patient now agreeable to staying inpatient, pulmonologist consulted.  Working on securing home NIPPV and oxygen for patient. Has been refusing to wear Bipap.     Interval Followup:   Patient was pulling off nasal cannula causing him to be hypoxic for which safety sitter was placed last evening. Later at night, he became agitated and there was concern for self farm for which he was placed on 2 pint restraints. Also wore bipap whole night given he was on restraints to pull it off. Was calm but confused this AM. On Bipap. Also ordered PRN 0.5mg IV Ativan as needed for agitation.  Family at bedside, updated.      Objective   Objective     Vitals:   Temp:  [97.3 °F (36.3 °C)-98 °F  (36.7 °C)] 97.3 °F (36.3 °C)  Heart Rate:  [82-97] 97  Resp:  [18-22] 22  BP: (129-158)/(73-94) 129/89  Flow (L/min):  [4-6] 4    Physical Exam               Constitutional: Awake, alert, confused intermittently, on 2 point restraints              Respiratory: Improving aeration, prolonged expiratory phase with end expiratory wheezing, On NIPPV              Cardiovascular: RRR, no murmurs, rubs, or gallops, palpable pedal pulses bilaterally              Gastrointestinal: Positive bowel sounds, soft, nontender, nondistended              Musculoskeletal: No bilateral ankle edema, no clubbing or cyanosis to extremities              Neurologic: Oriented x 3, speech clear    Result Review    Result Review:  I have personally reviewed these results:  [x]  Laboratory      Lab 07/17/24  0509 07/16/24  0402 07/15/24  0504 07/13/24  0534 07/12/24  0432 07/11/24  1149   WBC 14.64* 12.43* 12.64*   < > 5.60 9.37   HEMOGLOBIN 19.7* 19.1* 18.5*   < > 17.8* 19.2*   HEMATOCRIT 61.5* 58.7* 58.2*   < > 54.8* 57.3*   PLATELETS 156 140 171   < > 162 176   NEUTROS ABS 12.73*  --   --   --  5.03 7.60*   IMMATURE GRANS (ABS) 0.06*  --   --   --  0.02 0.02   LYMPHS ABS 0.63*  --   --   --  0.48* 1.03   MONOS ABS 1.20*  --   --   --  0.07* 0.67   EOS ABS 0.00  --   --   --  0.00 0.01   MCV 93.5 92.6 94.2   < > 91.9 90.1   PROCALCITONIN  --   --   --   --  0.03  --    LACTATE  --   --   --   --   --  1.4    < > = values in this interval not displayed.         Lab 07/17/24  0509 07/16/24  0402 07/15/24  0504   SODIUM 144 141 141   POTASSIUM 4.5 4.2 4.3   CHLORIDE 97* 98 97*   CO2 37.6* 39.1* 36.4*   ANION GAP 9.4 3.9* 7.6   BUN 30* 25* 20   CREATININE 0.61* 0.47* 0.50*   EGFR 101.4 109.7 107.7   GLUCOSE 146* 141* 139*   CALCIUM 9.5 9.2 9.4   MAGNESIUM 2.6* 2.5* 2.5*   PHOSPHORUS 3.8  --   --          Lab 07/11/24  1149   TOTAL PROTEIN 7.9   ALBUMIN 4.5   GLOBULIN 3.4   ALT (SGPT) 15   AST (SGOT) 18   BILIRUBIN 0.3   ALK PHOS 112         Lab  07/11/24  1149   PROBNP 83.5   HSTROP T 12                 Lab 07/16/24  0653 07/14/24  0650 07/12/24  0616   PH, ARTERIAL 7.403 7.329* 7.299*   PCO2, ARTERIAL 67.8* 76.2* 64.4*   PO2 ART 58.2* 59.1* 86.4   O2 SATURATION ART 88.4* 86.5* 95.0   FIO2 40 32 35   HCO3 ART 42.3* 40.1* 31.6*   BASE EXCESS ART 12.4* 9.2* 2.2*     Brief Urine Lab Results  (Last result in the past 365 days)        Color   Clarity   Blood   Leuk Est   Nitrite   Protein   CREAT   Urine HCG        01/25/24 1123 Yellow   Clear   Large   Moderate (2+)   Negative   Trace                 [x]  Microbiology   Microbiology Results (last 10 days)       Procedure Component Value - Date/Time    Respiratory Culture - Sputum, Cough [[id number]]  (Abnormal) Collected: 07/14/24 1743    Lab Status: Preliminary result Specimen: Sputum from Cough Updated: 07/16/24 1511     Respiratory Culture Heavy growth (4+) Haemophilus influenzae     Comment: This organism is Beta-lactamase negative and is predictably susceptible to ampicillin or amoxicillin.           Light growth (2+) Normal Respiratory Karen     Gram Stain Moderate (3+) WBCs observed - predominantly neutrophils      Rare (1+) Gram positive cocci in clusters      Few (2+) Squamous epithelial cells    S. Pneumo Ag Urine or CSF - Urine, Urine, Clean Catch [[id number]]  (Normal) Collected: 07/12/24 1705    Lab Status: Final result Specimen: Urine, Clean Catch Updated: 07/12/24 1730     Strep Pneumo Ag Negative    Legionella Antigen, Urine - Urine, Urine, Clean Catch [[id number]]  (Normal) Collected: 07/12/24 1705    Lab Status: Final result Specimen: Urine, Clean Catch Updated: 07/12/24 1730     LEGIONELLA ANTIGEN, URINE Negative    Blood Culture - Blood, Arm, Left [[id number]]  (Normal) Collected: 07/11/24 1329    Lab Status: Final result Specimen: Blood from Arm, Left Updated: 07/16/24 1346     Blood Culture No growth at 5 days    COVID PRE-OP / PRE-PROCEDURE SCREENING ORDER (NO ISOLATION) - Swab, Nasopharynx  [[id number]]  (Normal) Collected: 07/11/24 1313    Lab Status: Final result Specimen: Swab from Nasopharynx Updated: 07/11/24 1400    Narrative:      The following orders were created for panel order COVID PRE-OP / PRE-PROCEDURE SCREENING ORDER (NO ISOLATION) - Swab, Nasopharynx.  Procedure                               Abnormality         Status                     ---------                               -----------         ------                     COVID-19, FLU A/B, RSV P...[[id number]]  Normal              Final result                 Please view results for these tests on the individual orders.    COVID-19, FLU A/B, RSV PCR 1 HR TAT - Swab, Nasopharynx [[id number]]  (Normal) Collected: 07/11/24 1313    Lab Status: Final result Specimen: Swab from Nasopharynx Updated: 07/11/24 1400     COVID19 Not Detected     Influenza A PCR Not Detected     Influenza B PCR Not Detected     RSV, PCR Not Detected    Narrative:      Fact sheet for providers: https://www.fda.gov/media/135702/download    Fact sheet for patients: https://www.fda.gov/media/258830/download    Test performed by PCR.    Blood Culture - Blood, Arm, Right [[id number]]  (Normal) Collected: 07/11/24 1149    Lab Status: Final result Specimen: Blood from Arm, Right Updated: 07/16/24 1201     Blood Culture No growth at 5 days          [x]  Radiology  XR Chest 1 View    Result Date: 7/11/2024  Impression: Evidence of emphysema. No acute infiltrate. Electronically Signed: Angeline Hyde MD  7/11/2024 12:00 PM EDT  Workstation ID: [id number]   []  EKG/Telemetry   []  Cardiology/Vascular   []  Pathology  []  Old records  []  Other:    Assessment & Plan   Assessment / Plan     Assessment:  Acute hypoxic hypercapnic respiratory failure  H influenzae infection.  COPD in acute exacerbation  History of squamous cell carcinoma of the right lung  Ongoing tobacco abuse, 1 pack/day  Hypertension  Hyperlipidemia  History of prostate cancer     Plan:  Patient remains admitted to  the hospital for further care and management  On 4 L of oxygen, continue to titrate as tolerated to maintain SpO2 88-92%.  Might require oxygen at the time of discharge. Currently on NIPPV since last night.  Also on 2 point safety restraints to prevent self harm.  Repeat ABG today showed pCO2 of 71, so on continuous Bipap today.  Obtaining UA to r/o UTI.  Patient to use NIPPV at night and with naps; ans also as needed.  On IV ceftriaxone for total of 5 days given respiratory culture positive for H influenzae.  Pulmonology service following the patient, appreciate further input.    Given hypercapnic respiratory acidosis, continue with BiPAP at night and with naps, settings adjusted by pulmonology  RT case management consulted for possible NIPPV at home.  Appreciate input.  Continue with IV Solu-Medrol 40 mg every 12 hours.  Continue.  Completed course of azithromycin.  Scheduled and as needed breathing treatments  Incentive spirometer/flutter valve  NRT ordered  Patient has a history of noncompliance with oxygen and BiPAP at home.   Continue nicotine patch for smoking cessation.  Patient would benefit from rehab but family wants patient to go home with home health given patient will refuse to go to rehab.  Stated he has good family support; lived alone prior to presentation but family stated they will take turns to take care of him now.  Clinical course to determine disposition.  Likely next 48-72 hours.  Will obtain 6-minute walk test 24 hours prior to discharge.       VTE Prophylaxis:  Pharmacologic VTE prophylaxis orders are present.        CODE STATUS:   Code Status (Patient has no pulse and is not breathing): CPR (Attempt to Resuscitate)  Medical Interventions (Patient has pulse or is breathing): Full Support      Electronically signed by Satish Champagne MD, 7/17/2024, 09:21 EDT.

## 2024-07-17 NOTE — NURSING NOTE
Pt. Became agitated and aggressively trying to remove medical equipment such as oxygen and leads. Attempts were made to reorient, de-escalate, and refrain the patient from disrupting his care to no avail, PA was contacted and placed the order for bilateral upper soft restraints. Will continue to monitor patient.

## 2024-07-17 NOTE — PLAN OF CARE
Goal Outcome Evaluation:      Pt. VS stable on Bipap throughout night. No complaints of pain. Restraints still in place still attempting to remove oxygen and leads when performing range of motion. Pt. Restless throughout night.

## 2024-07-17 NOTE — NURSING NOTE
Pt. Is attempting to use lower extremities to remove medical equipment.  Restraint orders were updated to include bilateral lower soft restraints.

## 2024-07-17 NOTE — PLAN OF CARE
Goal Outcome Evaluation:  Plan of Care Reviewed With: patient           Outcome Evaluation: pt in non violent 4 pt restraints w/3 side rails. Pt still pulling at lines even with tats. Security called to adjust tats. Pt destats to upper 60s and 70s if cannula dislodges. On bipap much of day to maintain O2 levels. Pt confused and uncooperative at times. continue with plan of care.

## 2024-07-17 NOTE — CASE MANAGEMENT/SOCIAL WORK
Mr. Nevarez was SOA with labored breathing when I entered the room. Patient was placed on bipap and work of breathing was much improved. Family at bedside reports hallucinations and confusion starting last night. Patient currently in wrist restraints. ABG drawn; pH remains compensated. I will continue to follow.

## 2024-07-18 LAB
ANION GAP SERPL CALCULATED.3IONS-SCNC: 5.9 MMOL/L (ref 5–15)
ARTERIAL PATENCY WRIST A: POSITIVE
ATMOSPHERIC PRESS: 744.5 MMHG
BASE EXCESS BLDA CALC-SCNC: 10.9 MMOL/L (ref -2–2)
BASOPHILS # BLD AUTO: 0.03 10*3/MM3 (ref 0–0.2)
BASOPHILS NFR BLD AUTO: 0.3 % (ref 0–1.5)
BDY SITE: ABNORMAL
BUN SERPL-MCNC: 27 MG/DL (ref 8–23)
BUN/CREAT SERPL: 58.7 (ref 7–25)
CALCIUM SPEC-SCNC: 9.1 MG/DL (ref 8.6–10.5)
CHLORIDE SERPL-SCNC: 96 MMOL/L (ref 98–107)
CO2 SERPL-SCNC: 40.1 MMOL/L (ref 22–29)
CREAT SERPL-MCNC: 0.46 MG/DL (ref 0.76–1.27)
DEPRECATED RDW RBC AUTO: 45.3 FL (ref 37–54)
EGFRCR SERPLBLD CKD-EPI 2021: 110.4 ML/MIN/1.73
EOSINOPHIL # BLD AUTO: 0 10*3/MM3 (ref 0–0.4)
EOSINOPHIL NFR BLD AUTO: 0 % (ref 0.3–6.2)
ERYTHROCYTE [DISTWIDTH] IN BLOOD BY AUTOMATED COUNT: 13.4 % (ref 12.3–15.4)
GAS FLOW AIRWAY: 4 LPM
GLUCOSE SERPL-MCNC: 101 MG/DL (ref 65–99)
HCO3 BLDA-SCNC: 40.6 MMOL/L (ref 22–26)
HCT VFR BLD AUTO: 56.5 % (ref 37.5–51)
HCT VFR BLD CALC: 59 % (ref 38–51)
HEMODILUTION: NO
HGB BLD-MCNC: 18.2 G/DL (ref 13–17.7)
HGB BLDA-MCNC: 20.1 G/DL (ref 12–18)
IMM GRANULOCYTES # BLD AUTO: 0.06 10*3/MM3 (ref 0–0.05)
IMM GRANULOCYTES NFR BLD AUTO: 0.5 % (ref 0–0.5)
INHALED O2 CONCENTRATION: 36 %
LYMPHOCYTES # BLD AUTO: 0.57 10*3/MM3 (ref 0.7–3.1)
LYMPHOCYTES NFR BLD AUTO: 4.8 % (ref 19.6–45.3)
Lab: ABNORMAL
MAGNESIUM SERPL-MCNC: 2.4 MG/DL (ref 1.6–2.4)
MCH RBC QN AUTO: 30 PG (ref 26.6–33)
MCHC RBC AUTO-ENTMCNC: 32.2 G/DL (ref 31.5–35.7)
MCV RBC AUTO: 93.2 FL (ref 79–97)
MODALITY: ABNORMAL
MONOCYTES # BLD AUTO: 1.01 10*3/MM3 (ref 0.1–0.9)
MONOCYTES NFR BLD AUTO: 8.4 % (ref 5–12)
NEUTROPHILS NFR BLD AUTO: 10.31 10*3/MM3 (ref 1.7–7)
NEUTROPHILS NFR BLD AUTO: 86 % (ref 42.7–76)
NOTIFIED WHO: ABNORMAL
NRBC BLD AUTO-RTO: 0 /100 WBC (ref 0–0.2)
PCO2 BLDA: 68.5 MM HG (ref 35–45)
PH BLDA: 7.38 PH UNITS (ref 7.35–7.45)
PHOSPHATE SERPL-MCNC: 2.2 MG/DL (ref 2.5–4.5)
PLATELET # BLD AUTO: 97 10*3/MM3 (ref 140–450)
PMV BLD AUTO: 11.9 FL (ref 6–12)
PO2 BLD: 192 MM[HG] (ref 0–500)
PO2 BLDA: 69.1 MM HG (ref 80–100)
POTASSIUM SERPL-SCNC: 4.2 MMOL/L (ref 3.5–5.2)
RBC # BLD AUTO: 6.06 10*6/MM3 (ref 4.14–5.8)
READ BACK: YES
SAO2 % BLDCOA: 92.3 % (ref 95–99)
SODIUM SERPL-SCNC: 142 MMOL/L (ref 136–145)
WBC NRBC COR # BLD AUTO: 11.98 10*3/MM3 (ref 3.4–10.8)

## 2024-07-18 PROCEDURE — 94799 UNLISTED PULMONARY SVC/PX: CPT

## 2024-07-18 PROCEDURE — 99233 SBSQ HOSP IP/OBS HIGH 50: CPT | Performed by: STUDENT IN AN ORGANIZED HEALTH CARE EDUCATION/TRAINING PROGRAM

## 2024-07-18 PROCEDURE — 25010000002 ENOXAPARIN PER 10 MG: Performed by: INTERNAL MEDICINE

## 2024-07-18 PROCEDURE — 25010000002 LORAZEPAM PER 2 MG: Performed by: STUDENT IN AN ORGANIZED HEALTH CARE EDUCATION/TRAINING PROGRAM

## 2024-07-18 PROCEDURE — 94664 DEMO&/EVAL PT USE INHALER: CPT

## 2024-07-18 PROCEDURE — 85025 COMPLETE CBC W/AUTO DIFF WBC: CPT | Performed by: STUDENT IN AN ORGANIZED HEALTH CARE EDUCATION/TRAINING PROGRAM

## 2024-07-18 PROCEDURE — 94761 N-INVAS EAR/PLS OXIMETRY MLT: CPT

## 2024-07-18 PROCEDURE — 94660 CPAP INITIATION&MGMT: CPT

## 2024-07-18 PROCEDURE — 94669 MECHANICAL CHEST WALL OSCILL: CPT

## 2024-07-18 PROCEDURE — 0 DEXTROSE 5 % SOLUTION: Performed by: STUDENT IN AN ORGANIZED HEALTH CARE EDUCATION/TRAINING PROGRAM

## 2024-07-18 PROCEDURE — 84100 ASSAY OF PHOSPHORUS: CPT | Performed by: STUDENT IN AN ORGANIZED HEALTH CARE EDUCATION/TRAINING PROGRAM

## 2024-07-18 PROCEDURE — 36600 WITHDRAWAL OF ARTERIAL BLOOD: CPT

## 2024-07-18 PROCEDURE — 82803 BLOOD GASES ANY COMBINATION: CPT

## 2024-07-18 PROCEDURE — 99232 SBSQ HOSP IP/OBS MODERATE 35: CPT | Performed by: STUDENT IN AN ORGANIZED HEALTH CARE EDUCATION/TRAINING PROGRAM

## 2024-07-18 PROCEDURE — 80048 BASIC METABOLIC PNL TOTAL CA: CPT | Performed by: STUDENT IN AN ORGANIZED HEALTH CARE EDUCATION/TRAINING PROGRAM

## 2024-07-18 PROCEDURE — 83735 ASSAY OF MAGNESIUM: CPT | Performed by: STUDENT IN AN ORGANIZED HEALTH CARE EDUCATION/TRAINING PROGRAM

## 2024-07-18 PROCEDURE — 25010000002 METHYLPREDNISOLONE PER 40 MG

## 2024-07-18 PROCEDURE — 25010000002 CEFTRIAXONE PER 250 MG: Performed by: STUDENT IN AN ORGANIZED HEALTH CARE EDUCATION/TRAINING PROGRAM

## 2024-07-18 RX ORDER — OLANZAPINE 10 MG/1
5 TABLET ORAL NIGHTLY
Status: DISCONTINUED | OUTPATIENT
Start: 2024-07-18 | End: 2024-07-21 | Stop reason: HOSPADM

## 2024-07-18 RX ADMIN — ENOXAPARIN SODIUM 40 MG: 100 INJECTION SUBCUTANEOUS at 08:57

## 2024-07-18 RX ADMIN — NICOTINE 1 PATCH: 21 PATCH, EXTENDED RELEASE TRANSDERMAL at 17:05

## 2024-07-18 RX ADMIN — ARFORMOTEROL TARTRATE 15 MCG: 15 SOLUTION RESPIRATORY (INHALATION) at 18:38

## 2024-07-18 RX ADMIN — CEFTRIAXONE SODIUM 1000 MG: 1 INJECTION, POWDER, FOR SOLUTION INTRAMUSCULAR; INTRAVENOUS at 10:42

## 2024-07-18 RX ADMIN — Medication 10 ML: at 09:06

## 2024-07-18 RX ADMIN — Medication 10 ML: at 23:05

## 2024-07-18 RX ADMIN — METHYLPREDNISOLONE SODIUM SUCCINATE 40 MG: 40 INJECTION INTRAMUSCULAR; INTRAVENOUS at 09:03

## 2024-07-18 RX ADMIN — AMLODIPINE BESYLATE 10 MG: 10 TABLET ORAL at 08:58

## 2024-07-18 RX ADMIN — ARFORMOTEROL TARTRATE 15 MCG: 15 SOLUTION RESPIRATORY (INHALATION) at 09:21

## 2024-07-18 RX ADMIN — BUDESONIDE 0.5 MG: 0.5 INHALANT ORAL at 09:21

## 2024-07-18 RX ADMIN — SODIUM PHOSPHATE, MONOBASIC, MONOHYDRATE AND SODIUM PHOSPHATE, DIBASIC, ANHYDROUS 15 MMOL: 142; 276 INJECTION, SOLUTION INTRAVENOUS at 17:05

## 2024-07-18 RX ADMIN — ASPIRIN 81 MG: 81 TABLET, CHEWABLE ORAL at 08:58

## 2024-07-18 RX ADMIN — BUDESONIDE 0.5 MG: 0.5 INHALANT ORAL at 18:38

## 2024-07-18 RX ADMIN — LORAZEPAM 0.5 MG: 2 INJECTION, SOLUTION INTRAMUSCULAR; INTRAVENOUS at 04:25

## 2024-07-18 RX ADMIN — IPRATROPIUM BROMIDE AND ALBUTEROL SULFATE 3 ML: .5; 3 SOLUTION RESPIRATORY (INHALATION) at 09:29

## 2024-07-18 RX ADMIN — SODIUM PHOSPHATE, MONOBASIC, MONOHYDRATE AND SODIUM PHOSPHATE, DIBASIC, ANHYDROUS 15 MMOL: 142; 276 INJECTION, SOLUTION INTRAVENOUS at 12:52

## 2024-07-18 RX ADMIN — OLANZAPINE 5 MG: 10 TABLET, FILM COATED ORAL at 23:04

## 2024-07-18 RX ADMIN — LORAZEPAM 0.5 MG: 2 INJECTION, SOLUTION INTRAMUSCULAR; INTRAVENOUS at 00:02

## 2024-07-18 RX ADMIN — IPRATROPIUM BROMIDE AND ALBUTEROL SULFATE 3 ML: .5; 3 SOLUTION RESPIRATORY (INHALATION) at 18:45

## 2024-07-18 NOTE — PLAN OF CARE
Goal Outcome Evaluation:  Plan of Care Reviewed With: patient        Progress: no change  Outcome Evaluation: Patient remains in non violent restraints. Continues to pull at 02 when released. oxygen desats quickly. Slept with bipap in place with sitter at bedside. No s/s of pain, vitals stable. Will contine with POC

## 2024-07-18 NOTE — PROGRESS NOTES
RT EQUIPMENT DEVICE RELATED - SKIN ASSESSMENT    RT Medical Equipment/Device:     NIV Mask:  Full-face    size: m    Skin Assessment:      Cheek:  Intact  Chin:  Intact  Forehead:  Intact  Neck:  Intact  Nose:  Intact  Mouth:  Intact    Device Skin Pressure Protection:  Positioning supports utilized    Nurse Notification:  Rocio Lopez, RRT

## 2024-07-18 NOTE — CONSULTS
07/18/24 1341   Coping/Psychosocial   Additional Documentation Spiritual Care (Group)   Spiritual Care   Spiritual Care Source  initiative   Response to Spiritual Care receptive of support   Spiritual Care Visit Type initial   Spiritual Care Request family support   Receptivity to Spiritual Care other (see comments)  (Mayco is asleep at time of visit. I am welcomed into the room by his sister, who is at the bedside.)

## 2024-07-18 NOTE — PROGRESS NOTES
Respiratory Therapist Broncho-Pulmonary Hygiene Progress Note      Patient Name:  Colin Nevarez  YOB: 1950    Colin Nevarez meets the qualification for Level 4 of the Bronco-Pulmonary Hygiene Protocol. This was based on my daily patient assessment and includes review of chest x-ray results, cough ability and quality, oxygenation, secretions or risk for secretion development and patient mobility.     Broncho-Pulmonary Hygiene Assessment:    Level of Movement: Low level of mobility  Lethargic uncoorperative    Breath Sounds: Very poor air exchange with or without rhonchi    Cough: Ineffective, weak, frequent    Chest X-Ray: Presence of atelectasis and/or consolidation    Sputum Productions: None or small amount of thin or watery secretions with effective cough    History and Physical: COPD with mucus plugging or retained secretions    SpO2 to Oxygen Need: greater than 92% on 4-6L nasal canula    Current SpO2 is: 92% on 2 L/M NC    Based on this information, I have completed the following interventions: CPT (vest)      Electronically signed by Inez Petty CRT, 07/18/24, 11:01 AM EDT.

## 2024-07-18 NOTE — PLAN OF CARE
Goal Outcome Evaluation:   Patient has reluctantly wore bipap all night with sitter @ bedside. Tries to take mask off every chance he gets.

## 2024-07-18 NOTE — PLAN OF CARE
Goal Outcome Evaluation:           Progress: improving  Outcome Evaluation: Patient removed from violent wrist restraints at 1000. Patient alert and disoriented to situation. Patient slept with Bipap. Patient sitting on the edge of bed feeding their self and interacting with family. 20g IV placed in L anterior forearm. Continuing with plan of care.

## 2024-07-18 NOTE — CONSULTS
Purpose of the visit was to evaluate for: goals of care/advanced care planning. Spoke with RN and family and discussed palliative care.      Assessment:Mr. Nevarez has a past medical history of COPD, hypertension, hyperlipidemia, lung cancer, and prostate cancer. During consult pt was with RT receiving treatment and unable to participate in discussion. Spoke with sister who was given business card and summary of palliative care. Said she would talk with nephew about next steps also mentioned she spoke with pt's PCP who said any major changes should occur in the next 48 hours which will play a part in decisions. Will follow up tomorrow.        Recommendations/Plan:  Recommendation pending further clinical course at this time.  .    Tasks Completed: Emotional Support.      Renay SAUCEDO RN  Palliative Care

## 2024-07-18 NOTE — PLAN OF CARE
Goal Outcome Evaluation:      Patient wore the Bipap some last night and was taken off during first rounds this morning. ABG results included a compensated pH with an elevated CO2. Patient has remained off the bipap this shift and has tolerated well. Weaned O2 to 3 l/m. Started Chest Vest therapy 3 times a day to aid in secretion clearance.

## 2024-07-18 NOTE — PROGRESS NOTES
RT EQUIPMENT DEVICE RELATED - SKIN ASSESSMENT    RT Medical Equipment/Device:     NIV Mask:  Full-face    size:      Skin Assessment:      Cheek:  Intact  Chin:  Intact  Ears:  Intact  Neck:  Intact  Nose:  Intact  Mouth:  Intact    Device Skin Pressure Protection:   Patient not currently wearing Bipap mask    Nurse Notification:  Rocio Petty, CRT

## 2024-07-18 NOTE — PROGRESS NOTES
[facility]   Hospitalist Progress Note  Date: 2024  Patient Name: Colin Nevarez  : 1950  MRN: [id number]  Date of admission: 2024  Room/Bed: [facility]/      Subjective   Subjective     Chief Complaint:   Shortness of breath    Summary:  Colin Nevarez is a 74 y.o. male with a past medical history of COPD, hypertension, hyperlipidemia, lung cancer, and prostate cancer     Patient presents the emergency department on 2024 for complaints of worsening shortness of breath.  In the room patient tripoding with accessory muscle use, having conversational dyspnea.  In the ED patient found to have acute hypercapnic hypoxic respiratory failure.  Patient initially saturating in the 70s on room air, started on 3 L nasal cannula with improvement in O2.  ABG shows patient hypercapnic with a pCO2 of 68.6, pH of 7.255.  Patient has a hemoglobin of 19.2.  Patient's chest x-ray shows evidence of emphysema with no acute infiltrate.  Initially on meeting patient, he is apprehensive to be admitted.  ED physician and internal medicine doctor discussed extensively with patient, neighbors who brought patient into the hospital, as well as son over the phone.  Patient eventually agreed to admission in the hospital.  Patient initiated on BiPAP.  Patient now agreeable to staying inpatient, pulmonologist consulted.  Working on securing home NIPPV and oxygen for patient. Has been refusing to wear Bipap.  Patient was agitated and removing oxygen along with BiPAP for which patient needed a sitter initially, later was placed on 2 point restraints.  Hypercapnia persistent for which she was placed on standing BiPAP on .  Pulmonology following the patient.    Interval Followup:   Patient was on 2 point soft restraints overnight, was on BiPAP.  Off restraints and on 4 L/min of oxygen via NC during my evaluation today.  He is on respiratory distress even at rest, but denied any chest pain shortness of air.  He looks clinically  worse to me today compared to yesterday and the day prior to that.  Patient's friend was at bedside.  Palliative consulted for goals of care discussion given his severe COPD and respiratory status not improving.      Objective   Objective     Vitals:   Temp:  [97.3 °F (36.3 °C)-98.2 °F (36.8 °C)] 97.6 °F (36.4 °C)  Heart Rate:  [72-99] 72  Resp:  [18-22] 18  BP: (139-151)/(79-96) 139/86  Flow (L/min):  [4] 4    Physical Exam               Constitutional: Awake, alert, confused intermittently, on 2 point restraints              Respiratory: Bilateral decreased air entry, prolonged expiratory phase with end expiratory wheezing, on mild respiratory distress even at rest, conversational dyspnea present.  On 2 L/min of oxygen via NC.              Cardiovascular: RRR, no murmurs, rubs, or gallops, palpable pedal pulses bilaterally              Gastrointestinal: Positive bowel sounds, soft, nontender, nondistended              Musculoskeletal: No bilateral ankle edema, no clubbing or cyanosis to extremities              Neurologic: Oriented x 3, speech clear    Result Review    Result Review:  I have personally reviewed these results:  [x]  Laboratory      Lab 07/18/24  0449 07/17/24  0509 07/16/24  0402 07/13/24  0534 07/12/24  0432 07/11/24  1149   WBC 11.98* 14.64* 12.43*   < > 5.60 9.37   HEMOGLOBIN 18.2* 19.7* 19.1*   < > 17.8* 19.2*   HEMATOCRIT 56.5* 61.5* 58.7*   < > 54.8* 57.3*   PLATELETS 97* 156 140   < > 162 176   NEUTROS ABS 10.31* 12.73*  --   --  5.03 7.60*   IMMATURE GRANS (ABS) 0.06* 0.06*  --   --  0.02 0.02   LYMPHS ABS 0.57* 0.63*  --   --  0.48* 1.03   MONOS ABS 1.01* 1.20*  --   --  0.07* 0.67   EOS ABS 0.00 0.00  --   --  0.00 0.01   MCV 93.2 93.5 92.6   < > 91.9 90.1   PROCALCITONIN  --   --   --   --  0.03  --    LACTATE  --   --   --   --   --  1.4    < > = values in this interval not displayed.         Lab 07/18/24  0449 07/17/24  0509 07/16/24  0402   SODIUM 142 144 141   POTASSIUM 4.2 4.5 4.2    CHLORIDE 96* 97* 98   CO2 40.1* 37.6* 39.1*   ANION GAP 5.9 9.4 3.9*   BUN 27* 30* 25*   CREATININE 0.46* 0.61* 0.47*   EGFR 110.4 101.4 109.7   GLUCOSE 101* 146* 141*   CALCIUM 9.1 9.5 9.2   MAGNESIUM 2.4 2.6* 2.5*   PHOSPHORUS 2.2* 3.8  --          Lab 07/11/24  1149   TOTAL PROTEIN 7.9   ALBUMIN 4.5   GLOBULIN 3.4   ALT (SGPT) 15   AST (SGOT) 18   BILIRUBIN 0.3   ALK PHOS 112         Lab 07/11/24  1149   PROBNP 83.5   HSTROP T 12                 Lab 07/17/24  1141 07/16/24  0653 07/14/24  0650   PH, ARTERIAL 7.392 7.403 7.329*   PCO2, ARTERIAL 71.0* 67.8* 76.2*   PO2 ART 73.1* 58.2* 59.1*   O2 SATURATION ART 93.4* 88.4* 86.5*   FIO2 36 40 32   HCO3 ART 43.1* 42.3* 40.1*   BASE EXCESS ART 12.5* 12.4* 9.2*     Brief Urine Lab Results  (Last result in the past 365 days)        Color   Clarity   Blood   Leuk Est   Nitrite   Protein   CREAT   Urine HCG        01/25/24 1123 Yellow   Clear   Large   Moderate (2+)   Negative   Trace                 [x]  Microbiology   Microbiology Results (last 10 days)       Procedure Component Value - Date/Time    Respiratory Culture - Sputum, Cough [[id number]]  (Abnormal) Collected: 07/14/24 1743    Lab Status: Final result Specimen: Sputum from Cough Updated: 07/17/24 0937     Respiratory Culture Heavy growth (4+) Haemophilus influenzae     Comment: This organism is Beta-lactamase negative and is predictably susceptible to ampicillin or amoxicillin.           Light growth (2+) Normal Respiratory Karen     Gram Stain Moderate (3+) WBCs observed - predominantly neutrophils      Rare (1+) Gram positive cocci in clusters      Few (2+) Squamous epithelial cells    S. Pneumo Ag Urine or CSF - Urine, Urine, Clean Catch [[id number]]  (Normal) Collected: 07/12/24 1705    Lab Status: Final result Specimen: Urine, Clean Catch Updated: 07/12/24 1730     Strep Pneumo Ag Negative    Legionella Antigen, Urine - Urine, Urine, Clean Catch [[id number]]  (Normal) Collected: 07/12/24 3589    Lab Status:  Final result Specimen: Urine, Clean Catch Updated: 07/12/24 1730     LEGIONELLA ANTIGEN, URINE Negative    Blood Culture - Blood, Arm, Left [[id number]]  (Normal) Collected: 07/11/24 1329    Lab Status: Final result Specimen: Blood from Arm, Left Updated: 07/16/24 1346     Blood Culture No growth at 5 days    COVID PRE-OP / PRE-PROCEDURE SCREENING ORDER (NO ISOLATION) - Swab, Nasopharynx [[id number]]  (Normal) Collected: 07/11/24 1313    Lab Status: Final result Specimen: Swab from Nasopharynx Updated: 07/11/24 1400    Narrative:      The following orders were created for panel order COVID PRE-OP / PRE-PROCEDURE SCREENING ORDER (NO ISOLATION) - Swab, Nasopharynx.  Procedure                               Abnormality         Status                     ---------                               -----------         ------                     COVID-19, FLU A/B, RSV P...[[id number]]  Normal              Final result                 Please view results for these tests on the individual orders.    COVID-19, FLU A/B, RSV PCR 1 HR TAT - Swab, Nasopharynx [[id number]]  (Normal) Collected: 07/11/24 1313    Lab Status: Final result Specimen: Swab from Nasopharynx Updated: 07/11/24 1400     COVID19 Not Detected     Influenza A PCR Not Detected     Influenza B PCR Not Detected     RSV, PCR Not Detected    Narrative:      Fact sheet for providers: https://www.fda.gov/media/706292/download    Fact sheet for patients: https://www.fda.gov/media/811357/download    Test performed by PCR.    Blood Culture - Blood, Arm, Right [[id number]]  (Normal) Collected: 07/11/24 1149    Lab Status: Final result Specimen: Blood from Arm, Right Updated: 07/16/24 1201     Blood Culture No growth at 5 days          [x]  Radiology  XR Chest 1 View    Result Date: 7/11/2024  Impression: Evidence of emphysema. No acute infiltrate. Electronically Signed: Angeline Hyde MD  7/11/2024 12:00 PM EDT  Workstation ID: [id number]   []  EKG/Telemetry   []   Cardiology/Vascular   []  Pathology  []  Old records  []  Other:    Assessment & Plan   Assessment / Plan     Assessment:  Acute on chronic hypoxic hypercapnic respiratory failure  H influenzae infection.  COPD in acute exacerbation  History of squamous cell carcinoma of the right lung  Ongoing tobacco abuse, 1 pack/day  Hypertension  Hyperlipidemia  History of prostate cancer     Plan:  Patient remains admitted to the hospital for further care and management  On 4 L of oxygen, continue to titrate as tolerated to maintain SpO2 88-92%.  Might require oxygen at the time of discharge. Currently on NIPPV and nighttime and as needed.    Off restraints.  Started on low-dose Zyprexa for agitation, hopefully patient will agree to place BiPAP at night.  Repeat ABG today showed pCO2 of 69.1, was on BiPAP yesterday on 7/17 throughout and overnight.  Currently on 4 L/min.  Patient to use NIPPV at night and with naps; and also as needed.  On IV ceftriaxone for total of 5 days given respiratory culture positive for H influenzae.  Pulmonology service following the patient, appreciate further input.    RT case management on board for possible NIPPV at home.  Appreciate input.  Continue with IV Solu-Medrol 40 mg daily.  Continue.  Completed course of azithromycin.  Scheduled and as needed breathing treatments.  Incentive spirometer/flutter valve  NRT ordered  Patient has a history of noncompliance with oxygen and BiPAP at home.   Continue nicotine patch for smoking cessation.  Patient would benefit from rehab but family wants patient to go home with home health.  Stated he has good family support; lived alone prior to presentation but family stated they will take turns to take care of him now.  Palliative care involved given patient's not improving respiratory status, noncompliance of BiPAP which could lead to immediate readmission; requested that consult for goals of care discussion.  Clinical course to determine disposition.     Will obtain 6-minute walk test 24 hours prior to discharge.       VTE Prophylaxis:  Pharmacologic VTE prophylaxis orders are present.        CODE STATUS:   Code Status (Patient has no pulse and is not breathing): CPR (Attempt to Resuscitate)  Medical Interventions (Patient has pulse or is breathing): Full Support      Electronically signed by Satish Champagne MD, 7/18/2024, 07:38 EDT.

## 2024-07-18 NOTE — PROGRESS NOTES
Pulmonary / Critical Care Progress Note      Patient Name: Colin Nevarez  : 1950  MRN: [id number]  Primary Care Physician:  Liz Roper DO  Date of admission: 2024    Subjective   Subjective   Follow-up for acute COPD exacerbation requiring NIPPV.    No acute events overnight.  Wore NIPPV    This morning,  On 3 L nasal cannula  Lying in bed  Patient more alert this morning  Remains weak and fatigued  Remains in restraints  Vocsyn machine at bedside  Diuresing well  Dyspnea continues to improve slowly  Patient continues to improve slowly    Objective   Objective     Vitals:   Temp:  [97.3 °F (36.3 °C)-98.1 °F (36.7 °C)] 98.1 °F (36.7 °C)  Heart Rate:  [72-99] 89  Resp:  [18-22] 20  BP: (139-157)/(81-96) 146/81  Flow (L/min):  [4] 4    Physical Exam   Vital Signs Reviewed   General: Thin male, Alert, sitting up on side of bed, NAD on 3L NC    HEENT:  PERRL, EOMI.  OP, nares clear, no sinus tenderness  Neck:  Supple, no JVD, no thyromegaly  Chest:  poor aeration, wheezing and rhonchi bilaterally, increased work of breathing noted on 3L NC  CV: RRR, no MGR, pulses 2+, equal  Abd:  Soft, NT, ND, + BS, no HSM  EXT:  no clubbing, no cyanosis, no edema  Neuro:  A&Ox3, CN grossly intact, no focal deficits  Skin: No rashes or lesions noted    Result Review    Result Review:  I have personally reviewed the results from the time of this admission to 2024 13:05 EDT and agree with these findings:  [x]  Laboratory  [x]  Microbiology  [x]  Radiology  []  EKG/Telemetry   []  Cardiology/Vascular   []  Pathology  []  Old records  []  Other:  Most notable findings include:         Lab 24  0449 24  0509 24  0402 07/15/24  0504 24  0508 24  0534 24  0432   WBC 11.98* 14.64* 12.43* 12.64* 15.84* 16.55* 5.60   HEMOGLOBIN 18.2* 19.7* 19.1* 18.5* 17.7 18.2* 17.8*   HEMATOCRIT 56.5* 61.5* 58.7* 58.2* 55.5* 56.2* 54.8*   PLATELETS 97* 156 140 171 165 173 162   SODIUM 142 144 141 141 139  140 134*   POTASSIUM 4.2 4.5 4.2 4.3 4.6 4.7 4.8   CHLORIDE 96* 97* 98 97* 98 100 98   CO2 40.1* 37.6* 39.1* 36.4* 36.7* 31.4* 27.2   BUN 27* 30* 25* 20 20 19 17   CREATININE 0.46* 0.61* 0.47* 0.50* 0.46* 0.56* 0.58*   GLUCOSE 101* 146* 141* 139* 143* 150* 167*   CALCIUM 9.1 9.5 9.2 9.4 8.9 9.0 9.1   PHOSPHORUS 2.2* 3.8  --   --   --   --   --      7/14 ABG 7.32/76.2/59.1/40.1 on 4 L NC    Procal 0.03, cultures negative to date    7/11 CXR severely hyperinflated, no acute infiltrate or effusions.    Assessment & Plan   Assessment / Plan     Active Hospital Problems:  Active Hospital Problems    Diagnosis     **COPD (chronic obstructive pulmonary disease)     Moderate malnutrition      Impression:  Acute hypoxic hypercapnic failure requiring NIPPV  Acute exacerbation of severe COPD: FEV1 25%  H. influenzae infection  Ongoing tobacco abuse  History R LL squamous cell lung cancer: S/p SBRT  History of prostate cancer: S/p prostatectomy    Plan:  -Weaned to 2 L nasal cannula. Continue to wean O2 to keep sats greater than 90%.  Patient does not wear any home O2 at baseline  -Encourage NIPPV wear at nighttime and as needed with naps  -Appreciate RT  assistance in helping to arrange NIPPV for home.  -Continue ceftriaxone for total of 5 days for H. influenzae  -Continue Solu-Medrol to 40 mg once a day.   -Continue nebulizers and bronchopulmonary hygiene.  -Encourage I-S and flutter valve.  -Would benefit from pulmonary rehab.  -Recommend avoid sedating medications  -Encouraged tobacco cessation. Nicotine patch provided as needed  -Patient would benefit from rehab  -6-minute walk test prior to discharge  -Need to follow-up in our clinic 1 week after discharge in COPD clinic.    VTE Prophylaxis:  Pharmacologic VTE prophylaxis orders are present.    CODE STATUS:   Code Status (Patient has no pulse and is not breathing): CPR (Attempt to Resuscitate)  Medical Interventions (Patient has pulse or is breathing): Full  Support    Labs, microbiology, radiology, medications, and provider notes personally reviewed.  Discussed with primary services and bedside RN.    Electronically signed by CHEN Childs, 07/18/24, 1:05 PM EDT.  This patient was seen by both a physician and a NP. I, Lily Roland MD, spent >50% of time in accordance with split shared billing. This included personally reviewing all pertinent labs, imaging, microbiology and documentation. Also discussing the case with the patient and any available family, the admitting physician and any available ancillary staff.   Electronically signed by Lily Roland MD, 07/18/24, 1:12 PM EDT.

## 2024-07-18 NOTE — CONSULTS
"Nutrition Services    Patient Name: Colin Nevarez  YOB: 1950  MRN: [id number]  Admission date: 7/11/2024      CLINICAL NUTRITION ASSESSMENT      Reason for Assessment  Chronic Poor Intake     H&P:  Past Medical History:   Diagnosis Date    Arthritis     COPD (chronic obstructive pulmonary disease)     Essential hypertension 07/12/2021    Forgetfulness     Heart flutter, ventricular     High cholesterol     Lung cancer     Prostate cancer     Ringing in ear     Shortness of Air     SOB (shortness of breath)         Current Problems:   Active Hospital Problems    Diagnosis     **COPD (chronic obstructive pulmonary disease)     Moderate malnutrition         Nutrition/Diet History         Narrative   Pt alert but SOB and remains in non violent restraints. Pt's baby sister at bedside. Per family, pt only eats one meal per day at home. Pt's appetite is improving a little bit this morning with consuming 25% of his breakfast and 50% of ONS. Pt consumed 0% x 3 meals on 07/17/2024. Pt dislike chocolate flavor and also would like to trial Ensure instead of Boost. RD continue to follow.      Anthropometrics        Current Height, Weight Height: 177.8 cm (70\")  Weight: 63.3 kg (139 lb 8.8 oz)   Current BMI Body mass index is 20.02 kg/m².   BMI Classification Normal range   % IBW 86%   Adjusted Body Weight (ABW)    Weight Hx  Wt Readings from Last 30 Encounters:   07/11/24 1136 63.3 kg (139 lb 8.8 oz)   06/20/24 1304 63.3 kg (139 lb 9.6 oz)   02/22/24 0945 64.2 kg (141 lb 8.6 oz)   02/15/24 1007 63.3 kg (139 lb 9.6 oz)   02/08/24 1031 64.8 kg (142 lb 13.7 oz)   02/08/24 0859 64.8 kg (142 lb 13.7 oz)   01/25/24 1004 67 kg (147 lb 9.6 oz)   12/07/23 1035 70.6 kg (155 lb 9.6 oz)   11/07/23 0935 69.2 kg (152 lb 9.6 oz)   08/15/23 0841 70.5 kg (155 lb 6.4 oz)   08/11/23 1109 68.9 kg (152 lb)   08/07/23 0836 68.9 kg (151 lb 14.4 oz)   08/03/23 0903 69.1 kg (152 lb 5.4 oz)   06/14/23 0754 67.6 kg (149 lb 0.5 oz) "   06/05/23 0938 67.4 kg (148 lb 9.4 oz)   06/02/23 0926 67.5 kg (148 lb 13 oz)   06/01/23 0905 67.1 kg (147 lb 14.9 oz)   05/31/23 0933 67 kg (147 lb 11.3 oz)   05/30/23 0939 67.3 kg (148 lb 5.9 oz)   05/19/23 0953 66 kg (145 lb 8.1 oz)   05/16/23 1008 65.8 kg (145 lb)   05/11/23 0811 65.3 kg (143 lb 15.4 oz)   05/03/23 0848 66.2 kg (146 lb)   04/04/23 1245 65.8 kg (145 lb 1.6 oz)   03/27/23 1248 66.5 kg (146 lb 11.2 oz)   02/24/23 1412 66.2 kg (146 lb)   07/14/22 0817 66.2 kg (146 lb)   04/26/22 1527 67.6 kg (149 lb)   03/29/22 1134 70.2 kg (154 lb 11.2 oz)   11/29/21 1351 70.8 kg (156 lb)          Wt Change Observation Wt loss of 5.5% x 6 months per EMR.      Estimated/Assessed Needs  Estimated Needs based on: Current Body Weight       Energy Requirements 30-35 kcal/kg    EST Needs (kcal/day) 4478-1562       Protein Requirements 1.2-1.3 g/kg   EST Daily Needs (g/day) 76-82       Fluid Requirements 1 ml/kcal    Estimated Needs (mL/day) 9951-5675     Labs/Medications         Pertinent Labs Reviewed.   Results from last 7 days   Lab Units 07/18/24  0449 07/17/24  0509 07/16/24  0402 07/12/24  0432 07/11/24  1149   SODIUM mmol/L 142 144 141   < > 137   POTASSIUM mmol/L 4.2 4.5 4.2   < > 4.9   CHLORIDE mmol/L 96* 97* 98   < > 97*   CO2 mmol/L 40.1* 37.6* 39.1*   < > 27.8   BUN mg/dL 27* 30* 25*   < > 11   CREATININE mg/dL 0.46* 0.61* 0.47*   < > 0.68*   CALCIUM mg/dL 9.1 9.5 9.2   < > 9.7   BILIRUBIN mg/dL  --   --   --   --  0.3   ALK PHOS U/L  --   --   --   --  112   ALT (SGPT) U/L  --   --   --   --  15   AST (SGOT) U/L  --   --   --   --  18   GLUCOSE mg/dL 101* 146* 141*   < > 125*    < > = values in this interval not displayed.     Results from last 7 days   Lab Units 07/18/24  0449 07/17/24  0509 07/16/24  0402 07/16/24  0402   MAGNESIUM mg/dL 2.4 2.6*  --  2.5*   PHOSPHORUS mg/dL 2.2* 3.8   < >  --    HEMOGLOBIN g/dL 18.2* 19.7*  --  19.1*   HEMATOCRIT % 56.5* 61.5*  --  58.7*    < > = values in this interval  "not displayed.     COVID19   Date Value Ref Range Status   07/11/2024 Not Detected Not Detected - Ref. Range Final     No results found for: \"HGBA1C\"      Pertinent Medications Reviewed.     Malnutrition Severity Assessment      Patient meets criteria for : Moderate (non-severe) Malnutrition         Nutrition Diagnosis         Nutrition Dx Problem 1 Moderate malnutrition related to inadequate energy Intake as evidenced by unintended weight change. and patient report.     Nutrition Intervention           Current Nutrition Orders & Evaluation of Intake\       Current PO Diet Diet: Regular/House; Fluid Consistency: Thin (IDDSI 0)   Supplement Orders Placed This Encounter      Dietary Nutrition Supplements Boost Plus (Ensure Plus)           Nutrition Intervention/Prescription        Recommend to switch to Ensure Enlive TID with meal per pt's request. (Provides 350kcal, 20g protein each)   Continue encourage po intake.         Medical Nutrition Therapy/Nutrition Education          Learner     Readiness Patient  Acceptance     Method     Response Explanation  Verbalizes understanding     Monitor/Evaluation        Monitor Per protocol, PO intake, Supplement intake, Weight, POC/GOC     Nutrition Discharge Plan         Recommend to continue oral nutrition supplements on discharge.      Electronically signed by:  Cathy Evangelista RD  07/18/24 11:03 EDT   "

## 2024-07-19 LAB
ANION GAP SERPL CALCULATED.3IONS-SCNC: 7.5 MMOL/L (ref 5–15)
ARTERIAL PATENCY WRIST A: POSITIVE
ATMOSPHERIC PRESS: 744.5 MMHG
BASE EXCESS BLDA CALC-SCNC: 12.4 MMOL/L (ref -2–2)
BASOPHILS # BLD AUTO: 0.04 10*3/MM3 (ref 0–0.2)
BASOPHILS NFR BLD AUTO: 0.4 % (ref 0–1.5)
BDY SITE: ABNORMAL
BUN SERPL-MCNC: 21 MG/DL (ref 8–23)
BUN/CREAT SERPL: 44.7 (ref 7–25)
CALCIUM SPEC-SCNC: 8.8 MG/DL (ref 8.6–10.5)
CHLORIDE SERPL-SCNC: 95 MMOL/L (ref 98–107)
CO2 SERPL-SCNC: 36.5 MMOL/L (ref 22–29)
CREAT SERPL-MCNC: 0.47 MG/DL (ref 0.76–1.27)
DEPRECATED RDW RBC AUTO: 44.4 FL (ref 37–54)
EGFRCR SERPLBLD CKD-EPI 2021: 109.7 ML/MIN/1.73
EOSINOPHIL # BLD AUTO: 0.05 10*3/MM3 (ref 0–0.4)
EOSINOPHIL NFR BLD AUTO: 0.5 % (ref 0.3–6.2)
ERYTHROCYTE [DISTWIDTH] IN BLOOD BY AUTOMATED COUNT: 13.1 % (ref 12.3–15.4)
GLUCOSE SERPL-MCNC: 84 MG/DL (ref 65–99)
HCO3 BLDA-SCNC: 40.5 MMOL/L (ref 22–26)
HCT VFR BLD AUTO: 55.4 % (ref 37.5–51)
HCT VFR BLD CALC: 57 % (ref 38–51)
HEMODILUTION: NO
HGB BLD-MCNC: 18 G/DL (ref 13–17.7)
HGB BLDA-MCNC: 19.4 G/DL (ref 12–18)
IMM GRANULOCYTES # BLD AUTO: 0.08 10*3/MM3 (ref 0–0.05)
IMM GRANULOCYTES NFR BLD AUTO: 0.7 % (ref 0–0.5)
INHALED O2 CONCENTRATION: 36 %
LYMPHOCYTES # BLD AUTO: 1.49 10*3/MM3 (ref 0.7–3.1)
LYMPHOCYTES NFR BLD AUTO: 13.8 % (ref 19.6–45.3)
MAGNESIUM SERPL-MCNC: 2 MG/DL (ref 1.6–2.4)
MCH RBC QN AUTO: 29.9 PG (ref 26.6–33)
MCHC RBC AUTO-ENTMCNC: 32.5 G/DL (ref 31.5–35.7)
MCV RBC AUTO: 91.9 FL (ref 79–97)
MODALITY: ABNORMAL
MONOCYTES # BLD AUTO: 1.17 10*3/MM3 (ref 0.1–0.9)
MONOCYTES NFR BLD AUTO: 10.9 % (ref 5–12)
NEUTROPHILS NFR BLD AUTO: 7.95 10*3/MM3 (ref 1.7–7)
NEUTROPHILS NFR BLD AUTO: 73.7 % (ref 42.7–76)
NRBC BLD AUTO-RTO: 0 /100 WBC (ref 0–0.2)
PAW @ PEAK INSP FLOW SETTING VENT: 14 CMH2O
PCO2 BLDA: 60.4 MM HG (ref 35–45)
PEEP RESPIRATORY: 6 CM[H2O]
PH BLDA: 7.43 PH UNITS (ref 7.35–7.45)
PHOSPHATE SERPL-MCNC: 2.1 MG/DL (ref 2.5–4.5)
PLATELET # BLD AUTO: 103 10*3/MM3 (ref 140–450)
PMV BLD AUTO: 12.5 FL (ref 6–12)
PO2 BLD: 236 MM[HG] (ref 0–500)
PO2 BLDA: 84.8 MM HG (ref 80–100)
POTASSIUM SERPL-SCNC: 3.9 MMOL/L (ref 3.5–5.2)
RBC # BLD AUTO: 6.03 10*6/MM3 (ref 4.14–5.8)
RESPIRATORY RATE: 14
SAO2 % BLDCOA: 96.3 % (ref 95–99)
SODIUM SERPL-SCNC: 139 MMOL/L (ref 136–145)
WBC NRBC COR # BLD AUTO: 10.78 10*3/MM3 (ref 3.4–10.8)

## 2024-07-19 PROCEDURE — 25010000002 ENOXAPARIN PER 10 MG: Performed by: INTERNAL MEDICINE

## 2024-07-19 PROCEDURE — 99233 SBSQ HOSP IP/OBS HIGH 50: CPT | Performed by: STUDENT IN AN ORGANIZED HEALTH CARE EDUCATION/TRAINING PROGRAM

## 2024-07-19 PROCEDURE — 94799 UNLISTED PULMONARY SVC/PX: CPT

## 2024-07-19 PROCEDURE — 99232 SBSQ HOSP IP/OBS MODERATE 35: CPT | Performed by: STUDENT IN AN ORGANIZED HEALTH CARE EDUCATION/TRAINING PROGRAM

## 2024-07-19 PROCEDURE — 80048 BASIC METABOLIC PNL TOTAL CA: CPT | Performed by: STUDENT IN AN ORGANIZED HEALTH CARE EDUCATION/TRAINING PROGRAM

## 2024-07-19 PROCEDURE — 36600 WITHDRAWAL OF ARTERIAL BLOOD: CPT

## 2024-07-19 PROCEDURE — 94660 CPAP INITIATION&MGMT: CPT

## 2024-07-19 PROCEDURE — 94669 MECHANICAL CHEST WALL OSCILL: CPT

## 2024-07-19 PROCEDURE — 94761 N-INVAS EAR/PLS OXIMETRY MLT: CPT

## 2024-07-19 PROCEDURE — 83735 ASSAY OF MAGNESIUM: CPT | Performed by: STUDENT IN AN ORGANIZED HEALTH CARE EDUCATION/TRAINING PROGRAM

## 2024-07-19 PROCEDURE — 82803 BLOOD GASES ANY COMBINATION: CPT

## 2024-07-19 PROCEDURE — 85025 COMPLETE CBC W/AUTO DIFF WBC: CPT | Performed by: STUDENT IN AN ORGANIZED HEALTH CARE EDUCATION/TRAINING PROGRAM

## 2024-07-19 PROCEDURE — 25010000002 CEFTRIAXONE PER 250 MG: Performed by: STUDENT IN AN ORGANIZED HEALTH CARE EDUCATION/TRAINING PROGRAM

## 2024-07-19 PROCEDURE — 84100 ASSAY OF PHOSPHORUS: CPT | Performed by: STUDENT IN AN ORGANIZED HEALTH CARE EDUCATION/TRAINING PROGRAM

## 2024-07-19 PROCEDURE — 63710000001 PREDNISONE PER 1 MG

## 2024-07-19 PROCEDURE — 94664 DEMO&/EVAL PT USE INHALER: CPT

## 2024-07-19 RX ORDER — PREDNISONE 20 MG/1
40 TABLET ORAL
Status: DISPENSED | OUTPATIENT
Start: 2024-07-19 | End: 2024-07-21

## 2024-07-19 RX ADMIN — BUDESONIDE 0.5 MG: 0.5 INHALANT ORAL at 18:33

## 2024-07-19 RX ADMIN — ARFORMOTEROL TARTRATE 15 MCG: 15 SOLUTION RESPIRATORY (INHALATION) at 18:33

## 2024-07-19 RX ADMIN — ASPIRIN 81 MG: 81 TABLET, CHEWABLE ORAL at 08:20

## 2024-07-19 RX ADMIN — ENOXAPARIN SODIUM 40 MG: 100 INJECTION SUBCUTANEOUS at 08:20

## 2024-07-19 RX ADMIN — ARFORMOTEROL TARTRATE 15 MCG: 15 SOLUTION RESPIRATORY (INHALATION) at 09:15

## 2024-07-19 RX ADMIN — OLANZAPINE 5 MG: 10 TABLET, FILM COATED ORAL at 21:54

## 2024-07-19 RX ADMIN — PREDNISONE 40 MG: 20 TABLET ORAL at 08:20

## 2024-07-19 RX ADMIN — CEFTRIAXONE SODIUM 1000 MG: 1 INJECTION, POWDER, FOR SOLUTION INTRAMUSCULAR; INTRAVENOUS at 10:45

## 2024-07-19 RX ADMIN — BUDESONIDE 0.5 MG: 0.5 INHALANT ORAL at 09:15

## 2024-07-19 RX ADMIN — AMLODIPINE BESYLATE 10 MG: 10 TABLET ORAL at 08:20

## 2024-07-19 RX ADMIN — Medication 10 ML: at 08:20

## 2024-07-19 RX ADMIN — NICOTINE 1 PATCH: 21 PATCH, EXTENDED RELEASE TRANSDERMAL at 15:00

## 2024-07-19 NOTE — PROGRESS NOTES
Pulmonary / Critical Care Progress Note      Patient Name: Colin Nevarez  : 1950  MRN: [id number]  Primary Care Physician:  Liz Roper DO  Date of admission: 2024    Subjective   Subjective   Follow-up for acute COPD exacerbation requiring NIPPV.    No acute events overnight.  Wore NIPPV    This morning,  On 3 L nasal cannula  Sitting on edge of bed  Patient mentation much improved  No longer in restraints  Reports feeling well today  Dyspnea continues to improve    Objective   Objective     Vitals:   Temp:  [97.3 °F (36.3 °C)-98.7 °F (37.1 °C)] 98.2 °F (36.8 °C)  Heart Rate:  [] 84  Resp:  [14-18] 18  BP: (108-133)/(61-80) 132/79  Flow (L/min):  [3-4] 4    Physical Exam   Vital Signs Reviewed   General: Thin male, Alert, sitting up on side of bed, NAD on 3L NC    HEENT:  PERRL, EOMI.  OP, nares clear, no sinus tenderness  Neck:  Supple, no JVD, no thyromegaly  Chest:  poor aeration, wheezing and rhonchi bilaterally, increased work of breathing noted on 3L NC  CV: RRR, no MGR, pulses 2+, equal  Abd:  Soft, NT, ND, + BS, no HSM  EXT:  no clubbing, no cyanosis, no edema  Neuro:  A&Ox3, CN grossly intact, no focal deficits  Skin: No rashes or lesions noted    Result Review    Result Review:  I have personally reviewed the results from the time of this admission to 2024 16:17 EDT and agree with these findings:  [x]  Laboratory  [x]  Microbiology  [x]  Radiology  []  EKG/Telemetry   []  Cardiology/Vascular   []  Pathology  []  Old records  []  Other:  Most notable findings include:         Lab 24  0522 24  0449 24  0509 24  0402 07/15/24  0504 24  0508 24  0534   WBC 10.78 11.98* 14.64* 12.43* 12.64* 15.84* 16.55*   HEMOGLOBIN 18.0* 18.2* 19.7* 19.1* 18.5* 17.7 18.2*   HEMATOCRIT 55.4* 56.5* 61.5* 58.7* 58.2* 55.5* 56.2*   PLATELETS 103* 97* 156 140 171 165 173   SODIUM 139 142 144 141 141 139 140   POTASSIUM 3.9 4.2 4.5 4.2 4.3 4.6 4.7   CHLORIDE 95* 96*  97* 98 97* 98 100   CO2 36.5* 40.1* 37.6* 39.1* 36.4* 36.7* 31.4*   BUN 21 27* 30* 25* 20 20 19   CREATININE 0.47* 0.46* 0.61* 0.47* 0.50* 0.46* 0.56*   GLUCOSE 84 101* 146* 141* 139* 143* 150*   CALCIUM 8.8 9.1 9.5 9.2 9.4 8.9 9.0   PHOSPHORUS 2.1* 2.2* 3.8  --   --   --   --      7/14 ABG 7.32/76.2/59.1/40.1 on 4 L NC    Procal 0.03, cultures negative to date    7/11 CXR severely hyperinflated, no acute infiltrate or effusions.    Assessment & Plan   Assessment / Plan     Active Hospital Problems:  Active Hospital Problems    Diagnosis     **COPD (chronic obstructive pulmonary disease)     Moderate malnutrition      Impression:  Acute hypoxic hypercapnic failure requiring NIPPV  Acute exacerbation of severe COPD: FEV1 25%  H. influenzae infection  Ongoing tobacco abuse  History R LL squamous cell lung cancer: S/p SBRT  History of prostate cancer: S/p prostatectomy    Plan:  -Weaned to 2 L nasal cannula. Continue to wean O2 to keep sats greater than 90%.  Patient does not wear any home O2 at baseline  -Encourage NIPPV wear at nighttime and as needed with naps  -Appreciate RT  assistance in helping to arrange NIPPV for home.  -Continue ceftriaxone for total of 5 days for H. influenzae  -Transitioned to prednisone 40 mg oral daily  -Continue nebulizers and bronchopulmonary hygiene.  -Encourage I-S and flutter valve.  -Would benefit from pulmonary rehab.  -Recommend avoid sedating medications  -Encouraged tobacco cessation. Nicotine patch provided as needed  -Patient would benefit from rehab  -6-minute walk test prior to discharge  -Home with [facility] when discharged  -Need to follow-up in our clinic 1 week after discharge in COPD clinic.    VTE Prophylaxis:  Pharmacologic VTE prophylaxis orders are present.    CODE STATUS:   Code Status (Patient has no pulse and is not breathing): CPR (Attempt to Resuscitate)  Medical Interventions (Patient has pulse or is breathing): Full Support    Labs, microbiology,  radiology, medications, and provider notes personally reviewed.  Discussed with primary services and bedside RN.    Electronically signed by CHEN Childs, 07/19/24, 4:17 PM EDT.  This patient was seen by both a physician and a NP. I, Lily Roland MD, spent >50% of time in accordance with split shared billing. This included personally reviewing all pertinent labs, imaging, microbiology and documentation. Also discussing the case with the patient and any available family, the admitting physician and any available ancillary staff.   Electronically signed by Lily Roland MD, 07/19/24, 4:38 PM EDT.

## 2024-07-19 NOTE — PLAN OF CARE
"Goal Outcome Evaluation:Spoke with pt earlier in shift about wearing home unit that was delivered. Pt understood and agreed. Upon placement unit kept alarming 'pt disconnect\". No disconnects found, O2 bleed in was moved from pt mask to filter and alarming stopped. RT observed and pt would pull some volumes in 500's but had shallow breathing and volumes as low as 18. Pt placed back on V60 and was observed having apneic episodes. Rate of 14 given and pt improved.  Pt still had high leak on V60's  but volumes and sat adequate.                                              "

## 2024-07-19 NOTE — PLAN OF CARE
Goal Outcome Evaluation:                 Problem: Adult Inpatient Plan of Care  Goal: Plan of Care Review  Outcome: Ongoing, Progressing            Pt doing better today; breathing is less labored and patient has been oriented x3-4 today; continue to monitor; will need pulmonary rehab at discharge; PO steroids

## 2024-07-19 NOTE — PROGRESS NOTES
[facility]   Hospitalist Progress Note  Date: 2024  Patient Name: Colin Nevarez  : 1950  MRN: [id number]  Date of admission: 2024  Room/Bed: [facility]/      Subjective   Subjective     Chief Complaint:   Shortness of breath    Summary:  Colin Nevarez is a 74 y.o. male with a past medical history of COPD, hypertension, hyperlipidemia, lung cancer, and prostate cancer     Patient presents the emergency department on 2024 for complaints of worsening shortness of breath.  In the room patient tripoding with accessory muscle use, having conversational dyspnea.  In the ED patient found to have acute hypercapnic hypoxic respiratory failure.  Patient initially saturating in the 70s on room air, started on 3 L nasal cannula with improvement in O2.  ABG shows patient hypercapnic with a pCO2 of 68.6, pH of 7.255.  Patient has a hemoglobin of 19.2.  Patient's chest x-ray shows evidence of emphysema with no acute infiltrate.  Initially on meeting patient, he is apprehensive to be admitted.  ED physician and internal medicine doctor discussed extensively with patient, neighbors who brought patient into the hospital, as well as son over the phone.  Patient eventually agreed to admission in the hospital.  Patient initiated on BiPAP.  Patient now agreeable to staying inpatient, pulmonologist consulted.  Working on securing home NIPPV and oxygen for patient. Has been refusing to wear Bipap.  Patient was agitated and removing oxygen along with BiPAP for which patient needed a sitter initially, later was placed on 2 point restraints.  Hypercapnia persistent for which she was placed on standing BiPAP on .  Patient was off restraints and on 4 L via nasal cannula on , on mild respiratory distress.  Palliative was consulted for goals of care discussion.  Pulmonology following the patient.    Interval Followup:   No acute events overnight.  Patient tolerated BiPAP well.  On 4 L/min of nasal cannula this  morning.  Only in mild respiratory distress while talking and minimal use of respiratory muscles.  Denies any fever, chills, rigors, chest pain or difficulty breathing.  Encouraged to use NIPPV at night and with naps.    Objective   Objective     Vitals:   Temp:  [97.3 °F (36.3 °C)-98.7 °F (37.1 °C)] 98.2 °F (36.8 °C)  Heart Rate:  [] 84  Resp:  [14-18] 18  BP: (108-133)/(61-80) 132/79  Flow (L/min):  [3-4] 4    Physical Exam               Constitutional: Awake, alert.              Respiratory: Bilateral decreased air entry, prolonged expiratory phase with end expiratory wheezing, on mild respiratory distress with conversation.  On 4 L/min of oxygen via NC.              Cardiovascular: RRR, no murmurs, rubs, or gallops, palpable pedal pulses bilaterally              Gastrointestinal: Positive bowel sounds, soft, nontender, nondistended              Musculoskeletal: No bilateral ankle edema, no clubbing or cyanosis to extremities              Neurologic: Oriented x 3, speech clear    Result Review    Result Review:  I have personally reviewed these results:  [x]  Laboratory      Lab 07/19/24 0522 07/18/24 0449 07/17/24  0509   WBC 10.78 11.98* 14.64*   HEMOGLOBIN 18.0* 18.2* 19.7*   HEMATOCRIT 55.4* 56.5* 61.5*   PLATELETS 103* 97* 156   NEUTROS ABS 7.95* 10.31* 12.73*   IMMATURE GRANS (ABS) 0.08* 0.06* 0.06*   LYMPHS ABS 1.49 0.57* 0.63*   MONOS ABS 1.17* 1.01* 1.20*   EOS ABS 0.05 0.00 0.00   MCV 91.9 93.2 93.5         Lab 07/19/24  0522 07/18/24 0449 07/17/24  0509   SODIUM 139 142 144   POTASSIUM 3.9 4.2 4.5   CHLORIDE 95* 96* 97*   CO2 36.5* 40.1* 37.6*   ANION GAP 7.5 5.9 9.4   BUN 21 27* 30*   CREATININE 0.47* 0.46* 0.61*   EGFR 109.7 110.4 101.4   GLUCOSE 84 101* 146*   CALCIUM 8.8 9.1 9.5   MAGNESIUM 2.0 2.4 2.6*   PHOSPHORUS 2.1* 2.2* 3.8                             Lab 07/19/24  0631 07/18/24  0823 07/17/24  1141   PH, ARTERIAL 7.435 7.381 7.392   PCO2, ARTERIAL 60.4* 68.5* 71.0*   PO2 ART 84.8  69.1* 73.1*   O2 SATURATION ART 96.3 92.3* 93.4*   FIO2 36 36 36   HCO3 ART 40.5* 40.6* 43.1*   BASE EXCESS ART 12.4* 10.9* 12.5*     Brief Urine Lab Results  (Last result in the past 365 days)        Color   Clarity   Blood   Leuk Est   Nitrite   Protein   CREAT   Urine HCG        01/25/24 1123 Yellow   Clear   Large   Moderate (2+)   Negative   Trace                 [x]  Microbiology   Microbiology Results (last 10 days)       Procedure Component Value - Date/Time    Respiratory Culture - Sputum, Cough [[id number]]  (Abnormal) Collected: 07/14/24 1743    Lab Status: Final result Specimen: Sputum from Cough Updated: 07/17/24 0937     Respiratory Culture Heavy growth (4+) Haemophilus influenzae     Comment: This organism is Beta-lactamase negative and is predictably susceptible to ampicillin or amoxicillin.           Light growth (2+) Normal Respiratory Karen     Gram Stain Moderate (3+) WBCs observed - predominantly neutrophils      Rare (1+) Gram positive cocci in clusters      Few (2+) Squamous epithelial cells    S. Pneumo Ag Urine or CSF - Urine, Urine, Clean Catch [[id number]]  (Normal) Collected: 07/12/24 1705    Lab Status: Final result Specimen: Urine, Clean Catch Updated: 07/12/24 1730     Strep Pneumo Ag Negative    Legionella Antigen, Urine - Urine, Urine, Clean Catch [[id number]]  (Normal) Collected: 07/12/24 1705    Lab Status: Final result Specimen: Urine, Clean Catch Updated: 07/12/24 1730     LEGIONELLA ANTIGEN, URINE Negative    Blood Culture - Blood, Arm, Left [[id number]]  (Normal) Collected: 07/11/24 1329    Lab Status: Final result Specimen: Blood from Arm, Left Updated: 07/16/24 1346     Blood Culture No growth at 5 days    COVID PRE-OP / PRE-PROCEDURE SCREENING ORDER (NO ISOLATION) - Swab, Nasopharynx [[id number]]  (Normal) Collected: 07/11/24 1313    Lab Status: Final result Specimen: Swab from Nasopharynx Updated: 07/11/24 1400    Narrative:      The following orders were created for panel order  COVID PRE-OP / PRE-PROCEDURE SCREENING ORDER (NO ISOLATION) - Swab, Nasopharynx.  Procedure                               Abnormality         Status                     ---------                               -----------         ------                     COVID-19, FLU A/B, RSV P...[[id number]]  Normal              Final result                 Please view results for these tests on the individual orders.    COVID-19, FLU A/B, RSV PCR 1 HR TAT - Swab, Nasopharynx [[id number]]  (Normal) Collected: 07/11/24 1313    Lab Status: Final result Specimen: Swab from Nasopharynx Updated: 07/11/24 1400     COVID19 Not Detected     Influenza A PCR Not Detected     Influenza B PCR Not Detected     RSV, PCR Not Detected    Narrative:      Fact sheet for providers: https://www.fda.gov/media/864049/download    Fact sheet for patients: https://www.fda.gov/media/799244/download    Test performed by PCR.    Blood Culture - Blood, Arm, Right [[id number]]  (Normal) Collected: 07/11/24 1149    Lab Status: Final result Specimen: Blood from Arm, Right Updated: 07/16/24 1201     Blood Culture No growth at 5 days          [x]  Radiology  No radiology results for the last 7 days  []  EKG/Telemetry   []  Cardiology/Vascular   []  Pathology  []  Old records  []  Other:    Assessment & Plan   Assessment / Plan     Assessment:  Acute on chronic hypoxic hypercapnic respiratory failure  H influenzae infection.  COPD in acute exacerbation  History of squamous cell carcinoma of the right lung  Ongoing tobacco abuse, 1 pack/day  Hypertension  Hyperlipidemia  History of prostate cancer     Plan:  -Patient remains admitted to the hospital for further care and management  -On 4 L of oxygen, continue to titrate as tolerated to maintain SpO2 88-92%.  Might require oxygen at the time of discharge. Currently on NIPPV and nighttime and as needed.    -Off restraints.  -Started on low-dose Zyprexa for agitation, patient was able to tolerate BiPAP last night without  being on restraints.  Continue.  -Repeat ABG today showed pCO2 of 60.4.    -On IV ceftriaxone for total of 5 days given respiratory culture positive for H influenzae.  Day 3 today.  -Pulmonology service following the patient, appreciate further input.    -RT case management on board for possible NIPPV at home.  Appreciate input.  -S/p IV Solu-Medrol.  Switch to p.o. prednisone 40 mg daily from today.  -Completed course of azithromycin.  -Scheduled and as needed breathing treatments.  -Incentive spirometer/flutter valve  -NRT ordered  -Patient has a history of noncompliance with oxygen and BiPAP at home.   -Continue nicotine patch for smoking cessation.  --Patient would benefit from rehab but family wants patient to go home with home health.  Stated he has good family support; lived alone prior to presentation but family stated they will take turns to take care of him now.  -Palliative care involved given patient's not improving respiratory status, noncompliance of BiPAP which could lead to immediate readmission; requested that consult for goals of care discussion.  -Clinical course to determine disposition.    -Will obtain 6-minute walk test 24 hours prior to discharge.       VTE Prophylaxis:  Pharmacologic VTE prophylaxis orders are present.        CODE STATUS:   Code Status (Patient has no pulse and is not breathing): CPR (Attempt to Resuscitate)  Medical Interventions (Patient has pulse or is breathing): Full Support      Electronically signed by Satish Champagne MD, 7/19/2024, 16:06 EDT.

## 2024-07-19 NOTE — PLAN OF CARE
Goal Outcome Evaluation:         Patient continues on bipap this morning with settings of 14/6 rate of 14 and 36%. Abg was compensated this morning with a co2 of 60.4 and ph of 7.43

## 2024-07-19 NOTE — PROGRESS NOTES
RT EQUIPMENT DEVICE RELATED - SKIN ASSESSMENT    RT Medical Equipment/Device:     NIV Mask:  Full-face    size:      Skin Assessment:      Head/neck/face:  Intact    Device Skin Pressure Protection:  Pressure points protected    Nurse Notification:  Rocio Jennings, RRT

## 2024-07-19 NOTE — PLAN OF CARE
Goal Outcome Evaluation:              Outcome Evaluation: Patient has rested quietly for most of shift tonight. Patient has been cooperative and attempted wearing home cpap but was switched back to facility bipap for rate control. Patient denies pain throughout shift. No acute changes throughout shift.

## 2024-07-20 LAB
ANION GAP SERPL CALCULATED.3IONS-SCNC: 2.7 MMOL/L (ref 5–15)
BASOPHILS # BLD AUTO: 0.02 10*3/MM3 (ref 0–0.2)
BASOPHILS NFR BLD AUTO: 0.2 % (ref 0–1.5)
BUN SERPL-MCNC: 19 MG/DL (ref 8–23)
BUN/CREAT SERPL: 42.2 (ref 7–25)
CALCIUM SPEC-SCNC: 8.6 MG/DL (ref 8.6–10.5)
CHLORIDE SERPL-SCNC: 98 MMOL/L (ref 98–107)
CO2 SERPL-SCNC: 40.3 MMOL/L (ref 22–29)
CREAT SERPL-MCNC: 0.45 MG/DL (ref 0.76–1.27)
DEPRECATED RDW RBC AUTO: 45.3 FL (ref 37–54)
EGFRCR SERPLBLD CKD-EPI 2021: 111.2 ML/MIN/1.73
EOSINOPHIL # BLD AUTO: 0.06 10*3/MM3 (ref 0–0.4)
EOSINOPHIL NFR BLD AUTO: 0.7 % (ref 0.3–6.2)
ERYTHROCYTE [DISTWIDTH] IN BLOOD BY AUTOMATED COUNT: 13.1 % (ref 12.3–15.4)
GLUCOSE SERPL-MCNC: 74 MG/DL (ref 65–99)
HCT VFR BLD AUTO: 55.3 % (ref 37.5–51)
HGB BLD-MCNC: 17.8 G/DL (ref 13–17.7)
IMM GRANULOCYTES # BLD AUTO: 0.1 10*3/MM3 (ref 0–0.05)
IMM GRANULOCYTES NFR BLD AUTO: 1.1 % (ref 0–0.5)
LYMPHOCYTES # BLD AUTO: 1.39 10*3/MM3 (ref 0.7–3.1)
LYMPHOCYTES NFR BLD AUTO: 15.3 % (ref 19.6–45.3)
MAGNESIUM SERPL-MCNC: 2.1 MG/DL (ref 1.6–2.4)
MCH RBC QN AUTO: 30.1 PG (ref 26.6–33)
MCHC RBC AUTO-ENTMCNC: 32.2 G/DL (ref 31.5–35.7)
MCV RBC AUTO: 93.6 FL (ref 79–97)
MONOCYTES # BLD AUTO: 1.05 10*3/MM3 (ref 0.1–0.9)
MONOCYTES NFR BLD AUTO: 11.5 % (ref 5–12)
NEUTROPHILS NFR BLD AUTO: 6.48 10*3/MM3 (ref 1.7–7)
NEUTROPHILS NFR BLD AUTO: 71.2 % (ref 42.7–76)
NRBC BLD AUTO-RTO: 0 /100 WBC (ref 0–0.2)
PHOSPHATE SERPL-MCNC: 2.4 MG/DL (ref 2.5–4.5)
PLATELET # BLD AUTO: 110 10*3/MM3 (ref 140–450)
PMV BLD AUTO: 11.6 FL (ref 6–12)
POTASSIUM SERPL-SCNC: 4 MMOL/L (ref 3.5–5.2)
RBC # BLD AUTO: 5.91 10*6/MM3 (ref 4.14–5.8)
SODIUM SERPL-SCNC: 141 MMOL/L (ref 136–145)
WBC NRBC COR # BLD AUTO: 9.1 10*3/MM3 (ref 3.4–10.8)

## 2024-07-20 PROCEDURE — 94669 MECHANICAL CHEST WALL OSCILL: CPT

## 2024-07-20 PROCEDURE — 84100 ASSAY OF PHOSPHORUS: CPT | Performed by: STUDENT IN AN ORGANIZED HEALTH CARE EDUCATION/TRAINING PROGRAM

## 2024-07-20 PROCEDURE — 0 DEXTROSE 5 % SOLUTION: Performed by: INTERNAL MEDICINE

## 2024-07-20 PROCEDURE — 99233 SBSQ HOSP IP/OBS HIGH 50: CPT | Performed by: INTERNAL MEDICINE

## 2024-07-20 PROCEDURE — 94618 PULMONARY STRESS TESTING: CPT

## 2024-07-20 PROCEDURE — 99232 SBSQ HOSP IP/OBS MODERATE 35: CPT | Performed by: STUDENT IN AN ORGANIZED HEALTH CARE EDUCATION/TRAINING PROGRAM

## 2024-07-20 PROCEDURE — 63710000001 PREDNISONE PER 1 MG

## 2024-07-20 PROCEDURE — 80048 BASIC METABOLIC PNL TOTAL CA: CPT | Performed by: STUDENT IN AN ORGANIZED HEALTH CARE EDUCATION/TRAINING PROGRAM

## 2024-07-20 PROCEDURE — 94799 UNLISTED PULMONARY SVC/PX: CPT

## 2024-07-20 PROCEDURE — 85025 COMPLETE CBC W/AUTO DIFF WBC: CPT | Performed by: STUDENT IN AN ORGANIZED HEALTH CARE EDUCATION/TRAINING PROGRAM

## 2024-07-20 PROCEDURE — 94761 N-INVAS EAR/PLS OXIMETRY MLT: CPT

## 2024-07-20 PROCEDURE — 94664 DEMO&/EVAL PT USE INHALER: CPT

## 2024-07-20 PROCEDURE — 83735 ASSAY OF MAGNESIUM: CPT | Performed by: STUDENT IN AN ORGANIZED HEALTH CARE EDUCATION/TRAINING PROGRAM

## 2024-07-20 PROCEDURE — 25010000002 ENOXAPARIN PER 10 MG: Performed by: INTERNAL MEDICINE

## 2024-07-20 PROCEDURE — 25010000002 CEFTRIAXONE PER 250 MG: Performed by: STUDENT IN AN ORGANIZED HEALTH CARE EDUCATION/TRAINING PROGRAM

## 2024-07-20 RX ADMIN — PREDNISONE 40 MG: 20 TABLET ORAL at 10:56

## 2024-07-20 RX ADMIN — BUDESONIDE 0.5 MG: 0.5 INHALANT ORAL at 07:18

## 2024-07-20 RX ADMIN — OLANZAPINE 5 MG: 10 TABLET, FILM COATED ORAL at 21:06

## 2024-07-20 RX ADMIN — Medication 10 ML: at 10:56

## 2024-07-20 RX ADMIN — AMLODIPINE BESYLATE 10 MG: 10 TABLET ORAL at 10:56

## 2024-07-20 RX ADMIN — IPRATROPIUM BROMIDE AND ALBUTEROL SULFATE 3 ML: .5; 3 SOLUTION RESPIRATORY (INHALATION) at 07:17

## 2024-07-20 RX ADMIN — SODIUM PHOSPHATE, MONOBASIC, MONOHYDRATE AND SODIUM PHOSPHATE, DIBASIC, ANHYDROUS 15 MMOL: 142; 276 INJECTION, SOLUTION INTRAVENOUS at 10:57

## 2024-07-20 RX ADMIN — ARFORMOTEROL TARTRATE 15 MCG: 15 SOLUTION RESPIRATORY (INHALATION) at 07:17

## 2024-07-20 RX ADMIN — IPRATROPIUM BROMIDE AND ALBUTEROL SULFATE 3 ML: .5; 3 SOLUTION RESPIRATORY (INHALATION) at 18:34

## 2024-07-20 RX ADMIN — ARFORMOTEROL TARTRATE 15 MCG: 15 SOLUTION RESPIRATORY (INHALATION) at 18:35

## 2024-07-20 RX ADMIN — Medication 10 ML: at 21:07

## 2024-07-20 RX ADMIN — CEFTRIAXONE SODIUM 1000 MG: 1 INJECTION, POWDER, FOR SOLUTION INTRAMUSCULAR; INTRAVENOUS at 14:23

## 2024-07-20 RX ADMIN — ASPIRIN 81 MG: 81 TABLET, CHEWABLE ORAL at 10:56

## 2024-07-20 RX ADMIN — NICOTINE 1 PATCH: 21 PATCH, EXTENDED RELEASE TRANSDERMAL at 16:37

## 2024-07-20 RX ADMIN — BUDESONIDE 0.5 MG: 0.5 INHALANT ORAL at 18:34

## 2024-07-20 RX ADMIN — ENOXAPARIN SODIUM 40 MG: 100 INJECTION SUBCUTANEOUS at 10:55

## 2024-07-20 NOTE — PROGRESS NOTES
[facility]   Hospitalist Progress Note  Date: 2024  Patient Name: Colin Nevarez  : 1950  MRN: [id number]  Date of admission: 2024  Room/Bed: [facility]/      Subjective   Subjective     Chief Complaint:   Shortness of breath    Summary:  Colin Nevarez is a 74 y.o. male with a past medical history of COPD, hypertension, hyperlipidemia, lung cancer, and prostate cancer     Patient presents the emergency department on 2024 for complaints of worsening shortness of breath.  In the room patient tripoding with accessory muscle use, having conversational dyspnea.  In the ED patient found to have acute hypercapnic hypoxic respiratory failure.  Patient initially saturating in the 70s on room air, started on 3 L nasal cannula with improvement in O2.  ABG shows patient hypercapnic with a pCO2 of 68.6, pH of 7.255.  Patient has a hemoglobin of 19.2.  Patient's chest x-ray shows evidence of emphysema with no acute infiltrate.  Initially on meeting patient, he is apprehensive to be admitted.  ED physician and internal medicine doctor discussed extensively with patient, neighbors who brought patient into the hospital, as well as son over the phone.  Patient eventually agreed to admission in the hospital.  Patient initiated on BiPAP.  Patient now agreeable to staying inpatient, pulmonologist consulted.  Working on securing home NIPPV and oxygen for patient. Has been refusing to wear Bipap.  Patient was agitated and removing oxygen along with BiPAP for which patient needed a sitter initially, later was placed on 2 point restraints.  Hypercapnia persistent for which she was placed on standing BiPAP on .  Patient was off restraints and on 4 L via nasal cannula on , on mild respiratory distress.  Palliative was consulted for goals of care discussion.  Pulmonology following the patient.  Started on Zyprexa, mood better and tolerating BiPAP at night as well.  Will discharge him on Zyprexa.    Interval  Followup:   No acute events overnight.   On 2 L/min of nasal cannula this morning. Denies any fever, chills, rigors, chest pain or difficulty breathing.     Objective   Objective     Vitals:   Temp:  [97.2 °F (36.2 °C)-98.2 °F (36.8 °C)] 97.7 °F (36.5 °C)  Heart Rate:  [66-95] 70  Resp:  [16-20] 18  BP: (115-132)/(59-79) 115/77  Flow (L/min):  [4] 4    Physical Exam               Constitutional: Awake, alert.              Respiratory: Bilateral decreased air entry, prolonged expiratory phase with end expiratory wheezing, no respiratory distress with conversation.  On 2 L/min of oxygen via NC.              Cardiovascular: RRR, no murmurs, rubs, or gallops, palpable pedal pulses bilaterally              Gastrointestinal: Positive bowel sounds, soft, nontender, nondistended              Musculoskeletal: No bilateral ankle edema, no clubbing or cyanosis to extremities              Neurologic: Oriented x 3, speech clear    Result Review    Result Review:  I have personally reviewed these results:  [x]  Laboratory      Lab 07/20/24  0507 07/19/24  0522 07/18/24  0449   WBC 9.10 10.78 11.98*   HEMOGLOBIN 17.8* 18.0* 18.2*   HEMATOCRIT 55.3* 55.4* 56.5*   PLATELETS 110* 103* 97*   NEUTROS ABS 6.48 7.95* 10.31*   IMMATURE GRANS (ABS) 0.10* 0.08* 0.06*   LYMPHS ABS 1.39 1.49 0.57*   MONOS ABS 1.05* 1.17* 1.01*   EOS ABS 0.06 0.05 0.00   MCV 93.6 91.9 93.2         Lab 07/20/24  0507 07/19/24  0522 07/18/24  0449   SODIUM 141 139 142   POTASSIUM 4.0 3.9 4.2   CHLORIDE 98 95* 96*   CO2 40.3* 36.5* 40.1*   ANION GAP 2.7* 7.5 5.9   BUN 19 21 27*   CREATININE 0.45* 0.47* 0.46*   EGFR 111.2 109.7 110.4   GLUCOSE 74 84 101*   CALCIUM 8.6 8.8 9.1   MAGNESIUM 2.1 2.0 2.4   PHOSPHORUS 2.4* 2.1* 2.2*                             Lab 07/19/24  0631 07/18/24  0823 07/17/24  1141   PH, ARTERIAL 7.435 7.381 7.392   PCO2, ARTERIAL 60.4* 68.5* 71.0*   PO2 ART 84.8 69.1* 73.1*   O2 SATURATION ART 96.3 92.3* 93.4*   FIO2 36 36 36   HCO3 ART 40.5*  40.6* 43.1*   BASE EXCESS ART 12.4* 10.9* 12.5*     Brief Urine Lab Results  (Last result in the past 365 days)        Color   Clarity   Blood   Leuk Est   Nitrite   Protein   CREAT   Urine HCG        01/25/24 1123 Yellow   Clear   Large   Moderate (2+)   Negative   Trace                 [x]  Microbiology   Microbiology Results (last 10 days)       Procedure Component Value - Date/Time    Respiratory Culture - Sputum, Cough [[id number]]  (Abnormal) Collected: 07/14/24 1743    Lab Status: Final result Specimen: Sputum from Cough Updated: 07/17/24 0937     Respiratory Culture Heavy growth (4+) Haemophilus influenzae     Comment: This organism is Beta-lactamase negative and is predictably susceptible to ampicillin or amoxicillin.           Light growth (2+) Normal Respiratory Karen     Gram Stain Moderate (3+) WBCs observed - predominantly neutrophils      Rare (1+) Gram positive cocci in clusters      Few (2+) Squamous epithelial cells    S. Pneumo Ag Urine or CSF - Urine, Urine, Clean Catch [[id number]]  (Normal) Collected: 07/12/24 1705    Lab Status: Final result Specimen: Urine, Clean Catch Updated: 07/12/24 1730     Strep Pneumo Ag Negative    Legionella Antigen, Urine - Urine, Urine, Clean Catch [[id number]]  (Normal) Collected: 07/12/24 1705    Lab Status: Final result Specimen: Urine, Clean Catch Updated: 07/12/24 1730     LEGIONELLA ANTIGEN, URINE Negative    Blood Culture - Blood, Arm, Left [[id number]]  (Normal) Collected: 07/11/24 1329    Lab Status: Final result Specimen: Blood from Arm, Left Updated: 07/16/24 1346     Blood Culture No growth at 5 days    COVID PRE-OP / PRE-PROCEDURE SCREENING ORDER (NO ISOLATION) - Swab, Nasopharynx [[id number]]  (Normal) Collected: 07/11/24 1313    Lab Status: Final result Specimen: Swab from Nasopharynx Updated: 07/11/24 1400    Narrative:      The following orders were created for panel order COVID PRE-OP / PRE-PROCEDURE SCREENING ORDER (NO ISOLATION) - Swab,  Nasopharynx.  Procedure                               Abnormality         Status                     ---------                               -----------         ------                     COVID-19, FLU A/B, RSV P...[[id number]]  Normal              Final result                 Please view results for these tests on the individual orders.    COVID-19, FLU A/B, RSV PCR 1 HR TAT - Swab, Nasopharynx [[id number]]  (Normal) Collected: 07/11/24 1313    Lab Status: Final result Specimen: Swab from Nasopharynx Updated: 07/11/24 1400     COVID19 Not Detected     Influenza A PCR Not Detected     Influenza B PCR Not Detected     RSV, PCR Not Detected    Narrative:      Fact sheet for providers: https://www.fda.gov/media/696472/download    Fact sheet for patients: https://www.fda.gov/media/004463/download    Test performed by PCR.    Blood Culture - Blood, Arm, Right [[id number]]  (Normal) Collected: 07/11/24 1149    Lab Status: Final result Specimen: Blood from Arm, Right Updated: 07/16/24 1201     Blood Culture No growth at 5 days          [x]  Radiology  No radiology results for the last 7 days  []  EKG/Telemetry   []  Cardiology/Vascular   []  Pathology  []  Old records  []  Other:    Assessment & Plan   Assessment / Plan     Assessment:  Acute on chronic hypoxic hypercapnic respiratory failure  H influenzae infection.  COPD in acute exacerbation  History of squamous cell carcinoma of the right lung  Ongoing tobacco abuse, 1 pack/day  Hypertension  Hyperlipidemia  History of prostate cancer     Plan:  -Patient remains admitted to the hospital for further care and management  -On 2 L of oxygen, continue to titrate as tolerated to maintain SpO2 88-92%.  Might require oxygen at the time of discharge. Currently on NIPPV and nighttime and as needed.    -Off restraints.  -Started on low-dose Zyprexa for agitation, working well.  Plan to discharge him on Zyprexa at home.  -Patient was able to tolerate BiPAP last night.   Continue.  -Repeat ABG on 7/19 showed pCO2 of 60.4, improving.  -On IV ceftriaxone for total of 5 days given respiratory culture positive for H influenzae.  Day 4 today.  -Pulmonology service following the patient, appreciate further input.    -RT case management on board for possible NIPPV at home.  Appreciate input.  -S/p IV Solu-Medrol.  Switch to p.o. prednisone 40 mg daily.  Plan to decrease to 10 mg every 3 days until discontinued.  -Completed course of azithromycin.  -Scheduled and as needed breathing treatments.  -Incentive spirometer/flutter valve  -NRT ordered  -Patient has a history of noncompliance with oxygen and BiPAP at home.   -Continue nicotine patch for smoking cessation.  -Patient would benefit from rehab but family wants patient to go home with home health.  Stated he has good family support; lived alone prior to presentation but family stated they will take turns to take care of him now.  -Palliative care involved given patient's not improving respiratory status, noncompliance of BiPAP which could lead to immediate readmission; requested that consult for goals of care discussion.  -Likely discharge in next 24-48 hours.  Family wants to take him home with home health.  -Will obtain 6-minute walk test 24 hours prior to discharge.       VTE Prophylaxis:  Pharmacologic VTE prophylaxis orders are present.        CODE STATUS:   Code Status (Patient has no pulse and is not breathing): CPR (Attempt to Resuscitate)  Medical Interventions (Patient has pulse or is breathing): Full Support      Electronically signed by Satish Champagne MD, 7/20/2024, 09:03 EDT.

## 2024-07-20 NOTE — PLAN OF CARE
Goal Outcome Evaluation:           Progress: no change  Outcome Evaluation: Patient wore the BIPAP 14/6 R14 36% from 2251 to 0100. Patient is on 4LNC when not on BIPAP. Patient has  at bedside. Patient tolerated chest vest well.

## 2024-07-20 NOTE — PLAN OF CARE
Goal Outcome Evaluation:         Patient wore bipap for a few hours last night - Patient on 4 lpm per nasal cannula this morning, will continue to try to wean oxygen

## 2024-07-20 NOTE — PROGRESS NOTES
RT EQUIPMENT DEVICE RELATED - SKIN ASSESSMENT    RT Medical Equipment/Device:     NIV Mask:  Full-face    size: .    Skin Assessment:      Cheek:  Intact  Chin:  Intact  Nose:  Intact    Device Skin Pressure Protection:  Pressure points protected    Nurse Notification:  Rocio Ramires, RRT

## 2024-07-20 NOTE — PROGRESS NOTES
Pulmonary / Critical Care Progress Note      Patient Name: Colin Nevarez  : 1950  MRN: [id number]  Primary Care Physician:  Liz Roper DO  Date of admission: 2024    Subjective   Subjective   Follow-up for acute COPD exacerbation requiring NIPPV.    On 3 L of on further Suboxone  Weak and fatigued  Making slow improvements   Mental status improved  Sitting on side of bed  Dyspnea improved  No wheezing  No fevers    Objective   Objective     Vitals:   Temp:  [97.2 °F (36.2 °C)-98.2 °F (36.8 °C)] 97.2 °F (36.2 °C)  Heart Rate:  [66-95] 66  Resp:  [16-20] 18  BP: (118-132)/(59-79) 118/71  Flow (L/min):  [4] 4    Physical Exam   Vital Signs Reviewed   General: Thin male, Alert, sitting up on side of bed, NAD  HEENT:  PERRL, EOMI.  OP, nares clear  Chest:  poor aeration, wheezing and rhonchi bilaterally, pursed lip breathing noted  CV: RRR, no MGR, pulses 2+, equal  Abd:  Soft, NT, ND, + BS, no HSM  EXT:  no clubbing, no cyanosis, no edema  Neuro:  A&Ox3, CN grossly intact, no focal deficits  Skin: No rashes or lesions noted    Result Review    Result Review:  I have personally reviewed the results from the time of this admission to 2024 07:40 EDT and agree with these findings:  [x]  Laboratory  [x]  Microbiology  [x]  Radiology  []  EKG/Telemetry   []  Cardiology/Vascular   []  Pathology  []  Old records  []  Other:  Most notable findings include:         Lab 24  0507 24  0522 24  0449 24  0509 24  0402 07/15/24  0504 24  0508   WBC 9.10 10.78 11.98* 14.64* 12.43* 12.64* 15.84*   HEMOGLOBIN 17.8* 18.0* 18.2* 19.7* 19.1* 18.5* 17.7   HEMATOCRIT 55.3* 55.4* 56.5* 61.5* 58.7* 58.2* 55.5*   PLATELETS 110* 103* 97* 156 140 171 165   SODIUM 141 139 142 144 141 141 139   POTASSIUM 4.0 3.9 4.2 4.5 4.2 4.3 4.6   CHLORIDE 98 95* 96* 97* 98 97* 98   CO2 40.3* 36.5* 40.1* 37.6* 39.1* 36.4* 36.7*   BUN 19 21 27* 30* 25* 20 20   CREATININE 0.45* 0.47* 0.46* 0.61* 0.47* 0.50*  0.46*   GLUCOSE 74 84 101* 146* 141* 139* 143*   CALCIUM 8.6 8.8 9.1 9.5 9.2 9.4 8.9   PHOSPHORUS 2.4* 2.1* 2.2* 3.8  --   --   --      7/14 ABG 7.32/76.2/59.1/40.1 on 4 L NC    Procal 0.03, cultures negative to date    7/11 CXR severely hyperinflated, no acute infiltrate or effusions.    Assessment & Plan   Assessment / Plan     Active Hospital Problems:  Active Hospital Problems    Diagnosis     **COPD (chronic obstructive pulmonary disease)     Moderate malnutrition      Impression:  Acute hypoxic hypercapnic failure requiring NIPPV  Acute exacerbation of severe COPD: FEV1 25%  H. influenzae infection  Ongoing tobacco abuse of cigarettes  Hypophosphatemia  History R LL squamous cell lung cancer: S/p SBRT  History of prostate cancer: S/p prostatectomy     Plan:  Wean O2 to keep SPO2 greater than 90%.  Does not wear oxygen at home.  Will need 6-minute walk test prior to discharge  Encourage NIPPV, wear at nighttime and as needed with naps.  Appreciate RT  arranging  Complete antibiotics  On prednisone 40 mg daily.  Decrease by 10 mg every 3 days until off steroids  Continue nebulizers and bronchopulmonary hygiene.  Encourage I-S and flutter valve.  Would benefit from pulmonary rehab.  Encouraged tobacco cessation. Nicotine patch provided as needed  Patient would benefit from rehab  Trend renal panel and electrolytes.  Replace phosphorus IV  Need to follow-up in our clinic 1 week after discharge in COPD clinic.     VTE Prophylaxis:  Pharmacologic VTE prophylaxis orders are present.    CODE STATUS:   Code Status (Patient has no pulse and is not breathing): CPR (Attempt to Resuscitate)  Medical Interventions (Patient has pulse or is breathing): Full Support      Labs, microbiology, radiology, medications, and provider notes personally reviewed.  Discussed with primary    Electronically signed by J Luis Owusu MD, 07/20/24, 2:27 PM EDT.

## 2024-07-21 ENCOUNTER — READMISSION MANAGEMENT (OUTPATIENT)
Dept: CALL CENTER | Facility: HOSPITAL | Age: 74
End: 2024-07-21
Payer: MEDICARE

## 2024-07-21 VITALS
HEART RATE: 80 BPM | DIASTOLIC BLOOD PRESSURE: 62 MMHG | TEMPERATURE: 97.9 F | BODY MASS INDEX: 19.98 KG/M2 | RESPIRATION RATE: 18 BRPM | OXYGEN SATURATION: 97 % | WEIGHT: 139.55 LBS | HEIGHT: 70 IN | SYSTOLIC BLOOD PRESSURE: 118 MMHG

## 2024-07-21 LAB
ANION GAP SERPL CALCULATED.3IONS-SCNC: 0.5 MMOL/L (ref 5–15)
BASOPHILS # BLD AUTO: 0.06 10*3/MM3 (ref 0–0.2)
BASOPHILS NFR BLD AUTO: 0.8 % (ref 0–1.5)
BUN SERPL-MCNC: 16 MG/DL (ref 8–23)
BUN/CREAT SERPL: 30.8 (ref 7–25)
CALCIUM SPEC-SCNC: 8.6 MG/DL (ref 8.6–10.5)
CHLORIDE SERPL-SCNC: 102 MMOL/L (ref 98–107)
CO2 SERPL-SCNC: 38.5 MMOL/L (ref 22–29)
CREAT SERPL-MCNC: 0.52 MG/DL (ref 0.76–1.27)
DEPRECATED RDW RBC AUTO: 43.7 FL (ref 37–54)
EGFRCR SERPLBLD CKD-EPI 2021: 106.4 ML/MIN/1.73
EOSINOPHIL # BLD AUTO: 0.08 10*3/MM3 (ref 0–0.4)
EOSINOPHIL NFR BLD AUTO: 1.1 % (ref 0.3–6.2)
ERYTHROCYTE [DISTWIDTH] IN BLOOD BY AUTOMATED COUNT: 13.1 % (ref 12.3–15.4)
GLUCOSE SERPL-MCNC: 70 MG/DL (ref 65–99)
HCT VFR BLD AUTO: 48.9 % (ref 37.5–51)
HGB BLD-MCNC: 16 G/DL (ref 13–17.7)
IMM GRANULOCYTES # BLD AUTO: 0.13 10*3/MM3 (ref 0–0.05)
IMM GRANULOCYTES NFR BLD AUTO: 1.8 % (ref 0–0.5)
LYMPHOCYTES # BLD AUTO: 1.54 10*3/MM3 (ref 0.7–3.1)
LYMPHOCYTES NFR BLD AUTO: 20.9 % (ref 19.6–45.3)
MAGNESIUM SERPL-MCNC: 1.9 MG/DL (ref 1.6–2.4)
MCH RBC QN AUTO: 30.1 PG (ref 26.6–33)
MCHC RBC AUTO-ENTMCNC: 32.7 G/DL (ref 31.5–35.7)
MCV RBC AUTO: 91.9 FL (ref 79–97)
MONOCYTES # BLD AUTO: 0.83 10*3/MM3 (ref 0.1–0.9)
MONOCYTES NFR BLD AUTO: 11.3 % (ref 5–12)
NEUTROPHILS NFR BLD AUTO: 4.72 10*3/MM3 (ref 1.7–7)
NEUTROPHILS NFR BLD AUTO: 64.1 % (ref 42.7–76)
NRBC BLD AUTO-RTO: 0 /100 WBC (ref 0–0.2)
PHOSPHATE SERPL-MCNC: 2.8 MG/DL (ref 2.5–4.5)
PLATELET # BLD AUTO: 108 10*3/MM3 (ref 140–450)
PMV BLD AUTO: 11.7 FL (ref 6–12)
POTASSIUM SERPL-SCNC: 3.8 MMOL/L (ref 3.5–5.2)
RBC # BLD AUTO: 5.32 10*6/MM3 (ref 4.14–5.8)
RBC MORPH BLD: NORMAL
SMALL PLATELETS BLD QL SMEAR: NORMAL
SODIUM SERPL-SCNC: 141 MMOL/L (ref 136–145)
WBC MORPH BLD: NORMAL
WBC NRBC COR # BLD AUTO: 7.36 10*3/MM3 (ref 3.4–10.8)

## 2024-07-21 PROCEDURE — 94660 CPAP INITIATION&MGMT: CPT

## 2024-07-21 PROCEDURE — 94799 UNLISTED PULMONARY SVC/PX: CPT

## 2024-07-21 PROCEDURE — 84100 ASSAY OF PHOSPHORUS: CPT | Performed by: STUDENT IN AN ORGANIZED HEALTH CARE EDUCATION/TRAINING PROGRAM

## 2024-07-21 PROCEDURE — 85025 COMPLETE CBC W/AUTO DIFF WBC: CPT | Performed by: STUDENT IN AN ORGANIZED HEALTH CARE EDUCATION/TRAINING PROGRAM

## 2024-07-21 PROCEDURE — 80048 BASIC METABOLIC PNL TOTAL CA: CPT | Performed by: STUDENT IN AN ORGANIZED HEALTH CARE EDUCATION/TRAINING PROGRAM

## 2024-07-21 PROCEDURE — 25010000002 MAGNESIUM SULFATE IN D5W 1G/100ML (PREMIX) 1-5 GM/100ML-% SOLUTION

## 2024-07-21 PROCEDURE — 83735 ASSAY OF MAGNESIUM: CPT | Performed by: STUDENT IN AN ORGANIZED HEALTH CARE EDUCATION/TRAINING PROGRAM

## 2024-07-21 PROCEDURE — 99233 SBSQ HOSP IP/OBS HIGH 50: CPT | Performed by: INTERNAL MEDICINE

## 2024-07-21 PROCEDURE — 94669 MECHANICAL CHEST WALL OSCILL: CPT

## 2024-07-21 PROCEDURE — 94761 N-INVAS EAR/PLS OXIMETRY MLT: CPT

## 2024-07-21 PROCEDURE — 25010000002 ENOXAPARIN PER 10 MG: Performed by: INTERNAL MEDICINE

## 2024-07-21 PROCEDURE — 85007 BL SMEAR W/DIFF WBC COUNT: CPT | Performed by: STUDENT IN AN ORGANIZED HEALTH CARE EDUCATION/TRAINING PROGRAM

## 2024-07-21 PROCEDURE — 25010000002 CEFTRIAXONE PER 250 MG: Performed by: STUDENT IN AN ORGANIZED HEALTH CARE EDUCATION/TRAINING PROGRAM

## 2024-07-21 PROCEDURE — 94664 DEMO&/EVAL PT USE INHALER: CPT

## 2024-07-21 PROCEDURE — 99239 HOSP IP/OBS DSCHRG MGMT >30: CPT | Performed by: STUDENT IN AN ORGANIZED HEALTH CARE EDUCATION/TRAINING PROGRAM

## 2024-07-21 RX ORDER — MAGNESIUM SULFATE 1 G/100ML
1 INJECTION INTRAVENOUS ONCE
Status: COMPLETED | OUTPATIENT
Start: 2024-07-21 | End: 2024-07-21

## 2024-07-21 RX ORDER — OLANZAPINE 5 MG/1
5 TABLET ORAL NIGHTLY
Qty: 30 TABLET | Refills: 0 | Status: SHIPPED | OUTPATIENT
Start: 2024-07-21 | End: 2024-08-20

## 2024-07-21 RX ORDER — PREDNISONE 10 MG/1
TABLET ORAL
Qty: 18 TABLET | Refills: 0 | Status: SHIPPED | OUTPATIENT
Start: 2024-07-22 | End: 2024-07-31

## 2024-07-21 RX ORDER — NICOTINE 21 MG/24HR
1 PATCH, TRANSDERMAL 24 HOURS TRANSDERMAL EVERY 24 HOURS
Qty: 14 PATCH | Refills: 0 | Status: SHIPPED | OUTPATIENT
Start: 2024-07-21 | End: 2024-08-04

## 2024-07-21 RX ORDER — POTASSIUM CHLORIDE 750 MG/1
40 CAPSULE, EXTENDED RELEASE ORAL ONCE
Status: COMPLETED | OUTPATIENT
Start: 2024-07-21 | End: 2024-07-21

## 2024-07-21 RX ADMIN — POTASSIUM CHLORIDE 40 MEQ: 750 CAPSULE, EXTENDED RELEASE ORAL at 11:48

## 2024-07-21 RX ADMIN — CEFTRIAXONE SODIUM 1000 MG: 1 INJECTION, POWDER, FOR SOLUTION INTRAMUSCULAR; INTRAVENOUS at 11:47

## 2024-07-21 RX ADMIN — MAGNESIUM SULFATE HEPTAHYDRATE 1 G: 1 INJECTION, SOLUTION INTRAVENOUS at 11:47

## 2024-07-21 RX ADMIN — BUDESONIDE 0.5 MG: 0.5 INHALANT ORAL at 09:58

## 2024-07-21 RX ADMIN — ASPIRIN 81 MG: 81 TABLET, CHEWABLE ORAL at 11:47

## 2024-07-21 RX ADMIN — Medication 10 ML: at 11:49

## 2024-07-21 RX ADMIN — AMLODIPINE BESYLATE 10 MG: 10 TABLET ORAL at 11:48

## 2024-07-21 RX ADMIN — ARFORMOTEROL TARTRATE 15 MCG: 15 SOLUTION RESPIRATORY (INHALATION) at 09:58

## 2024-07-21 RX ADMIN — ENOXAPARIN SODIUM 40 MG: 100 INJECTION SUBCUTANEOUS at 11:48

## 2024-07-21 NOTE — PROGRESS NOTES
Pulmonary / Critical Care Progress Note      Patient Name: Colin Nevarez  : 1950  MRN: [id number]  Primary Care Physician:  Liz Roper DO  Date of admission: 2024    Subjective   Subjective   Follow-up for acute COPD exacerbation requiring NIPPV.    No acute events overnight    This morning,  Remains on 2 L nasal cannula  Lying in bed  Reports feeling well today  Dyspnea improved  Exertional dyspnea remains  Remains weak and fatigued  No fever or chills  Diuresing well  -1.2 L urine output    Objective   Objective     Vitals:   Temp:  [97.2 °F (36.2 °C)-98.1 °F (36.7 °C)] 97.2 °F (36.2 °C)  Heart Rate:  [63-85] 64  Resp:  [18] 18  BP: (115-141)/(68-79) 137/71  Flow (L/min):  [2-4] 2    Physical Exam   Vital Signs Reviewed   General: Thin male, Alert, sitting up in bed, NAD  HEENT:  PERRL, EOMI.  OP, nares clear  Chest:  poor aeration, wheezing and rhonchi bilaterally, pursed lip breathing noted  CV: RRR, no MGR, pulses 2+, equal  Abd:  Soft, NT, ND, + BS, no HSM  EXT:  no clubbing, no cyanosis, no edema  Neuro:  A&Ox3, CN grossly intact, no focal deficits  Skin: No rashes or lesions noted    Result Review    Result Review:  I have personally reviewed the results from the time of this admission to 2024 08:01 EDT and agree with these findings:  [x]  Laboratory  [x]  Microbiology  [x]  Radiology  []  EKG/Telemetry   []  Cardiology/Vascular   []  Pathology  []  Old records  []  Other:  Most notable findings include:         Lab 24  0550 24  0507 24  0522 24  0449 24  0509 24  0402 07/15/24  0504   WBC 7.36 9.10 10.78 11.98* 14.64* 12.43* 12.64*   HEMOGLOBIN 16.0 17.8* 18.0* 18.2* 19.7* 19.1* 18.5*   HEMATOCRIT 48.9 55.3* 55.4* 56.5* 61.5* 58.7* 58.2*   PLATELETS 108* 110* 103* 97* 156 140 171   SODIUM 141 141 139 142 144 141 141   POTASSIUM 3.8 4.0 3.9 4.2 4.5 4.2 4.3   CHLORIDE 102 98 95* 96* 97* 98 97*   CO2 38.5* 40.3* 36.5* 40.1* 37.6* 39.1* 36.4*   BUN 16 19  21 27* 30* 25* 20   CREATININE 0.52* 0.45* 0.47* 0.46* 0.61* 0.47* 0.50*   GLUCOSE 70 74 84 101* 146* 141* 139*   CALCIUM 8.6 8.6 8.8 9.1 9.5 9.2 9.4   PHOSPHORUS 2.8 2.4* 2.1* 2.2* 3.8  --   --      7/14 ABG 7.32/76.2/59.1/40.1 on 4 L NC    Procal 0.03, cultures negative to date    7/11 CXR severely hyperinflated, no acute infiltrate or effusions.    Assessment & Plan   Assessment / Plan     Active Hospital Problems:  Active Hospital Problems    Diagnosis     **COPD (chronic obstructive pulmonary disease)     Moderate malnutrition      Impression:  Acute hypoxic hypercapnic failure requiring NIPPV  Acute exacerbation of severe COPD: FEV1 25%  H. influenzae infection  Hypokalemia  Hypophosphatemia  Ongoing tobacco abuse of cigarettes  Hypophosphatemia  History R LL squamous cell lung cancer: S/p SBRT  History of prostate cancer: S/p prostatectomy     Plan:  Wean O2 to keep SPO2 greater than 90%.  Does not wear oxygen at home.    S/p 6-minute walk test.  Patient will likely need oxygen at discharge  Encourage NIPPV, wear at nighttime and as needed with naps.  Appreciate RT  arranging  Complete antibiotics  On prednisone 40 mg daily.  Decrease by 10 mg every 3 days until off steroids  Continue nebulizers and bronchopulmonary hygiene.  Encourage I-S and flutter valve.  Would benefit from pulmonary rehab.  Encouraged tobacco cessation. Nicotine patch provided as needed  Patient would benefit from rehab  Trend renal panel and electrolytes.  Replaced potassium and phosphorus IV  Need to follow-up in our clinic 1 week after discharge in COPD clinic.     VTE Prophylaxis:  Pharmacologic VTE prophylaxis orders are present.    CODE STATUS:   Code Status (Patient has no pulse and is not breathing): CPR (Attempt to Resuscitate)  Medical Interventions (Patient has pulse or is breathing): Full Support      Labs, microbiology, radiology, medications, and provider notes personally reviewed.  Discussed with primary    I,  Dr. J Luis Owusu, have spent more than 50% of the total time managing the patient in this encounter today.  This included personally reviewing all pertinent labs, imaging, microbiology and documentation. Also discussing the case with the patient and any available family, the admitting physician and any available ancillary staff.    Electronically signed by CHEN Childs, 07/21/24, 2:51 PM EDT.  Electronically signed by J Luis Owusu MD, 07/21/24, 3:33 PM EDT.

## 2024-07-21 NOTE — OUTREACH NOTE
Prep Survey      Flowsheet Row Responses   [facility] patient discharged from? [facility]   Is LACE score < 7 ? No   Eligibility [facility]   Date of Admission 07/11/24   Date of Discharge 07/21/24   Discharge Disposition Home-Health Care [facility]   Discharge diagnosis COPD (chronic obstructive pulmonary disease)   Does the patient have one of the following disease processes/diagnoses(primary or secondary)? COPD   Does the patient have Home health ordered? Yes   What is the Home health agency?  [facility]   Is there a DME ordered? Yes   What DME was ordered? rollator & O2 ordered from [facility]   Prep survey completed? Yes            Stephania LAURENT - Registered Nurse

## 2024-07-21 NOTE — PROGRESS NOTES
RT EQUIPMENT DEVICE RELATED - SKIN ASSESSMENT    RT Medical Equipment/Device:     NIV Mask:  Full-face    size:      Skin Assessment:      Cheek:  Intact  Chin:  Intact  Nose:  Intact    Device Skin Pressure Protection:  Pressure points protected    Nurse Notification:  Rocio Bueno, RRT

## 2024-07-21 NOTE — PLAN OF CARE
Goal Outcome Evaluation:           Progress: no change  Outcome Evaluation: Patient wore BIPAP thorughout the night with no issues. BIPAP settings are 14/6 R14 36%. Patient is on 3LNC when not on BIPAP.

## 2024-07-21 NOTE — PROCEDURES
Walking Oximetry Progress Note      Patient Name:  Colin Nevarez  YOB: 1950  Date of Procedure: 07/20/24              ROOM AIR BASELINE   SpO2% 90   Heart Rate 82     EXERCISE ON ROOM AIR SpO2% EXERCISE ON O2 LPM SpO2%   1 MINUTE 87 1 MINUTE  2 85   2 MINUTES - 2 MINUTES  2 86   3 MINUTES - 3 MINUTES  3 87   4 MINUTES - 4 MINUTES  3 89   5 MINUTES - 5 MINUTES  3 90   6 MINUTES - 6 MINUTES  3 90              Time to Recovery  1 min   SpO2% Post Exercise  91 on 3.    HR Post Exercise  100     Comments: Started patient on room air. Once walk began pt immediately dropped to 87% and was placed on 2L. Patient never got above 88% on 2L while standing. While walking on 2L patient saturation dropped to 85%. Placed patient on 3L and waited to walk again patient was between 88-90% for remainder of walk.          Electronically signed by Natalia Ramires RRT, 07/20/24, 8:26 PM EDT.

## 2024-07-21 NOTE — PLAN OF CARE
Goal Outcome Evaluation:  Plan of Care Reviewed With: patient        Progress: improving  Outcome Evaluation: Patient able to ambulate with stand by assist with walker from bed around room to sit in recliner without becoming short of breath.  Patient was also able to maintain O2 saturation above 90% on O2 4L/NC.  Patient is free from falls/injury and is more alert and is able to be safe without bed alarm as her is able to call upon staff for assistance when needed. Progressing towards dc

## 2024-07-21 NOTE — PLAN OF CARE
Goal Outcome Evaluation:  Plan of Care Reviewed With: patient        Progress: improving  Outcome Evaluation: Patient being discharged home with son.  COPD and medication educatio provided to patient, son and patient's sister.  Patient is new on oxygen and education provided. Patient to go home on portable tank and equipment provided by [employer] [phone].  Oxygen will be available will patient arrives home.

## 2024-07-21 NOTE — DISCHARGE SUMMARY
[facility]         HOSPITALIST  DISCHARGE SUMMARY    Patient Name: Colin Nevarez  : 1950  MRN: [id number]    Date of Admission: 2024  Date of Discharge:  2024  Primary Care Physician: Liz Roper DO    Consults       Date and Time Order Name Status Description    2024  9:21 AM Inpatient Pulmonology Consult      2024  2:56 PM Inpatient Hospitalist Consult              Active and Resolved Hospital Problems:  Active Hospital Problems    Diagnosis POA    **COPD (chronic obstructive pulmonary disease) [J44.9] Yes    Moderate malnutrition [E44.0] Yes      Resolved Hospital Problems   No resolved problems to display.       Hospital Course     Hospital Course:  Colin Nevarez is a 74 y.o. male with a past medical history of COPD, hypertension, hyperlipidemia, lung cancer, and prostate cancer     Patient presents the emergency department on 2024 for complaints of worsening shortness of breath.  In the room patient tripoding with accessory muscle use, having conversational dyspnea.  In the ED patient found to have acute hypercapnic hypoxic respiratory failure.  Patient initially saturating in the 70s on room air, started on 3 L nasal cannula with improvement in O2.  ABG shows patient hypercapnic with a pCO2 of 68.6, pH of 7.255.  Patient has a hemoglobin of 19.2.  Patient's chest x-ray shows evidence of emphysema with no acute infiltrate.  Initially on meeting patient, he is apprehensive to be admitted.  ED physician and internal medicine doctor discussed extensively with patient, neighbors who brought patient into the hospital, as well as son over the phone.  Patient eventually agreed to admission in the hospital.  Patient initiated on BiPAP.  Patient now agreeable to staying inpatient, pulmonologist consulted.  Working on securing home NIPPV and oxygen for patient. Has been refusing to wear Bipap.  Patient was agitated and removing oxygen along with BiPAP for which  patient needed a sitter initially, later was placed on 2 point restraints.  Hypercapnia persistent for which she was placed on standing BiPAP on 7/17.  Patient was off restraints and on 4 L via nasal cannula on 7/18, on mild respiratory distress.  Palliative was consulted for goals of care discussion.  Pulmonology following the patient.  Started on Zyprexa, mood better and tolerating BiPAP at night as well.  Will discharge him on Zyprexa. Patient underwent 6MWT prior to discharge and qualified for home oxygen. Also being provided with Bipap to go home with. Patient is stable and deemed okay to be discharged from Pulmonology standpoint as well. Patient is being discharged on tapering dose of prednisone. Continue Zyprexa. Completed course of antibiotics.    Patient id being discharged [facility] with [facility]. Patient and his family declined rehab placement.  Patient is stable at the time of discharge. Patient will follow up with PCP in 3-7 days. Follow up with Pulmonology as recommended. Encouraged to used oxygen and Bipap at home. Advised to compliant with medications and diet. Fall precautions. High risk of readmission given his chromic illness, history of non compliance to medications and equipments use; along with refusal to go to rehab.        DISCHARGE Follow Up Recommendations for labs and diagnostics: as mentioned above.      Day of Discharge     Vital Signs:  Temp:  [97.2 °F (36.2 °C)-98.1 °F (36.7 °C)] 97.9 °F (36.6 °C)  Heart Rate:  [63-85] 80  Resp:  [18] 18  BP: (115-141)/(62-79) 118/62  Flow (L/min):  [2-4] 2  Physical Exam:   Constitutional: Awake, alert.              Respiratory: Bilateral decreased air entry, prolonged expiratory phase with end expiratory wheezing, no respiratory distress with conversation.  On 2 L/min of oxygen via NC.              Cardiovascular: RRR, no murmurs, rubs, or gallops, palpable pedal pulses bilaterally              Gastrointestinal: Positive bowel sounds, soft, nontender,  nondistended              Musculoskeletal: No bilateral ankle edema, no clubbing or cyanosis to extremities              Neurologic: Oriented x 3, speech clear    Discharge Details        Discharge Medications        New Medications        Instructions Start Date   nicotine 21 MG/24HR patch  Commonly known as: NICODERM CQ   1 patch, Transdermal, Every 24 Hours      OLANZapine 5 MG tablet  Commonly known as: zyPREXA   5 mg, Oral, Nightly      predniSONE 10 MG tablet  Commonly known as: DELTASONE   Take 3 tablets by mouth Daily With Breakfast for 3 days, THEN 2 tablets Daily With Breakfast for 3 days, THEN 1 tablet Daily With Breakfast for 3 days.   Start Date: July 22, 2024            Continue These Medications        Instructions Start Date   albuterol (2.5 MG/3ML) 0.083% nebulizer solution  Commonly known as: PROVENTIL   2.5 mg, Nebulization, Every 4 Hours PRN      albuterol sulfate  (90 Base) MCG/ACT inhaler  Commonly known as: PROVENTIL HFA;VENTOLIN HFA;PROAIR HFA   2 puffs, Inhalation, Every 4 Hours PRN      amLODIPine 10 MG tablet  Commonly known as: NORVASC   10 mg, Oral, Daily      aspirin 81 MG chewable tablet   81 mg, Oral, Daily      MULTIVITAMIN GUMMIES ADULTS PO   1 tablet, Oral, Daily, 1 gummie/day      Trelegy Ellipta 200-62.5-25 MCG/ACT inhaler  Generic drug: Fluticasone-Umeclidin-Vilant   200 mcg, Inhalation, Daily               No Known Allergies    Dis[place] [facility] [place]:  Hospital:  Diet Order   Procedures    Diet: Regular/House; Fluid Consistency: Thin (IDDSI 0)       Discharge Activity:       CODE STATUS:  Code Status and Medical Interventions:   Ordered at: 07/15/24 1714     Code Status (Patient has no pulse and is not breathing):    CPR (Attempt to Resuscitate)     Medical Interventions (Patient has pulse or is breathing):    Full Support       Future Appointments   Date Time Provider Department Center   8/8/2024  9:15 AM Nahed Samuels APRN [facility]  ETRING [facility]   8/8/2024 10:00 AM [facility] CT [facility]   8/13/2024 11:30 AM [place]isio Neville [place] [facility] [place]   8/22/2024 10:00 AM Renetta Garcia [place]N [facility] [place] [facility]   12/20/2024  9:15 AM Falguni Crawford APRN [facility] [place] [facility]       Additional Instructions for the Follow-ups that You Need to Schedule       Discharge Follow-up with PCP   As directed       Currently Documented PCP:    Liz Roper DO    PCP Phone Number:    [phone]     Follow Up Details: In 3-7 days; needs CBC and chemistries trended during follow up.                Pertinent  and/or Most Recent Results     PROCEDURES:       LAB RESULTS:      Lab 07/21/24  0550 07/20/24  0507 07/19/24  0522 07/18/24  0449 07/17/24  0509   WBC 7.36 9.10 10.78 11.98* 14.64*   HEMOGLOBIN 16.0 17.8* 18.0* 18.2* 19.7*   HEMATOCRIT 48.9 55.3* 55.4* 56.5* 61.5*   PLATELETS 108* 110* 103* 97* 156   NEUTROS ABS 4.72 6.48 7.95* 10.31* 12.73*   IMMATURE GRANS (ABS) 0.13* 0.10* 0.08* 0.06* 0.06*   LYMPHS ABS 1.54 1.39 1.49 0.57* 0.63*   MONOS ABS 0.83 1.05* 1.17* 1.01* 1.20*   EOS ABS 0.08 0.06 0.05 0.00 0.00   MCV 91.9 93.6 91.9 93.2 93.5         Lab 07/21/24  0550 07/20/24  0507 07/19/24  0522 07/18/24  0449 07/17/24  0509   SODIUM 141 141 139 142 144   POTASSIUM 3.8 4.0 3.9 4.2 4.5   CHLORIDE 102 98 95* 96* 97*   CO2 38.5* 40.3* 36.5* 40.1* 37.6*   ANION GAP 0.5* 2.7* 7.5 5.9 9.4   BUN 16 19 21 27* 30*   CREATININE 0.52* 0.45* 0.47* 0.46* 0.61*   EGFR 106.4 111.2 109.7 110.4 101.4   GLUCOSE 70 74 84 101* 146*   CALCIUM 8.6 8.6 8.8 9.1 9.5   MAGNESIUM 1.9 2.1 2.0 2.4 2.6*   PHOSPHORUS 2.8 2.4* 2.1* 2.2* 3.8                         Lab 07/19/24  0631 07/18/24  0823 07/17/24  1141   PH, ARTERIAL 7.435 7.381 7.392   PCO2, ARTERIAL 60.4* 68.5* 71.0*   PO2 ART 84.8 69.1* 73.1*   O2 SATURATION ART 96.3 92.3* 93.4*   FIO2 36 36 36   HCO3 ART 40.5* 40.6* 43.1*   BASE EXCESS ART 12.4* 10.9* 12.5*     Brief Urine Lab Results  (Last result in the past 365 days)         Color   Clarity   Blood   Leuk Est   Nitrite   Protein   CREAT   Urine HCG        01/25/24 1123 Yellow   Clear   Large   Moderate (2+)   Negative   Trace                 Microbiology Results (last 10 days)       Procedure Component Value - Date/Time    Respiratory Culture - Sputum, Cough [[id number]]  (Abnormal) Collected: 07/14/24 1743    Lab Status: Final result Specimen: Sputum from Cough Updated: 07/17/24 0937     Respiratory Culture Heavy growth (4+) Haemophilus influenzae     Comment: This organism is Beta-lactamase negative and is predictably susceptible to ampicillin or amoxicillin.           Light growth (2+) Normal Respiratory Karen     Gram Stain Moderate (3+) WBCs observed - predominantly neutrophils      Rare (1+) Gram positive cocci in clusters      Few (2+) Squamous epithelial cells    S. Pneumo Ag Urine or CSF - Urine, Urine, Clean Catch [[id number]]  (Normal) Collected: 07/12/24 1705    Lab Status: Final result Specimen: Urine, Clean Catch Updated: 07/12/24 1730     Strep Pneumo Ag Negative    Legionella Antigen, Urine - Urine, Urine, Clean Catch [[id number]]  (Normal) Collected: 07/12/24 1705    Lab Status: Final result Specimen: Urine, Clean Catch Updated: 07/12/24 1730     LEGIONELLA ANTIGEN, URINE Negative    Blood Culture - Blood, Arm, Left [[id number]]  (Normal) Collected: 07/11/24 1329    Lab Status: Final result Specimen: Blood from Arm, Left Updated: 07/16/24 1346     Blood Culture No growth at 5 days    COVID PRE-OP / PRE-PROCEDURE SCREENING ORDER (NO ISOLATION) - Swab, Nasopharynx [[id number]]  (Normal) Collected: 07/11/24 1313    Lab Status: Final result Specimen: Swab from Nasopharynx Updated: 07/11/24 1400    Narrative:      The following orders were created for panel order COVID PRE-OP / PRE-PROCEDURE SCREENING ORDER (NO ISOLATION) - Swab, Nasopharynx.  Procedure                               Abnormality         Status                     ---------                                -----------         ------                     COVID-19, FLU A/B, RSV P...[[id number]]  Normal              Final result                 Please view results for these tests on the individual orders.    COVID-19, FLU A/B, RSV PCR 1 HR TAT - Swab, Nasopharynx [[id number]]  (Normal) Collected: 07/11/24 1313    Lab Status: Final result Specimen: Swab from Nasopharynx Updated: 07/11/24 1400     COVID19 Not Detected     Influenza A PCR Not Detected     Influenza B PCR Not Detected     RSV, PCR Not Detected    Narrative:      Fact sheet for providers: https://www.fda.gov/media/576278/download    Fact sheet for patients: https://www.fda.gov/media/411886/download    Test performed by PCR.            No radiology results for the last 7 days                 Labs Pending at Discharge:        Time spent on Discharge including face to face service:  35 minutes    Electronically signed by Satish Champagne MD, 07/21/24, 12:28 PM EDT.

## 2024-07-21 NOTE — PLAN OF CARE
Goal Outcome Evaluation:  Plan of Care Reviewed With: patient        Progress: no change  Outcome Evaluation: Pt did wear bipap last night for a few hours. V60 now on standby. Pt currently on a 2L nasal cannula resting at this time.

## 2024-07-21 NOTE — PLAN OF CARE
Goal Outcome Evaluation:  Plan of Care Reviewed With: patient        Progress: improving  Outcome Evaluation: vss. no acute events overnight. bipap worn.

## 2024-07-22 ENCOUNTER — TRANSITIONAL CARE MANAGEMENT TELEPHONE ENCOUNTER (OUTPATIENT)
Dept: CALL CENTER | Facility: HOSPITAL | Age: 74
End: 2024-07-22
Payer: MEDICARE

## 2024-07-22 ENCOUNTER — TELEPHONE (OUTPATIENT)
Dept: FAMILY MEDICINE CLINIC | Facility: CLINIC | Age: 74
End: 2024-07-22
Payer: MEDICARE

## 2024-07-22 DIAGNOSIS — J44.1 CHRONIC OBSTRUCTIVE PULMONARY DISEASE WITH ACUTE EXACERBATION: Primary | Chronic | ICD-10-CM

## 2024-07-22 DIAGNOSIS — I10 ESSENTIAL HYPERTENSION: Chronic | ICD-10-CM

## 2024-07-22 DIAGNOSIS — J44.9 CHRONIC OBSTRUCTIVE PULMONARY DISEASE, UNSPECIFIED COPD TYPE: Primary | Chronic | ICD-10-CM

## 2024-07-22 RX ORDER — AMLODIPINE BESYLATE 10 MG/1
10 TABLET ORAL DAILY
Qty: 90 TABLET | Refills: 1 | Status: SHIPPED | OUTPATIENT
Start: 2024-07-22

## 2024-07-22 RX ORDER — ALBUTEROL SULFATE 2.5 MG/3ML
2.5 SOLUTION RESPIRATORY (INHALATION) EVERY 4 HOURS PRN
Qty: 100 EACH | Refills: 12 | Status: SHIPPED | OUTPATIENT
Start: 2024-07-22

## 2024-07-22 NOTE — OUTREACH NOTE
Call Center TCM Note      Flowsheet Row Responses   [facility] facility patient discharged from? [facility]   Does the patient have one of the following disease processes/diagnoses(primary or secondary)? COPD   TCM attempt successful? No  [No VR]   Unsuccessful attempts Attempt 1   Discharge diagnosis COPD (chronic obstructive pulmonary disease)            Betzaida Ortega RN    7/22/2024, 10:14 EDT

## 2024-07-22 NOTE — TELEPHONE ENCOUNTER
Tried calling patient's sister Lily per Dr Roper request to see what patient needed in regards to medication and oxygen    Left VM to call back

## 2024-07-22 NOTE — OUTREACH NOTE
Call Center TCM Note      Flowsheet Row Responses   [facility] facility patient discharged from? [facility]   Does the patient have one of the following disease processes/diagnoses(primary or secondary)? COPD   TCM attempt successful? No   Unsuccessful attempts Attempt 2   Discharge diagnosis COPD (chronic obstructive pulmonary disease)            Betzaida Ortega RN    7/22/2024, 16:24 EDT

## 2024-07-22 NOTE — TELEPHONE ENCOUNTER
Pharmacy sent requesting asking if patient needed nebulizer.  I didn't see where one has ever been ordered.  Pended for you to send to [facility].

## 2024-07-22 NOTE — CASE MANAGEMENT/SOCIAL WORK
RT CM received email from floor RT stating patient's home NIV was malfunctioning.  Mr. Nevarez was discharged 7/21.  [facility] was contacted to make emergency visit to patient's home.

## 2024-07-23 ENCOUNTER — TRANSITIONAL CARE MANAGEMENT TELEPHONE ENCOUNTER (OUTPATIENT)
Dept: CALL CENTER | Facility: HOSPITAL | Age: 74
End: 2024-07-23
Payer: MEDICARE

## 2024-07-23 NOTE — OUTREACH NOTE
Call Center TCM Note      Flowsheet Row Responses   [facility] patient discharged from? [facility]   Does the patient have one of the following disease processes/diagnoses(primary or secondary)? COPD   TCM attempt successful? No   Unsuccessful attempts Attempt 3            Darius Rebolledo RN    7/23/2024, 10:30 EDT

## 2024-07-25 ENCOUNTER — TELEPHONE (OUTPATIENT)
Dept: PULMONOLOGY | Facility: CLINIC | Age: 74
End: 2024-07-25
Payer: MEDICARE

## 2024-07-25 NOTE — TELEPHONE ENCOUNTER
Received call from FABRIZIO Reyna with [facility].  Patient is unclear about home medication regimen following hospital discharge.  Clarification with Dr. Hancock for patient to use Brovana BID via nebulizer, Pulmicort BID via nebulizer, and Yupelri daily via nebulizer.  Also complete tapering steroid dose as prescribed at discharge.  Maribell is agreeable to further educate the patient while she is in the home.

## 2024-07-26 ENCOUNTER — OFFICE VISIT (OUTPATIENT)
Dept: FAMILY MEDICINE CLINIC | Facility: CLINIC | Age: 74
End: 2024-07-26
Payer: MEDICARE

## 2024-07-26 ENCOUNTER — LAB (OUTPATIENT)
Dept: LAB | Facility: HOSPITAL | Age: 74
End: 2024-07-26
Payer: MEDICARE

## 2024-07-26 VITALS
SYSTOLIC BLOOD PRESSURE: 113 MMHG | TEMPERATURE: 97.7 F | DIASTOLIC BLOOD PRESSURE: 63 MMHG | HEART RATE: 71 BPM | RESPIRATION RATE: 18 BRPM | OXYGEN SATURATION: 97 % | WEIGHT: 136.6 LBS | BODY MASS INDEX: 19.56 KG/M2 | HEIGHT: 70 IN

## 2024-07-26 DIAGNOSIS — E83.42 HYPOMAGNESEMIA: ICD-10-CM

## 2024-07-26 DIAGNOSIS — Z09 HOSPITAL DISCHARGE FOLLOW-UP: Primary | ICD-10-CM

## 2024-07-26 DIAGNOSIS — E83.39 HYPOPHOSPHATEMIA: ICD-10-CM

## 2024-07-26 DIAGNOSIS — J44.9 CHRONIC OBSTRUCTIVE PULMONARY DISEASE, UNSPECIFIED COPD TYPE: Chronic | ICD-10-CM

## 2024-07-26 DIAGNOSIS — F17.219 CIGARETTE NICOTINE DEPENDENCE WITH NICOTINE-INDUCED DISORDER: Chronic | ICD-10-CM

## 2024-07-26 DIAGNOSIS — J44.9 CHRONIC OBSTRUCTIVE PULMONARY DISEASE, UNSPECIFIED COPD TYPE: ICD-10-CM

## 2024-07-26 DIAGNOSIS — I10 ESSENTIAL HYPERTENSION: Chronic | ICD-10-CM

## 2024-07-26 LAB
ALBUMIN SERPL-MCNC: 3.7 G/DL (ref 3.5–5.2)
ALBUMIN/GLOB SERPL: 1.3 G/DL
ALP SERPL-CCNC: 75 U/L (ref 39–117)
ALT SERPL W P-5'-P-CCNC: 93 U/L (ref 1–41)
ANION GAP SERPL CALCULATED.3IONS-SCNC: 11.3 MMOL/L (ref 5–15)
AST SERPL-CCNC: 37 U/L (ref 1–40)
BASOPHILS # BLD AUTO: 0.08 10*3/MM3 (ref 0–0.2)
BASOPHILS NFR BLD AUTO: 0.6 % (ref 0–1.5)
BILIRUB SERPL-MCNC: 0.2 MG/DL (ref 0–1.2)
BUN SERPL-MCNC: 13 MG/DL (ref 8–23)
BUN/CREAT SERPL: 18.8 (ref 7–25)
CALCIUM SPEC-SCNC: 9.5 MG/DL (ref 8.6–10.5)
CHLORIDE SERPL-SCNC: 98 MMOL/L (ref 98–107)
CO2 SERPL-SCNC: 28.7 MMOL/L (ref 22–29)
CREAT SERPL-MCNC: 0.69 MG/DL (ref 0.76–1.27)
DEPRECATED RDW RBC AUTO: 41.3 FL (ref 37–54)
EGFRCR SERPLBLD CKD-EPI 2021: 97.7 ML/MIN/1.73
EOSINOPHIL # BLD AUTO: 0.14 10*3/MM3 (ref 0–0.4)
EOSINOPHIL NFR BLD AUTO: 1 % (ref 0.3–6.2)
ERYTHROCYTE [DISTWIDTH] IN BLOOD BY AUTOMATED COUNT: 12.8 % (ref 12.3–15.4)
GLOBULIN UR ELPH-MCNC: 2.8 GM/DL
GLUCOSE SERPL-MCNC: 73 MG/DL (ref 65–99)
HCT VFR BLD AUTO: 51.6 % (ref 37.5–51)
HGB BLD-MCNC: 17.6 G/DL (ref 13–17.7)
IMM GRANULOCYTES # BLD AUTO: 0.5 10*3/MM3 (ref 0–0.05)
IMM GRANULOCYTES NFR BLD AUTO: 3.7 % (ref 0–0.5)
LYMPHOCYTES # BLD AUTO: 2.24 10*3/MM3 (ref 0.7–3.1)
LYMPHOCYTES NFR BLD AUTO: 16.7 % (ref 19.6–45.3)
MAGNESIUM SERPL-MCNC: 2.1 MG/DL (ref 1.6–2.4)
MCH RBC QN AUTO: 30.3 PG (ref 26.6–33)
MCHC RBC AUTO-ENTMCNC: 34.1 G/DL (ref 31.5–35.7)
MCV RBC AUTO: 89 FL (ref 79–97)
MONOCYTES # BLD AUTO: 0.89 10*3/MM3 (ref 0.1–0.9)
MONOCYTES NFR BLD AUTO: 6.6 % (ref 5–12)
NEUTROPHILS NFR BLD AUTO: 71.4 % (ref 42.7–76)
NEUTROPHILS NFR BLD AUTO: 9.57 10*3/MM3 (ref 1.7–7)
NRBC BLD AUTO-RTO: 0 /100 WBC (ref 0–0.2)
PHOSPHATE SERPL-MCNC: 2.9 MG/DL (ref 2.5–4.5)
PLATELET # BLD AUTO: 216 10*3/MM3 (ref 140–450)
PMV BLD AUTO: 10.5 FL (ref 6–12)
POTASSIUM SERPL-SCNC: 4.5 MMOL/L (ref 3.5–5.2)
PROT SERPL-MCNC: 6.5 G/DL (ref 6–8.5)
RBC # BLD AUTO: 5.8 10*6/MM3 (ref 4.14–5.8)
SODIUM SERPL-SCNC: 138 MMOL/L (ref 136–145)
WBC NRBC COR # BLD AUTO: 13.42 10*3/MM3 (ref 3.4–10.8)

## 2024-07-26 PROCEDURE — 36415 COLL VENOUS BLD VENIPUNCTURE: CPT

## 2024-07-26 PROCEDURE — 3078F DIAST BP <80 MM HG: CPT | Performed by: FAMILY MEDICINE

## 2024-07-26 PROCEDURE — 1159F MED LIST DOCD IN RCRD: CPT | Performed by: FAMILY MEDICINE

## 2024-07-26 PROCEDURE — 3074F SYST BP LT 130 MM HG: CPT | Performed by: FAMILY MEDICINE

## 2024-07-26 PROCEDURE — 85025 COMPLETE CBC W/AUTO DIFF WBC: CPT

## 2024-07-26 PROCEDURE — 1160F RVW MEDS BY RX/DR IN RCRD: CPT | Performed by: FAMILY MEDICINE

## 2024-07-26 PROCEDURE — 1126F AMNT PAIN NOTED NONE PRSNT: CPT | Performed by: FAMILY MEDICINE

## 2024-07-26 PROCEDURE — 83735 ASSAY OF MAGNESIUM: CPT

## 2024-07-26 PROCEDURE — 80053 COMPREHEN METABOLIC PANEL: CPT

## 2024-07-26 PROCEDURE — 1111F DSCHRG MED/CURRENT MED MERGE: CPT | Performed by: FAMILY MEDICINE

## 2024-07-26 PROCEDURE — 84100 ASSAY OF PHOSPHORUS: CPT

## 2024-07-26 PROCEDURE — 99495 TRANSJ CARE MGMT MOD F2F 14D: CPT | Performed by: FAMILY MEDICINE

## 2024-07-26 NOTE — ASSESSMENT & PLAN NOTE
Tobacco use is improving with lifestyle modifications.  Smoking cessation counseling was provided.  Tobacco use will be reassessed in 6 months.    He is down to smoking 3 cigarettes per day.

## 2024-07-26 NOTE — PROGRESS NOTES
Transitional Care Follow Up Visit  Subjective     Colin Nevarez is a 74 y.o. male who presents for a transitional care management visit.    Within 48 business hours after discharge our office contacted him via telephone to coordinate his care and needs.      I reviewed and discussed the details of that call along with the discharge summary, hospital problems, inpatient lab results, inpatient diagnostic studies, and consultation reports with Colin.     Current outpatient and discharge medications have been reconciled for the patient.  Reviewed by: Liz Roper DO          7/21/2024     5:25 PM   Date of TCM Phone Call   [facility]   Date of Admission 7/11/2024   Date of Discharge 7/21/2024   [place] [facility] [place]     Risk for Readmission (LACE) Score: 13 (7/21/2024  6:00 AM)      History of Present Illness   Course During Hospital Stay:  History of Present Illness  He is here today for management of his chronic medical conditions and for a hospital follow-up. He has right middle lung cancer, tobacco abuse, COPD, prostate cancer and chronic back pain. He is a smoker. He has smoked since he was 7 years old. He smokes a little less than one pack per day.   Patient presented to the emergency department on 7/11/2024 for complaints of worsening shortness of breath.  In the room patient tripoding with accessory muscle use, having conversational dyspnea.  In the ED patient found to have acute hypercapnic hypoxic respiratory failure.  Patient initially saturating in the 70s on room air, started on 3 L nasal cannula with improvement in O2.  ABG shows patient hypercapnic with a pCO2 of 68.6, pH of 7.255.  Patient has a hemoglobin of 19.2.  Patient's chest x-ray shows evidence of emphysema with no acute infiltrate.  Initially on meeting patient, he is apprehensive to be admitted.  ED physician and internal medicine doctor discussed extensively with patient, neighbors who brought  patient into the hospital, as well as son over the phone.  Patient eventually agreed to admission in the hospital.  Patient initiated on BiPAP.  Patient now agreeable to staying inpatient, pulmonologist consulted.  Working on securing home NIPPV and oxygen for patient. Has been refusing to wear Bipap.  Patient was agitated and removing oxygen along with BiPAP for which patient needed a sitter initially, later was placed on 2 point restraints.  Hypercapnia persistent for which she was placed on standing BiPAP on 7/17.  Patient was off restraints and on 4 L via nasal cannula on 7/18, on mild respiratory distress.  Palliative was consulted for goals of care discussion.  Pulmonology following the patient.  Started on Zyprexa, mood better and tolerating BiPAP at night as well.  Will discharge him on Zyprexa. Patient underwent 6MWT prior to discharge and qualified for home oxygen. Also being provided with Bipap to go home with. Patient is stable and deemed okay to be discharged from Pulmonology standpoint as well. Patient is being discharged on tapering dose of prednisone. Continue Zyprexa. Completed course of antibiotics.     He was discharged home with [facility] on 7/21/2024. The patient and his family declined rehab placement.  The patient is stable at the time of discharge. Patient will follow up with PCP in 3-7 days. Follow up with Pulmonology as recommended. Encouraged to used oxygen and Bipap at home. Advised to compliant with medications and diet. Fall precautions. High risk of readmission given his chromic illness, history of non compliance to medications and equipments use; along with refusal to go to rehab.         The following portions of the patient's history were reviewed and updated as appropriate: allergies, current medications, past family history, past medical history, past social history, past surgical history, and problem list.    Review of Systems   Constitutional:  Negative for appetite change and  "unexpected weight change.   HENT:  Negative for facial swelling and trouble swallowing.    Respiratory:  Positive for cough, shortness of breath and wheezing.    Gastrointestinal:  Negative for abdominal distention and abdominal pain.   Endocrine: Negative for cold intolerance and heat intolerance.   Genitourinary:  Negative for difficulty urinating.   Allergic/Immunologic: Negative for immunocompromised state.   Neurological:  Negative for dizziness.   Psychiatric/Behavioral:  Negative for agitation, behavioral problems and confusion.        Objective   /63 (BP Location: Left arm, Patient Position: Sitting, Cuff Size: Adult)   Pulse 71   Temp 97.7 °F (36.5 °C) (Tympanic)   Resp 18   Ht 177.8 cm (70\")   Wt 62 kg (136 lb 9.6 oz)   SpO2 97%   BMI 19.60 kg/m²   Physical Exam  Vitals reviewed.   Constitutional:       Appearance: Normal appearance. He is well-developed.   HENT:      Head: Normocephalic and atraumatic.      Right Ear: External ear normal.      Left Ear: External ear normal.      Mouth/Throat:      Pharynx: No oropharyngeal exudate.   Eyes:      Conjunctiva/sclera: Conjunctivae normal.      Pupils: Pupils are equal, round, and reactive to light.   Neck:      Vascular: No carotid bruit.   Cardiovascular:      Rate and Rhythm: Normal rate and regular rhythm.      Heart sounds: No murmur heard.     No friction rub. No gallop.   Pulmonary:      Effort: Pulmonary effort is normal.      Breath sounds: Decreased air movement present. Decreased breath sounds present. No wheezing or rhonchi.   Abdominal:      General: There is no distension.   Skin:     General: Skin is warm and dry.   Neurological:      Mental Status: He is alert and oriented to person, place, and time.      Cranial Nerves: No cranial nerve deficit.      Motor: No weakness.   Psychiatric:         Mood and Affect: Mood and affect normal.         Behavior: Behavior normal.         Thought Content: Thought content normal.         " Judgment: Judgment normal.         Assessment & Plan   Diagnoses and all orders for this visit:    1. Hospital discharge follow-up (Primary)    2. Essential hypertension  Assessment & Plan:  Hypertension is stable and controlled  Continue current treatment regimen.  Dietary sodium restriction.  Weight loss.  Blood pressure will be reassessed in 6 months.      3. Chronic obstructive pulmonary disease, unspecified COPD type  -     CBC w AUTO Differential; Future  -     Comprehensive metabolic panel; Future    4. Hypophosphatemia  -     Phosphorus; Future    5. Hypomagnesemia  -     Magnesium; Future    6. Cigarette nicotine dependence with nicotine-induced disorder  Assessment & Plan:  Tobacco use is improving with lifestyle modifications.  Smoking cessation counseling was provided.  Tobacco use will be reassessed in 6 months.    He is down to smoking 3 cigarettes per day.

## 2024-07-26 NOTE — PROGRESS NOTES
"Chief Complaint  Hospital Follow Up Visit (COPD admitted for 10 days)    Subjective    {Problem List  Visit Diagnosis   Encounters  Notes  Medications  Labs  Result Review Imaging  Media :23}    Colin Nevarez presents to [facility]  History of Present Illness  He is here today for management of his chronic medical conditions. He has right middle lung cancer, tobacco abuse, COPD, prostate cancer and chronic back pain. He is a smoker. He has smoked since he was 7 years old. He smokes a little less than one pack per day.     He was started on antibiotics one week ago for pneumonia. He is feeling better today.      The patient has no other complaints today and denies chest pain, shortness of breath, weakness, numbness, nausea, vomiting, diarrhea, dizziness or syncopal event.        Objective   Vital Signs:  /63 (BP Location: Left arm, Patient Position: Sitting, Cuff Size: Adult)   Pulse 71   Temp 97.7 °F (36.5 °C) (Tympanic)   Resp 18   Ht 177.8 cm (70\")   Wt 62 kg (136 lb 9.6 oz)   SpO2 97%   BMI 19.60 kg/m²   Estimated body mass index is 19.6 kg/m² as calculated from the following:    Height as of this encounter: 177.8 cm (70\").    Weight as of this encounter: 62 kg (136 lb 9.6 oz).       BMI is within normal parameters. No other follow-up for BMI required.      Physical Exam  Vitals reviewed.   Constitutional:       Appearance: Normal appearance. He is well-developed.   HENT:      Head: Normocephalic and atraumatic.      Right Ear: External ear normal.      Left Ear: External ear normal.      Mouth/Throat:      Pharynx: No oropharyngeal exudate.   Eyes:      Conjunctiva/sclera: Conjunctivae normal.      Pupils: Pupils are equal, round, and reactive to light.   Neck:      Vascular: No carotid bruit.   Cardiovascular:      Rate and Rhythm: Normal rate and regular rhythm.      Heart sounds: No murmur heard.     No friction rub. No gallop.   Pulmonary:      Effort: " Pulmonary effort is normal.      Breath sounds: Normal breath sounds. No wheezing or rhonchi.   Abdominal:      General: There is no distension.   Skin:     General: Skin is warm and dry.   Neurological:      Mental Status: He is alert and oriented to person, place, and time.      Cranial Nerves: No cranial nerve deficit.      Motor: No weakness.   Psychiatric:         Mood and Affect: Mood and affect normal.         Behavior: Behavior normal.         Thought Content: Thought content normal.         Judgment: Judgment normal.        Result Review :{Labs  Result Review  Imaging  Med Tab  Media  Procedures :23}    {The following data was reviewed by (Optional):[id number]}  CMP          7/19/2024    05:22 7/20/2024    05:07 7/21/2024    05:50   CMP   Glucose 84  74  70    BUN 21  19  16    Creatinine 0.47  0.45  0.52    EGFR 109.7  111.2  106.4    Sodium 139  141  141    Potassium 3.9  4.0  3.8    Chloride 95  98  102    Calcium 8.8  8.6  8.6    BUN/Creatinine Ratio 44.7  42.2  30.8    Anion Gap 7.5  2.7  0.5      CBC          7/19/2024    05:22 7/20/2024    05:07 7/21/2024    05:50   CBC   WBC 10.78  9.10  7.36    RBC 6.03  5.91  5.32    Hemoglobin 18.0  17.8  16.0    Hematocrit 55.4  55.3  48.9    MCV 91.9  93.6  91.9    MCH 29.9  30.1  30.1    MCHC 32.5  32.2  32.7    RDW 13.1  13.1  13.1    Platelets 103  110  108      Lipid Panel          2/9/2024    09:35   Lipid Panel   Total Cholesterol 164    Triglycerides 75    HDL Cholesterol 41    VLDL Cholesterol 14    LDL Cholesterol  109    LDL/HDL Ratio 2.63      TSH          2/9/2024    09:35   TSH   TSH 1.240      {Data reviewed (Optional):32536:::1}             Assessment and Plan {CC Problem List  Visit Diagnosis   ROS  Review (Popup)  [facility] Maintenance  Quality  BestPractice  Medications  SmartSets  SnapShot Encounters  Media :23}    Diagnoses and all orders for this visit:    1. Essential hypertension (Primary)  Assessment & Plan:  Hypertension is  stable and controlled  Continue current treatment regimen.  Dietary sodium restriction.  Weight loss.  Blood pressure will be reassessed in 6 months.      2. Chronic obstructive pulmonary disease, unspecified COPD type  -     CBC w AUTO Differential; Future  -     Comprehensive metabolic panel; Future    3. Hypophosphatemia  -     Phosphorus; Future    4. Hypomagnesemia  -     Magnesium; Future           {Time Spent (Optional):88257}  Follow Up {Instructions Charge Capture  Follow-up Communications :23}    Return in about 3 months (around 10/26/2024).  Patient was given instructions and counseling regarding his condition or for health maintenance advice. Please see specific information pulled into the AVS if appropriate.

## 2024-07-30 ENCOUNTER — PATIENT MESSAGE (OUTPATIENT)
Dept: FAMILY MEDICINE CLINIC | Facility: CLINIC | Age: 74
End: 2024-07-30
Payer: MEDICARE

## 2024-08-01 ENCOUNTER — LAB (OUTPATIENT)
Dept: LAB | Facility: HOSPITAL | Age: 74
End: 2024-08-01
Payer: MEDICARE

## 2024-08-01 ENCOUNTER — TELEPHONE (OUTPATIENT)
Dept: UROLOGY | Facility: CLINIC | Age: 74
End: 2024-08-01
Payer: MEDICARE

## 2024-08-01 DIAGNOSIS — R97.21 RISING PSA FOLLOWING TREATMENT FOR MALIGNANT NEOPLASM OF PROSTATE: Primary | ICD-10-CM

## 2024-08-01 DIAGNOSIS — R97.21 RISING PSA FOLLOWING TREATMENT FOR MALIGNANT NEOPLASM OF PROSTATE: ICD-10-CM

## 2024-08-01 DIAGNOSIS — J44.9 CHRONIC OBSTRUCTIVE PULMONARY DISEASE, UNSPECIFIED COPD TYPE: Chronic | ICD-10-CM

## 2024-08-01 LAB
ALPHA1 GLOB MFR UR ELPH: 161 MG/DL (ref 90–200)
PSA SERPL-MCNC: 0.02 NG/ML (ref 0–4)

## 2024-08-01 PROCEDURE — 82103 ALPHA-1-ANTITRYPSIN TOTAL: CPT

## 2024-08-01 PROCEDURE — 36415 COLL VENOUS BLD VENIPUNCTURE: CPT

## 2024-08-01 PROCEDURE — 84153 ASSAY OF PSA TOTAL: CPT

## 2024-08-01 NOTE — TELEPHONE ENCOUNTER
Pt is at the lab and needs to have psa prior to appt.  There is no order placed in pts chart.  I was asked by Becca MARIANO to place the order since the pt is at the lab and waiting.  Order has been placed after reviewing the chart.

## 2024-08-08 ENCOUNTER — HOSPITAL ENCOUNTER (OUTPATIENT)
Dept: CT IMAGING | Facility: HOSPITAL | Age: 74
Discharge: HOME | End: 2024-08-08
Admitting: INTERNAL MEDICINE
Payer: MEDICARE

## 2024-08-08 ENCOUNTER — OFFICE VISIT (OUTPATIENT)
Dept: UROLOGY | Facility: CLINIC | Age: 74
End: 2024-08-08
Payer: MEDICARE

## 2024-08-08 VITALS
BODY MASS INDEX: 19.98 KG/M2 | SYSTOLIC BLOOD PRESSURE: 135 MMHG | DIASTOLIC BLOOD PRESSURE: 84 MMHG | WEIGHT: 139.6 LBS | HEIGHT: 70 IN | HEART RATE: 78 BPM

## 2024-08-08 DIAGNOSIS — C61 PROSTATE CANCER: ICD-10-CM

## 2024-08-08 DIAGNOSIS — C34.91 SQUAMOUS CELL CARCINOMA OF RIGHT LUNG: ICD-10-CM

## 2024-08-08 DIAGNOSIS — N39.3 URINARY INCONTINENCE, STRESS, MALE: ICD-10-CM

## 2024-08-08 DIAGNOSIS — R97.21 RISING PSA FOLLOWING TREATMENT FOR MALIGNANT NEOPLASM OF PROSTATE: Primary | ICD-10-CM

## 2024-08-08 PROBLEM — J44.9 COPD (CHRONIC OBSTRUCTIVE PULMONARY DISEASE): Status: RESOLVED | Noted: 2024-07-11 | Resolved: 2024-08-08

## 2024-08-08 PROBLEM — J06.9 ACUTE URI: Status: RESOLVED | Noted: 2023-12-07 | Resolved: 2024-08-08

## 2024-08-08 PROBLEM — E44.0 MODERATE MALNUTRITION: Status: RESOLVED | Noted: 2024-07-15 | Resolved: 2024-08-08

## 2024-08-08 PROBLEM — Z09 HOSPITAL DISCHARGE FOLLOW-UP: Status: RESOLVED | Noted: 2024-07-26 | Resolved: 2024-08-08

## 2024-08-08 LAB
BILIRUB BLD-MCNC: NEGATIVE MG/DL
CLARITY, POC: CLEAR
COLOR UR: YELLOW
EXPIRATION DATE: NORMAL
GLUCOSE UR STRIP-MCNC: NEGATIVE MG/DL
KETONES UR QL: NEGATIVE
LEUKOCYTE EST, POC: NEGATIVE
Lab: NORMAL
NITRITE UR-MCNC: NEGATIVE MG/ML
PH UR: 6 [PH] (ref 5–8)
PROT UR STRIP-MCNC: NEGATIVE MG/DL
RBC # UR STRIP: NEGATIVE /UL
SP GR UR: 1.01 (ref 1–1.03)
URINE VOLUME: 0
UROBILINOGEN UR QL: NORMAL

## 2024-08-08 PROCEDURE — 71250 CT THORAX DX C-: CPT

## 2024-08-08 RX ORDER — ARFORMOTEROL TARTRATE 15 UG/2ML
SOLUTION RESPIRATORY (INHALATION)
COMMUNITY
Start: 2024-07-25

## 2024-08-08 NOTE — PROGRESS NOTES
Chief Complaint: Follow-up (Elevated PSA)    Subjective         Prostate Cancer    Follow-up    Colin Nevarez is a 74 y.o. male presents to [facility] to be seen for annual f/u prostate cancer.      He returns today with a repeat PSA from 24 which is now 0.016    No further UTI since we last saw him.     He did get admitted for 11 days for COPD exacerbation but has done well since he has been home has been gaining weight back that he lost.     He is cutting back on smoking.     Leakage he states is getting better.    Previous:    status post  RALPw LND  20 , pT2 N0 R1     NO UTI or GH     Voids without issue.    Minimal incontinence, no pads.     Not worried about erections.    PSA now 0.016 previously undetectable        No history of kidney stone.     Patient had a ruptured appendix in .  Still has a large midline incision.     No urologic family history     No CAD, No COPD.  smokes 1 ppd.  ASA 82  Mom dies of CA at 50.  Dad  at 60 of CVA.       0.81, GFR >60    Prostate CA        <0.014         <0.014  3/21    <0.01  10/20  <0.01       RALP w LND  3+4, 4+3 5 to 10%, tertiary pattern 5 5% overall [place] score 7, margins involved, right apical, right anterior, right posterior, 7 lymph nodes negative     pT2 N0 R1     2020 MRI fusion prostate biopsy  Region of interest - 3+3, multiple cores, 22%  Right base3+3, 2/2, 17%  Right apex3+3, 2/2, 77%  Right mid3+4, 2/2, 46%  Left base, left apex, left midnegative  Region of interest #23 +3, 2/2, 26%      MRI [id number] x 4 mm, PIRADS 5.  Right lateral peripheral zone at the apex to mid gland    8.16    Objective     Past Medical History:   Diagnosis Date    Arthritis     COPD (chronic obstructive pulmonary disease)     Essential hypertension 2021    Forgetfulness     Heart flutter, ventricular     High cholesterol     Lung cancer     Prostate cancer     Ringing in ear     Shortness of Air      SOB (shortness of breath)        Past Surgical History:   Procedure Laterality Date    APPENDECTOMY      COLONOSCOPY  06/01/2019    COLONOSCOPY N/A 6/14/2023    Procedure: COLONOSCOPY;  Surgeon: Geoffrey Carey MD;  Location: [facility] ENDOSCOPY;  Service: General;  Laterality: N/A;  NORMAL    LUNG BIOPSY      PROSTATECTOMY      TONSILLECTOMY  1969    Per PT         Current Outpatient Medications:     albuterol (PROVENTIL) (2.5 MG/3ML) 0.083% nebulizer solution, Take 2.5 mg by nebulization Every 4 (Four) Hours As Needed for Wheezing., Disp: 100 each, Rfl: 12    albuterol sulfate  (90 Base) MCG/ACT inhaler, Inhale 2 puffs Every 4 (Four) Hours As Needed for Wheezing or Shortness of Air., Disp: 18 g, Rfl: 2    amLODIPine (NORVASC) 10 MG tablet, Take 1 tablet by mouth Daily., Disp: 90 tablet, Rfl: 1    arformoterol (BROVANA) 15 MCG/2ML nebulizer solution, 2 mL 2 TIMES DAILY (route: inhalation), Disp: , Rfl:     aspirin 81 MG chewable tablet, Chew 1 tablet Daily., Disp: , Rfl:     budesonide 0.25 MG/2ML suspension 0.25 mg, arformoterol 15 MCG/2ML nebulizer solution 15 mcg, Inhale., Disp: , Rfl:     Fluticasone-Umeclidin-Vilant (Trelegy Ellipta) 200-62.5-25 MCG/ACT aerosol powder , Inhale 200 mcg Daily., Disp: 3 each, Rfl: 0    Multiple Vitamins-Minerals (MULTIVITAMIN GUMMIES ADULTS PO), Take 1 tablet by mouth Daily. 1 gummie/day, Disp: , Rfl:     O2 (OXYGEN), 3 Liter O2 - CONTINUOUS (route: Oxygen), Disp: , Rfl:     No Known Allergies     Family History   Problem Relation Age of Onset    Ovarian cancer Mother     Stroke Father     Breast cancer Sister 40    Cancer Maternal Grandmother     Cancer Paternal Grandmother        Social History     Socioeconomic History    Marital status: Single   Tobacco Use    Smoking status: Every Day     Current packs/day: 1.00     Average packs/day: 1 pack/day for 65.6 years (65.6 ttl pk-yrs)     Types: Cigarettes     Start date: 1959     Passive exposure: Current    Smokeless  "tobacco: Never    Tobacco comments:     States that he is cutting back and is smoking less than 1ppd.   Vaping Use    Vaping status: Never Used   Substance and Sexual Activity    Alcohol use: Never    Drug use: Defer    Sexual activity: Defer       Vital Signs:   /84 (BP Location: Left arm, Patient Position: Sitting, Cuff Size: Adult)   Pulse 78   Ht 177.8 cm (70\")   Wt 63.3 kg (139 lb 9.6 oz)   BMI 20.03 kg/m²      Physical Exam     Result Review :   The following data was reviewed by: CHEN Jay on 8/7/2024:  Results for orders placed or performed in visit on 08/08/24   Bladder Scan   Result Value Ref Range    Urine Volume 0    POC Urinalysis Dipstick, Automated    Specimen: Urine   Result Value Ref Range    Color Yellow Yellow, Straw, Dark Yellow, Ying    Clarity, UA Clear Clear    Specific Gravity  1.010 1.005 - 1.030    pH, Urine 6.0 5.0 - 8.0    Leukocytes Negative Negative    Nitrite, UA Negative Negative    Protein, POC Negative Negative mg/dL    Glucose, UA Negative Negative mg/dL    Ketones, UA Negative Negative    Urobilinogen, UA 0.2 E.U./dL Normal, 0.2 E.U./dL    Bilirubin Negative Negative    Blood, UA Negative Negative    Lot Number [id number]     Expiration Date 4/2,025       PSA          2/9/2024    09:35 4/30/2024    09:31 8/1/2024    08:53   PSA   PSA <0.014  0.016  0.016      Bladder Scan interpretation 08/08/2024    Estimation of residual urine via BVI 3000 [place] Bladder Scan  MA/nurse performing: TAYLRO c. Rn   Residual Urine: 0 ml  Indication: Rising PSA following treatment for malignant neoplasm of prostate    Prostate cancer    Urinary incontinence, stress, male   Position: Supine  Examination: Incremental scanning of the suprapubic area using 2.0 MHz transducer using copious amounts of acoustic gel.   Findings: An anechoic area was demonstrated which represented the bladder, with measurement of residual urine as noted. I inspected this myself. In that the residual " urine was stable or insignificant, refer to plan for treatment and plan necessary at this time.          Procedures        Assessment and Plan    Diagnoses and all orders for this visit:    1. Rising PSA following treatment for malignant neoplasm of prostate (Primary)  -     POC Urinalysis Dipstick, Automated  -     Bladder Scan  -     PSA DIAGNOSTIC; Future    2. Prostate cancer  -     POC Urinalysis Dipstick, Automated  -     Bladder Scan    3. Urinary incontinence, stress, male  -     POC Urinalysis Dipstick, Automated  -     Bladder Scan        PSA has remained the same in the last 3 months.     Given stability we will f/u in 6 months for repeat PSA.      I spent 10 minutes caring for Colin on this date of service. This time includes time spent by me in the following activities:reviewing tests, obtaining and/or reviewing a separately obtained history, performing a medically appropriate examination and/or evaluation , counseling and educating the patient/family/caregiver, ordering medications, tests, or procedures, and documenting information in the medical record  Follow Up   Return in about 6 months (around 2/8/2025) for f/u with PSA .  Patient was given instructions and counseling regarding his condition or for health maintenance advice. Please see specific information pulled into the AVS if appropriate.         This document has been electronically signed by CHEN Jay  August 8, 2024 09:34 EDT

## 2024-08-13 ENCOUNTER — OFFICE VISIT (OUTPATIENT)
Dept: ONCOLOGY | Facility: HOSPITAL | Age: 74
End: 2024-08-13
Payer: MEDICARE

## 2024-08-13 VITALS
BODY MASS INDEX: 19.76 KG/M2 | HEART RATE: 80 BPM | OXYGEN SATURATION: 94 % | TEMPERATURE: 97.6 F | RESPIRATION RATE: 16 BRPM | SYSTOLIC BLOOD PRESSURE: 127 MMHG | WEIGHT: 138.01 LBS | HEIGHT: 70 IN | DIASTOLIC BLOOD PRESSURE: 71 MMHG

## 2024-08-13 DIAGNOSIS — C34.91 SQUAMOUS CELL CARCINOMA OF RIGHT LUNG: Primary | ICD-10-CM

## 2024-08-13 DIAGNOSIS — D75.1 ERYTHROCYTOSIS: ICD-10-CM

## 2024-08-13 DIAGNOSIS — J44.9 CHRONIC OBSTRUCTIVE PULMONARY DISEASE, UNSPECIFIED COPD TYPE: ICD-10-CM

## 2024-08-13 DIAGNOSIS — C61 PROSTATE CANCER: Chronic | ICD-10-CM

## 2024-08-13 PROCEDURE — 3078F DIAST BP <80 MM HG: CPT | Performed by: INTERNAL MEDICINE

## 2024-08-13 PROCEDURE — G2211 COMPLEX E/M VISIT ADD ON: HCPCS | Performed by: INTERNAL MEDICINE

## 2024-08-13 PROCEDURE — 3074F SYST BP LT 130 MM HG: CPT | Performed by: INTERNAL MEDICINE

## 2024-08-13 PROCEDURE — G0463 HOSPITAL OUTPT CLINIC VISIT: HCPCS | Performed by: INTERNAL MEDICINE

## 2024-08-13 PROCEDURE — 99214 OFFICE O/P EST MOD 30 MIN: CPT | Performed by: INTERNAL MEDICINE

## 2024-08-13 PROCEDURE — 1126F AMNT PAIN NOTED NONE PRSNT: CPT | Performed by: INTERNAL MEDICINE

## 2024-08-13 NOTE — PROGRESS NOTES
Chief Complaint   FOLLOW UP 2 - Squamous cell carcinoma of right lung-- 6 MO    Liz Roper, DO  Jacquelin, Liz, DO    Subjective          Colin Nevarez presents to [facility] for NSCLC    Mr. Nevarez is a very pleasant 73-year-old male with a history of COPD, prostate cancer, hypertension, tobacco use who presents for follow up for squamous cell carcinoma of RLL. He underwent SBRT and finished this in early June 2023. Patient recently discharged from the hospital on the 21st after a 10-day admission for acute hypercapnic  hypoxic respiratory failure.  Patient initiated on BiPAP but then weaned down.  He was treated for COPD exacerbation.  He did qualify for home oxygen.  He denied rehab.  Discharge to home.  He reports feeling much better since discharge.  He is off oxygen.  He has very minimal cough.  No fevers or chills.  Reports he continues to cut back on smoking cigarettes.  Patient with repeat recent CT scans for follow-up of his lung cancer.  No evidence of malignancy seen.      Oncology/Hematology History Overview Note   8/2020: Patient underwent radical prostatectomy (Dr. Rodriguez) for clinical T3, [place] 3+4=7 prostate adenocarcinoma, PSA <10.     4/10/23: NM/PET performed due to abnormal lung nodule on CT Chest demonstrated a non-calcified 1.7 cm pulmonary nodule within the lateral right lower lobe with FDG activity of 7.33. No other nodule identified. No mediastinal, hilar or axillary lymphadenopathy.    5/3/23: CT needle biopsy of RLL: Moderately to poorly differentiated squamous cell carcinoma.    6/5/23: SBRT completed to RLL nodule    6/14/2023: Colonoscopy with Dr. Carey. Normal exam with no specimens collected. Repeat in 5 years for surveillance (2028).     7/31/23: CT Chest: interval decrease in size of RLL nodule.          Prostate cancer   7/12/2021 Initial Diagnosis    Prostate cancer (HCC)     5/11/2023 Cancer Staged    Staging form: Prostate, AJCC 8th  Edition  - Clinical: Stage IIB (cT1c, cN0, cM0, PSA: 8.2, Grade Group: 2) - Signed by Dionisio Neville MD on 5/11/2023     Squamous cell carcinoma lung   4/4/2023 Initial Diagnosis    Squamous cell carcinoma lung     5/11/2023 Cancer Staged    Staging form: Lung, AJCC 8th Edition  - Clinical: Stage IA2 (cT1b, cN0, cM0) - Signed by Dionisio Neville MD on 5/11/2023     Malignant neoplasm of lower lobe, right bronchus or lung (Resolved)   5/22/2023 Initial Diagnosis    Malignant neoplasm of lower lobe, right bronchus or lung     5/30/2023 - 6/5/2023 Radiation    Radiation OncologyTreatment Course:  Colin Nevarez received 5500 cGy in 5 fractions to right lower lobe via stereotactic body radiotherapy.            Review of Systems   Constitutional:  Positive for fatigue and unexpected weight gain. Negative for appetite change, diaphoresis, fever and unexpected weight loss.   HENT:  Negative for hearing loss, mouth sores, sore throat, swollen glands, trouble swallowing and voice change.    Eyes:  Negative for blurred vision.   Respiratory:  Positive for shortness of breath. Negative for cough and wheezing.    Cardiovascular:  Negative for chest pain and palpitations.   Gastrointestinal:  Negative for abdominal pain, blood in stool, constipation, diarrhea, nausea and vomiting.   Endocrine: Negative for cold intolerance and heat intolerance.   Genitourinary:  Negative for difficulty urinating, dysuria, frequency, hematuria and urinary incontinence.   Musculoskeletal:  Negative for arthralgias, back pain and myalgias.   Skin:  Negative for rash, skin lesions and wound.   Neurological:  Negative for dizziness, seizures, weakness, numbness and headache.   Hematological:  Does not bruise/bleed easily.   Psychiatric/Behavioral:  Negative for depressed mood. The patient is not nervous/anxious.    All other systems reviewed and are negative.    Current Outpatient Medications on File Prior to Visit   Medication Sig  Dispense Refill    albuterol (PROVENTIL) (2.5 MG/3ML) 0.083% nebulizer solution Take 2.5 mg by nebulization Every 4 (Four) Hours As Needed for Wheezing. 100 each 12    albuterol sulfate  (90 Base) MCG/ACT inhaler Inhale 2 puffs Every 4 (Four) Hours As Needed for Wheezing or Shortness of Air. 18 g 2    amLODIPine (NORVASC) 10 MG tablet Take 1 tablet by mouth Daily. 90 tablet 1    arformoterol (BROVANA) 15 MCG/2ML nebulizer solution 2 mL 2 TIMES DAILY (route: inhalation)      aspirin 81 MG chewable tablet Chew 1 tablet Daily.      budesonide 0.25 MG/2ML suspension 0.25 mg, arformoterol 15 MCG/2ML nebulizer solution 15 mcg Inhale.      Fluticasone-Umeclidin-Vilant (Trelegy Ellipta) 200-62.5-25 MCG/ACT aerosol powder  Inhale 200 mcg Daily. 3 each 0    Multiple Vitamins-Minerals (MULTIVITAMIN GUMMIES ADULTS PO) Take 1 tablet by mouth Daily. 1 gummie/day      O2 (OXYGEN) 3 Liter O2 - CONTINUOUS (route: Oxygen)       No current facility-administered medications on file prior to visit.       No Known Allergies  Past Medical History:   Diagnosis Date    Arthritis     COPD (chronic obstructive pulmonary disease)     Essential hypertension 07/12/2021    Forgetfulness     Heart flutter, ventricular     High cholesterol     Lung cancer     Prostate cancer     Ringing in ear     Shortness of Air     SOB (shortness of breath)      Past Surgical History:   Procedure Laterality Date    APPENDECTOMY      COLONOSCOPY  06/01/2019    COLONOSCOPY N/A 6/14/2023    Procedure: COLONOSCOPY;  Surgeon: Geoffrey Carey MD;  Location: [facility] ENDOSCOPY;  Service: General;  Laterality: N/A;  NORMAL    LUNG BIOPSY      PROSTATECTOMY      TONSILLECTOMY  1969    Per PT     Social History     Socioeconomic History    Marital status: Single   Tobacco Use    Smoking status: Every Day     Current packs/day: 1.00     Average packs/day: 1 pack/day for 65.6 years (65.6 ttl pk-yrs)     Types: Cigarettes     Start date: 1959     Passive exposure:  "Current    Smokeless tobacco: Never    Tobacco comments:     States that he is cutting back and is smoking less than 1ppd.   Vaping Use    Vaping status: Never Used   Substance and Sexual Activity    Alcohol use: Never    Drug use: Defer    Sexual activity: Defer     Family History   Problem Relation Age of Onset    Ovarian cancer Mother     Stroke Father     Breast cancer Sister 40    Cancer Maternal Grandmother     Cancer Paternal Grandmother        Objective   Physical Exam  Well appearing patient in no acute distress on RA  Anicteric sclerae, no rash on exposed skin  Respirations non-labored  Awake, alert, and oriented x 4. Speech intact. No gross neurologic deficit  Appropriate mood and affect      Vitals:    08/13/24 1138   BP: 127/71   Pulse: 80   Resp: 16   Temp: 97.6 °F (36.4 °C)   TempSrc: Temporal   SpO2: 94%   Weight: 62.6 kg (138 lb 0.1 oz)   Height: 177.8 cm (70\")   PainSc: 0-No pain                 PHQ-9 Total Score:           Result Review :   The following data was reviewed by: Dionisio Neville MD on 08/13/24:  Lab Results   Component Value Date    HGB 17.6 07/26/2024    HCT 51.6 (H) 07/26/2024    MCV 89.0 07/26/2024     07/26/2024    WBC 13.42 (H) 07/26/2024    NEUTROABS 9.57 (H) 07/26/2024    LYMPHSABS 2.24 07/26/2024    MONOSABS 0.89 07/26/2024    EOSABS 0.14 07/26/2024    BASOSABS 0.08 07/26/2024     Lab Results   Component Value Date    GLUCOSE 73 07/26/2024    BUN 13 07/26/2024    CREATININE 0.69 (L) 07/26/2024     07/26/2024    K 4.5 07/26/2024    CL 98 07/26/2024    CO2 28.7 07/26/2024    CALCIUM 9.5 07/26/2024    PROTEINTOT 6.5 07/26/2024    ALBUMIN 3.7 07/26/2024    BILITOT 0.2 07/26/2024    ALKPHOS 75 07/26/2024    AST 37 07/26/2024    ALT 93 (H) 07/26/2024     Lab Results   Component Value Date    MG 2.1 07/26/2024    PHOS 2.9 07/26/2024    FREET4 1.1 05/29/2020    TSH 1.240 02/09/2024     Labs personally reviewed. Erythrocytosis noted.     Discharge summary personally " reviewed    CT Chest personally reviewed and per my independent read without any solid masses    CT Chest Without Contrast Diagnostic    Result Date: 8/9/2024  Impression: 1.New peripheral consolidation within the right lower lobe potentially infectious or inflammatory. No well-defined masslike features. Continued surveillance imaging recommended. 2.Resolution of previous ill-defined opacities scattered throughout the lungs. 3.Decrease in size of right hilar lymph nodes. 4.Other stable chronic findings Electronically Signed: Jay Hassan MD  8/9/2024 11:02 AM EDT  Workstation ID: [id number]           Assessment and Plan    Diagnoses and all orders for this visit:    1. Squamous cell carcinoma of right lung (Primary)  -     CT Chest Without Contrast; Future    2. Erythrocytosis    3. Chronic obstructive pulmonary disease, unspecified COPD type    4. Prostate cancer        Stage IA2 NSCLC (squamous cell carcinoma)    1.7 cm RLL FDG avid lesion per PET without any other hypermetabolic adenopathy. Staged at [id number]. Completed SBRT 6/5/23 per radiation oncology. 7/31/23 CT Chest demonstrated disease response. Repeat CT Chest improved  right hilar nodes and no evidence of malignancy. Hold on treatment of RLL consolidation as patient asymptomatic. Recommend repeat CT Chest in 6 months. Follow up at that time.  Patient also following with radiation oncology.  He is hesitant about following up with both radiation and medical oncology.    COPD   Recent exacerbation and hospitalization. Recommend continued follow-up with pulmonology.  Patient currently off oxygen.  Recommend compliance with inhalers.    Erythrocytosis  Secondary to smoking history. No workup or treatment recommended.    Smoking Cessation  Cessation recommended. Patient cutting back.     Prostate Cancer  Stage IIB, [place] 3+4=7, PSA <10, clinical T3N0, s/p radical prostatectomy 8/2020, [facility]. Follows with urology. PSA only minimally elevated        Patient Follow  Up: 6 months with CT Chest  Patient was given instructions and counseling regarding his condition or for health maintenance advice. Please see specific information pulled into the AVS if appropriate.

## 2024-08-16 ENCOUNTER — OFFICE VISIT (OUTPATIENT)
Dept: PULMONOLOGY | Facility: CLINIC | Age: 74
End: 2024-08-16
Payer: MEDICARE

## 2024-08-16 VITALS
SYSTOLIC BLOOD PRESSURE: 124 MMHG | RESPIRATION RATE: 16 BRPM | HEIGHT: 70 IN | HEART RATE: 72 BPM | DIASTOLIC BLOOD PRESSURE: 79 MMHG | TEMPERATURE: 97.6 F | OXYGEN SATURATION: 92 % | WEIGHT: 138.6 LBS | BODY MASS INDEX: 19.84 KG/M2

## 2024-08-16 DIAGNOSIS — J44.9 CHRONIC OBSTRUCTIVE PULMONARY DISEASE, UNSPECIFIED COPD TYPE: Primary | Chronic | ICD-10-CM

## 2024-08-16 DIAGNOSIS — J43.2 CENTRILOBULAR EMPHYSEMA: ICD-10-CM

## 2024-08-16 DIAGNOSIS — R06.09 DYSPNEA ON EXERTION: ICD-10-CM

## 2024-08-16 DIAGNOSIS — C34.91 SQUAMOUS CELL CARCINOMA OF RIGHT LUNG: ICD-10-CM

## 2024-08-16 DIAGNOSIS — F17.210 NICOTINE DEPENDENCE, CIGARETTES, UNCOMPLICATED: ICD-10-CM

## 2024-08-16 RX ORDER — BUDESONIDE 0.5 MG/2ML
0.5 INHALANT ORAL
Qty: 60 EACH | Refills: 3 | Status: SHIPPED | OUTPATIENT
Start: 2024-08-16

## 2024-08-16 RX ORDER — ARFORMOTEROL TARTRATE 15 UG/2ML
15 SOLUTION RESPIRATORY (INHALATION)
Qty: 120 ML | Refills: 3 | Status: SHIPPED | OUTPATIENT
Start: 2024-08-16

## 2024-08-16 NOTE — PROGRESS NOTES
Primary Care Provider  Liz Roper DO     Referring Provider  No ref. provider found     Chief Complaint  COPD and Follow-up (2 month f/up )    Subjective          Colin Nevarez presents to [facility]INE  History of Present Illness  Colin Nevarez is a 74 y.o. male patient here for management of COPD, centrilobular emphysema and tobacco abuse of cigarettes ongoing.    Patient states he is doing okay since his last office visit.  Unfortunately, he was hospitalized in July but states he is doing very well since then.  Patient states that his shortness of breath is mild in severity, worse with exertion and improved with rest. He does take Brovana and Pulmicort but only uses it on occasion.  He does have albuterol to use as needed.  He continues to smoke 0.5 pack of cigarettes daily and is not interested in quitting at this time.  He states that he is wearing his event that he received from the hospital.  Overall, he is doing well and has no other concerns at this time.  He is able to perform his ADLs without difficulty.     His history of smoking is   Tobacco Use: High Risk (8/16/2024)    Patient History     Smoking Tobacco Use: Every Day     Smokeless Tobacco Use: Never     Passive Exposure: Current     Colin Nevarez  reports that he has been smoking cigarettes. He started smoking about 65 years ago. He has a 65.6 pack-year smoking history. He has been exposed to tobacco smoke. He has never used smokeless tobacco. I have educated him on the risk of diseases from using tobacco products such as cancer, COPD, and heart disease.     I advised him to quit and he is not willing to quit.    I spent 3  minutes counseling the patient.      Review of Systems   Constitutional:  Negative for chills, fatigue, fever, unexpected weight gain and unexpected weight loss.   HENT:  Congestion: Nasal.    Respiratory:  Positive for shortness of breath. Negative for apnea, cough and  wheezing.         Negative for Hemoptysis     Cardiovascular:  Negative for chest pain, palpitations and leg swelling.   Skin:         Negative for cyanosis      Sleep: Negative for Excessive daytime sleepiness  Negative for morning headaches  Negative for Snoring    Family History   Problem Relation Age of Onset    Ovarian cancer Mother     Stroke Father     Breast cancer Sister 40    Cancer Maternal Grandmother     Cancer Paternal Grandmother         Social History     Socioeconomic History    Marital status: Single   Tobacco Use    Smoking status: Every Day     Current packs/day: 1.00     Average packs/day: 1 pack/day for 65.6 years (65.6 ttl pk-yrs)     Types: Cigarettes     Start date: 1958     Passive exposure: Current    Smokeless tobacco: Never    Tobacco comments:     States that he is cutting back and is smoking less than 1ppd.   Vaping Use    Vaping status: Never Used   Substance and Sexual Activity    Alcohol use: Never    Drug use: Defer    Sexual activity: Defer        Past Medical History:   Diagnosis Date    Arthritis     COPD (chronic obstructive pulmonary disease)     Essential hypertension 07/12/2021    Forgetfulness     Heart flutter, ventricular     High cholesterol     Lung cancer     Prostate cancer     Ringing in ear     Shortness of Air     SOB (shortness of breath)         Immunization History   Administered Date(s) Administered    COVID-19 (MODERNA) 1st,2nd,3rd Dose Monovalent 04/12/2021, 05/10/2021, 03/29/2022    COVID-19 (MODERNA) Monovalent Original Booster 04/12/2021, 05/10/2021    FLUAD TRI 65YR+ 10/12/2017    Fluzone Quad >6mos (Multi-dose) 10/23/2019    Pneumococcal Conjugate 13-Valent (PCV13) 09/03/2018    Pneumococcal Conjugate 20-Valent (PCV20) 01/25/2024    Pneumococcal Polysaccharide (PPSV23) 09/04/2017, 10/12/2017         No Known Allergies       Current Outpatient Medications:     albuterol (PROVENTIL) (2.5 MG/3ML) 0.083% nebulizer solution, Take 2.5 mg by nebulization Every  4 (Four) Hours As Needed for Wheezing., Disp: 100 each, Rfl: 12    albuterol sulfate  (90 Base) MCG/ACT inhaler, Inhale 2 puffs Every 4 (Four) Hours As Needed for Wheezing or Shortness of Air., Disp: 18 g, Rfl: 2    amLODIPine (NORVASC) 10 MG tablet, Take 1 tablet by mouth Daily., Disp: 90 tablet, Rfl: 1    arformoterol (BROVANA) 15 MCG/2ML nebulizer solution, Take 2 mL by nebulization 2 (Two) Times a Day., Disp: 120 mL, Rfl: 3    aspirin 81 MG chewable tablet, Chew 1 tablet Daily., Disp: , Rfl:     Multiple Vitamins-Minerals (MULTIVITAMIN GUMMIES ADULTS PO), Take 1 tablet by mouth Daily. 1 gummie/day, Disp: , Rfl:     O2 (OXYGEN), 3 Liter O2 - CONTINUOUS (route: Oxygen), Disp: , Rfl:     budesonide (Pulmicort) 0.5 MG/2ML nebulizer solution, Take 2 mL by nebulization 2 (Two) Times a Day., Disp: 60 each, Rfl: 3     Objective   Physical Exam  Constitutional:       General: He is not in acute distress.     Appearance: Normal appearance. He is normal weight.   HENT:      Right Ear: Hearing normal.      Left Ear: Hearing normal.      Nose: No nasal tenderness or congestion.      Mouth/Throat:      Mouth: Mucous membranes are moist. No oral lesions.   Eyes:      Extraocular Movements: Extraocular movements intact.      Pupils: Pupils are equal, round, and reactive to light.   Cardiovascular:      Rate and Rhythm: Normal rate and regular rhythm.      Pulses: Normal pulses.      Heart sounds: Normal heart sounds. No murmur heard.  Pulmonary:      Effort: Pulmonary effort is normal.      Breath sounds: Normal breath sounds. No wheezing, rhonchi or rales.   Musculoskeletal:      Right lower leg: No edema.      Left lower leg: No edema.   Skin:     General: Skin is warm and dry.      Findings: No lesion or rash.   Neurological:      General: No focal deficit present.      Mental Status: He is alert and oriented to person, place, and time.   Psychiatric:         Mood and Affect: Affect normal. Mood is not anxious or  "depressed.         Vital Signs:   /79 (BP Location: Left arm, Patient Position: Sitting, Cuff Size: Adult)   Pulse 72   Temp 97.6 °F (36.4 °C) (Oral)   Resp 16   Ht 177.8 cm (70\")   Wt 62.9 kg (138 lb 9.6 oz)   SpO2 92% Comment: RA  BMI 19.89 kg/m²        Result Review :   The following data was reviewed by: CHEN Olsen on 08/16/2024:  CMP          7/20/2024    05:07 7/21/2024    05:50 7/26/2024    09:50   CMP   Glucose 74  70  73    BUN 19  16  13    Creatinine 0.45  0.52  0.69    EGFR 111.2  106.4  97.7    Sodium 141  141  138    Potassium 4.0  3.8  4.5    Chloride 98  102  98    Calcium 8.6  8.6  9.5    Total Protein   6.5    Albumin   3.7    Globulin   2.8    Total Bilirubin   0.2    Alkaline Phosphatase   75    AST (SGOT)   37    ALT (SGPT)   93    Albumin/Globulin Ratio   1.3    BUN/Creatinine Ratio 42.2  30.8  18.8    Anion Gap 2.7  0.5  11.3      CBC w/diff          7/20/2024    05:07 7/21/2024    05:50 7/26/2024    09:50   CBC w/Diff   WBC 9.10  7.36  13.42    RBC 5.91  5.32  5.80    Hemoglobin 17.8  16.0  17.6    Hematocrit 55.3  48.9  51.6    MCV 93.6  91.9  89.0    MCH 30.1  30.1  30.3    MCHC 32.2  32.7  34.1    RDW 13.1  13.1  12.8    Platelets 110  108  216    Neutrophil Rel % 71.2  64.1  71.4    Immature Granulocyte Rel % 1.1  1.8  3.7    Lymphocyte Rel % 15.3  20.9  16.7    Monocyte Rel % 11.5  11.3  6.6    Eosinophil Rel % 0.7  1.1  1.0    Basophil Rel % 0.2  0.8  0.6      Data reviewed : Radiologic studies chest Ct 8/8/2024, chest xray 7/11/2024, pulmonary function test 5/15/2023 and my last office note    Procedures        Assessment and Plan    Diagnoses and all orders for this visit:    1. Chronic obstructive pulmonary disease, unspecified COPD type (Primary)  -     arformoterol (BROVANA) 15 MCG/2ML nebulizer solution; Take 2 mL by nebulization 2 (Two) Times a Day.  Dispense: 120 mL; Refill: 3  -     budesonide (Pulmicort) 0.5 MG/2ML nebulizer solution; Take 2 mL by " nebulization 2 (Two) Times a Day.  Dispense: 60 each; Refill: 3    2. Centrilobular emphysema    3. Squamous cell carcinoma of right lung    4. Nicotine dependence, cigarettes, uncomplicated    5. Dyspnea on exertion    Smoking cessation counseling provided.  I spent 3 minutes today counseling patient on the risks of smoking, including throat cancer, lung cancer, COPD, heart disease and death.  Also discussed the benefits of quitting.         Follow Up   Return for Next scheduled follow up.  Patient was given instructions and counseling regarding his condition or for health maintenance advice. Please see specific information pulled into the AVS if appropriate.

## 2024-08-16 NOTE — PATIENT INSTRUCTIONS
COPD and Physical Activity  Chronic obstructive pulmonary disease (COPD) is a long-term, or chronic, condition that affects the lungs. COPD is a general term that can be used to describe many problems that cause inflammation of the lungs and limit airflow. These conditions include chronic bronchitis and emphysema.  The main symptom of COPD is shortness of breath, which makes it harder to do even simple tasks. This can also make it harder to exercise and stay active. Talk with your health care provider about treatments to help you breathe better and actions you can take to prevent breathing problems during physical activity.  What are the benefits of exercising when you have COPD?  Exercising regularly is an important part of a healthy lifestyle. You can still exercise and do physical activities even though you have COPD. Exercise and physical activity improve your shortness of breath by increasing blood flow (circulation). This causes your heart to pump more oxygen through your body. Moderate exercise can:  Improve oxygen use.  Increase your energy level.  Help with shortness of breath.  Strengthen your breathing muscles.  Improve heart health.  Help with sleep.  Improve your self-esteem and feelings of self-worth.  Lower depression, stress, and anxiety.  Exercise can benefit everyone with COPD. The severity of your disease may affect how hard you can exercise, especially at first, but everyone can benefit. Talk with your health care provider about how much exercise is safe for you, and which activities and exercises are safe for you.  What actions can I take to prevent breathing problems during physical activity?  Sign up for a pulmonary rehabilitation program. This type of program may include:  Education about lung diseases.  Exercise classes that teach you how to exercise and be more active while improving your breathing. This usually involves:  Exercise using your lower extremities, such as a stationary  bicycle.  About 30 minutes of exercise, 2 to 5 times per week, for 6 to 12 weeks.  Strength training, such as push-ups or leg lifts.  Nutrition education.  Group classes in which you can talk with others who also have COPD and learn ways to manage stress.  If you use an oxygen tank, you should use it while you exercise. Work with your health care provider to adjust your oxygen for your physical activity. Your resting flow rate is different from your flow rate during physical activity.  How to manage your breathing while exercising  While you are exercising:  Take slow breaths.  Pace yourself, and do nottry to go too fast.  Purse your lips while breathing out. Pursing your lips is similar to a kissing or whistling position.  If doing exercise that uses a quick burst of effort, such as weight lifting:  Breathe in before starting the exercise.  Breathe out during the hardest part of the exercise, such as raising the weights.  Where to find support  You can find support for exercising with COPD from:  Your health care provider.  A pulmonary rehabilitation program.  Your local health department or community health programs.  Support groups, either online or in-person. Your health care provider may be able to recommend support groups.  Where to find more information  You can find more information about exercising with COPD from:  American Lung Association: lung.org  COPD Foundation: copdfoundation.org  Contact a health care provider if:  Your symptoms get worse.  You have nausea.  You have a fever.  You want to start a new exercise program or a new activity.  Get help right away if:  You have chest pain.  You cannot breathe.  These symptoms may represent a serious problem that is an emergency. Do not wait to see if the symptoms will go away. Get medical help right away. Call your local emergency services (911 in the [place] Do not drive yourself to the hospital.  Summary  COPD is a general term that can be used to describe  "many different lung problems that cause lung inflammation and limit airflow. This includes chronic bronchitis and emphysema.  Exercise and physical activity improve your shortness of breath by increasing blood flow (circulation). This causes your heart to provide more oxygen to your body.  Contact your health care provider before starting any exercise program or new activity. Ask your health care provider what exercises and activities are safe for you.  This information is not intended to replace advice given to you by your health care provider. Make sure you discuss any questions you have with your health care provider.  Document Revised: 10/26/2021 Document Reviewed: 10/26/2021  [employer] Patient Education © 2024 [employer]  Smoking Tobacco Information, Adult  Smoking tobacco can be harmful to your health. Tobacco contains a toxic colorless chemical called nicotine. Nicotine causes changes in your brain that make you want more and more. This is called addiction. This can make it hard to stop smoking once you start. Tobacco also has other toxic chemicals that can hurt your body and raise your risk of many cancers.  Menthol or \"lite\" tobacco or cigarette brands are not safer than regular brands.  How can smoking tobacco affect me?  Smoking tobacco puts you at risk for:  Cancer. Smoking is most commonly associated with lung cancer, but can also lead to cancer in other parts of the body.  Chronic obstructive pulmonary disease (COPD). This is a long-term lung condition that makes it hard to breathe. It also gets worse over time.  High blood pressure (hypertension), heart disease, stroke, heart attack, and lung infections, such as pneumonia.  Cataracts. This is when the lenses in the eyes become clouded.  Digestive problems. This may include peptic ulcers, heartburn, and gastroesophageal reflux disease (GERD).  Oral health problems, such as gum disease, mouth sores, and tooth loss.  Loss of taste and smell.  Smoking " also affects how you look and smell. Smoking may cause:  Wrinkles.  Yellow or stained teeth, fingers, and fingernails.  Bad breath.  Bad-smelling clothes and hair.  Smoking tobacco can also affect your social life, because:  It may be challenging to find places to smoke when away from home. Many workplaces, restaurants, hotels, and public places are tobacco-free.  Smoking is expensive. This is due to the cost of tobacco and the long-term costs of treating health problems from smoking.  Secondhand smoke may affect those around you. Secondhand smoke can cause lung cancer, breathing problems, and heart disease. Children of smokers have a higher risk for:  Sudden infant death syndrome (SIDS).  Ear infections.  Lung infections.  What actions can I take to prevent health problems?  Quit smoking    Do not start smoking. Quit if you already smoke.  Do not replace cigarette smoking with vaping devices, such as e-cigarettes.  Make a plan to quit smoking and commit to it. Look for programs to help you, and ask your health care provider for recommendations and ideas. Set a date and write down all the reasons you want to quit.  Let your friends and family know you are quitting so they can help and support you. Consider finding friends who also want to quit. It can be easier to quit with someone else, so that you can support each other.  Talk with your health care provider about using nicotine replacement medicines to help you quit. These include gum, lozenges, patches, sprays, or pills.  If you try to quit but return to smoking, stay positive. It is common to slip up when you first quit, so take it one day at a time.  Be prepared for cravings. When you feel the urge to smoke, chew gum or suck on hard candy.  Lifestyle  Stay busy.  Take care of your body. Get plenty of exercise, eat a healthy diet, and drink plenty of water.  Find ways to manage your stress, such as meditation, yoga, exercise, or time spent with friends and  family.  Ask your health care provider about having regular tests (screenings) to check for cancer. This may include blood tests, imaging tests, and other tests.  Where to find support  To get support to quit smoking, consider:  Asking your health care provider for more information and resources.  Joining a support group for people who want to quit smoking in your local community. There are many effective programs that may help you to quit.  Calling the smokefree.gov counselor helpline at 5-228-QUIT-NOW (1-445.782.6339).  Where to find more information  You may find more information about quitting smoking from:  Centers for Disease Control and Prevention: cdc.gov/tobacco  Smokefree.gov: smokefree.gov  American Lung Association: freedomfromsmoking.org  Contact a health care provider if:  You have problems breathing.  Your lips, nose, or fingers turn blue.  You have chest pain.  You are coughing up blood.  You feel like you will faint.  You have other health changes that cause you to worry.  Summary  Smoking tobacco can negatively affect your health, the health of those around you, your finances, and your social life.  Do not start smoking. Quit if you already smoke. If you need help quitting, ask your health care provider.  Consider joining a support group for people in your local community who want to quit smoking. There are many effective programs that may help you to quit.  This information is not intended to replace advice given to you by your health care provider. Make sure you discuss any questions you have with your health care provider.  Document Revised: 12/13/2022 Document Reviewed: 12/13/2022  [employer] Patient Education © 2024 [employer]

## 2024-08-16 NOTE — TELEPHONE ENCOUNTER
From: Colin ONEILL Roque  To: Liz Roper  Sent: 7/30/2024 10:49 AM EDT  Subject: Nebulizer medication    Mayco would like to know if he needs to refill the trelegy neb meds or is he done when they are gone?

## 2024-08-21 ENCOUNTER — TELEPHONE (OUTPATIENT)
Dept: PULMONOLOGY | Facility: CLINIC | Age: 74
End: 2024-08-21
Payer: MEDICARE

## 2024-08-21 DIAGNOSIS — J44.9 CHRONIC OBSTRUCTIVE PULMONARY DISEASE, UNSPECIFIED COPD TYPE: Primary | ICD-10-CM

## 2024-08-21 RX ORDER — FLUTICASONE PROPIONATE 110 UG/1
2 AEROSOL, METERED RESPIRATORY (INHALATION)
Qty: 12 G | Refills: 11 | Status: SHIPPED | OUTPATIENT
Start: 2024-08-21 | End: 2024-08-22

## 2024-08-21 RX ORDER — FORMOTEROL FUMARATE DIHYDRATE 20 UG/2ML
20 SOLUTION RESPIRATORY (INHALATION)
Qty: 120 ML | Refills: 11 | Status: SHIPPED | OUTPATIENT
Start: 2024-08-21

## 2024-08-22 ENCOUNTER — OFFICE VISIT (OUTPATIENT)
Dept: RADIATION ONCOLOGY | Facility: HOSPITAL | Age: 74
End: 2024-08-22
Payer: MEDICARE

## 2024-08-22 VITALS
WEIGHT: 140.43 LBS | BODY MASS INDEX: 20.15 KG/M2 | SYSTOLIC BLOOD PRESSURE: 128 MMHG | RESPIRATION RATE: 20 BRPM | DIASTOLIC BLOOD PRESSURE: 76 MMHG | HEART RATE: 82 BPM | OXYGEN SATURATION: 90 % | TEMPERATURE: 98.5 F

## 2024-08-22 DIAGNOSIS — J44.9 CHRONIC OBSTRUCTIVE PULMONARY DISEASE, UNSPECIFIED COPD TYPE: Primary | ICD-10-CM

## 2024-08-22 DIAGNOSIS — C34.31 PRIMARY SQUAMOUS CELL CARCINOMA OF LOWER LOBE OF RIGHT LUNG: ICD-10-CM

## 2024-08-22 DIAGNOSIS — Z90.79 HISTORY OF RADICAL PROSTATECTOMY: ICD-10-CM

## 2024-08-22 DIAGNOSIS — R91.8 MULTIPLE PULMONARY NODULES: ICD-10-CM

## 2024-08-22 DIAGNOSIS — J43.2 CENTRILOBULAR EMPHYSEMA: ICD-10-CM

## 2024-08-22 DIAGNOSIS — F17.200 NICOTINE DEPENDENCE WITH CURRENT USE: ICD-10-CM

## 2024-08-22 DIAGNOSIS — J44.9 CHRONIC OBSTRUCTIVE PULMONARY DISEASE, UNSPECIFIED COPD TYPE: ICD-10-CM

## 2024-08-22 DIAGNOSIS — Z85.46 HISTORY OF PROSTATE CANCER: ICD-10-CM

## 2024-08-22 DIAGNOSIS — C34.31 MALIGNANT NEOPLASM OF LOWER LOBE, RIGHT BRONCHUS OR LUNG: Primary | ICD-10-CM

## 2024-08-22 DIAGNOSIS — Z92.3 HISTORY OF EXTERNAL BEAM RADIATION THERAPY: ICD-10-CM

## 2024-08-22 PROCEDURE — G0463 HOSPITAL OUTPT CLINIC VISIT: HCPCS | Performed by: NURSE PRACTITIONER

## 2024-08-22 RX ORDER — FLUTICASONE FUROATE 100 UG/1
1 POWDER RESPIRATORY (INHALATION) DAILY
Qty: 30 EACH | Refills: 11 | Status: SHIPPED | OUTPATIENT
Start: 2024-08-22

## 2024-08-22 NOTE — PROGRESS NOTES
Follow Up Office Visit      Encounter Date: 08/22/2024   Patient Name: Colin Nevarez  YOB: 1950   Medical Record Number: [id number]   Primary Diagnosis: Malignant neoplasm of lower lobe, right bronchus or lung [C34.31]     Cancer Staging   Prostate cancer  Staging form: Prostate, AJCC 8th Edition  - Clinical: Stage IIB (cT1c, cN0, cM0, PSA: 8.2, Grade Group: 2) - Signed by Dionisio Neville MD on 5/11/2023    Squamous cell carcinoma lung  Staging form: Lung, AJCC 8th Edition  - Clinical: Stage IA2 (cT1b, cN0, cM0) - Signed by Dionisio Neville MD on 5/11/2023    Radiation Completion Date:  6/5/2023 SBRT RLL    Chief Complaint:    Chief Complaint   Patient presents with    Follow-up    Lung Cancer       Oncology/Hematology History Overview Note   8/2020: Patient underwent radical prostatectomy (Dr. Rodriguez) for clinical T3, Dee 3+4=7 prostate adenocarcinoma, PSA <10.     4/10/23: NM/PET performed due to abnormal lung nodule on CT Chest demonstrated a non-calcified 1.7 cm pulmonary nodule within the lateral right lower lobe with FDG activity of 7.33. No other nodule identified. No mediastinal, hilar or axillary lymphadenopathy.    5/3/23: CT needle biopsy of RLL: Moderately to poorly differentiated squamous cell carcinoma.    6/5/23: SBRT completed to RLL nodule    6/14/2023: Colonoscopy with Dr. Carey. Normal exam with no specimens collected. Repeat in 5 years for surveillance (2028).     7/31/23: CT Chest: interval decrease in size of RLL nodule.          Prostate cancer   7/12/2021 Initial Diagnosis    Prostate cancer (HCC)     5/11/2023 Cancer Staged    Staging form: Prostate, AJCC 8th Edition  - Clinical: Stage IIB (cT1c, cN0, cM0, PSA: 8.2, Grade Group: 2) - Signed by Dionisio Neville MD on 5/11/2023     Squamous cell carcinoma lung   4/4/2023 Initial Diagnosis    Squamous cell carcinoma lung     5/11/2023 Cancer Staged    Staging form: Lung, AJCC 8th Edition  -  Clinical: Stage IA2 (cT1b, cN0, cM0) - Signed by Dionisio Neville MD on 5/11/2023     Malignant neoplasm of lower lobe, right bronchus or lung (Resolved)   5/22/2023 Initial Diagnosis    Malignant neoplasm of lower lobe, right bronchus or lung     5/30/2023 - 6/5/2023 Radiation    Radiation OncologyTreatment Course:  Colin Nevarez received 5500 cGy in 5 fractions to right lower lobe via stereotactic body radiotherapy.          History of Present Illness: Colin Nevarez is a 74 y.o. male who returns to [facility] for routine follow-up of his lung cancer. He was hospitalized for 10 days in July for COPD exacerbation. He was discharged with home oxygen which he reports using as needed. He presents on room air today. He reports feeling much better since discharge and has no respiratory complaints today. Denies cough, hemoptysis, fevers, chills or worsening shortness of breath from his baseline. He is now being followed by Pulmonology. He is down to a quarter pack cigarettes a day, decreased from 1 pack. He uses a nicotine patch. He is expressing that he would like to continue to follow-up with Dr. Neville only at this point in effort of reducing out of pocket expense for appointments.     Subjective      Review of Systems: Review of Systems   Constitutional:  Positive for fatigue (5/10). Negative for appetite change, chills, fever and unexpected weight change.   HENT:  Positive for hearing loss (Ongoing), tinnitus (ongoing ) and voice change (hoarsness, ongoing). Negative for sore throat and trouble swallowing.    Eyes:  Negative for visual disturbance.   Respiratory:  Positive for cough (Productive with clear secretions, ongoing) and shortness of breath (Improved, noted with exertion, ongoing.). Negative for chest tightness and wheezing.         Wears o2 prn.     Cardiovascular:  Positive for leg swelling (occasionally, ongoing). Negative for chest pain and palpitations.   Gastrointestinal:   Negative for abdominal distention, abdominal pain, anal bleeding, blood in stool, constipation, diarrhea, nausea and vomiting.   Genitourinary:  Negative for decreased urine volume, difficulty urinating, dysuria, frequency, hematuria and urgency.        Occasional leakage/incontinence, ongoing       Musculoskeletal:  Positive for arthralgias (Occasional shoulder/neck pain, ongoing), back pain (ongoing ) and neck pain (Occasional, ongoing). Negative for gait problem and joint swelling.   Skin:  Negative for color change and rash.   Neurological:  Positive for weakness (Occasionally, ongoing). Negative for dizziness and headaches.   Psychiatric/Behavioral:  Negative for sleep disturbance.        The following portions of the patient's history were reviewed and updated as appropriate: allergies, current medications, past family history, past medical history, past social history, past surgical history and problem list.    Medications:     Current Outpatient Medications:     NICOTINE TD, Place  on the skin as directed by provider. States that he wears approximately every other day., Disp: , Rfl:     O2 (OXYGEN), Patient  unsure of how many liters and only wears prn., Disp: , Rfl:     albuterol (PROVENTIL) (2.5 MG/3ML) 0.083% nebulizer solution, Take 2.5 mg by nebulization Every 4 (Four) Hours As Needed for Wheezing., Disp: 100 each, Rfl: 12    albuterol sulfate  (90 Base) MCG/ACT inhaler, Inhale 2 puffs Every 4 (Four) Hours As Needed for Wheezing or Shortness of Air., Disp: 18 g, Rfl: 2    amLODIPine (NORVASC) 10 MG tablet, Take 1 tablet by mouth Daily., Disp: 90 tablet, Rfl: 1    arformoterol (BROVANA) 15 MCG/2ML nebulizer solution, Take 2 mL by nebulization 2 (Two) Times a Day., Disp: 120 mL, Rfl: 3    aspirin 81 MG chewable tablet, Chew 1 tablet Daily., Disp: , Rfl:     budesonide (Pulmicort) 0.5 MG/2ML nebulizer solution, Take 2 mL by nebulization 2 (Two) Times a Day., Disp: 60 each, Rfl: 3    fluticasone  (FLOVENT HFA) 110 MCG/ACT inhaler, Inhale 2 puffs 2 (Two) Times a Day., Disp: 12 g, Rfl: 11    formoterol (Perforomist) 20 MCG/2ML nebulizer solution, Take 2 mL by nebulization 2 (Two) Times a Day., Disp: 120 mL, Rfl: 11    Multiple Vitamins-Minerals (MULTIVITAMIN GUMMIES ADULTS PO), Take 1 tablet by mouth Daily. 1 gummie/day, Disp: , Rfl:     Allergies:   No Known Allergies    Patient Smoking History:   Social History     Tobacco Use   Smoking Status Every Day    Current packs/day: 0.25    Average packs/day: 0.3 packs/day for 65.6 years (16.4 ttl pk-yrs)    Types: Cigarettes    Start date: 1959    Passive exposure: Current   Smokeless Tobacco Never   Tobacco Comments    Recently cut back, currently smoking 1/4ppd.  Using a nicotine patch every other day.       Measures:  PHQ-9 Total Score: 1   Quality of Life: 90 - Limited Activity   ECOG score: 0   ECOG: (1) Restricted in physically strenuous activity but ambulatory and able to carry out work of a light or sedentary nature, e.g., light house work, office work  Pain: (on a scale of 0-10)   Pain Score    08/22/24 1005   PainSc: 0-No pain     Objective     Physical Exam:   Vital Signs:   Vitals:    08/22/24 1005   BP: 128/76   Pulse: 82   Resp: 20   Temp: 98.5 °F (36.9 °C)   TempSrc: Temporal   SpO2: 90%   Weight: 63.7 kg (140 lb 6.9 oz)   PainSc: 0-No pain         Body mass index is 20.15 kg/m².   Wt Readings from Last 3 Encounters:   08/22/24 63.7 kg (140 lb 6.9 oz)   08/16/24 62.9 kg (138 lb 9.6 oz)   08/13/24 62.6 kg (138 lb 0.1 oz)       Physical Exam  Vitals reviewed.   Constitutional:       General: He is not in acute distress.     Appearance: Normal appearance. He is normal weight. He is not ill-appearing.   HENT:      Head: Normocephalic and atraumatic.      Mouth/Throat:      Mouth: Mucous membranes are moist.      Pharynx: Oropharynx is clear. No oropharyngeal exudate or posterior oropharyngeal erythema.   Eyes:      Conjunctiva/sclera: Conjunctivae  normal.      Pupils: Pupils are equal, round, and reactive to light.   Cardiovascular:      Rate and Rhythm: Normal rate and regular rhythm.      Pulses: Normal pulses.      Heart sounds: Normal heart sounds.   Pulmonary:      Effort: Pulmonary effort is normal. No respiratory distress.      Breath sounds: No stridor. No wheezing or rhonchi.      Comments: Diminished throughout. On RA. No adventitious lung sounds.     Musculoskeletal:         General: Normal range of motion.      Cervical back: Normal range of motion.   Skin:     General: Skin is warm and dry.   Neurological:      General: No focal deficit present.      Mental Status: He is alert and oriented to person, place, and time. Mental status is at baseline.   Psychiatric:         Mood and Affect: Mood normal.         Behavior: Behavior normal.       Result Review: I independently reviewed the following data.   -- CT Chest Without Contrast Diagnostic (08/08/2024 10:08)   -- Records from hospitalization in July reviewed.   -- PSA DIAGNOSTIC (08/01/2024 08:53)   -- CT Chest Without Contrast Diagnostic (02/06/2024 10:30)     Pathology:   Lab Results   Component Value Date    CLININFO  05/03/2023     Lung nodule      FINALDX  05/03/2023     Lung, right lower lobe lesion, biopsy:   -Moderately to poorly differentiated squamous cell carcinoma    Comment: Per policy, reflex testing has been ordered, and the results will be separately reported.    REMARKS: The above positive (malignant) diagnosis was called to [place] in Dr. Roper's office at 13:10 EDT on 5/4/2023 by gil.            Imaging: CT Chest Without Contrast Diagnostic    Result Date: 8/9/2024  Impression: 1.New peripheral consolidation within the right lower lobe potentially infectious or inflammatory. No well-defined masslike features. Continued surveillance imaging recommended. 2.Resolution of previous ill-defined opacities scattered throughout the lungs. 3.Decrease in size of right hilar lymph nodes.  4.Other stable chronic findings Electronically Signed: Jay Hassan MD  8/9/2024 11:02 AM EDT  Workstation ID: [id number]    XR Chest 1 View    Result Date: 7/11/2024  Impression: Evidence of emphysema. No acute infiltrate. Electronically Signed: Angeline Hyde MD  7/11/2024 12:00 PM EDT  Workstation ID: [id number]      Labs:   WBC   Date Value Ref Range Status   07/26/2024 13.42 (H) 3.40 - 10.80 10*3/mm3 Final     Hemoglobin   Date Value Ref Range Status   07/26/2024 17.6 13.0 - 17.7 g/dL Final     Hematocrit   Date Value Ref Range Status   07/26/2024 51.6 (H) 37.5 - 51.0 % Final     Platelets   Date Value Ref Range Status   07/26/2024 216 140 - 450 10*3/mm3 Final     TSH   Date Value Ref Range Status   02/09/2024 1.240 0.270 - 4.200 uIU/mL Final      PSA   Date Value Ref Range Status   08/01/2024 0.016 0.000 - 4.000 ng/mL Final   04/30/2024 0.016 0.000 - 4.000 ng/mL Final   02/09/2024 <0.014 0.000 - 4.000 ng/mL Final   10/30/2023 0.015 0.000 - 4.000 ng/mL Final   08/04/2023 0.016 0.000 - 4.000 ng/mL Final   07/19/2022 <0.014 0.000 - 4.000 ng/mL Final   09/01/2021 <0.014 0.000 - 4.000 ng/mL Final   03/08/2021 <0.01 0.00 - 4.00 ng/mL Final   10/28/2020 <0.01 0.00 - 4.00 ng/mL Final   05/29/2020 8.16 (H) 0.00 - 4.00 ng/mL Final     Assessment / Plan      Impression: Colin Nevarez is a pleasant 74 y.o. 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 He has no clinical or radiographic evidence of distant metastatic disease per PET scan on 4/10/2023. He has advanced COPD and is not a surgical candidate. He is status post SBRT to the right lower lobe, completed on 6/5/2023. He is clinically doing well overall. His performance status is limited by his advanced COPD. His CT chest on 8/8/24 demonstrates decrease in size of right hilar lymph nodes. There is consolidation within the right lower lobe favored to be inflammatory in nature as he is  asymptomatic. Moving forward Mr. Nevarez would like to follow-up with medical oncology/Dr. Neville only.     COPD: now being followed by Pulmonology. Encouraged him to continue taking Mucinex BID for ongoing management of sputum associated with COPD.      Nicotine Dependence: current cigarette smoker, reports decreased amount now down to less than 1 pack/day. Not interested in quitting.      History of Prostate Cancer: Stage IIB (cT1c, cN0, cM0), iPSA: 8.2, Dee 3+4= 7. Status post radical prostatectomy in 8/2020; pT1b pN0. Most recent PSA on 8/1/24 is 0.016 ng/mL. He is followed by Dr. Rodriguez.     Assessment/Plan:   Diagnoses and all orders for this visit:    1. Malignant neoplasm of lower lobe, right bronchus or lung (Primary)    2. Primary squamous cell carcinoma of lower lobe of right lung    3. History of external beam radiation therapy    4. Multiple pulmonary nodules    5. Chronic obstructive pulmonary disease, unspecified COPD type    6. Nicotine dependence with current use    7. History of prostate cancer    8. History of radical prostatectomy      Follow Up:   Return as needed.   Follow-up with Dr. Neville on 2/17/24 with CT chest prior.   Follow-up with Pulmonology on 12/20/24.   Follow-up with Urology on 2/6/25 regarding history of prostate cancer.     No follow-ups on file.  Colin Nevarez was encouraged to contact me in the interim with any questions or concerns regarding his care.      CHEN García  Radiation Oncology  [facility]    This document has been signed by CHEN Chaves on August 22, 2024 11:00 EDT

## 2024-08-30 DIAGNOSIS — J44.9 CHRONIC OBSTRUCTIVE PULMONARY DISEASE, UNSPECIFIED COPD TYPE: Primary | ICD-10-CM

## 2024-08-30 RX ORDER — FLUTICASONE FUROATE, UMECLIDINIUM BROMIDE AND VILANTEROL TRIFENATATE 200; 62.5; 25 UG/1; UG/1; UG/1
1 POWDER RESPIRATORY (INHALATION)
Qty: 60 EACH | Refills: 5 | Status: SHIPPED | OUTPATIENT
Start: 2024-08-30

## 2024-11-01 ENCOUNTER — OFFICE VISIT (OUTPATIENT)
Dept: FAMILY MEDICINE CLINIC | Facility: CLINIC | Age: 74
End: 2024-11-01
Payer: MEDICARE

## 2024-11-01 VITALS
SYSTOLIC BLOOD PRESSURE: 137 MMHG | RESPIRATION RATE: 18 BRPM | BODY MASS INDEX: 21.22 KG/M2 | HEIGHT: 70 IN | DIASTOLIC BLOOD PRESSURE: 88 MMHG | OXYGEN SATURATION: 97 % | HEART RATE: 70 BPM | WEIGHT: 148.2 LBS | TEMPERATURE: 97.8 F

## 2024-11-01 DIAGNOSIS — I10 ESSENTIAL HYPERTENSION: Chronic | ICD-10-CM

## 2024-11-01 DIAGNOSIS — E78.00 HIGH CHOLESTEROL: Chronic | ICD-10-CM

## 2024-11-01 DIAGNOSIS — F17.219 CIGARETTE NICOTINE DEPENDENCE WITH NICOTINE-INDUCED DISORDER: Chronic | ICD-10-CM

## 2024-11-01 DIAGNOSIS — J44.9 CHRONIC OBSTRUCTIVE PULMONARY DISEASE, UNSPECIFIED COPD TYPE: ICD-10-CM

## 2024-11-01 DIAGNOSIS — Z00.00 MEDICARE ANNUAL WELLNESS VISIT, SUBSEQUENT: Primary | ICD-10-CM

## 2024-11-01 PROCEDURE — G0439 PPPS, SUBSEQ VISIT: HCPCS | Performed by: FAMILY MEDICINE

## 2024-11-01 PROCEDURE — 3079F DIAST BP 80-89 MM HG: CPT | Performed by: FAMILY MEDICINE

## 2024-11-01 PROCEDURE — 1160F RVW MEDS BY RX/DR IN RCRD: CPT | Performed by: FAMILY MEDICINE

## 2024-11-01 PROCEDURE — 1126F AMNT PAIN NOTED NONE PRSNT: CPT | Performed by: FAMILY MEDICINE

## 2024-11-01 PROCEDURE — 1159F MED LIST DOCD IN RCRD: CPT | Performed by: FAMILY MEDICINE

## 2024-11-01 PROCEDURE — 3075F SYST BP GE 130 - 139MM HG: CPT | Performed by: FAMILY MEDICINE

## 2024-11-01 PROCEDURE — 1170F FXNL STATUS ASSESSED: CPT | Performed by: FAMILY MEDICINE

## 2024-11-01 RX ORDER — FLUTICASONE FUROATE, UMECLIDINIUM BROMIDE AND VILANTEROL TRIFENATATE 100; 62.5; 25 UG/1; UG/1; UG/1
1 POWDER RESPIRATORY (INHALATION)
Qty: 1 EACH | Refills: 0 | COMMUNITY
Start: 2024-11-01

## 2024-11-01 RX ORDER — BUDESONIDE, GLYCOPYRROLATE, AND FORMOTEROL FUMARATE 160; 9; 4.8 UG/1; UG/1; UG/1
2 AEROSOL, METERED RESPIRATORY (INHALATION) 2 TIMES DAILY
Qty: 2 EACH | Refills: 0 | COMMUNITY
Start: 2024-11-01

## 2024-11-01 NOTE — ASSESSMENT & PLAN NOTE
Lipid abnormalities are improving with lifestyle modifications.  Nutritional counseling was provided.  Lipids will be reassessed in 6 months.    The 10-year ASCVD risk score (Richard THRASHER, et al., 2019) is: 31.2%    Values used to calculate the score:      Age: 74 years      Sex: Male      Is Non- : No      Diabetic: No      Tobacco smoker: Yes      Systolic Blood Pressure: 137 mmHg      Is BP treated: Yes      HDL Cholesterol: 41 mg/dL      Total Cholesterol: 164 mg/dL

## 2024-11-01 NOTE — ASSESSMENT & PLAN NOTE
COPD is worsening.    Plan:  Continue same medication/s without change.    Discussed medication dosage, use, side effects, and goals of treatment in detail.    Discussed monitoring symptoms and use of quick-relief medications and maintenance medication.  Counseled patient on lifestyle modifications to help control headaches. .    Patient Treatment Goals:   symptom prevention, minimizing limitation in activity, prevention of exacerbations and use of ER/inpatient care, maintenance of optimal pulmonary function, and minimization of adverse effects of treatment    Followup in 6 months.

## 2024-11-01 NOTE — PROGRESS NOTES
Subjective   The ABCs of the Annual Wellness Visit  Medicare Wellness Visit      Colin Nevarez is a 74 y.o. patient who presents for a Medicare Wellness Visit.    The following portions of the patient's history were reviewed and   updated as appropriate: allergies, current medications, past family history, past medical history, past social history, past surgical history, and problem list.    Compared to one year ago, the patient's physical   health is the same.  Compared to one year ago, the patient's mental   health is the same.    Recent Hospitalizations:  This patient has had a [facility] admission record on file within the last 365 days.  Current Medical Providers:  Patient Care Team:  Liz Roper DO as PCP - General (Family Medicine)  Nahed Samuels APRN as Nurse Practitioner (Urology)  Falguni Crawford APRN as Nurse Practitioner (Pulmonary Disease)  Dionisio Neville MD as Consulting Physician (Hematology)    Outpatient Medications Prior to Visit   Medication Sig Dispense Refill    albuterol (PROVENTIL) (2.5 MG/3ML) 0.083% nebulizer solution Take 2.5 mg by nebulization Every 4 (Four) Hours As Needed for Wheezing. 100 each 12    albuterol sulfate  (90 Base) MCG/ACT inhaler Inhale 2 puffs Every 4 (Four) Hours As Needed for Wheezing or Shortness of Air. 18 g 2    amLODIPine (NORVASC) 10 MG tablet Take 1 tablet by mouth Daily. 90 tablet 1    arformoterol (BROVANA) 15 MCG/2ML nebulizer solution Take 2 mL by nebulization 2 (Two) Times a Day. 120 mL 3    aspirin 81 MG chewable tablet Chew 1 tablet Daily.      budesonide (Pulmicort) 0.5 MG/2ML nebulizer solution Take 2 mL by nebulization 2 (Two) Times a Day. 60 each 3    formoterol (Perforomist) 20 MCG/2ML nebulizer solution Take 2 mL by nebulization 2 (Two) Times a Day. 120 mL 11    Multiple Vitamins-Minerals (MULTIVITAMIN GUMMIES ADULTS PO) Take 1 tablet by mouth Daily. 1 gummie/day      O2 (OXYGEN) Patient  unsure of how many liters and  "only wears prn.      Fluticasone-Umeclidin-Vilant (Trelegy Ellipta) 200-62.5-25 MCG/ACT inhaler Inhale 1 puff Daily. 60 each 5    Fluticasone Furoate (Arnuity Ellipta) 100 MCG/ACT aerosol powder  Inhale 1 puff Daily. (Patient not taking: Reported on 11/1/2024) 30 each 11    NICOTINE TD Place  on the skin as directed by provider. States that he wears approximately every other day. (Patient not taking: Reported on 11/1/2024)       No facility-administered medications prior to visit.     No opioid medication identified on active medication list. I have reviewed chart for other potential  high risk medication/s and harmful drug interactions in the elderly.      Aspirin is on active medication list. Aspirin use is indicated based on review of current medical condition/s. Pros and cons of this therapy have been discussed today. Benefits of this medication outweigh potential harm.  Patient has been encouraged to continue taking this medication.  .      Patient Active Problem List   Diagnosis    Arthritis    Chronic obstructive pulmonary disease    Heart flutter, ventricular    High cholesterol    Prostate cancer    Essential hypertension    Urinary incontinence, stress, male    Cigarette nicotine dependence with nicotine-induced disorder    Hematuria    Medicare annual wellness visit, subsequent    Ascending aortic aneurysm    Squamous cell carcinoma lung     Advance Care Planning Advance Directive is on file.  ACP discussion was held with the patient during this visit. Patient has an advance directive in EMR which is still valid.             Objective   Vitals:    11/01/24 1039   BP: 137/88   BP Location: Left arm   Patient Position: Sitting   Cuff Size: Adult   Pulse: 70   Resp: 18   Temp: 97.8 °F (36.6 °C)   TempSrc: Temporal   SpO2: 97%   Weight: 67.2 kg (148 lb 3.2 oz)   Height: 177.8 cm (70\")   PainSc: 0-No pain       Estimated body mass index is 21.26 kg/m² as calculated from the following:    Height as of this " "encounter: 177.8 cm (70\").    Weight as of this encounter: 67.2 kg (148 lb 3.2 oz).    BMI is within normal parameters. No other follow-up for BMI required.       Does the patient have evidence of cognitive impairment? No                                                                                                Health  Risk Assessment    Smoking Status:  Social History     Tobacco Use   Smoking Status Every Day    Current packs/day: 0.25    Average packs/day: 0.3 packs/day for 65.8 years (16.5 ttl pk-yrs)    Types: Cigarettes    Start date:     Passive exposure: Current   Smokeless Tobacco Never   Tobacco Comments         Alcohol Consumption:  Social History     Substance and Sexual Activity   Alcohol Use Never       Fall Risk Screen  STEADI Fall Risk Assessment was completed, and patient is at LOW risk for falls.Assessment completed on:2024    Depression Screenin/1/2024    10:00 AM   PHQ-2/PHQ-9 Depression Screening   Little interest or pleasure in doing things Not at all   Feeling down, depressed, or hopeless Not at all     Health Habits and Functional and Cognitive Screenin/1/2024    10:00 AM   Functional & Cognitive Status   Do you have difficulty preparing food and eating? No   Do you have difficulty bathing yourself, getting dressed or grooming yourself? No   Do you have difficulty using the toilet? No   Do you have difficulty moving around from place to place? No   Do you have trouble with steps or getting out of a bed or a chair? No   Current Diet Well Balanced Diet   Dental Exam Up to date   Eye Exam Up to date   Exercise (times per week) 7 times per week   Current Exercises Include Yard Work;Walking   Do you need help using the phone?  No   Are you deaf or do you have serious difficulty hearing?  Yes   Do you need help to go to places out of walking distance? No   Do you need help shopping? No   Do you need help preparing meals?  No   Do you need help with housework?  No "   Do you need help with laundry? No   Do you need help taking your medications? No   Do you need help managing money? No   Do you ever drive or ride in a car without wearing a seat belt? No   Have you felt unusual stress, anger or loneliness in the last month? No   Who do you live with? Alone   If you need help, do you have trouble finding someone available to you? No   Have you been bothered in the last four weeks by sexual problems? No   Do you have difficulty concentrating, remembering or making decisions? No           Age-appropriate Screening Schedule:  Refer to the list below for future screening recommendations based on patient's age, sex and/or medical conditions. Orders for these recommended tests are listed in the plan section. The patient has been provided with a written plan.    Health Maintenance List  Health Maintenance   Topic Date Due    TDAP/TD VACCINES (1 - Tdap) Never done    ZOSTER VACCINE (1 of 2) Never done    COVID-19 Vaccine (4 - 2023-24 season) 09/01/2024    INFLUENZA VACCINE  03/31/2025 (Originally 8/1/2024)    LIPID PANEL  02/09/2025    ANNUAL WELLNESS VISIT  11/01/2025    COLORECTAL CANCER SCREENING  06/14/2028    HEPATITIS C SCREENING  Completed    Pneumococcal Vaccine 65+  Completed    AAA SCREEN (ONE-TIME)  Completed    LUNG CANCER SCREENING  Discontinued                                                                                                                                                CMS Preventative Services Quick Reference  Risk Factors Identified During Encounter  Fall Risk-High or Moderate: Discussed Fall Prevention in the home    The above risks/problems have been discussed with the patient.  Pertinent information has been shared with the patient in the After Visit Summary.  An After Visit Summary and PPPS were made available to the patient.    Follow Up:   Next Medicare Wellness visit to be scheduled in 1 year.         Additional E&M Note during same encounter  "follows:  Patient has additional, significant, and separately identifiable condition(s)/problem(s) that require work above and beyond the Medicare Wellness Visit     Chief Complaint  Medicare Wellness-subsequent    Subjective   HPI  Mayco is also being seen today for additional medical problem/s. COPD, HTN, HLD and nicotine dependence.    Review of Systems   HENT:  Negative for trouble swallowing.    Eyes:  Negative for visual disturbance.   Respiratory:  Negative for apnea.    Cardiovascular:  Negative for chest pain.   Gastrointestinal:  Negative for blood in stool.   Endocrine: Negative for polyphagia.   Genitourinary:  Negative for dysuria.   Skin:  Negative for color change.   Allergic/Immunologic: Negative for immunocompromised state.   Neurological:  Negative for seizures.   Hematological:  Negative for adenopathy.   Psychiatric/Behavioral:  Negative for behavioral problems.               Objective   Vital Signs:  /88 (BP Location: Left arm, Patient Position: Sitting, Cuff Size: Adult)   Pulse 70   Temp 97.8 °F (36.6 °C) (Temporal)   Resp 18   Ht 177.8 cm (70\")   Wt 67.2 kg (148 lb 3.2 oz)   SpO2 97%   BMI 21.26 kg/m²   Physical Exam  Vitals reviewed.   Constitutional:       Appearance: Normal appearance. He is well-developed.   HENT:      Head: Normocephalic and atraumatic.      Right Ear: External ear normal.      Left Ear: External ear normal.      Mouth/Throat:      Pharynx: No oropharyngeal exudate.   Eyes:      Conjunctiva/sclera: Conjunctivae normal.      Pupils: Pupils are equal, round, and reactive to light.   Neck:      Vascular: No carotid bruit.   Cardiovascular:      Rate and Rhythm: Normal rate and regular rhythm.      Heart sounds: No murmur heard.     No friction rub. No gallop.   Pulmonary:      Effort: Pulmonary effort is normal.      Breath sounds: Normal breath sounds. No wheezing or rhonchi.   Abdominal:      General: There is no distension.   Skin:     General: Skin is warm " and dry.   Neurological:      Mental Status: He is alert and oriented to person, place, and time.      Cranial Nerves: No cranial nerve deficit.      Motor: No weakness.   Psychiatric:         Mood and Affect: Mood and affect normal.         Behavior: Behavior normal.         Thought Content: Thought content normal.         Judgment: Judgment normal.                 Assessment and Plan               Medicare annual wellness visit, subsequent    Chronic obstructive pulmonary disease, unspecified COPD type  COPD is worsening.    Plan:  Continue same medication/s without change.    Discussed medication dosage, use, side effects, and goals of treatment in detail.    Discussed monitoring symptoms and use of quick-relief medications and maintenance medication.  Counseled patient on lifestyle modifications to help control headaches. .    Patient Treatment Goals:   symptom prevention, minimizing limitation in activity, prevention of exacerbations and use of ER/inpatient care, maintenance of optimal pulmonary function, and minimization of adverse effects of treatment    Followup in 6 months.    High cholesterol  Lipid abnormalities are improving with lifestyle modifications.  Nutritional counseling was provided.  Lipids will be reassessed in 6 months.    The 10-year ASCVD risk score (Richard THRASHER, et al., 2019) is: 31.2%    Values used to calculate the score:      Age: 74 years      Sex: Male      Is Non- : No      Diabetic: No      Tobacco smoker: Yes      Systolic Blood Pressure: 137 mmHg      Is BP treated: Yes      HDL Cholesterol: 41 mg/dL      Total Cholesterol: 164 mg/dL    Essential hypertension  Hypertension is stable and controlled  Continue current treatment regimen.  Dietary sodium restriction.  Weight loss.  Blood pressure will be reassessed in 6 months.  Cigarette nicotine dependence with nicotine-induced disorder  Tobacco use is improving with lifestyle modifications.  Smoking cessation  counseling was provided.  Tobacco use will be reassessed in 6 months.    He is down to smoking 12 cigarettes per day.                                         No orders of the defined types were placed in this encounter.    New Medications Ordered This Visit   Medications    Budeson-Glycopyrrol-Formoterol (Breztri Aerosphere) 160-9-4.8 MCG/ACT aerosol inhaler     Sig: Inhale 2 puffs 2 (Two) Times a Day.     Dispense:  2 each     Refill:  0    Fluticasone-Umeclidin-Vilant (Trelegy Ellipta) 100-62.5-25 MCG/ACT inhaler     Sig: Inhale 1 puff Daily.     Dispense:  1 each     Refill:  0          Follow Up   Return in about 6 months (around 5/1/2025).  Patient was given instructions and counseling regarding his condition or for health maintenance advice. Please see specific information pulled into the AVS if appropriate.

## 2024-11-01 NOTE — ASSESSMENT & PLAN NOTE
Tobacco use is improving with lifestyle modifications.  Smoking cessation counseling was provided.  Tobacco use will be reassessed in 6 months.    He is down to smoking 12 cigarettes per day.

## 2024-12-20 ENCOUNTER — OFFICE VISIT (OUTPATIENT)
Dept: PULMONOLOGY | Facility: CLINIC | Age: 74
End: 2024-12-20
Payer: MEDICARE

## 2024-12-20 VITALS
HEART RATE: 80 BPM | HEIGHT: 70 IN | OXYGEN SATURATION: 92 % | SYSTOLIC BLOOD PRESSURE: 150 MMHG | DIASTOLIC BLOOD PRESSURE: 71 MMHG | BODY MASS INDEX: 21.09 KG/M2 | RESPIRATION RATE: 16 BRPM | TEMPERATURE: 97.6 F | WEIGHT: 147.3 LBS

## 2024-12-20 DIAGNOSIS — J43.2 CENTRILOBULAR EMPHYSEMA: Chronic | ICD-10-CM

## 2024-12-20 DIAGNOSIS — R06.09 DYSPNEA ON EXERTION: ICD-10-CM

## 2024-12-20 DIAGNOSIS — J44.9 CHRONIC OBSTRUCTIVE PULMONARY DISEASE, UNSPECIFIED COPD TYPE: Primary | Chronic | ICD-10-CM

## 2024-12-20 DIAGNOSIS — Z23 ENCOUNTER FOR IMMUNIZATION: ICD-10-CM

## 2024-12-20 DIAGNOSIS — J96.11 CHRONIC RESPIRATORY FAILURE WITH HYPOXIA: Chronic | ICD-10-CM

## 2024-12-20 DIAGNOSIS — F17.210 NICOTINE DEPENDENCE, CIGARETTES, UNCOMPLICATED: ICD-10-CM

## 2024-12-20 NOTE — PATIENT INSTRUCTIONS
COPD and Physical Activity  Chronic obstructive pulmonary disease (COPD) is a long-term, or chronic, condition that affects the lungs. COPD is a general term that can be used to describe many problems that cause inflammation of the lungs and limit airflow. These conditions include chronic bronchitis and emphysema.  The main symptom of COPD is shortness of breath, which makes it harder to do even simple tasks. This can also make it harder to exercise and stay active. Talk with your health care provider about treatments to help you breathe better and actions you can take to prevent breathing problems during physical activity.  What are the benefits of exercising when you have COPD?  Exercising regularly is an important part of a healthy lifestyle. You can still exercise and do physical activities even though you have COPD. Exercise and physical activity improve your shortness of breath by increasing blood flow (circulation). This causes your heart to pump more oxygen through your body. Moderate exercise can:  Improve oxygen use.  Increase your energy level.  Help with shortness of breath.  Strengthen your breathing muscles.  Improve heart health.  Help with sleep.  Improve your self-esteem and feelings of self-worth.  Lower depression, stress, and anxiety.  Exercise can benefit everyone with COPD. The severity of your disease may affect how hard you can exercise, especially at first, but everyone can benefit. Talk with your health care provider about how much exercise is safe for you, and which activities and exercises are safe for you.  What actions can I take to prevent breathing problems during physical activity?  Sign up for a pulmonary rehabilitation program. This type of program may include:  Education about lung diseases.  Exercise classes that teach you how to exercise and be more active while improving your breathing. This usually involves:  Exercise using your lower extremities, such as a stationary  bicycle.  About 30 minutes of exercise, 2 to 5 times per week, for 6 to 12 weeks.  Strength training, such as push-ups or leg lifts.  Nutrition education.  Group classes in which you can talk with others who also have COPD and learn ways to manage stress.  If you use an oxygen tank, you should use it while you exercise. Work with your health care provider to adjust your oxygen for your physical activity. Your resting flow rate is different from your flow rate during physical activity.  How to manage your breathing while exercising  While you are exercising:  Take slow breaths.  Pace yourself, and do nottry to go too fast.  Purse your lips while breathing out. Pursing your lips is similar to a kissing or whistling position.  If doing exercise that uses a quick burst of effort, such as weight lifting:  Breathe in before starting the exercise.  Breathe out during the hardest part of the exercise, such as raising the weights.  Where to find support  You can find support for exercising with COPD from:  Your health care provider.  A pulmonary rehabilitation program.  Your local health department or community health programs.  Support groups, either online or in-person. Your health care provider may be able to recommend support groups.  Where to find more information  You can find more information about exercising with COPD from:  American Lung Association: lung.org  COPD Foundation: copdfoundation.org  Contact a health care provider if:  Your symptoms get worse.  You have nausea.  You have a fever.  You want to start a new exercise program or a new activity.  Get help right away if:  You have chest pain.  You cannot breathe.  These symptoms may represent a serious problem that is an emergency. Do not wait to see if the symptoms will go away. Get medical help right away. Call your local emergency services (911 in the [place] Do not drive yourself to the hospital.  Summary  COPD is a general term that can be used to describe  "many different lung problems that cause lung inflammation and limit airflow. This includes chronic bronchitis and emphysema.  Exercise and physical activity improve your shortness of breath by increasing blood flow (circulation). This causes your heart to provide more oxygen to your body.  Contact your health care provider before starting any exercise program or new activity. Ask your health care provider what exercises and activities are safe for you.  This information is not intended to replace advice given to you by your health care provider. Make sure you discuss any questions you have with your health care provider.  Document Revised: 10/26/2021 Document Reviewed: 10/26/2021  [employer] Patient Education © 2024 [employer]  Smoking Tobacco Information, Adult  Smoking tobacco can be harmful to your health. Tobacco contains a toxic colorless chemical called nicotine. Nicotine causes changes in your brain that make you want more and more. This is called addiction. This can make it hard to stop smoking once you start. Tobacco also has other toxic chemicals that can hurt your body and raise your risk of many cancers.  Menthol or \"lite\" tobacco or cigarette brands are not safer than regular brands.  How can smoking tobacco affect me?  Smoking tobacco puts you at risk for:  Cancer. Smoking is most commonly associated with lung cancer, but can also lead to cancer in other parts of the body.  Chronic obstructive pulmonary disease (COPD). This is a long-term lung condition that makes it hard to breathe. It also gets worse over time.  High blood pressure (hypertension), heart disease, stroke, heart attack, and lung infections, such as pneumonia.  Cataracts. This is when the lenses in the eyes become clouded.  Digestive problems. This may include peptic ulcers, heartburn, and gastroesophageal reflux disease (GERD).  Oral health problems, such as gum disease, mouth sores, and tooth loss.  Loss of taste and smell.  Smoking " also affects how you look and smell. Smoking may cause:  Wrinkles.  Yellow or stained teeth, fingers, and fingernails.  Bad breath.  Bad-smelling clothes and hair.  Smoking tobacco can also affect your social life, because:  It may be challenging to find places to smoke when away from home. Many workplaces, restaurants, hotels, and public places are tobacco-free.  Smoking is expensive. This is due to the cost of tobacco and the long-term costs of treating health problems from smoking.  Secondhand smoke may affect those around you. Secondhand smoke can cause lung cancer, breathing problems, and heart disease. Children of smokers have a higher risk for:  Sudden infant death syndrome (SIDS).  Ear infections.  Lung infections.  What actions can I take to prevent health problems?  Quit smoking    Do not start smoking. Quit if you already smoke.  Do not replace cigarette smoking with vaping devices, such as e-cigarettes.  Make a plan to quit smoking and commit to it. Look for programs to help you, and ask your health care provider for recommendations and ideas. Set a date and write down all the reasons you want to quit.  Let your friends and family know you are quitting so they can help and support you. Consider finding friends who also want to quit. It can be easier to quit with someone else, so that you can support each other.  Talk with your health care provider about using nicotine replacement medicines to help you quit. These include gum, lozenges, patches, sprays, or pills.  If you try to quit but return to smoking, stay positive. It is common to slip up when you first quit, so take it one day at a time.  Be prepared for cravings. When you feel the urge to smoke, chew gum or suck on hard candy.  Lifestyle  Stay busy.  Take care of your body. Get plenty of exercise, eat a healthy diet, and drink plenty of water.  Find ways to manage your stress, such as meditation, yoga, exercise, or time spent with friends and  family.  Ask your health care provider about having regular tests (screenings) to check for cancer. This may include blood tests, imaging tests, and other tests.  Where to find support  To get support to quit smoking, consider:  Asking your health care provider for more information and resources.  Joining a support group for people who want to quit smoking in your local community. There are many effective programs that may help you to quit.  Calling the smokefree.gov counselor helpline at 9-708-QUIT-NOW (1-902.213.3761).  Where to find more information  You may find more information about quitting smoking from:  Centers for Disease Control and Prevention: cdc.gov/tobacco  Smokefree.gov: smokefree.gov  American Lung Association: freedomfromsmoking.org  Contact a health care provider if:  You have problems breathing.  Your lips, nose, or fingers turn blue.  You have chest pain.  You are coughing up blood.  You feel like you will faint.  You have other health changes that cause you to worry.  Summary  Smoking tobacco can negatively affect your health, the health of those around you, your finances, and your social life.  Do not start smoking. Quit if you already smoke. If you need help quitting, ask your health care provider.  Consider joining a support group for people in your local community who want to quit smoking. There are many effective programs that may help you to quit.  This information is not intended to replace advice given to you by your health care provider. Make sure you discuss any questions you have with your health care provider.  Document Revised: 12/13/2022 Document Reviewed: 12/13/2022  [employer] Patient Education © 2024 [employer]

## 2024-12-20 NOTE — PROGRESS NOTES
Primary Care Provider  Liz Rpoer DO     Referring Provider  No ref. provider found     Chief Complaint  COPD, Cough (Clear thick mucus ), Shortness of Breath, and Follow-up (6 month f/up )    Subjective          Colin Nevarez presents to [facility]  History of Present Illness  Colin Nevarez is a 74 y.o. male patient of Dr. tirado for management of COPD, centrilobular emphysema and tobacco abuse of cigarettes ongoing.     Patient states he is doing well since his last office visit.  He denies use any antibiotics or steroids for his lungs.  He denies any current fevers or chills.  His shortness of breath is mild in severity, worse with exertion and improved the rest.  He continues to use Brovana and Pulmicort as prescribed. Patient also has as needed albuterol.  He continues to use his oxygen and is benefiting from its use.  Patient continues to smoke 0.5 packs of cigarettes daily and is not interested in quitting.  He continues to follow-up with oncology and radiation oncology for management of history of prostate cancer and lung cancer.  He would like to have the RSV sent to his local pharmacy.  He is debating on getting his flu vaccine at his primary care provider's office.     His history of smoking is   Tobacco Use: High Risk (12/20/2024)    Patient History     Smoking Tobacco Use: Every Day     Smokeless Tobacco Use: Never     Passive Exposure: Current   Colin Nevarez  reports that he has been smoking cigarettes. He started smoking about 66 years ago. He has a 16.5 pack-year smoking history. He has been exposed to tobacco smoke. He has never used smokeless tobacco. I have educated him on the risk of diseases from using tobacco products such as cancer, COPD, and heart disease.     I advised him to quit and he is not willing to quit.    I spent 3  minutes counseling the patient.      Review of Systems   Constitutional:  Negative for chills, fatigue, fever,  unexpected weight gain and unexpected weight loss.   HENT:  Congestion: Nasal.    Respiratory:  Positive for cough and shortness of breath. Negative for apnea and wheezing.         Negative for Hemoptysis     Cardiovascular:  Negative for chest pain, palpitations and leg swelling.   Skin:         Negative for cyanosis      Sleep: Negative for Excessive daytime sleepiness  Negative for morning headaches  Negative for Snoring    Family History   Problem Relation Age of Onset    Ovarian cancer Mother     Stroke Father     Breast cancer Sister 40    Cancer Maternal Grandmother     Cancer Paternal Grandmother         Social History     Socioeconomic History    Marital status: Single   Tobacco Use    Smoking status: Every Day     Current packs/day: 0.25     Average packs/day: 0.3 packs/day for 66.0 years (16.5 ttl pk-yrs)     Types: Cigarettes     Start date: 1958     Passive exposure: Current    Smokeless tobacco: Never    Tobacco comments:        Vaping Use    Vaping status: Never Used   Substance and Sexual Activity    Alcohol use: Never    Drug use: Defer    Sexual activity: Defer        Past Medical History:   Diagnosis Date    Arthritis     COPD (chronic obstructive pulmonary disease)     Essential hypertension 07/12/2021    Forgetfulness     Heart flutter, ventricular     High cholesterol     Lung cancer     Prostate cancer     Ringing in ear     Shortness of Air     SOB (shortness of breath)         Immunization History   Administered Date(s) Administered    COVID-19 (MODERNA) 1st,2nd,3rd Dose Monovalent 04/12/2021, 05/10/2021, 03/29/2022    COVID-19 (MODERNA) Monovalent Original Booster 04/12/2021, 05/10/2021    FLUAD TRI 65YR+ 10/12/2017    Fluzone Quad >6mos (Multi-dose) 10/23/2019    Pneumococcal Conjugate 13-Valent (PCV13) 09/03/2018    Pneumococcal Conjugate 20-Valent (PCV20) 01/25/2024    Pneumococcal Polysaccharide (PPSV23) 09/04/2017, 10/12/2017         No Known Allergies       Current Outpatient  Medications:     albuterol (PROVENTIL) (2.5 MG/3ML) 0.083% nebulizer solution, Take 2.5 mg by nebulization Every 4 (Four) Hours As Needed for Wheezing., Disp: 100 each, Rfl: 12    albuterol sulfate  (90 Base) MCG/ACT inhaler, Inhale 2 puffs Every 4 (Four) Hours As Needed for Wheezing or Shortness of Air., Disp: 18 g, Rfl: 2    amLODIPine (NORVASC) 10 MG tablet, Take 1 tablet by mouth Daily., Disp: 90 tablet, Rfl: 1    arformoterol (BROVANA) 15 MCG/2ML nebulizer solution, Take 2 mL by nebulization 2 (Two) Times a Day., Disp: 120 mL, Rfl: 3    aspirin 81 MG chewable tablet, Chew 1 tablet Daily., Disp: , Rfl:     budesonide (Pulmicort) 0.5 MG/2ML nebulizer solution, Take 2 mL by nebulization 2 (Two) Times a Day., Disp: 60 each, Rfl: 3    Multiple Vitamins-Minerals (MULTIVITAMIN GUMMIES ADULTS PO), Take 1 tablet by mouth Daily. 1 gummie/day, Disp: , Rfl:     O2 (OXYGEN), Patient  unsure of how many liters and only wears prn., Disp: , Rfl:     RSVPreF3 Vac Recomb Adjuvanted (AREXVY) 120 MCG/0.5ML reconstituted suspension injection, Inject 0.5 mL into the appropriate muscle as directed by prescriber 1 (One) Time for 1 dose., Disp: 0.5 mL, Rfl: 0     Objective   Physical Exam  Constitutional:       General: He is not in acute distress.     Appearance: Normal appearance. He is normal weight.   HENT:      Right Ear: Hearing normal.      Left Ear: Hearing normal.      Nose: No nasal tenderness or congestion.      Mouth/Throat:      Mouth: Mucous membranes are moist. No oral lesions.   Eyes:      Extraocular Movements: Extraocular movements intact.      Pupils: Pupils are equal, round, and reactive to light.   Cardiovascular:      Rate and Rhythm: Normal rate and regular rhythm.      Pulses: Normal pulses.      Heart sounds: Normal heart sounds. No murmur heard.  Pulmonary:      Effort: Pulmonary effort is normal.      Breath sounds: Examination of the right-upper field reveals wheezing. Examination of the left-upper  "field reveals wheezing. Wheezing (Expiratory) present. No rhonchi or rales.   Musculoskeletal:      Right lower leg: No edema.      Left lower leg: No edema.   Skin:     General: Skin is warm and dry.      Findings: No lesion or rash.   Neurological:      General: No focal deficit present.      Mental Status: He is alert and oriented to person, place, and time.   Psychiatric:         Mood and Affect: Affect normal. Mood is not anxious or depressed.         Vital Signs:   /71 (BP Location: Right arm, Patient Position: Sitting, Cuff Size: Adult)   Pulse 80   Temp 97.6 °F (36.4 °C) (Oral)   Resp 16   Ht 177.8 cm (70\")   Wt 66.8 kg (147 lb 4.8 oz)   SpO2 92% Comment: RA  BMI 21.14 kg/m²        Result Review :   The following data was reviewed by: CHEN Olsen on 12/20/2024:  CMP          7/20/2024    05:07 7/21/2024    05:50 7/26/2024    09:50   CMP   Glucose 74  70  73    BUN 19  16  13    Creatinine 0.45  0.52  0.69    EGFR 111.2  106.4  97.7    Sodium 141  141  138    Potassium 4.0  3.8  4.5    Chloride 98  102  98    Calcium 8.6  8.6  9.5    Total Protein   6.5    Albumin   3.7    Globulin   2.8    Total Bilirubin   0.2    Alkaline Phosphatase   75    AST (SGOT)   37    ALT (SGPT)   93    Albumin/Globulin Ratio   1.3    BUN/Creatinine Ratio 42.2  30.8  18.8    Anion Gap 2.7  0.5  11.3      CBC w/diff          7/20/2024    05:07 7/21/2024    05:50 7/26/2024    09:50   CBC w/Diff   WBC 9.10  7.36  13.42    RBC 5.91  5.32  5.80    Hemoglobin 17.8  16.0  17.6    Hematocrit 55.3  48.9  51.6    MCV 93.6  91.9  89.0    MCH 30.1  30.1  30.3    MCHC 32.2  32.7  34.1    RDW 13.1  13.1  12.8    Platelets 110  108  216    Neutrophil Rel % 71.2  64.1  71.4    Immature Granulocyte Rel % 1.1  1.8  3.7    Lymphocyte Rel % 15.3  20.9  16.7    Monocyte Rel % 11.5  11.3  6.6    Eosinophil Rel % 0.7  1.1  1.0    Basophil Rel % 0.2  0.8  0.6      Data reviewed : Radiologic studies chest CT 2/6/2024, chest CT " 8/8/2024, pulmonary function test 5/15/2023 and my last office note    Procedures        Assessment and Plan    Diagnoses and all orders for this visit:    1. Chronic obstructive pulmonary disease, unspecified COPD type (Primary)  Comments:  Continue Brovana and Pulmicort    2. Centrilobular emphysema  Comments:  Continue Brovana and Pulmicort    3. Dyspnea on exertion  Comments:  Continue albuterol as needed    4. Nicotine dependence, cigarettes, uncomplicated  Comments:  Smoking cessation education    5. Encounter for immunization  -     RSVPreF3 Vac Recomb Adjuvanted (AREXVY) 120 MCG/0.5ML reconstituted suspension injection; Inject 0.5 mL into the appropriate muscle as directed by prescriber 1 (One) Time for 1 dose.  Dispense: 0.5 mL; Refill: 0    6. Chronic respiratory failure with hypoxia  Comments:  Continue oxygen    Smoking cessation counseling provided.  I spent 3 minutes today counseling patient on the risks of smoking, including throat cancer, lung cancer, COPD, heart disease and death.  Also discussed the benefits of quitting.     I spent 36 minutes caring for Colin on this date of service. This time includes time spent by me in the following activities:preparing for the visit, reviewing tests, obtaining and/or reviewing a separately obtained history, performing a medically appropriate examination and/or evaluation , counseling and educating the patient/family/caregiver, ordering medications, tests, or procedures, and documenting information in the medical record    Follow Up   Return in about 6 months (around 6/20/2025) for Recheck.  Patient was given instructions and counseling regarding his condition or for health maintenance advice. Please see specific information pulled into the AVS if appropriate.

## 2024-12-26 ENCOUNTER — OFFICE VISIT (OUTPATIENT)
Dept: FAMILY MEDICINE CLINIC | Facility: CLINIC | Age: 74
End: 2024-12-26
Payer: MEDICARE

## 2024-12-26 VITALS
TEMPERATURE: 97.9 F | HEART RATE: 88 BPM | OXYGEN SATURATION: 91 % | SYSTOLIC BLOOD PRESSURE: 128 MMHG | WEIGHT: 144.6 LBS | BODY MASS INDEX: 20.7 KG/M2 | HEIGHT: 70 IN | RESPIRATION RATE: 22 BRPM | DIASTOLIC BLOOD PRESSURE: 67 MMHG

## 2024-12-26 DIAGNOSIS — F17.219 CIGARETTE NICOTINE DEPENDENCE WITH NICOTINE-INDUCED DISORDER: Chronic | ICD-10-CM

## 2024-12-26 DIAGNOSIS — J44.1 CHRONIC OBSTRUCTIVE PULMONARY DISEASE WITH ACUTE EXACERBATION: Primary | ICD-10-CM

## 2024-12-26 DIAGNOSIS — I10 ESSENTIAL HYPERTENSION: Chronic | ICD-10-CM

## 2024-12-26 PROBLEM — Z00.00 MEDICARE ANNUAL WELLNESS VISIT, SUBSEQUENT: Status: RESOLVED | Noted: 2022-07-14 | Resolved: 2024-12-26

## 2024-12-26 PROCEDURE — 1160F RVW MEDS BY RX/DR IN RCRD: CPT | Performed by: FAMILY MEDICINE

## 2024-12-26 PROCEDURE — 3078F DIAST BP <80 MM HG: CPT | Performed by: FAMILY MEDICINE

## 2024-12-26 PROCEDURE — 1126F AMNT PAIN NOTED NONE PRSNT: CPT | Performed by: FAMILY MEDICINE

## 2024-12-26 PROCEDURE — 99215 OFFICE O/P EST HI 40 MIN: CPT | Performed by: FAMILY MEDICINE

## 2024-12-26 PROCEDURE — 96372 THER/PROPH/DIAG INJ SC/IM: CPT | Performed by: FAMILY MEDICINE

## 2024-12-26 PROCEDURE — 1159F MED LIST DOCD IN RCRD: CPT | Performed by: FAMILY MEDICINE

## 2024-12-26 PROCEDURE — 3074F SYST BP LT 130 MM HG: CPT | Performed by: FAMILY MEDICINE

## 2024-12-26 RX ORDER — ALBUTEROL SULFATE 0.83 MG/ML
2.5 SOLUTION RESPIRATORY (INHALATION) ONCE
Status: COMPLETED | OUTPATIENT
Start: 2024-12-26 | End: 2024-12-26

## 2024-12-26 RX ORDER — PREDNISONE 20 MG/1
TABLET ORAL
Qty: 36 TABLET | Refills: 0 | Status: SHIPPED | OUTPATIENT
Start: 2024-12-26 | End: 2025-01-07

## 2024-12-26 RX ORDER — AZITHROMYCIN 250 MG/1
TABLET, FILM COATED ORAL
Qty: 6 TABLET | Refills: 0 | Status: SHIPPED | OUTPATIENT
Start: 2024-12-26

## 2024-12-26 RX ORDER — METHYLPREDNISOLONE SODIUM SUCCINATE 125 MG/2ML
125 INJECTION INTRAMUSCULAR; INTRAVENOUS EVERY 6 HOURS
Status: DISCONTINUED | OUTPATIENT
Start: 2024-12-26 | End: 2024-12-27

## 2024-12-26 RX ORDER — IPRATROPIUM BROMIDE AND ALBUTEROL SULFATE 2.5; .5 MG/3ML; MG/3ML
3 SOLUTION RESPIRATORY (INHALATION) EVERY 4 HOURS PRN
Qty: 360 ML | Refills: 2 | Status: SHIPPED | OUTPATIENT
Start: 2024-12-26

## 2024-12-26 RX ADMIN — METHYLPREDNISOLONE SODIUM SUCCINATE 125 MG: 125 INJECTION INTRAMUSCULAR; INTRAVENOUS at 16:00

## 2024-12-26 RX ADMIN — ALBUTEROL SULFATE 2.5 MG: 0.83 SOLUTION RESPIRATORY (INHALATION) at 16:00

## 2024-12-26 NOTE — PROGRESS NOTES
"Chief Complaint  Shortness of Breath (Pt reports getting RSV vaccination on Friday 12/20, states that he began to feel bad late Monday evening. Pt reports feeling SOB, more than usual. O2 reading upon entering exam room was 77%. Pt states that he feels like he can't breathe.)    Subjective        Colin Nevarez presents to [facility]  History of Present Illness  He is here today for management of his chronic medical conditions and for an acute visit. He has right middle lung cancer, tobacco abuse, COPD, prostate cancer and chronic back pain. He is a smoker. He has smoked since he was 7 years old. He smokes a little less than one pack per day.     He has been having a cough and congestion with SOB since he received the RSV vaccination. On arrival today his 02 was 77% on room air.      The patient has no other complaints today and denies chest pain, shortness of breath, weakness, numbness, nausea, vomiting, diarrhea, dizziness or syncopal event.        Objective   Vital Signs:  /67 (BP Location: Right arm, Patient Position: Sitting, Cuff Size: Adult)   Pulse 88   Temp 97.9 °F (36.6 °C) (Temporal)   Resp 22   Ht 177.8 cm (70\")   Wt 65.6 kg (144 lb 9.6 oz)   SpO2 91%   BMI 20.75 kg/m²   Estimated body mass index is 20.75 kg/m² as calculated from the following:    Height as of this encounter: 177.8 cm (70\").    Weight as of this encounter: 65.6 kg (144 lb 9.6 oz).    BMI is within normal parameters. No other follow-up for BMI required.      Physical Exam  Vitals reviewed.   Constitutional:       Appearance: Normal appearance. He is well-developed.   HENT:      Head: Normocephalic and atraumatic.      Right Ear: External ear normal.      Left Ear: External ear normal.      Mouth/Throat:      Pharynx: No oropharyngeal exudate.   Eyes:      Conjunctiva/sclera: Conjunctivae normal.      Pupils: Pupils are equal, round, and reactive to light.   Neck:      Vascular: No carotid " bruit.   Cardiovascular:      Rate and Rhythm: Normal rate and regular rhythm.      Heart sounds: No murmur heard.     No friction rub. No gallop.   Pulmonary:      Effort: Tachypnea and accessory muscle usage present.      Breath sounds: Decreased air movement present. Decreased breath sounds and wheezing present. No rhonchi.   Abdominal:      General: There is no distension.   Skin:     General: Skin is warm and dry.   Neurological:      Mental Status: He is alert and oriented to person, place, and time.      Cranial Nerves: No cranial nerve deficit.      Motor: No weakness.   Psychiatric:         Mood and Affect: Mood and affect normal.         Behavior: Behavior normal.         Thought Content: Thought content normal.         Judgment: Judgment normal.        Result Review :    CMP          7/20/2024    05:07 7/21/2024    05:50 7/26/2024    09:50   CMP   Glucose 74  70  73    BUN 19  16  13    Creatinine 0.45  0.52  0.69    EGFR 111.2  106.4  97.7    Sodium 141  141  138    Potassium 4.0  3.8  4.5    Chloride 98  102  98    Calcium 8.6  8.6  9.5    Total Protein   6.5    Albumin   3.7    Globulin   2.8    Total Bilirubin   0.2    Alkaline Phosphatase   75    AST (SGOT)   37    ALT (SGPT)   93    Albumin/Globulin Ratio   1.3    BUN/Creatinine Ratio 42.2  30.8  18.8    Anion Gap 2.7  0.5  11.3      CBC          7/20/2024    05:07 7/21/2024    05:50 7/26/2024    09:50   CBC   WBC 9.10  7.36  13.42    RBC 5.91  5.32  5.80    Hemoglobin 17.8  16.0  17.6    Hematocrit 55.3  48.9  51.6    MCV 93.6  91.9  89.0    MCH 30.1  30.1  30.3    MCHC 32.2  32.7  34.1    RDW 13.1  13.1  12.8    Platelets 110  108  216      Lipid Panel          2/9/2024    09:35   Lipid Panel   Total Cholesterol 164    Triglycerides 75    HDL Cholesterol 41    VLDL Cholesterol 14    LDL Cholesterol  109    LDL/HDL Ratio 2.63      TSH          2/9/2024    09:35   TSH   TSH 1.240                Assessment and Plan   Diagnoses and all orders for this  visit:    1. Chronic obstructive pulmonary disease with acute exacerbation (Primary)  Assessment & Plan:  COPD is worsening.    Plan:  He was given 125 of Solu-Medrol, albuterol treatment and placed on 3 L of oxygen in the room.  The patient's oxygen level improved to 95%.  After being in the clinic for approximately an hour and a half his symptoms improved.  However, he was tachypneic and we suggested he go to the emergency room.  The patient refused.  He said he would go home and use home oxygen as well as breathing treatments.  He was given a prescription today of DuoNeb to be taken every 4 hours as needed as well as azithromycin and prednisone to be taken as directed.  He was urged if his symptoms worsen to call 911.    Followup in 2 weeks.       Orders:  -     methylPREDNISolone sodium succinate (SOLU-Medrol) injection 125 mg  -     albuterol (PROVENTIL) nebulizer solution 0.083% 2.5 mg/3mL  -     ipratropium-albuterol (DUO-NEB) 0.5-2.5 mg/3 ml nebulizer; Take 3 mL by nebulization Every 4 (Four) Hours As Needed for Wheezing.  Dispense: 360 mL; Refill: 2  -     predniSONE (DELTASONE) 20 MG tablet; Take 4 tablets by mouth Daily for 4 days, THEN 3 tablets Daily for 4 days, THEN 2 tablets Daily for 4 days.  Dispense: 36 tablet; Refill: 0  -     azithromycin (Zithromax Z-Rudi) 250 MG tablet; Take 2 tablets by mouth on day 1, then 1 tablet daily on days 2-5  Dispense: 6 tablet; Refill: 0    2. Essential hypertension  Assessment & Plan:  Hypertension is stable and controlled  Continue current treatment regimen.  Dietary sodium restriction.  Weight loss.  Blood pressure will be reassessed in 6 months.      3. Cigarette nicotine dependence with nicotine-induced disorder  Assessment & Plan:  Tobacco use is improving with lifestyle modifications.  Smoking cessation counseling was provided.  Tobacco use will be reassessed in 6 months.    He is down to smoking 12 cigarettes per day.        I spent 65 minutes caring for  Colin on this date of service. This time includes time spent by me in the following activities:preparing for the visit, reviewing tests, obtaining and/or reviewing a separately obtained history, performing a medically appropriate examination and/or evaluation , counseling and educating the patient/family/caregiver, ordering medications, tests, or procedures, documenting information in the medical record, and care coordination  Follow Up   Return in about 2 weeks (around 1/9/2025).  Patient was given instructions and counseling regarding his condition or for health maintenance advice. Please see specific information pulled into the AVS if appropriate.

## 2024-12-26 NOTE — ASSESSMENT & PLAN NOTE
COPD is worsening.    Plan:  He was given 125 of Solu-Medrol, albuterol treatment and placed on 3 L of oxygen in the room.  The patient's oxygen level improved to 95%.  After being in the clinic for approximately an hour and a half his symptoms improved.  However, he was tachypneic and we suggested he go to the emergency room.  The patient refused.  He said he would go home and use home oxygen as well as breathing treatments.  He was given a prescription today of DuoNeb to be taken every 4 hours as needed as well as azithromycin and prednisone to be taken as directed.  He was urged if his symptoms worsen to call 911.    Followup in 2 weeks.

## 2024-12-27 RX ORDER — METHYLPREDNISOLONE SODIUM SUCCINATE 125 MG/2ML
125 INJECTION INTRAMUSCULAR; INTRAVENOUS ONCE
Status: COMPLETED | OUTPATIENT
Start: 2024-12-26 | End: 2024-12-26

## 2024-12-30 ENCOUNTER — TELEPHONE (OUTPATIENT)
Dept: FAMILY MEDICINE CLINIC | Facility: CLINIC | Age: 74
End: 2024-12-30
Payer: MEDICARE

## 2024-12-30 NOTE — TELEPHONE ENCOUNTER
Pt's sister called clinic today expressing that pt is struggling to maintain stable O2 readings. Pt's condition seems to be worsening and pt's sister is requesting that pt been seen in the morning for an abx/steroid injection. Pt will be in tomorrow as a nurse visit.

## 2024-12-31 ENCOUNTER — HOSPITAL ENCOUNTER (INPATIENT)
Facility: HOSPITAL | Age: 74
LOS: 3 days | Discharge: HOME | DRG: 193 | End: 2025-01-03
Attending: EMERGENCY MEDICINE | Admitting: HOSPITALIST
Payer: MEDICARE

## 2024-12-31 ENCOUNTER — APPOINTMENT (OUTPATIENT)
Dept: CT IMAGING | Facility: HOSPITAL | Age: 74
DRG: 193 | End: 2024-12-31
Payer: MEDICARE

## 2024-12-31 ENCOUNTER — APPOINTMENT (OUTPATIENT)
Dept: GENERAL RADIOLOGY | Facility: HOSPITAL | Age: 74
DRG: 193 | End: 2024-12-31
Payer: MEDICARE

## 2024-12-31 ENCOUNTER — CLINICAL SUPPORT (OUTPATIENT)
Dept: FAMILY MEDICINE CLINIC | Facility: CLINIC | Age: 74
End: 2024-12-31
Payer: MEDICARE

## 2024-12-31 VITALS
OXYGEN SATURATION: 95 % | RESPIRATION RATE: 26 BRPM | HEART RATE: 68 BPM | SYSTOLIC BLOOD PRESSURE: 144 MMHG | TEMPERATURE: 99.5 F | DIASTOLIC BLOOD PRESSURE: 82 MMHG

## 2024-12-31 DIAGNOSIS — J96.21 ACUTE ON CHRONIC RESPIRATORY FAILURE WITH HYPOXIA: ICD-10-CM

## 2024-12-31 DIAGNOSIS — J18.9 PNEUMONIA DUE TO INFECTIOUS ORGANISM, UNSPECIFIED LATERALITY, UNSPECIFIED PART OF LUNG: Primary | ICD-10-CM

## 2024-12-31 DIAGNOSIS — I10 ESSENTIAL HYPERTENSION: Chronic | ICD-10-CM

## 2024-12-31 DIAGNOSIS — R26.2 DIFFICULTY WALKING: ICD-10-CM

## 2024-12-31 DIAGNOSIS — J96.21 ACUTE ON CHRONIC RESPIRATORY FAILURE WITH HYPOXIA: Primary | ICD-10-CM

## 2024-12-31 DIAGNOSIS — J44.1 COPD EXACERBATION: ICD-10-CM

## 2024-12-31 DIAGNOSIS — J44.9 CHRONIC OBSTRUCTIVE PULMONARY DISEASE, UNSPECIFIED COPD TYPE: Chronic | ICD-10-CM

## 2024-12-31 DIAGNOSIS — Z78.9 DECREASED ACTIVITIES OF DAILY LIVING (ADL): ICD-10-CM

## 2024-12-31 DIAGNOSIS — R09.02 HYPOXIA: ICD-10-CM

## 2024-12-31 DIAGNOSIS — R13.10 DYSPHAGIA, UNSPECIFIED TYPE: ICD-10-CM

## 2024-12-31 DIAGNOSIS — F17.219 CIGARETTE NICOTINE DEPENDENCE WITH NICOTINE-INDUCED DISORDER: Chronic | ICD-10-CM

## 2024-12-31 PROBLEM — J96.20 ACUTE ON CHRONIC RESPIRATORY FAILURE: Status: ACTIVE | Noted: 2024-12-31

## 2024-12-31 LAB
ALBUMIN SERPL-MCNC: 4 G/DL (ref 3.5–5.2)
ALBUMIN/GLOB SERPL: 1.3 G/DL
ALP SERPL-CCNC: 79 U/L (ref 39–117)
ALT SERPL W P-5'-P-CCNC: 35 U/L (ref 1–41)
ANION GAP SERPL CALCULATED.3IONS-SCNC: 8.7 MMOL/L (ref 5–15)
ARTERIAL PATENCY WRIST A: POSITIVE
AST SERPL-CCNC: 16 U/L (ref 1–40)
ATMOSPHERIC PRESS: 741.6 MMHG
BASE EXCESS BLDA CALC-SCNC: 6.2 MMOL/L (ref -2–2)
BASOPHILS # BLD AUTO: 0.01 10*3/MM3 (ref 0–0.2)
BASOPHILS NFR BLD AUTO: 0.1 % (ref 0–1.5)
BDY SITE: ABNORMAL
BILIRUB SERPL-MCNC: 0.4 MG/DL (ref 0–1.2)
BUN SERPL-MCNC: 22 MG/DL (ref 8–23)
BUN/CREAT SERPL: 30.1 (ref 7–25)
CALCIUM SPEC-SCNC: 9.4 MG/DL (ref 8.6–10.5)
CHLORIDE SERPL-SCNC: 99 MMOL/L (ref 98–107)
CO2 SERPL-SCNC: 32.3 MMOL/L (ref 22–29)
CREAT SERPL-MCNC: 0.73 MG/DL (ref 0.76–1.27)
D-LACTATE SERPL-SCNC: 1.1 MMOL/L (ref 0.5–2)
DEPRECATED RDW RBC AUTO: 42.4 FL (ref 37–54)
EGFRCR SERPLBLD CKD-EPI 2021: 96.1 ML/MIN/1.73
EOSINOPHIL # BLD AUTO: 0.02 10*3/MM3 (ref 0–0.4)
EOSINOPHIL NFR BLD AUTO: 0.2 % (ref 0.3–6.2)
ERYTHROCYTE [DISTWIDTH] IN BLOOD BY AUTOMATED COUNT: 13 % (ref 12.3–15.4)
FLUAV SUBTYP SPEC NAA+PROBE: NOT DETECTED
FLUBV RNA ISLT QL NAA+PROBE: NOT DETECTED
GAS FLOW AIRWAY: 5 LPM
GEN 5 1HR TROPONIN T REFLEX: 13 NG/L
GLOBULIN UR ELPH-MCNC: 3.2 GM/DL
GLUCOSE SERPL-MCNC: 173 MG/DL (ref 65–99)
HCO3 BLDA-SCNC: 35.3 MMOL/L (ref 22–26)
HCT VFR BLD AUTO: 58.8 % (ref 37.5–51)
HCT VFR BLD CALC: 60 % (ref 38–51)
HEMODILUTION: NO
HGB BLD-MCNC: 19.3 G/DL (ref 13–17.7)
HGB BLDA-MCNC: 20.3 G/DL (ref 12–18)
HOLD SPECIMEN: NORMAL
HOLD SPECIMEN: NORMAL
IMM GRANULOCYTES # BLD AUTO: 0.04 10*3/MM3 (ref 0–0.05)
IMM GRANULOCYTES NFR BLD AUTO: 0.4 % (ref 0–0.5)
INHALED O2 CONCENTRATION: 40 %
L PNEUMO1 AG UR QL IA: NEGATIVE
LYMPHOCYTES # BLD AUTO: 2.22 10*3/MM3 (ref 0.7–3.1)
LYMPHOCYTES NFR BLD AUTO: 22.9 % (ref 19.6–45.3)
Lab: ABNORMAL
MAGNESIUM SERPL-MCNC: 2.3 MG/DL (ref 1.6–2.4)
MCH RBC QN AUTO: 29.5 PG (ref 26.6–33)
MCHC RBC AUTO-ENTMCNC: 32.8 G/DL (ref 31.5–35.7)
MCV RBC AUTO: 89.9 FL (ref 79–97)
MODALITY: ABNORMAL
MONOCYTES # BLD AUTO: 0.94 10*3/MM3 (ref 0.1–0.9)
MONOCYTES NFR BLD AUTO: 9.7 % (ref 5–12)
NEUTROPHILS NFR BLD AUTO: 6.45 10*3/MM3 (ref 1.7–7)
NEUTROPHILS NFR BLD AUTO: 66.7 % (ref 42.7–76)
NOTIFIED WHO: ABNORMAL
NRBC BLD AUTO-RTO: 0 /100 WBC (ref 0–0.2)
NT-PROBNP SERPL-MCNC: <36 PG/ML (ref 0–900)
PCO2 BLDA: 63.6 MM HG (ref 35–45)
PH BLDA: 7.35 PH UNITS (ref 7.35–7.45)
PHOSPHATE SERPL-MCNC: 2.9 MG/DL (ref 2.5–4.5)
PLATELET # BLD AUTO: 212 10*3/MM3 (ref 140–450)
PMV BLD AUTO: 10.8 FL (ref 6–12)
PO2 BLD: 166 MM[HG] (ref 0–500)
PO2 BLDA: 66.2 MM HG (ref 80–100)
POTASSIUM SERPL-SCNC: 4.1 MMOL/L (ref 3.5–5.2)
PROCALCITONIN SERPL-MCNC: 0.03 NG/ML (ref 0–0.25)
PROT SERPL-MCNC: 7.2 G/DL (ref 6–8.5)
RBC # BLD AUTO: 6.54 10*6/MM3 (ref 4.14–5.8)
READ BACK: YES
RSV RNA NPH QL NAA+NON-PROBE: NOT DETECTED
S PNEUM AG SPEC QL LA: NEGATIVE
SAO2 % BLDCOA: 90.9 % (ref 95–99)
SARS-COV-2 RNA RESP QL NAA+PROBE: NOT DETECTED
SODIUM SERPL-SCNC: 140 MMOL/L (ref 136–145)
TROPONIN T NUMERIC DELTA: 1 NG/L
TROPONIN T SERPL HS-MCNC: 12 NG/L
WBC NRBC COR # BLD AUTO: 9.68 10*3/MM3 (ref 3.4–10.8)
WHOLE BLOOD HOLD COAG: NORMAL
WHOLE BLOOD HOLD SPECIMEN: NORMAL

## 2024-12-31 PROCEDURE — 93005 ELECTROCARDIOGRAM TRACING: CPT | Performed by: EMERGENCY MEDICINE

## 2024-12-31 PROCEDURE — 83605 ASSAY OF LACTIC ACID: CPT | Performed by: EMERGENCY MEDICINE

## 2024-12-31 PROCEDURE — 99215 OFFICE O/P EST HI 40 MIN: CPT | Performed by: FAMILY MEDICINE

## 2024-12-31 PROCEDURE — 25010000002 METHYLPREDNISOLONE PER 125 MG: Performed by: EMERGENCY MEDICINE

## 2024-12-31 PROCEDURE — 85025 COMPLETE CBC W/AUTO DIFF WBC: CPT

## 2024-12-31 PROCEDURE — 84484 ASSAY OF TROPONIN QUANT: CPT

## 2024-12-31 PROCEDURE — G2211 COMPLEX E/M VISIT ADD ON: HCPCS | Performed by: FAMILY MEDICINE

## 2024-12-31 PROCEDURE — 94799 UNLISTED PULMONARY SVC/PX: CPT

## 2024-12-31 PROCEDURE — 84484 ASSAY OF TROPONIN QUANT: CPT | Performed by: EMERGENCY MEDICINE

## 2024-12-31 PROCEDURE — 25010000002 CEFTRIAXONE PER 250 MG: Performed by: EMERGENCY MEDICINE

## 2024-12-31 PROCEDURE — 71250 CT THORAX DX C-: CPT

## 2024-12-31 PROCEDURE — 83880 ASSAY OF NATRIURETIC PEPTIDE: CPT

## 2024-12-31 PROCEDURE — 87899 AGENT NOS ASSAY W/OPTIC: CPT | Performed by: HOSPITALIST

## 2024-12-31 PROCEDURE — 93005 ELECTROCARDIOGRAM TRACING: CPT

## 2024-12-31 PROCEDURE — 94640 AIRWAY INHALATION TREATMENT: CPT

## 2024-12-31 PROCEDURE — 87449 NOS EACH ORGANISM AG IA: CPT | Performed by: HOSPITALIST

## 2024-12-31 PROCEDURE — 36600 WITHDRAWAL OF ARTERIAL BLOOD: CPT

## 2024-12-31 PROCEDURE — 71045 X-RAY EXAM CHEST 1 VIEW: CPT

## 2024-12-31 PROCEDURE — 36415 COLL VENOUS BLD VENIPUNCTURE: CPT

## 2024-12-31 PROCEDURE — 84100 ASSAY OF PHOSPHORUS: CPT | Performed by: HOSPITALIST

## 2024-12-31 PROCEDURE — 87040 BLOOD CULTURE FOR BACTERIA: CPT | Performed by: EMERGENCY MEDICINE

## 2024-12-31 PROCEDURE — 99223 1ST HOSP IP/OBS HIGH 75: CPT | Performed by: HOSPITALIST

## 2024-12-31 PROCEDURE — 82803 BLOOD GASES ANY COMBINATION: CPT

## 2024-12-31 PROCEDURE — 83735 ASSAY OF MAGNESIUM: CPT | Performed by: HOSPITALIST

## 2024-12-31 PROCEDURE — 80053 COMPREHEN METABOLIC PANEL: CPT

## 2024-12-31 PROCEDURE — 87637 SARSCOV2&INF A&B&RSV AMP PRB: CPT

## 2024-12-31 PROCEDURE — 25010000002 AZITHROMYCIN PER 500 MG: Performed by: EMERGENCY MEDICINE

## 2024-12-31 PROCEDURE — 25010000002 METHYLPREDNISOLONE PER 40 MG: Performed by: HOSPITALIST

## 2024-12-31 PROCEDURE — 94761 N-INVAS EAR/PLS OXIMETRY MLT: CPT

## 2024-12-31 PROCEDURE — 84145 PROCALCITONIN (PCT): CPT | Performed by: HOSPITALIST

## 2024-12-31 PROCEDURE — 25810000003 SODIUM CHLORIDE 0.9 % SOLUTION 250 ML FLEX CONT: Performed by: EMERGENCY MEDICINE

## 2024-12-31 PROCEDURE — 99285 EMERGENCY DEPT VISIT HI MDM: CPT

## 2024-12-31 RX ORDER — POLYETHYLENE GLYCOL 3350 17 G/17G
17 POWDER, FOR SOLUTION ORAL DAILY PRN
Status: DISCONTINUED | OUTPATIENT
Start: 2024-12-31 | End: 2025-01-03 | Stop reason: HOSPADM

## 2024-12-31 RX ORDER — ONDANSETRON 4 MG/1
4 TABLET, ORALLY DISINTEGRATING ORAL EVERY 6 HOURS PRN
Status: DISCONTINUED | OUTPATIENT
Start: 2024-12-31 | End: 2025-01-03 | Stop reason: HOSPADM

## 2024-12-31 RX ORDER — BISACODYL 5 MG/1
5 TABLET, DELAYED RELEASE ORAL DAILY PRN
Status: DISCONTINUED | OUTPATIENT
Start: 2024-12-31 | End: 2025-01-03 | Stop reason: HOSPADM

## 2024-12-31 RX ORDER — SODIUM CHLORIDE 0.9 % (FLUSH) 0.9 %
10 SYRINGE (ML) INJECTION AS NEEDED
Status: DISCONTINUED | OUTPATIENT
Start: 2024-12-31 | End: 2025-01-03 | Stop reason: HOSPADM

## 2024-12-31 RX ORDER — METHYLPREDNISOLONE SODIUM SUCCINATE 40 MG/ML
40 INJECTION, POWDER, LYOPHILIZED, FOR SOLUTION INTRAMUSCULAR; INTRAVENOUS EVERY 12 HOURS
Status: DISCONTINUED | OUTPATIENT
Start: 2024-12-31 | End: 2025-01-03

## 2024-12-31 RX ORDER — ASPIRIN 81 MG/1
81 TABLET, CHEWABLE ORAL DAILY
Status: DISCONTINUED | OUTPATIENT
Start: 2025-01-01 | End: 2025-01-03 | Stop reason: HOSPADM

## 2024-12-31 RX ORDER — ONDANSETRON 2 MG/ML
4 INJECTION INTRAMUSCULAR; INTRAVENOUS EVERY 6 HOURS PRN
Status: DISCONTINUED | OUTPATIENT
Start: 2024-12-31 | End: 2025-01-03 | Stop reason: HOSPADM

## 2024-12-31 RX ORDER — METHYLPREDNISOLONE SODIUM SUCCINATE 125 MG/2ML
125 INJECTION, POWDER, LYOPHILIZED, FOR SOLUTION INTRAMUSCULAR; INTRAVENOUS ONCE
Status: COMPLETED | OUTPATIENT
Start: 2024-12-31 | End: 2024-12-31

## 2024-12-31 RX ORDER — ARFORMOTEROL TARTRATE 15 UG/2ML
15 SOLUTION RESPIRATORY (INHALATION)
Status: DISCONTINUED | OUTPATIENT
Start: 2024-12-31 | End: 2025-01-03 | Stop reason: HOSPADM

## 2024-12-31 RX ORDER — ACETAMINOPHEN 650 MG/1
650 SUPPOSITORY RECTAL EVERY 4 HOURS PRN
Status: DISCONTINUED | OUTPATIENT
Start: 2024-12-31 | End: 2025-01-03 | Stop reason: HOSPADM

## 2024-12-31 RX ORDER — IPRATROPIUM BROMIDE AND ALBUTEROL SULFATE 2.5; .5 MG/3ML; MG/3ML
3 SOLUTION RESPIRATORY (INHALATION) EVERY 4 HOURS PRN
Status: DISCONTINUED | OUTPATIENT
Start: 2024-12-31 | End: 2025-01-01

## 2024-12-31 RX ORDER — BISACODYL 10 MG
10 SUPPOSITORY, RECTAL RECTAL DAILY PRN
Status: DISCONTINUED | OUTPATIENT
Start: 2024-12-31 | End: 2025-01-03 | Stop reason: HOSPADM

## 2024-12-31 RX ORDER — SODIUM CHLORIDE 0.9 % (FLUSH) 0.9 %
10 SYRINGE (ML) INJECTION EVERY 12 HOURS SCHEDULED
Status: DISCONTINUED | OUTPATIENT
Start: 2024-12-31 | End: 2025-01-03 | Stop reason: HOSPADM

## 2024-12-31 RX ORDER — AMLODIPINE BESYLATE 10 MG/1
10 TABLET ORAL DAILY
Status: DISCONTINUED | OUTPATIENT
Start: 2025-01-01 | End: 2025-01-03 | Stop reason: HOSPADM

## 2024-12-31 RX ORDER — HYDROCODONE BITARTRATE AND ACETAMINOPHEN 5; 325 MG/1; MG/1
1 TABLET ORAL EVERY 8 HOURS PRN
Status: DISCONTINUED | OUTPATIENT
Start: 2024-12-31 | End: 2025-01-03 | Stop reason: HOSPADM

## 2024-12-31 RX ORDER — BUDESONIDE 0.5 MG/2ML
0.5 INHALANT ORAL
Status: DISCONTINUED | OUTPATIENT
Start: 2024-12-31 | End: 2025-01-03 | Stop reason: HOSPADM

## 2024-12-31 RX ORDER — ACETAMINOPHEN 325 MG/1
650 TABLET ORAL EVERY 4 HOURS PRN
Status: DISCONTINUED | OUTPATIENT
Start: 2024-12-31 | End: 2025-01-03 | Stop reason: HOSPADM

## 2024-12-31 RX ORDER — SODIUM CHLORIDE 9 MG/ML
40 INJECTION, SOLUTION INTRAVENOUS AS NEEDED
Status: DISCONTINUED | OUTPATIENT
Start: 2024-12-31 | End: 2025-01-03 | Stop reason: HOSPADM

## 2024-12-31 RX ORDER — ACETAMINOPHEN 160 MG/5ML
650 SOLUTION ORAL EVERY 4 HOURS PRN
Status: DISCONTINUED | OUTPATIENT
Start: 2024-12-31 | End: 2025-01-03 | Stop reason: HOSPADM

## 2024-12-31 RX ORDER — AMOXICILLIN 250 MG
2 CAPSULE ORAL 2 TIMES DAILY
Status: DISCONTINUED | OUTPATIENT
Start: 2024-12-31 | End: 2025-01-03 | Stop reason: HOSPADM

## 2024-12-31 RX ORDER — ENOXAPARIN SODIUM 100 MG/ML
40 INJECTION SUBCUTANEOUS DAILY
Status: DISCONTINUED | OUTPATIENT
Start: 2025-01-01 | End: 2025-01-03 | Stop reason: HOSPADM

## 2024-12-31 RX ORDER — IPRATROPIUM BROMIDE AND ALBUTEROL SULFATE 2.5; .5 MG/3ML; MG/3ML
3 SOLUTION RESPIRATORY (INHALATION)
Status: COMPLETED | OUTPATIENT
Start: 2024-12-31 | End: 2024-12-31

## 2024-12-31 RX ADMIN — IPRATROPIUM BROMIDE AND ALBUTEROL SULFATE 3 ML: .5; 3 SOLUTION RESPIRATORY (INHALATION) at 21:23

## 2024-12-31 RX ADMIN — IPRATROPIUM BROMIDE AND ALBUTEROL SULFATE 3 ML: .5; 3 SOLUTION RESPIRATORY (INHALATION) at 18:27

## 2024-12-31 RX ADMIN — ARFORMOTEROL TARTRATE 15 MCG: 15 SOLUTION RESPIRATORY (INHALATION) at 21:24

## 2024-12-31 RX ADMIN — IPRATROPIUM BROMIDE AND ALBUTEROL SULFATE 3 ML: .5; 3 SOLUTION RESPIRATORY (INHALATION) at 18:21

## 2024-12-31 RX ADMIN — AZITHROMYCIN DIHYDRATE 500 MG: 500 INJECTION, POWDER, LYOPHILIZED, FOR SOLUTION INTRAVENOUS at 19:35

## 2024-12-31 RX ADMIN — SODIUM CHLORIDE 1000 MG: 9 INJECTION INTRAMUSCULAR; INTRAVENOUS; SUBCUTANEOUS at 19:30

## 2024-12-31 RX ADMIN — METHYLPREDNISOLONE SODIUM SUCCINATE 125 MG: 125 INJECTION, POWDER, FOR SOLUTION INTRAMUSCULAR; INTRAVENOUS at 19:24

## 2024-12-31 RX ADMIN — IPRATROPIUM BROMIDE AND ALBUTEROL SULFATE 3 ML: .5; 3 SOLUTION RESPIRATORY (INHALATION) at 18:36

## 2024-12-31 RX ADMIN — METHYLPREDNISOLONE SODIUM SUCCINATE 40 MG: 40 INJECTION, POWDER, FOR SOLUTION INTRAMUSCULAR; INTRAVENOUS at 21:17

## 2024-12-31 RX ADMIN — BUDESONIDE 0.5 MG: 0.5 INHALANT RESPIRATORY (INHALATION) at 21:24

## 2024-12-31 RX ADMIN — Medication 10 ML: at 21:18

## 2024-12-31 NOTE — ASSESSMENT & PLAN NOTE
COPD is worsening.    Plan:  Continue same medication/s without change.    Discussed medication dosage, use, side effects, and goals of treatment in detail.    Warning signs of respiratory distress were reviewed with the patient.   Smoking cessation discussed: Information shared with patient..    Patient Treatment Goals:   symptom prevention, minimizing limitation in activity, prevention of exacerbations and use of ER/inpatient care, maintenance of optimal pulmonary function, and minimization of adverse effects of treatment    Followup in 3 months.

## 2024-12-31 NOTE — ED PROVIDER NOTES
Time: 2:20 PM EST  Date of encounter:  12/31/2024  Independent Historian/Clinical History and Information was obtained by:   Patient and Family    History is limited by: N/A    Chief Complaint: Dyspnea      History of Present Illness:  Patient is a 74 y.o. year old male who presents to the emergency department for evaluation of dyspnea.  The patient has a history of COPD and is on oxygen as needed.  The patient had progressive cough and low-grade fever over the last several days.  His cough and shortness of breath got so bad that he had to come to the emergency department today.  The patient a pulse oximeter of 81% on room air.  He had to be put on nasal cannula at 4 L in order to get his pulse oximeter above 90%.    Patient Care Team  Primary Care Provider: Liz Roper DO    Past Medical History:     No Known Allergies  Past Medical History:   Diagnosis Date    Arthritis     COPD (chronic obstructive pulmonary disease)     Essential hypertension 07/12/2021    Forgetfulness     Heart flutter, ventricular     High cholesterol     Lung cancer     Prostate cancer     Ringing in ear     Shortness of Air     SOB (shortness of breath)      Past Surgical History:   Procedure Laterality Date    APPENDECTOMY      COLONOSCOPY  06/01/2019    COLONOSCOPY N/A 6/14/2023    Procedure: COLONOSCOPY;  Surgeon: Geoffrey Carey MD;  Location: [facility] ENDOSCOPY;  Service: General;  Laterality: N/A;  NORMAL    LUNG BIOPSY      PROSTATECTOMY      TONSILLECTOMY  1969    Per PT     Family History   Problem Relation Age of Onset    Ovarian cancer Mother     Stroke Father     Breast cancer Sister 40    Cancer Maternal Grandmother     Cancer Paternal Grandmother        Home Medications:  Prior to Admission medications    Medication Sig Start Date End Date Taking? Authorizing Provider   albuterol (PROVENTIL) (2.5 MG/3ML) 0.083% nebulizer solution Take 2.5 mg by nebulization Every 4 (Four) Hours As Needed for Wheezing. 7/22/24   Jacquelin  DO Liz   albuterol sulfate  (90 Base) MCG/ACT inhaler Inhale 2 puffs Every 4 (Four) Hours As Needed for Wheezing or Shortness of Air. 8/3/23   Renetta Garcia APRN   amLODIPine (NORVASC) 10 MG tablet Take 1 tablet by mouth Daily. 7/22/24   Liz Roper DO   arformoterol (BROVANA) 15 MCG/2ML nebulizer solution Take 2 mL by nebulization 2 (Two) Times a Day. 8/16/24   Falguni Crawford APRN   aspirin 81 MG chewable tablet Chew 1 tablet Daily.    ProviderArben MD   azithromycin (Zithromax Z-Rudi) 250 MG tablet Take 2 tablets by mouth on day 1, then 1 tablet daily on days 2-5 12/26/24   Liz Roper DO   budesonide (Pulmicort) 0.5 MG/2ML nebulizer solution Take 2 mL by nebulization 2 (Two) Times a Day. 8/16/24   Falguni Crawford APRN   ipratropium-albuterol (DUO-NEB) 0.5-2.5 mg/3 ml nebulizer Take 3 mL by nebulization Every 4 (Four) Hours As Needed for Wheezing. 12/26/24   Liz Roper DO   Multiple Vitamins-Minerals (MULTIVITAMIN GUMMIES ADULTS PO) Take 1 tablet by mouth Daily. 1 gummie/day    ProviderArben MD   O2 (OXYGEN) Patient  unsure of how many liters and only wears prn. 7/25/24   ProviderArben MD   predniSONE (DELTASONE) 20 MG tablet Take 4 tablets by mouth Daily for 4 days, THEN 3 tablets Daily for 4 days, THEN 2 tablets Daily for 4 days. 12/26/24 1/7/25  Liz Roper DO        Social History:   Social History     Tobacco Use    Smoking status: Every Day     Current packs/day: 0.25     Average packs/day: 0.3 packs/day for 66.0 years (16.5 ttl pk-yrs)     Types: Cigarettes     Start date: 1959     Passive exposure: Current    Smokeless tobacco: Never    Tobacco comments:        Vaping Use    Vaping status: Never Used   Substance Use Topics    Alcohol use: Never    Drug use: Defer         Review of Systems:  Review of Systems   Constitutional:  Negative for chills and fever.   HENT:  Negative for congestion, ear pain and sore throat.    Eyes:  Negative for pain.    Respiratory:  Positive for cough and shortness of breath. Negative for chest tightness.    Cardiovascular:  Negative for chest pain.   Gastrointestinal:  Negative for abdominal pain, diarrhea, nausea and vomiting.   Genitourinary:  Negative for flank pain and hematuria.   Musculoskeletal:  Negative for joint swelling.   Skin:  Negative for pallor.   Neurological:  Negative for seizures and headaches.   All other systems reviewed and are negative.       Physical Exam:  /84   Pulse 83   Temp 99.5 °F (37.5 °C) (Oral)   Resp 25   SpO2 91%     Physical Exam  Constitutional:       General: He is in acute distress.      Appearance: Normal appearance. He is ill-appearing.   HENT:      Head: Normocephalic.   Eyes:      Extraocular Movements: Extraocular movements intact.      Conjunctiva/sclera: Conjunctivae normal.   Pulmonary:      Effort: Accessory muscle usage and respiratory distress present.      Breath sounds: Examination of the right-middle field reveals rhonchi. Examination of the left-middle field reveals rhonchi. Rhonchi present.   Abdominal:      General: There is no distension.   Skin:     General: Skin is warm.      Coloration: Skin is not cyanotic.   Neurological:      Mental Status: He is alert and oriented to person, place, and time.   Psychiatric:         Attention and Perception: Attention and perception normal.         Mood and Affect: Mood normal.                    Medical Decision Making:      Comorbidities that affect care:    COPD    External Notes reviewed:    Previous Clinic Note: Office visit with primary care provider for COPD.      The following orders were placed and all results were independently analyzed by me:  Orders Placed This Encounter   Procedures    COVID-19, FLU A/B, RSV PCR 1 HR TAT - Swab, Nasopharynx    Blood Culture - Blood,    Blood Culture - Blood,    Respiratory Panel PCR w/COVID-19(SARS-CoV-2) LUCINDA/BINTA/FILOMENA/PAD/COR/PATRICK In-House, NP Swab in UTM/VTM, 2 HR TAT - Swab,  Nasopharynx    Respiratory Culture - Sputum, Throat    Legionella Antigen, Urine - Urine, Urine, Clean Catch    S. Pneumo Ag Urine or CSF - Urine, Urine, Clean Catch    XR Chest 1 View    [place] Draw    Comprehensive Metabolic Panel    BNP    High Sensitivity Troponin T    CBC Auto Differential    High Sensitivity Troponin T 1Hr    Lactic Acid, Plasma    Procalcitonin    Blood Gas, Arterial -    NPO Diet NPO Type: Strict NPO    Undress & Gown    Continuous Pulse Oximetry    Vital Signs    Code Status and Medical Interventions: CPR (Attempt to Resuscitate); Full Support    Hospitalist (on-call MD unless specified)    Oxygen Therapy- Nasal Cannula; Titrate 1-6 LPM Per SpO2; 90 - 95%    ECG 12 Lead ED Triage Standing Order; SOA    Insert Peripheral IV    Inpatient Admission    CBC & Differential    Green Top (Gel)    Lavender Top    Gold Top - SST    Light Blue Top       Medications Given in the Emergency Department:  Medications   sodium chloride 0.9 % flush 10 mL (has no administration in time range)   azithromycin (ZITHROMAX) 500 mg in sodium chloride 0.9 % 250 mL IVPB-VTB (500 mg Intravenous New Bag 12/31/24 1935)   ipratropium-albuterol (DUO-NEB) nebulizer solution 3 mL (3 mL Nebulization Given 12/31/24 1836)   methylPREDNISolone sodium succinate (SOLU-Medrol) injection 125 mg (125 mg Intravenous Given 12/31/24 1924)   cefTRIAXone (ROCEPHIN) in NS 1 gram/10ml IV PUSH syringe (1,000 mg Intravenous Given 12/31/24 1930)        ED Course:    ED Course as of 12/31/24 1955 Tue Dec 31, 2024   1420   --- PROVIDER IN TRIAGE NOTE ---    The patient was evaluated by Abdirizak hernandez in triage. Orders were placed and the patient is currently awaiting disposition.    [AJ]      ED Course User Index  [AJ] Abdirizak Bennett PA-C     EKG: Sinus rhythm rate 83 bpm  Biatrial large  No acute ischemia.    Labs:    Lab Results (last 24 hours)       Procedure Component Value Units Date/Time    COVID-19, FLU A/B, RSV PCR 1 HR  TAT - Swab, Nasopharynx [[id number]]  (Normal) Collected: 12/31/24 1422    Specimen: Swab from Nasopharynx Updated: 12/31/24 1511     COVID19 Not Detected     Influenza A PCR Not Detected     Influenza B PCR Not Detected     RSV, PCR Not Detected    Narrative:      Fact sheet for providers: https://www.fda.gov/media/448468/download    Fact sheet for patients: https://www.fda.gov/media/170718/download    Test performed by PCR.    CBC & Differential [[id number]]  (Abnormal) Collected: 12/31/24 1438    Specimen: Blood from Hand, Right Updated: 12/31/24 2983    Narrative:      The following orders were created for panel order CBC & Differential.  Procedure                               Abnormality         Status                     ---------                               -----------         ------                     CBC Auto Differential[[id number]]        Abnormal            Final result                 Please view results for these tests on the individual orders.    Comprehensive Metabolic Panel [[id number]]  (Abnormal) Collected: 12/31/24 1438    Specimen: Blood from Hand, Right Updated: 12/31/24 1519     Glucose 173 mg/dL      BUN 22 mg/dL      Creatinine 0.73 mg/dL      Sodium 140 mmol/L      Potassium 4.1 mmol/L      Chloride 99 mmol/L      CO2 32.3 mmol/L      Calcium 9.4 mg/dL      Total Protein 7.2 g/dL      Albumin 4.0 g/dL      ALT (SGPT) 35 U/L      AST (SGOT) 16 U/L      Alkaline Phosphatase 79 U/L      Total Bilirubin 0.4 mg/dL      Globulin 3.2 gm/dL      A/G Ratio 1.3 g/dL      BUN/Creatinine Ratio 30.1     Anion Gap 8.7 mmol/L      eGFR 96.1 mL/min/1.73     Narrative:      GFR Categories in Chronic Kidney Disease (CKD)      GFR Category          GFR (mL/min/1.73)    Interpretation  G1                     90 or greater         Normal or high (1)  G2                      60-89                Mild decrease (1)  G3a                   45-59                Mild to moderate decrease  G3b                   30-44                 Moderate to severe decrease  G4                    15-29                Severe decrease  G5                    14 or less           Kidney failure          (1)In the absence of evidence of kidney disease, neither GFR category G1 or G2 fulfill the criteria for CKD.    eGFR calculation 2021 CKD-EPI creatinine equation, which does not include race as a factor    BNP [[id number]]  (Normal) Collected: 12/31/24 1438    Specimen: Blood from Hand, Right Updated: 12/31/24 1512     proBNP <36.0 pg/mL     Narrative:      This assay is used as an aid in the diagnosis of individuals suspected of having heart failure. It can be used as an aid in the diagnosis of acute decompensated heart failure (ADHF) in patients presenting with signs and symptoms of ADHF to the emergency department (ED). In addition, NT-proBNP of <300 pg/mL indicates ADHF is not likely.    Age Range Result Interpretation  NT-proBNP Concentration (pg/mL:      <50             Positive            >450                   Gray                 300-450                    Negative             <300    50-75           Positive            >900                  Gray                300-900                  Negative            <300      >75             Positive            >1800                  Gray                300-1800                  Negative            <300    High Sensitivity Troponin T [[id number]]  (Normal) Collected: 12/31/24 1438    Specimen: Blood from Hand, Right Updated: 12/31/24 1513     HS Troponin T 12 ng/L     Narrative:      High Sensitive Troponin T Reference Range:  <14.0 ng/L- Negative Female for AMI  <22.0 ng/L- Negative Male for AMI  >=14 - Abnormal Female indicating possible myocardial injury.  >=22 - Abnormal Male indicating possible myocardial injury.   Clinicians would have to utilize clinical acumen, EKG, Troponin, and serial changes to determine if it is an Acute Myocardial Infarction or myocardial injury due to an underlying chronic  condition.         CBC Auto Differential [[id number]]  (Abnormal) Collected: 12/31/24 1438    Specimen: Blood from Hand, Right Updated: 12/31/24 1453     WBC 9.68 10*3/mm3      RBC 6.54 10*6/mm3      Hemoglobin 19.3 g/dL      Hematocrit 58.8 %      MCV 89.9 fL      MCH 29.5 pg      MCHC 32.8 g/dL      RDW 13.0 %      RDW-SD 42.4 fl      MPV 10.8 fL      Platelets 212 10*3/mm3      Neutrophil % 66.7 %      Lymphocyte % 22.9 %      Monocyte % 9.7 %      Eosinophil % 0.2 %      Basophil % 0.1 %      Immature Grans % 0.4 %      Neutrophils, Absolute 6.45 10*3/mm3      Lymphocytes, Absolute 2.22 10*3/mm3      Monocytes, Absolute 0.94 10*3/mm3      Eosinophils, Absolute 0.02 10*3/mm3      Basophils, Absolute 0.01 10*3/mm3      Immature Grans, Absolute 0.04 10*3/mm3      nRBC 0.0 /100 WBC     Legionella Antigen, Urine - Urine, Urine, Clean Catch [[id number]]  (Normal) Collected: 12/31/24 1919    Specimen: Urine, Clean Catch Updated: 12/31/24 1948     LEGIONELLA ANTIGEN, URINE Negative    S. Pneumo Ag Urine or CSF - Urine, Urine, Clean Catch [[id number]]  (Normal) Collected: 12/31/24 1919    Specimen: Urine, Clean Catch Updated: 12/31/24 1949     Strep Pneumo Ag Negative             Imaging:    XR Chest 1 View    Result Date: 12/31/2024  XR CHEST 1 VW Date of Exam: 12/31/2024 2:56 PM EST Indication: SOA Triage Protocol Comparison: CT 8/8/2024 and prior Findings: Lungs are hyperexpanded. The heart mediastinal contours are stable. Focal patchy opacity at the right midlung zone, right lung base appears grossly similar to prior study compatible with scarring. Increased infrahilar opacity at the left lung base appears slightly more prominent in the comparison. Findings may represent atelectasis or pneumonia superimposed upon chronic change. Osseous structures are stable     Impression: 1.Hyperexpansion of lungs compatible with COPD. 2.Increased opacity at the left lung base which may represent atelectasis or pneumonia superimposed  upon chronic change. 3.Recommend follow-up to document resolution. Electronically Signed: Kirit Cool MD  12/31/2024 3:32 PM EST  Workstation ID: [id number]       Differential Diagnosis and Discussion:    Dyspnea: Differential diagnosis includes but is not limited to metabolic acidosis, neurological disorders, psychogenic, asthma, pneumothorax, upper airway obstruction, COPD, pneumonia, noncardiogenic pulmonary edema, interstitial lung disease, anemia, congestive heart failure, and pulmonary embolism    PROCEDURES:    Labs were collected in the emergency department and all labs were reviewed and interpreted by me.  X-ray were performed in the emergency department and all X-ray impressions were independently interpreted by me.  An EKG was performed and the EKG was interpreted by me.    ECG 12 Lead ED Triage Standing Order; SOA   Preliminary Result   HEART RATE=83  bpm   RR Ocpcztdb=653  ms   WV Mpjcbikk=306  ms   P Horizontal Axis=-9  deg   P Front Axis=92  deg   QRSD Timgazrj=060  ms   QT Jvoxpvzp=187  ms   IUmN=962  ms   QRS Axis=268  deg   T Wave Axis=57  deg   - ABNORMAL ECG -   Sinus rhythm   Biatrial enlargement   Inferior  infarct, old   Date and Time of Study:2024-12-31 14:32:07          Procedures    MDM                     Patient Care Considerations:    SEPSIS was considered but is NOT present in the emergency department as SIRS criteria is not present.      Consultants/Shared Management Plan:    Hospitalist: I have discussed the case with hospitalist who agrees to accept the patient for admission.    Social Determinants of Health:    Patient is unable to carry out activities of daily life. Escalation of care is necessary.       Disposition and Care Coordination:    Admit:   Through independent evaluation of the patient's history, physical, and imperical data, the patient meets criteria for inpatient admission to the hospital.        Final diagnoses:   Pneumonia due to infectious organism, unspecified  laterality, unspecified part of lung   COPD exacerbation   Hypoxia        ED Disposition       ED Disposition   Decision to Admit    Condition   --    Comment   Level of Care: Telemetry [5]   Diagnosis: Pneumonia [[id number]]   Admitting Physician: JERRY ARNOLD [[id number]]   Attending Physician: JERRY ARNOLD [[id number]]   Certification: I Certify That Inpatient Hospital Services Are Medically Necessary For Greater Than 2 Midnights                 This medical record created using voice recognition software.             Aime Castelan, DO  12/31/24 1955

## 2024-12-31 NOTE — PROGRESS NOTES
"Chief Complaint  No chief complaint on file.    Subjective        Colin Nevarez presents to [facility]  History of Present Illness  The patient is here today for for follow-up for the management of his chronic medical conditions and for an acute visit.  He has chronic hypoxic respiratory failure on home oxygen, COPD, nicotine dependence and hyperlipidemia.     The Son is with the patient today helping with the history.  For the past week his breathing has been worsening.  He has been using 4-1/2 L home O2.  When he came in today his color was gray.  At first pulse oximetry was determined to show his oxygen was in the 70-80's.  He was placed on 5 L of oxygen and we got it up into the 90s.  Due to how hard he was working to breathe and his overall disposition it was decided that the son would take the patient to the ER.         Objective   Vital Signs:  /82   Pulse 68   Temp 99.5 °F (37.5 °C)   Resp 26   SpO2 95%   PF (!) 5 L/min   Estimated body mass index is 20.75 kg/m² as calculated from the following:    Height as of 12/26/24: 177.8 cm (70\").    Weight as of 12/26/24: 65.6 kg (144 lb 9.6 oz).    BMI is within normal parameters. No other follow-up for BMI required.      Physical Exam  Vitals reviewed.   Constitutional:       Appearance: He is well-developed. He is ill-appearing.   HENT:      Head: Normocephalic and atraumatic.      Right Ear: External ear normal.      Left Ear: External ear normal.      Mouth/Throat:      Pharynx: No oropharyngeal exudate.   Eyes:      Conjunctiva/sclera: Conjunctivae normal.      Pupils: Pupils are equal, round, and reactive to light.   Neck:      Vascular: No carotid bruit.   Cardiovascular:      Rate and Rhythm: Normal rate and regular rhythm.      Heart sounds: No murmur heard.     No friction rub. No gallop.   Pulmonary:      Effort: Pulmonary effort is normal.      Breath sounds: Decreased breath sounds and wheezing present. No " rhonchi.   Abdominal:      General: There is no distension.   Skin:     General: Skin is warm and dry.   Neurological:      Mental Status: He is alert and oriented to person, place, and time.      Cranial Nerves: No cranial nerve deficit.      Motor: No weakness.   Psychiatric:         Mood and Affect: Mood and affect normal.         Behavior: Behavior normal.         Thought Content: Thought content normal.         Judgment: Judgment normal.        Result Review :    CMP          7/21/2024    05:50 7/26/2024    09:50 12/31/2024    14:38   CMP   Glucose 70  73  173    BUN 16  13  22    Creatinine 0.52  0.69  0.73    EGFR 106.4  97.7  96.1    Sodium 141  138  140    Potassium 3.8  4.5  4.1    Chloride 102  98  99    Calcium 8.6  9.5  9.4    Total Protein  6.5  7.2    Albumin  3.7  4.0    Globulin  2.8  3.2    Total Bilirubin  0.2  0.4    Alkaline Phosphatase  75  79    AST (SGOT)  37  16    ALT (SGPT)  93  35    Albumin/Globulin Ratio  1.3  1.3    BUN/Creatinine Ratio 30.8  18.8  30.1    Anion Gap 0.5  11.3  8.7      CBC          7/21/2024    05:50 7/26/2024    09:50 12/31/2024    14:38   CBC   WBC 7.36  13.42  9.68    RBC 5.32  5.80  6.54    Hemoglobin 16.0  17.6  19.3    Hematocrit 48.9  51.6  58.8    MCV 91.9  89.0  89.9    MCH 30.1  30.3  29.5    MCHC 32.7  34.1  32.8    RDW 13.1  12.8  13.0    Platelets 108  216  212      Lipid Panel          2/9/2024    09:35   Lipid Panel   Total Cholesterol 164    Triglycerides 75    HDL Cholesterol 41    VLDL Cholesterol 14    LDL Cholesterol  109    LDL/HDL Ratio 2.63      TSH          2/9/2024    09:35   TSH   TSH 1.240                Assessment and Plan   Diagnoses and all orders for this visit:    1. Acute on chronic respiratory failure with hypoxia (Primary)  Assessment & Plan:  The patient was explained the risks and benefits of going to the emergency room. We highly encouraged that he go due to fears that if he does not his breathing will worsen and he will get in  trouble at home. Reluctantly the patient did agree to go to the emergency room. His son was in the room and agreed to take him to the ER.       2. Chronic obstructive pulmonary disease, unspecified COPD type  Assessment & Plan:  COPD is worsening.    Plan:  Continue same medication/s without change.    Discussed medication dosage, use, side effects, and goals of treatment in detail.    Warning signs of respiratory distress were reviewed with the patient.   Smoking cessation discussed: Information shared with patient..    Patient Treatment Goals:   symptom prevention, minimizing limitation in activity, prevention of exacerbations and use of ER/inpatient care, maintenance of optimal pulmonary function, and minimization of adverse effects of treatment    Followup in 3 months.      3. Essential hypertension  Assessment & Plan:  Hypertension is stable and controlled  Continue current treatment regimen.  Dietary sodium restriction.  Weight loss.  Blood pressure will be reassessed in 6 months.      4. Cigarette nicotine dependence with nicotine-induced disorder  Assessment & Plan:  Tobacco use is improving with lifestyle modifications.  Smoking cessation counseling was provided.  Tobacco use will be reassessed in 6 months.    He is down to smoking 12 cigarettes per day.                                                      Follow Up   Return in about 3 months (around 3/31/2025), or if symptoms worsen or fail to improve.  Patient was given instructions and counseling regarding his condition or for health maintenance advice. Please see specific information pulled into the AVS if appropriate.

## 2024-12-31 NOTE — ASSESSMENT & PLAN NOTE
The patient was explained the risks and benefits of going to the emergency room. We highly encouraged that he go due to fears that if he does not his breathing will worsen and he will get in trouble at home. Reluctantly the patient did agree to go to the emergency room. His son was in the room and agreed to take him to the ER.

## 2025-01-01 LAB
ANION GAP SERPL CALCULATED.3IONS-SCNC: 9.8 MMOL/L (ref 5–15)
B PARAPERT DNA SPEC QL NAA+PROBE: NOT DETECTED
B PERT DNA SPEC QL NAA+PROBE: NOT DETECTED
BASOPHILS # BLD AUTO: 0.02 10*3/MM3 (ref 0–0.2)
BASOPHILS NFR BLD AUTO: 0.3 % (ref 0–1.5)
BUN SERPL-MCNC: 24 MG/DL (ref 8–23)
BUN/CREAT SERPL: 36.4 (ref 7–25)
C PNEUM DNA NPH QL NAA+NON-PROBE: NOT DETECTED
CALCIUM SPEC-SCNC: 9 MG/DL (ref 8.6–10.5)
CHLORIDE SERPL-SCNC: 101 MMOL/L (ref 98–107)
CO2 SERPL-SCNC: 32.2 MMOL/L (ref 22–29)
CREAT SERPL-MCNC: 0.66 MG/DL (ref 0.76–1.27)
DEPRECATED RDW RBC AUTO: 41.6 FL (ref 37–54)
EGFRCR SERPLBLD CKD-EPI 2021: 99 ML/MIN/1.73
EOSINOPHIL # BLD AUTO: 0 10*3/MM3 (ref 0–0.4)
EOSINOPHIL NFR BLD AUTO: 0 % (ref 0.3–6.2)
ERYTHROCYTE [DISTWIDTH] IN BLOOD BY AUTOMATED COUNT: 12.6 % (ref 12.3–15.4)
FLUAV SUBTYP SPEC NAA+PROBE: NOT DETECTED
FLUBV RNA ISLT QL NAA+PROBE: NOT DETECTED
GLUCOSE SERPL-MCNC: 159 MG/DL (ref 65–99)
HADV DNA SPEC NAA+PROBE: NOT DETECTED
HCOV 229E RNA SPEC QL NAA+PROBE: NOT DETECTED
HCOV HKU1 RNA SPEC QL NAA+PROBE: NOT DETECTED
HCOV NL63 RNA SPEC QL NAA+PROBE: NOT DETECTED
HCOV OC43 RNA SPEC QL NAA+PROBE: DETECTED
HCT VFR BLD AUTO: 57.4 % (ref 37.5–51)
HGB BLD-MCNC: 18.6 G/DL (ref 13–17.7)
HMPV RNA NPH QL NAA+NON-PROBE: NOT DETECTED
HPIV1 RNA ISLT QL NAA+PROBE: NOT DETECTED
HPIV2 RNA SPEC QL NAA+PROBE: NOT DETECTED
HPIV3 RNA NPH QL NAA+PROBE: NOT DETECTED
HPIV4 P GENE NPH QL NAA+PROBE: NOT DETECTED
IMM GRANULOCYTES # BLD AUTO: 0.02 10*3/MM3 (ref 0–0.05)
IMM GRANULOCYTES NFR BLD AUTO: 0.3 % (ref 0–0.5)
LYMPHOCYTES # BLD AUTO: 0.51 10*3/MM3 (ref 0.7–3.1)
LYMPHOCYTES NFR BLD AUTO: 7.5 % (ref 19.6–45.3)
M PNEUMO IGG SER IA-ACNC: NOT DETECTED
MAGNESIUM SERPL-MCNC: 2.2 MG/DL (ref 1.6–2.4)
MCH RBC QN AUTO: 29.1 PG (ref 26.6–33)
MCHC RBC AUTO-ENTMCNC: 32.4 G/DL (ref 31.5–35.7)
MCV RBC AUTO: 89.8 FL (ref 79–97)
MONOCYTES # BLD AUTO: 0.08 10*3/MM3 (ref 0.1–0.9)
MONOCYTES NFR BLD AUTO: 1.2 % (ref 5–12)
NEUTROPHILS NFR BLD AUTO: 6.14 10*3/MM3 (ref 1.7–7)
NEUTROPHILS NFR BLD AUTO: 90.7 % (ref 42.7–76)
NRBC BLD AUTO-RTO: 0 /100 WBC (ref 0–0.2)
PHOSPHATE SERPL-MCNC: 3.2 MG/DL (ref 2.5–4.5)
PLATELET # BLD AUTO: 196 10*3/MM3 (ref 140–450)
PMV BLD AUTO: 10.7 FL (ref 6–12)
POTASSIUM SERPL-SCNC: 4.6 MMOL/L (ref 3.5–5.2)
QT INTERVAL: 352 MS
QTC INTERVAL: 413 MS
RBC # BLD AUTO: 6.39 10*6/MM3 (ref 4.14–5.8)
RHINOVIRUS RNA SPEC NAA+PROBE: NOT DETECTED
RSV RNA NPH QL NAA+NON-PROBE: NOT DETECTED
SARS-COV-2 RNA RESP QL NAA+PROBE: NOT DETECTED
SODIUM SERPL-SCNC: 143 MMOL/L (ref 136–145)
WBC NRBC COR # BLD AUTO: 6.77 10*3/MM3 (ref 3.4–10.8)

## 2025-01-01 PROCEDURE — 25810000003 SODIUM CHLORIDE 0.9 % SOLUTION 250 ML FLEX CONT: Performed by: HOSPITALIST

## 2025-01-01 PROCEDURE — 25010000002 METHYLPREDNISOLONE PER 40 MG: Performed by: HOSPITALIST

## 2025-01-01 PROCEDURE — 84100 ASSAY OF PHOSPHORUS: CPT | Performed by: HOSPITALIST

## 2025-01-01 PROCEDURE — 94761 N-INVAS EAR/PLS OXIMETRY MLT: CPT

## 2025-01-01 PROCEDURE — 80048 BASIC METABOLIC PNL TOTAL CA: CPT | Performed by: HOSPITALIST

## 2025-01-01 PROCEDURE — 25010000002 AZITHROMYCIN PER 500 MG: Performed by: HOSPITALIST

## 2025-01-01 PROCEDURE — 85025 COMPLETE CBC W/AUTO DIFF WBC: CPT | Performed by: HOSPITALIST

## 2025-01-01 PROCEDURE — 99222 1ST HOSP IP/OBS MODERATE 55: CPT | Performed by: INTERNAL MEDICINE

## 2025-01-01 PROCEDURE — 25010000002 CEFTRIAXONE PER 250 MG: Performed by: HOSPITALIST

## 2025-01-01 PROCEDURE — 99233 SBSQ HOSP IP/OBS HIGH 50: CPT | Performed by: FAMILY MEDICINE

## 2025-01-01 PROCEDURE — 94667 MNPJ CHEST WALL 1ST: CPT

## 2025-01-01 PROCEDURE — 25010000002 ENOXAPARIN PER 10 MG: Performed by: HOSPITALIST

## 2025-01-01 PROCEDURE — 94799 UNLISTED PULMONARY SVC/PX: CPT

## 2025-01-01 PROCEDURE — 94664 DEMO&/EVAL PT USE INHALER: CPT

## 2025-01-01 PROCEDURE — 0202U NFCT DS 22 TRGT SARS-COV-2: CPT | Performed by: HOSPITALIST

## 2025-01-01 PROCEDURE — 83735 ASSAY OF MAGNESIUM: CPT | Performed by: HOSPITALIST

## 2025-01-01 PROCEDURE — 94668 MNPJ CHEST WALL SBSQ: CPT

## 2025-01-01 RX ORDER — ALBUTEROL SULFATE 0.83 MG/ML
2.5 SOLUTION RESPIRATORY (INHALATION)
Status: DISCONTINUED | OUTPATIENT
Start: 2025-01-01 | End: 2025-01-03 | Stop reason: HOSPADM

## 2025-01-01 RX ORDER — IPRATROPIUM BROMIDE AND ALBUTEROL SULFATE 2.5; .5 MG/3ML; MG/3ML
3 SOLUTION RESPIRATORY (INHALATION)
Status: DISCONTINUED | OUTPATIENT
Start: 2025-01-01 | End: 2025-01-03 | Stop reason: HOSPADM

## 2025-01-01 RX ORDER — AZITHROMYCIN 250 MG/1
500 TABLET, FILM COATED ORAL
Status: COMPLETED | OUTPATIENT
Start: 2025-01-02 | End: 2025-01-02

## 2025-01-01 RX ADMIN — BUDESONIDE 0.5 MG: 0.5 INHALANT RESPIRATORY (INHALATION) at 09:17

## 2025-01-01 RX ADMIN — METHYLPREDNISOLONE SODIUM SUCCINATE 40 MG: 40 INJECTION, POWDER, FOR SOLUTION INTRAMUSCULAR; INTRAVENOUS at 21:29

## 2025-01-01 RX ADMIN — IPRATROPIUM BROMIDE AND ALBUTEROL SULFATE 3 ML: 2.5; .5 SOLUTION RESPIRATORY (INHALATION) at 18:28

## 2025-01-01 RX ADMIN — BUDESONIDE 0.5 MG: 0.5 INHALANT RESPIRATORY (INHALATION) at 18:28

## 2025-01-01 RX ADMIN — SENNOSIDES AND DOCUSATE SODIUM 2 TABLET: 50; 8.6 TABLET ORAL at 21:29

## 2025-01-01 RX ADMIN — SODIUM CHLORIDE 500 MG: 9 INJECTION, SOLUTION INTRAVENOUS at 09:08

## 2025-01-01 RX ADMIN — ASPIRIN 81 MG: 81 TABLET, CHEWABLE ORAL at 09:08

## 2025-01-01 RX ADMIN — ARFORMOTEROL TARTRATE 15 MCG: 15 SOLUTION RESPIRATORY (INHALATION) at 09:17

## 2025-01-01 RX ADMIN — AMLODIPINE BESYLATE 10 MG: 10 TABLET ORAL at 09:08

## 2025-01-01 RX ADMIN — IPRATROPIUM BROMIDE AND ALBUTEROL SULFATE 3 ML: 2.5; .5 SOLUTION RESPIRATORY (INHALATION) at 15:53

## 2025-01-01 RX ADMIN — Medication 10 ML: at 09:08

## 2025-01-01 RX ADMIN — ARFORMOTEROL TARTRATE 15 MCG: 15 SOLUTION RESPIRATORY (INHALATION) at 18:28

## 2025-01-01 RX ADMIN — METHYLPREDNISOLONE SODIUM SUCCINATE 40 MG: 40 INJECTION, POWDER, FOR SOLUTION INTRAMUSCULAR; INTRAVENOUS at 09:08

## 2025-01-01 RX ADMIN — Medication 10 ML: at 21:29

## 2025-01-01 RX ADMIN — SODIUM CHLORIDE 1000 MG: 9 INJECTION INTRAMUSCULAR; INTRAVENOUS; SUBCUTANEOUS at 09:08

## 2025-01-01 RX ADMIN — ENOXAPARIN SODIUM 40 MG: 100 INJECTION SUBCUTANEOUS at 09:08

## 2025-01-01 NOTE — PLAN OF CARE
Goal Outcome Evaluation:  Plan of Care Reviewed With: patient           Outcome Evaluation: Pt. tolerating humidifed AIRVO throught the shift, alert and oriented, able to express needs.

## 2025-01-01 NOTE — CONSULTS
"Nutrition Services    Patient Name: Colin Nevarez  YOB: 1950  MRN: [id number]  Admission date: 12/31/2024      CLINICAL NUTRITION ASSESSMENT      Reason for Assessment  Physician Consult and Chronic Poor Intake     H&P:  Past Medical History:   Diagnosis Date    Arthritis     COPD (chronic obstructive pulmonary disease)     Essential hypertension 07/12/2021    Forgetfulness     Heart flutter, ventricular     High cholesterol     Lung cancer     Prostate cancer     Ringing in ear     Shortness of Air     SOB (shortness of breath)         Current Problems:   Active Hospital Problems    Diagnosis     **Pneumonia         Nutrition/Diet History         Narrative   Consult received for chronic poor intake. Pt reports his intake was decreased for a few days prior to admit d/t not felling well, but he was still eating 2 meals/day during that time. He and his daughter report he is eating well here and is back to normal. He is at a low BMI for age and does have some visible muscle loss but has no clinically significant wt loss. Partially edentulous, denies chewing/swallowing problems as well as n/v/d/c. Does not currently meet criteria for malnutrition, but if intake decreases he will. Providers note changes in lungs consistent with emphysema which increases WOB, calorie expenditure, and wt loss. Will continue to monitor.     Anthropometrics        Current Height, Weight Height: 172.7 cm (68\")  Weight: 63.7 kg (140 lb 6.9 oz)   Current BMI Body mass index is 21.35 kg/m².   BMI Classification Underweight - healthy BMI for age 23-30   % IBW 93%   Adjusted Body Weight (ABW)    Weight Hx  Wt Readings from Last 30 Encounters:   12/31/24 2333 63.7 kg (140 lb 6.9 oz)   12/26/24 1407 65.6 kg (144 lb 9.6 oz)   12/20/24 0852 66.8 kg (147 lb 4.8 oz)   11/01/24 1039 67.2 kg (148 lb 3.2 oz)   08/22/24 1005 63.7 kg (140 lb 6.9 oz)   08/16/24 0918 62.9 kg (138 lb 9.6 oz)   08/13/24 1138 62.6 kg (138 lb 0.1 oz)   08/08/24 0855 " 63.3 kg (139 lb 9.6 oz)   07/26/24 0854 62 kg (136 lb 9.6 oz)   07/11/24 1136 63.3 kg (139 lb 8.8 oz)   06/20/24 1304 63.3 kg (139 lb 9.6 oz)   02/22/24 0945 64.2 kg (141 lb 8.6 oz)   02/15/24 1007 63.3 kg (139 lb 9.6 oz)   02/08/24 1031 64.8 kg (142 lb 13.7 oz)   02/08/24 0859 64.8 kg (142 lb 13.7 oz)   01/25/24 1004 67 kg (147 lb 9.6 oz)   12/07/23 1035 70.6 kg (155 lb 9.6 oz)   11/07/23 0935 69.2 kg (152 lb 9.6 oz)   08/15/23 0841 70.5 kg (155 lb 6.4 oz)   08/11/23 1109 68.9 kg (152 lb)   08/07/23 0836 68.9 kg (151 lb 14.4 oz)   08/03/23 0903 69.1 kg (152 lb 5.4 oz)   06/14/23 0754 67.6 kg (149 lb 0.5 oz)   06/05/23 0938 67.4 kg (148 lb 9.4 oz)   06/02/23 0926 67.5 kg (148 lb 13 oz)   06/01/23 0905 67.1 kg (147 lb 14.9 oz)   05/31/23 0933 67 kg (147 lb 11.3 oz)   05/30/23 0939 67.3 kg (148 lb 5.9 oz)   05/19/23 0953 66 kg (145 lb 8.1 oz)   05/16/23 1008 65.8 kg (145 lb)          Wt Change Observation -5% x 3 mo, appears to be near his baseline wt     Estimated/Assessed Needs  Estimated Needs based on: Current Body Weight - 64 kg       Energy Requirements 25-30 kcal/kg   EST Needs (kcal/day) 0259-2825       Protein Requirements 1.0-1.2 g/kg   EST Daily Needs (g/day) 64-67       Fluid Requirements 1 ml/kcal    Estimated Needs (mL/day) 8879-8206     Labs/Medications         Pertinent Labs Reviewed.   Results from last 7 days   Lab Units 01/01/25  0412 12/31/24  1438   SODIUM mmol/L 143 140   POTASSIUM mmol/L 4.6 4.1   CHLORIDE mmol/L 101 99   CO2 mmol/L 32.2* 32.3*   BUN mg/dL 24* 22   CREATININE mg/dL 0.66* 0.73*   CALCIUM mg/dL 9.0 9.4   BILIRUBIN mg/dL  --  0.4   ALK PHOS U/L  --  79   ALT (SGPT) U/L  --  35   AST (SGOT) U/L  --  16   GLUCOSE mg/dL 159* 173*     Results from last 7 days   Lab Units 01/01/25  0412 12/31/24  1920   MAGNESIUM mg/dL 2.2 2.3   PHOSPHORUS mg/dL 3.2 2.9   HEMOGLOBIN g/dL 18.6*  --    HEMATOCRIT % 57.4*  --      COVID19   Date Value Ref Range Status   01/01/2025 Not Detected Not Detected  "- Ref. Range Final     No results found for: \"HGBA1C\"      Pertinent Medications Reviewed.     Malnutrition Severity Assessment         Malnutrition Type (Last 8 Hours)       Malnutrition Severity Assessment       Row Name 01/01/25 1411       Insufficient Energy Intake     Insufficient Energy Intake Findings Mild      Row Name 01/01/25 1411       Unintentional Weight Loss     Unintentional Weight Loss Findings None      Row Name 01/01/25 1411       Muscle Loss    Loss of Muscle Mass Findings Moderate     [place] Region Moderate - slight depression    Clavicle Bone Region Moderate - some protrusion in females, visible in males    Acromion Bone Region Moderate - acromion may slightly protrude      Row Name 01/01/25 1411       Fat Loss    Orbital Region  Moderate -  somewhat hollowness, slightly dark circles                     Nutrition Diagnosis         Nutrition Dx Problem 1 Underweight related to increased nutrient needs due to catabolic disease as evidenced by body composition changes. and emphysematous changes increasing WOB and calorie expenditure.     Nutrition Intervention           Current Nutrition Orders & Evaluation of Intake       Current PO Diet Diet: Regular/House; Fluid Consistency: Thin (IDDSI 0)   Supplement No active supplement orders           Nutrition Intervention/Prescription        Continue current House diet as ordered  Add Boost Glucose Control BID (190kcal, 16g pro)        Medical Nutrition Therapy/Nutrition Education          Learner     Readiness Patient and Family  Acceptance     Method     Response Explanation  Verbalizes understanding     Monitor/Evaluation        Monitor PO intake, Supplement intake, Weight, Symptoms     Nutrition Discharge Plan         Recommend to continue oral nutrition supplements on discharge.      Electronically signed by:  Mirta Mckinney RD  01/01/25 14:11 EST   "

## 2025-01-01 NOTE — PROGRESS NOTES
[facility]   Hospitalist Progress Note  Date: 2025  Patient Name: Colin Nevarez  : 1950  MRN: [id number]  Date of admission: 2024      Subjective   Subjective     Chief Complaint: Follow-up shortness of breath    Summary:Colin Nevarez is a 74 y.o. male with COPD, essential hypertension, lung and prostate, cancer who presents to the emergency room for evaluation of worsening dyspnea.  Patient has had a low-grade fever and a progressive cough for the last couple of days.  He was at his primary care provider's office when patient had a pulse ox of 81% on room air.  He was put on 5 L of oxygen to get his pulse ox above 90%.  He was instructed to come to the ER because he was working so hard to breathe. On arrival to the ED, patient temperature 99.5, pulse of 80, respiratory 24, blood pressure 106/77, and he is on 3 L of oxygen saturating 99%.  Eventually patient required 4 L of oxygen and was saturating 91%. Chest x-ray shows hyperexpansion of lungs compatible with COPD.  Increased opacity at the left lung base which may represent atelectasis or pneumonia superimposed upon chronic change. On labs, patient has a white blood cell count of 9.68, hemoglobin of 19.3, CO2 of 32.3.  ABG demonstrated chronic hypercapnia with a pCO2 of 63.  Patient admitted for further evaluation and treatment.  Pulmonology consulted.  Started on empiric antibiotics, inhaled bronchodilators and systemic steroids.  Patient briefly required AirVo but was able to be weaned to high flow nasal cannula on morning of 2025.  Patient hoping to return home once respiratory status improves.  CT chest did reveal areas of peripheral consolidation and right lower lobe similar to prior study.  Recommending outpatient follow-up in 3 to 6 months to assess for resolution.    Interval Followup: Patient sitting up in bed appears to be resting fairly comfortably with family at the bedside.  Patient endorsing proved shortness of breath.   Cough remains minimally productive.  Weaned off of AirVo to high flow nasal cannula.  Afebrile overnight.  Sinus rhythm 60s to 90s on telemetry review.  Blood pressure within normal limits.  Satting well on 12 L flow nasal cannula during my exam though able to be weaned down to 4 L nasal cannula this evening.  Respiratory panel positive for coronavirus OC 43.  No other issues per nursing.    Review of Systems  Constitutional: Negative for fatigue and fever.   HENT: Negative for sore throat and trouble swallowing.    Eyes: Negative for pain and discharge.   Respiratory: Positive for cough and shortness of breath.    Cardiovascular: Negative for chest pain and palpitations.   Gastrointestinal: Negative for abdominal pain, nausea and vomiting.   Endocrine: Negative for cold intolerance and heat intolerance.   Genitourinary: Negative for difficulty urinating and dysuria.   Musculoskeletal: Negative for back pain and neck stiffness.   Skin: Negative for color change and rash.   Neurological: Negative for syncope and headaches.   Hematological: Negative for adenopathy.   Psychiatric/Behavioral: Negative for confusion and hallucinations.    Objective   Objective     Vitals:   Temp:  [97.5 °F (36.4 °C)-99.5 °F (37.5 °C)] 97.9 °F (36.6 °C)  Heart Rate:  [65-87] 70  Resp:  [16-26] 20  BP: (123-176)/(79-92) 138/80  Flow (L/min) (Oxygen Therapy):  [3-55] 6  Physical Exam   General: well-developed appearing stated age in no acute distress  HEENT: Normocephalic atraumatic moist membranes pupils equal round reactive light, no scleral icterus no conjunctival injection  Cardiovascular: regular rate and rhythm no murmurs rubs or gallops S1-S2, no lower extremity edema appreciated  Pulmonary: Scant wheezes, prolonged expiratory phase, no rhonchi, symmetric chest expansion, unlabored, no conversational dyspnea appreciated  Gastrointestinal: Soft nontender nondistended positive bowel sounds all 4 quadrants no rebound or  guarding  Musculoskeletal: No clubbing cyanosis, warm and well-perfused, calves soft symmetric nontender bilaterally  Skin: Clean dry without rashes  Neuro: Cranial nerves II through XII intact grossly no sensorimotor deficits appreciated bilateral upper and lower extremities  Psych: Patient is calm cooperative and appropriate with exam not responding to internal stimuli  : No [place] catheter no bladder distention no suprapubic tenderness    Result Review    Result Review:  I have personally reviewed these results and agree with these findings:  [x]  Laboratory  LAB RESULTS:      Lab 01/01/25 0412 12/31/24  2100 12/31/24 1920 12/31/24  1438   WBC 6.77  --   --  9.68   HEMOGLOBIN 18.6*  --   --  19.3*   HEMATOCRIT 57.4*  --   --  58.8*   PLATELETS 196  --   --  212   NEUTROS ABS 6.14  --   --  6.45   IMMATURE GRANS (ABS) 0.02  --   --  0.04   LYMPHS ABS 0.51*  --   --  2.22   MONOS ABS 0.08*  --   --  0.94*   EOS ABS 0.00  --   --  0.02   MCV 89.8  --   --  89.9   PROCALCITONIN  --   --  0.03  --    LACTATE  --  1.1  --   --          Lab 01/01/25 0412 12/31/24 1920 12/31/24  1438   SODIUM 143  --  140   POTASSIUM 4.6  --  4.1   CHLORIDE 101  --  99   CO2 32.2*  --  32.3*   ANION GAP 9.8  --  8.7   BUN 24*  --  22   CREATININE 0.66*  --  0.73*   EGFR 99.0  --  96.1   GLUCOSE 159*  --  173*   CALCIUM 9.0  --  9.4   MAGNESIUM 2.2 2.3  --    PHOSPHORUS 3.2 2.9  --          Lab 12/31/24  1438   TOTAL PROTEIN 7.2   ALBUMIN 4.0   GLOBULIN 3.2   ALT (SGPT) 35   AST (SGOT) 16   BILIRUBIN 0.4   ALK PHOS 79         Lab 12/31/24 1920 12/31/24  1438   PROBNP  --  <36.0   HSTROP T 13 12                 Lab 12/31/24 2009   PH, ARTERIAL 7.352   PCO2, ARTERIAL 63.6*   PO2 ART 66.2*   O2 SATURATION ART 90.9*   FIO2 40   HCO3 ART 35.3*   BASE EXCESS ART 6.2*     Brief Urine Lab Results  (Last result in the past 365 days)        Color   Clarity   Blood   Leuk Est   Nitrite   Protein   CREAT   Urine HCG        08/08/24 0915  Yellow   Clear   Negative   Negative   Negative   Negative                 Microbiology Results (last 10 days)       Procedure Component Value - Date/Time    Respiratory Panel PCR w/COVID-19(SARS-CoV-2) LUCINDA/BINTA/FILOMENA/PAD/COR/PATRICK In-House, NP Swab in UTM/VTM, 2 HR TAT - Swab, Nasopharynx [[id number]]  (Abnormal) Collected: 01/01/25 0912    Lab Status: Final result Specimen: Swab from Nasopharynx Updated: 01/01/25 1157     ADENOVIRUS, PCR Not Detected     Coronavirus 229E Not Detected     Coronavirus HKU1 Not Detected     Coronavirus NL63 Not Detected     Coronavirus OC43 Detected     COVID19 Not Detected     Human Metapneumovirus Not Detected     Human Rhinovirus/Enterovirus Not Detected     Influenza A PCR Not Detected     Influenza B PCR Not Detected     Parainfluenza Virus 1 Not Detected     Parainfluenza Virus 2 Not Detected     Parainfluenza Virus 3 Not Detected     Parainfluenza Virus 4 Not Detected     RSV, PCR Not Detected     Bordetella pertussis pcr Not Detected     Bordetella parapertussis PCR Not Detected     Chlamydophila pneumoniae PCR Not Detected     Mycoplasma pneumo by PCR Not Detected    Narrative:      In the setting of a positive respiratory panel with a viral infection PLUS a negative procalcitonin without other underlying concern for bacterial infection, consider observing off antibiotics or discontinuation of antibiotics and continue supportive care. If the respiratory panel is positive for atypical bacterial infection (Bordetella pertussis, Chlamydophila pneumoniae, or Mycoplasma pneumoniae), consider antibiotic de-escalation to target atypical bacterial infection.    Legionella Antigen, Urine - Urine, Urine, Clean Catch [[id number]]  (Normal) Collected: 12/31/24 1919    Lab Status: Final result Specimen: Urine, Clean Catch Updated: 12/31/24 1948     LEGIONELLA ANTIGEN, URINE Negative    S. Pneumo Ag Urine or CSF - Urine, Urine, Clean Catch [[id number]]  (Normal) Collected: 12/31/24 1919    Lab  Status: Final result Specimen: Urine, Clean Catch Updated: 12/31/24 1949     Strep Pneumo Ag Negative    COVID-19, FLU A/B, RSV PCR 1 HR TAT - Swab, Nasopharynx [[id number]]  (Normal) Collected: 12/31/24 1422    Lab Status: Final result Specimen: Swab from Nasopharynx Updated: 12/31/24 1511     COVID19 Not Detected     Influenza A PCR Not Detected     Influenza B PCR Not Detected     RSV, PCR Not Detected    Narrative:      Fact sheet for providers: https://www.fda.gov/media/602081/download    Fact sheet for patients: https://www.fda.gov/media/588015/download    Test performed by PCR.            [x]  Microbiology  [x]  Radiology  CT Chest Without Contrast Diagnostic    Result Date: 12/31/2024  Impression: 1. No acute process. 2. Area of peripheral consolidation within the right lower lobe appears similar to the prior study from 8/8/2024 and may relate to scarring in the setting of prior infection, recommend close follow-up in 3 to 6 months to assess for resolution. No new area of consolidation. 3. Emphysema, coronary artery calcifications and additional chronic findings above. Electronically Signed: Joseph Pacheco MD  12/31/2024 10:01 PM EST  Workstation ID: [id number]    XR Chest 1 View    Result Date: 12/31/2024  Impression: 1.Hyperexpansion of lungs compatible with COPD. 2.Increased opacity at the left lung base which may represent atelectasis or pneumonia superimposed upon chronic change. 3.Recommend follow-up to document resolution. Electronically Signed: Kirit Cool MD  12/31/2024 3:32 PM EST  Workstation ID: [id number]     [x]  EKG/Telemetry   []  Cardiology/Vascular   []  Pathology  []  Old records  [x]  Other:  Scheduled Meds:amLODIPine, 10 mg, Oral, Daily  arformoterol, 15 mcg, Nebulization, BID - RT  aspirin, 81 mg, Oral, Daily  [START ON 1/2/2025] azithromycin, 500 mg, Oral, Q24H  budesonide, 0.5 mg, Nebulization, BID - RT  cefTRIAXone, 1,000 mg, Intravenous, Q24H  enoxaparin, 40 mg, Subcutaneous,  Daily  ipratropium-albuterol, 3 mL, Nebulization, Q4H While Awake - RT  methylPREDNISolone sodium succinate, 40 mg, Intravenous, Q12H  senna-docusate sodium, 2 tablet, Oral, BID  sodium chloride, 10 mL, Intravenous, Q12H      Continuous Infusions:   PRN Meds:.  acetaminophen **OR** acetaminophen **OR** acetaminophen    albuterol    senna-docusate sodium **AND** polyethylene glycol **AND** bisacodyl **AND** bisacodyl    HYDROcodone-acetaminophen    melatonin    ondansetron ODT **OR** ondansetron    sodium chloride    sodium chloride    sodium chloride      Assessment & Plan   Assessment / Plan     Assessment/Plan:  Acute on chronic hypoxic respiratory failure  Chronic hypercapnic respiratory failure  COPD with acute exacerbation  Coronavirus OC 43 infection  Hypertension  Polycythemia vera        Patient admitted for further evaluation and treatment  Pulmonology consulted thank you for assistance  Continue supplemental oxygen titrate to keep sats between 90 and 96%  Continue BiPAP at night as needed  Continue Solu-Medrol and taper per pulmonology  Continue Aston Royalmicort with DuoNebs scheduled  Continue as needed albuterol  Continue Rocephin azithromycin empirically  Continue bronchopulmonary hygiene protocol  Continue supportive care for coronavirus infection  Continue amlodipine  Further inpatient orders and recommendations pending clinical course       Discussed plan with bedside RN as well as pulmonology team.    Disposition: Home as respiratory function improves.  Repeat CT chest in 3 to 6 months to ensure benign resolution of peripheral right lower lobe consolidation.    VTE Prophylaxis:  Pharmacologic VTE prophylaxis orders are present.        CODE STATUS:   Level Of Support Discussed With: Patient  Code Status (Patient has no pulse and is not breathing): CPR (Attempt to Resuscitate)  Medical Interventions (Patient has pulse or is breathing): Full Support

## 2025-01-01 NOTE — CONSULTS
Pulmonary / Critical Care Consult Note      Patient Name: Colin Nevarez  : 1950  MRN: [id number]  Primary Care Physician:  Liz Roper DO  Referring Physician: Javi Hassan MD  Date of admission: 2024    Subjective   Subjective     Reason for Consult/ Chief Complaint:   Acute on chronic hypoxic respiratory failure, concern for pneumonia from unknown organism, COPD exacerbation    HPI:  Colin Nevarez is a 74 y.o. 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  In the ED, ABG was 7.3 , and all other labs were unremarkable.  Initial CXR demonstrating hyperexpansion of the lungs and increased opacity at the left lung base which may represent a likely cyst pneumonia or chronic changes.  Follow-up chest CT demonstrated areas of appearing fluid consolidation within the right lower lobe are similar to previous imaging and emphysema.  However, chest CT did not demonstrate any acute process.  The service was consulted to assist in management treating the patient's acute issues.  Upon assessment, patient sitting on edge of bed utilizing Airvo.  Findings and plan were discussed the patient.  Patient stating that he became more short of breath over the last couple days with increased cough and subjective fevers.  Patient denies any or any known sick contacts.  Patient currently reporting that his cough is improved.  Patient denies any chest pain or chest tightness.        Personal History     Past Medical History:   Diagnosis Date    Arthritis     COPD (chronic obstructive pulmonary disease)     Essential hypertension 2021    Forgetfulness     Heart flutter, ventricular     High cholesterol     Lung cancer     Prostate cancer     Ringing in ear     Shortness  of Air     SOB (shortness of breath)        Past Surgical History:   Procedure Laterality Date    APPENDECTOMY      COLONOSCOPY  06/01/2019    COLONOSCOPY N/A 6/14/2023    Procedure: COLONOSCOPY;  Surgeon: Geoffrey Carey MD;  Location: [facility] ENDOSCOPY;  Service: General;  Laterality: N/A;  NORMAL    LUNG BIOPSY      PROSTATECTOMY      TONSILLECTOMY  1969    Per PT       Family History: family history includes Breast cancer (age of onset: 40) in his sister; Cancer in his maternal grandmother and paternal grandmother; Ovarian cancer in his mother; Stroke in his father. Otherwise pertinent FHx was reviewed and not pertinent to current issue.    Social History:  reports that he has been smoking cigarettes. He started smoking about 66 years ago. He has a 16.5 pack-year smoking history. He has been exposed to tobacco smoke. He has never used smokeless tobacco. He reports that he does not drink alcohol and does not use drugs.    Home Medications:  Multiple Vitamins-Minerals, O2, albuterol, albuterol sulfate HFA, amLODIPine, arformoterol, aspirin, budesonide, ipratropium-albuterol, and predniSONE    Allergies:  No Known Allergies    Objective    Objective     Vitals:   Temp:  [97.5 °F (36.4 °C)-99.5 °F (37.5 °C)] 98.2 °F (36.8 °C)  Heart Rate:  [68-87] 75  Resp:  [16-26] 16  BP: (106-176)/(77-92) 123/80  Flow (L/min) (Oxygen Therapy):  [3-55] 15    Physical Exam:  Vital Signs Reviewed   General:  WDWN, Alert, NAD.  Medically ill-appearing elderly male, sitting on edge of bed  HEENT:  PERRL, EOMI.  OP, nares clear, no sinus tenderness  Neck:  Supple, no JVD, no thyromegaly  Lymph: no axillary, cervical, supraclavicular lymphadenopathy noted bilaterally  Chest: Poor aeration with scattered rhonchi and wheezing, no crackles, breathing on Airvo  CV: RRR, no MGR, pulses 2+, equal.  Abd:  Soft, NT, ND, + BS, no HSM  EXT:  no clubbing, no cyanosis, no edema, no joint tenderness  Neuro:  A&Ox3, CN grossly intact, no focal  deficits.  Skin: No rashes or lesions noted      Result Review    Result Review:  I have personally reviewed the results from the time of this admission to 1/1/2025 10:17 EST and agree with these findings:  [x]  Laboratory  [x]  Microbiology  [x]  Radiology  []  EKG/Telemetry   []  Cardiology/Vascular   []  Pathology  []  Old records  []  Other:  Most notable findings include:         Lab 01/01/25  0412 12/31/24  1920 12/31/24  1438   WBC 6.77  --  9.68   HEMOGLOBIN 18.6*  --  19.3*   HEMATOCRIT 57.4*  --  58.8*   PLATELETS 196  --  212   SODIUM 143  --  140   POTASSIUM 4.6  --  4.1   CHLORIDE 101  --  99   CO2 32.2*  --  32.3*   BUN 24*  --  22   CREATININE 0.66*  --  0.73*   GLUCOSE 159*  --  173*   CALCIUM 9.0  --  9.4   PHOSPHORUS 3.2 2.9  --    TOTAL PROTEIN  --   --  7.2   ALBUMIN  --   --  4.0   GLOBULIN  --   --  3.2         Assessment & Plan   Assessment / Plan     Active Hospital Problems:  Active Hospital Problems    Diagnosis     **Pneumonia    Impression:  Acute on chronic hypoxic respiratory failure requiring Airvo  Acute on chronic COPD exacerbation, FEV1 24%  Coronavirus OC 43 infection  Concern for pneumonia from unknown organism  Stable perihilar consolidation in the right lower lobe  History of Right lower lobe squamous cell carcinoma s/p SBRT  History of prostate cancer s/p prostatectomy  Tobacco abuse of cigarettes, ongoing    Plan:  -Respiratory status remains tenuous at this time.  Continue to wean supplemental oxygen maintain SpO2 greater than 90%  -Chest CT reviewed demonstrating stable area of consolidation compared to previous imaging.  -Micro negative to date.  Respiratory panel positive for coronavirus OC 43  -Continue azithromycin 500 mg oral daily and ceftriaxone for a total of 5 days  -Continue Brovana, Pulmicort, DuoNebs  -Continue Solu-Medrol 40 mg IV twice daily.  Patient will likely need long steroid taper  -Continue bronchopulmonary hygiene.  Encourage incentive spirometer  and  -Chest physiotherapy available  -PT/OT/SLP on board.  Appreciate assistance  -Encouraged tobacco cessation      VTE Prophylaxis:  Pharmacologic VTE prophylaxis orders are present.    Code Status and Medical Interventions: CPR (Attempt to Resuscitate); Full Support   Ordered at: 12/1950     Level Of Support Discussed With:    Patient     Code Status (Patient has no pulse and is not breathing):    CPR (Attempt to Resuscitate)     Medical Interventions (Patient has pulse or is breathing):    Full Support      Electronically signed by CHEN Childs, 01/01/25, 10:33 AM EST.  This visit was performed by both a physician and an APC.  I personally evaluated and examined the patient.  I performed all aspects of MDM as documented.  I have reviewed and confirmed the accuracy of the patient's history as documented in this note and I have reexamined the patient and the results are consistent with the previously documented exam.  I have updated the documentation as necessary. Electronically signed by Isidro Armijo MD, 01/01/25, 2:12 PM EST.

## 2025-01-01 NOTE — H&P
[facility]  [facility]: 2024   Patient Name: Colin Nevarez  : 1950  MRN: [id number]  Primary Care Physician:  Liz Roper DO  Date of admission: 2024    Subjective dyspnea  Subjective   Chief Complaint: Dyspnea    HPI: Patient is a 74-year-old male with COPD, essential hypertension, lung and prostate, cancer who presents to the emergency room for evaluation of worsening dyspnea.  Patient has had a low-grade fever and a progressive cough for the last couple of days.  He was at his primary care provider's office when patient had a pulse ox of 81% on room air.  He was put on 5 L of oxygen to get his pulse ox above 90%.  He was instructed to come to the ER because he was working so hard to breathe.    On arrival to the ED, patient temperature 99.5, pulse of 80, respiratory 24, blood pressure 106/77, and he is on 3 L of oxygen saturating 99%.  Eventually patient required 4 L of oxygen and was saturating 91%.    Chest x-ray shows hyperexpansion of lungs compatible with COPD.  Increased opacity at the left lung base which may represent atelectasis or pneumonia superimposed upon chronic change.    On labs, patient has a white blood cell count of 9.68, hemoglobin of 19.3, CO2 of 32.3.  Personal History     Past Medical History:  Past Medical History:   Diagnosis Date    Arthritis     COPD (chronic obstructive pulmonary disease)     Essential hypertension 2021    Forgetfulness     Heart flutter, ventricular     High cholesterol     Lung cancer     Prostate cancer     Ringing in ear     Shortness of Air     SOB (shortness of breath)        Past Surgical History:  Past Surgical History:   Procedure Laterality Date    APPENDECTOMY      COLONOSCOPY  2019    COLONOSCOPY N/A 2023    Procedure: COLONOSCOPY;  Surgeon: Geoffrey Carey MD;  Location: [facility] ENDOSCOPY;  Service: General;  Laterality: N/A;  NORMAL    LUNG BIOPSY      PROSTATECTOMY       TONSILLECTOMY  1969    Per PT       Family History:   Family History   Problem Relation Age of Onset    Ovarian cancer Mother     Stroke Father     Breast cancer Sister 40    Cancer Maternal Grandmother     Cancer Paternal Grandmother        Social History:   Social History     Socioeconomic History    Marital status: Single   Tobacco Use    Smoking status: Every Day     Current packs/day: 0.25     Average packs/day: 0.3 packs/day for 66.0 years (16.5 ttl pk-yrs)     Types: Cigarettes     Start date: 1959     Passive exposure: Current    Smokeless tobacco: Never    Tobacco comments:        Vaping Use    Vaping status: Never Used   Substance and Sexual Activity    Alcohol use: Never    Drug use: Defer    Sexual activity: Defer       Home Medications:  Multiple Vitamins-Minerals, O2, albuterol, albuterol sulfate HFA, amLODIPine, arformoterol, aspirin, budesonide, ipratropium-albuterol, and predniSONE    Allergies:  No Known Allergies    Review of Systems   All systems were reviewed and negative except for: Worsening shortness of breath    Objective   Objective     Vitals:   Temp:  [99.5 °F (37.5 °C)] 99.5 °F (37.5 °C)  Heart Rate:  [68-83] 83  Resp:  [22-26] 26  BP: (106-176)/(77-92) 155/84  Flow (L/min) (Oxygen Therapy):  [3-4] 4    Physical Exam    Constitutional: Awake, alert, no acute distress   Eyes: Pupils equal, sclerae anicteric, no conjunctival injection   HENT: NCAT, mucous membranes moist   Neck: Supple, no thyromegaly, no lymphadenopathy, trachea midline   Respiratory: Wheezing   Cardiovascular: RRR, no murmurs, rubs, or gallops, palpable pedal pulses bilaterally   Gastrointestinal: Positive bowel sounds, soft, nontender, nondistended   Musculoskeletal: No bilateral ankle edema, no clubbing or cyanosis to extremities   Psychiatric: Appropriate affect, cooperative   Neurologic: Oriented x 3, strength symmetric in all extremities, Cranial Nerves grossly intact to confrontation, speech clear   Skin: No rashes      Result Review    Result Review:  I have personally reviewed the results from the time of this admission to 12/31/2024 19:52 EST and agree with these findings:  [x]  Laboratory  [x]  Microbiology  [x]  Radiology  [x]  EKG/Telemetry   [x]  Cardiology/Vascular   [x]  Pathology  [x]  Old records  []  Other:      Assessment & Plan   Assessment / Plan   #1 pneumonia with acute respiratory failure  -Complete pneumonia panel ordered  -Rocephin and azithromycin  -Pulmonology consulted  -CT for better visualization.  -Swallow assessment to rule out any aspiration.    #2 COPD  -Concern for CO2 retention.  Will get an ABG.  -May need as needed BiPAP  -DuoNeb, Brovana, Pulmicort, RT for bronchopulmonary hygiene, steroids.    #3 hypertension continue amlodipine    #4 Polycythemia vera        VTE Prophylaxis:  Pharmacologic VTE prophylaxis orders are signed & held.          CODE STATUS:    Level Of Support Discussed With: Patient  Code Status (Patient has no pulse and is not breathing): CPR (Attempt to Resuscitate)  Medical Interventions (Patient has pulse or is breathing): Full Support      Admission Status:  I believe this patient meets inpatient status.    Electronically signed by Haja Conklin DO, 12/31/24, 7:42 PM EST.

## 2025-01-01 NOTE — PROGRESS NOTES
Respiratory Therapist Broncho-Pulmonary Hygiene Progress Note      Patient Name:  Colin Nevarez  YOB: 1950    Colin Nevarez meets the qualification for Level 4 of the Bronco-Pulmonary Hygiene Protocol. This was based on my daily patient assessment and includes review of chest x-ray results, cough ability and quality, oxygenation, secretions or risk for secretion development and patient mobility.     Broncho-Pulmonary Hygiene Assessment:    Level of Movement: Actively changing positions without assistance  Alert/ oriented/ cooperative    Breath Sounds: Bilateral localized decreased air exchange and/or coarse rhonchi    Cough: Strong, effective and/or frequent    Chest X-Ray: Presence of atelectasis and/or consolidation    Sputum Productions: Able to produce small to moderate amount, of moderately thick secretions    History and Physical: Home use of oxygen , Chronic condition, and COPD with mucus plugging or retained secretions    SpO2 to Oxygen Need: greater than 92% on room air or  less than 3L nasal canula    Current SpO2 is: 93% on AIRVO 55L AND 35%    Based on this information, I have completed the following interventions: CPT (precussor)      Electronically signed by Renetta Mathew RRT, 12/31/24, 9:54 PM EST.

## 2025-01-01 NOTE — PROGRESS NOTES
Pt found on RA with AIRVO cannula out of nose. Pt was sating 87%. Pt stated he could not tolerate the heat from the AIRVO and could not keep it in his nose any longer. Pt was switched to a 15L HFNC, sating 98% and resting comfortably. Pt states home O2 is 3L, will continue to wean back to home O2 as tolerated.

## 2025-01-01 NOTE — PLAN OF CARE
Goal Outcome Evaluation:              Outcome Evaluation: Patient alert and oriented, hard of hearing, on 6L NC, no complaints of pain, urinal at bedside, no other changes

## 2025-01-02 LAB
ANION GAP SERPL CALCULATED.3IONS-SCNC: 7.6 MMOL/L (ref 5–15)
BASOPHILS # BLD AUTO: 0.01 10*3/MM3 (ref 0–0.2)
BASOPHILS NFR BLD AUTO: 0.1 % (ref 0–1.5)
BUN SERPL-MCNC: 22 MG/DL (ref 8–23)
BUN/CREAT SERPL: 40 (ref 7–25)
CALCIUM SPEC-SCNC: 8.9 MG/DL (ref 8.6–10.5)
CHLORIDE SERPL-SCNC: 100 MMOL/L (ref 98–107)
CO2 SERPL-SCNC: 32.4 MMOL/L (ref 22–29)
CREAT SERPL-MCNC: 0.55 MG/DL (ref 0.76–1.27)
DEPRECATED RDW RBC AUTO: 43.3 FL (ref 37–54)
EGFRCR SERPLBLD CKD-EPI 2021: 104.6 ML/MIN/1.73
EOSINOPHIL # BLD AUTO: 0 10*3/MM3 (ref 0–0.4)
EOSINOPHIL NFR BLD AUTO: 0 % (ref 0.3–6.2)
ERYTHROCYTE [DISTWIDTH] IN BLOOD BY AUTOMATED COUNT: 12.6 % (ref 12.3–15.4)
GLUCOSE SERPL-MCNC: 140 MG/DL (ref 65–99)
HCT VFR BLD AUTO: 54.9 % (ref 37.5–51)
HGB BLD-MCNC: 17.3 G/DL (ref 13–17.7)
IMM GRANULOCYTES # BLD AUTO: 0.04 10*3/MM3 (ref 0–0.05)
IMM GRANULOCYTES NFR BLD AUTO: 0.4 % (ref 0–0.5)
L PNEUMO1 AG UR QL IA: NEGATIVE
LYMPHOCYTES # BLD AUTO: 0.63 10*3/MM3 (ref 0.7–3.1)
LYMPHOCYTES NFR BLD AUTO: 6.3 % (ref 19.6–45.3)
MAGNESIUM SERPL-MCNC: 2.1 MG/DL (ref 1.6–2.4)
MCH RBC QN AUTO: 29 PG (ref 26.6–33)
MCHC RBC AUTO-ENTMCNC: 31.5 G/DL (ref 31.5–35.7)
MCV RBC AUTO: 92.1 FL (ref 79–97)
MONOCYTES # BLD AUTO: 0.32 10*3/MM3 (ref 0.1–0.9)
MONOCYTES NFR BLD AUTO: 3.2 % (ref 5–12)
NEUTROPHILS NFR BLD AUTO: 9.05 10*3/MM3 (ref 1.7–7)
NEUTROPHILS NFR BLD AUTO: 90 % (ref 42.7–76)
NRBC BLD AUTO-RTO: 0 /100 WBC (ref 0–0.2)
PHOSPHATE SERPL-MCNC: 2.9 MG/DL (ref 2.5–4.5)
PLATELET # BLD AUTO: 178 10*3/MM3 (ref 140–450)
PMV BLD AUTO: 10.9 FL (ref 6–12)
POTASSIUM SERPL-SCNC: 4.5 MMOL/L (ref 3.5–5.2)
RBC # BLD AUTO: 5.96 10*6/MM3 (ref 4.14–5.8)
SODIUM SERPL-SCNC: 140 MMOL/L (ref 136–145)
WBC NRBC COR # BLD AUTO: 10.05 10*3/MM3 (ref 3.4–10.8)

## 2025-01-02 PROCEDURE — 97165 OT EVAL LOW COMPLEX 30 MIN: CPT

## 2025-01-02 PROCEDURE — 99232 SBSQ HOSP IP/OBS MODERATE 35: CPT | Performed by: INTERNAL MEDICINE

## 2025-01-02 PROCEDURE — 94799 UNLISTED PULMONARY SVC/PX: CPT

## 2025-01-02 PROCEDURE — 92610 EVALUATE SWALLOWING FUNCTION: CPT

## 2025-01-02 PROCEDURE — 83735 ASSAY OF MAGNESIUM: CPT | Performed by: HOSPITALIST

## 2025-01-02 PROCEDURE — 97161 PT EVAL LOW COMPLEX 20 MIN: CPT

## 2025-01-02 PROCEDURE — 84100 ASSAY OF PHOSPHORUS: CPT | Performed by: HOSPITALIST

## 2025-01-02 PROCEDURE — 99232 SBSQ HOSP IP/OBS MODERATE 35: CPT | Performed by: FAMILY MEDICINE

## 2025-01-02 PROCEDURE — 85025 COMPLETE CBC W/AUTO DIFF WBC: CPT | Performed by: HOSPITALIST

## 2025-01-02 PROCEDURE — 25010000002 ENOXAPARIN PER 10 MG: Performed by: HOSPITALIST

## 2025-01-02 PROCEDURE — 94664 DEMO&/EVAL PT USE INHALER: CPT

## 2025-01-02 PROCEDURE — 87070 CULTURE OTHR SPECIMN AEROBIC: CPT | Performed by: HOSPITALIST

## 2025-01-02 PROCEDURE — 25010000002 CEFTRIAXONE PER 250 MG: Performed by: HOSPITALIST

## 2025-01-02 PROCEDURE — 94761 N-INVAS EAR/PLS OXIMETRY MLT: CPT

## 2025-01-02 PROCEDURE — 25010000002 METHYLPREDNISOLONE PER 40 MG: Performed by: HOSPITALIST

## 2025-01-02 PROCEDURE — 87449 NOS EACH ORGANISM AG IA: CPT | Performed by: HOSPITALIST

## 2025-01-02 PROCEDURE — 87205 SMEAR GRAM STAIN: CPT | Performed by: HOSPITALIST

## 2025-01-02 PROCEDURE — 94668 MNPJ CHEST WALL SBSQ: CPT

## 2025-01-02 PROCEDURE — 80048 BASIC METABOLIC PNL TOTAL CA: CPT | Performed by: HOSPITALIST

## 2025-01-02 RX ADMIN — Medication 10 ML: at 20:24

## 2025-01-02 RX ADMIN — IPRATROPIUM BROMIDE AND ALBUTEROL SULFATE 3 ML: 2.5; .5 SOLUTION RESPIRATORY (INHALATION) at 06:24

## 2025-01-02 RX ADMIN — METHYLPREDNISOLONE SODIUM SUCCINATE 40 MG: 40 INJECTION, POWDER, FOR SOLUTION INTRAMUSCULAR; INTRAVENOUS at 08:56

## 2025-01-02 RX ADMIN — AMLODIPINE BESYLATE 10 MG: 10 TABLET ORAL at 08:56

## 2025-01-02 RX ADMIN — IPRATROPIUM BROMIDE AND ALBUTEROL SULFATE 3 ML: 2.5; .5 SOLUTION RESPIRATORY (INHALATION) at 23:48

## 2025-01-02 RX ADMIN — AZITHROMYCIN DIHYDRATE 500 MG: 250 TABLET ORAL at 08:56

## 2025-01-02 RX ADMIN — SENNOSIDES AND DOCUSATE SODIUM 2 TABLET: 50; 8.6 TABLET ORAL at 20:24

## 2025-01-02 RX ADMIN — IPRATROPIUM BROMIDE AND ALBUTEROL SULFATE 3 ML: 2.5; .5 SOLUTION RESPIRATORY (INHALATION) at 18:31

## 2025-01-02 RX ADMIN — METHYLPREDNISOLONE SODIUM SUCCINATE 40 MG: 40 INJECTION, POWDER, FOR SOLUTION INTRAMUSCULAR; INTRAVENOUS at 20:52

## 2025-01-02 RX ADMIN — BUDESONIDE 0.5 MG: 0.5 INHALANT RESPIRATORY (INHALATION) at 06:24

## 2025-01-02 RX ADMIN — IPRATROPIUM BROMIDE AND ALBUTEROL SULFATE 3 ML: 2.5; .5 SOLUTION RESPIRATORY (INHALATION) at 10:38

## 2025-01-02 RX ADMIN — BUDESONIDE 0.5 MG: 0.5 INHALANT RESPIRATORY (INHALATION) at 18:31

## 2025-01-02 RX ADMIN — ARFORMOTEROL TARTRATE 15 MCG: 15 SOLUTION RESPIRATORY (INHALATION) at 18:31

## 2025-01-02 RX ADMIN — IPRATROPIUM BROMIDE AND ALBUTEROL SULFATE 3 ML: 2.5; .5 SOLUTION RESPIRATORY (INHALATION) at 01:02

## 2025-01-02 RX ADMIN — IPRATROPIUM BROMIDE AND ALBUTEROL SULFATE 3 ML: 2.5; .5 SOLUTION RESPIRATORY (INHALATION) at 15:16

## 2025-01-02 RX ADMIN — ENOXAPARIN SODIUM 40 MG: 100 INJECTION SUBCUTANEOUS at 08:56

## 2025-01-02 RX ADMIN — ARFORMOTEROL TARTRATE 15 MCG: 15 SOLUTION RESPIRATORY (INHALATION) at 06:24

## 2025-01-02 RX ADMIN — Medication 10 ML: at 08:56

## 2025-01-02 RX ADMIN — SODIUM CHLORIDE 1000 MG: 9 INJECTION INTRAMUSCULAR; INTRAVENOUS; SUBCUTANEOUS at 08:56

## 2025-01-02 RX ADMIN — ASPIRIN 81 MG: 81 TABLET, CHEWABLE ORAL at 08:56

## 2025-01-02 NOTE — PROGRESS NOTES
Pulmonary / Critical Care Progress Note      Patient Name: Colin Nevarez  : 1950  MRN: [id number]  Primary Care Physician:  Liz Roper DO  Date of admission: 2024    Subjective   Subjective   Follow-up for Acute on chronic hypoxic respiratory failure, concern for pneumonia from unknown organism, COPD exacerbation     No acute events overnight.    This morning,  On 4 L nasal cannula  Sitting on edge of bed  Feels somewhat better today   Denies any chest pain or chest tightness  Cough improving  No fever or chills    Objective   Objective     Vitals:   Temp:  [97.3 °F (36.3 °C)-97.9 °F (36.6 °C)] 97.5 °F (36.4 °C)  Heart Rate:  [65-84] 74  Resp:  [16-20] 18  BP: (134-148)/(78-84) 136/80  Flow (L/min) (Oxygen Therapy):  [4-15] 4  Physical Exam   Vital Signs Reviewed   General: WDWN, Alert, NAD.  Chronically ill-appearing elderly male, sitting on edge of bed  HEENT:  PERRL, EOMI.  OP, nares clear, no sinus tenderness  Neck:  Supple, no JVD, no thyromegaly  Chest: Improved aeration with resolving rhonchi and wheezing, no crackles, no increased work of breathing on 4 L while at rest  CV: RRR, no MGR, pulses 2+, equal.  Abd:  Soft, NT, ND, + BS, no HSM  EXT:  no clubbing, no cyanosis, no edema  Neuro:  A&Ox3, CN grossly intact, no focal deficits.  Skin: No rashes or lesions noted      Result Review    Result Review:  I have personally reviewed the results from the time of this admission to 2025 09:38 EST and agree with these findings:  []  Laboratory  []  Microbiology  []  Radiology  []  EKG/Telemetry   []  Cardiology/Vascular   []  Pathology  []  Old records  []  Other:  Most notable findings include:         Lab 25  0457 25  0412 24  1920 24  1438   WBC 10.05 6.77  --  9.68   HEMOGLOBIN 17.3 18.6*  --  19.3*   HEMATOCRIT 54.9* 57.4*  --  58.8*   PLATELETS 178 196  --  212   SODIUM 140 143  --  140   POTASSIUM 4.5 4.6  --  4.1   CHLORIDE 100 101  --  99   CO2 32.4* 32.2*  --   32.3*   BUN 22 24*  --  22   CREATININE 0.55* 0.66*  --  0.73*   GLUCOSE 140* 159*  --  173*   CALCIUM 8.9 9.0  --  9.4   PHOSPHORUS 2.9 3.2 2.9  --    TOTAL PROTEIN  --   --   --  7.2   ALBUMIN  --   --   --  4.0   GLOBULIN  --   --   --  3.2       Assessment & Plan   Assessment / Plan     Active Hospital Problems:  Active Hospital Problems    Diagnosis     **Pneumonia    Impression:  Acute on chronic hypoxic respiratory failure requiring Airvo  Acute on chronic COPD exacerbation, FEV1 24%  Coronavirus OC 43 infection  Concern for pneumonia from unknown organism  Stable perihilar consolidation in the right lower lobe  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  -Continue azithromycin 500 mg oral daily and ceftriaxone for a total of 5 days  -Continue Brovana, Pulmicort, DuoNebs  -Continue Solu-Medrol 40 mg IV twice daily.  Patient will likely need long steroid taper  -Continue bronchopulmonary hygiene.  Encourage incentive spirometer and  -Chest physiotherapy available  -PT/OT/SLP on board.  Appreciate assistance  -Encouraged tobacco cessation  -Follow-up pulmonary clinic 1 to 2 weeks after discharge.  Recommend repeat chest CT in 4 to 6 weeks    VTE Prophylaxis:  Pharmacologic VTE prophylaxis orders are present.    CODE STATUS:   Level Of Support Discussed With: Patient  Code Status (Patient has no pulse and is not breathing): CPR (Attempt to Resuscitate)  Medical Interventions (Patient has pulse or is breathing): Full Support      Electronically signed by CHEN Childs, 01/02/25, 9:38 AM EST.  This visit was performed by both a physician and an APC.  I personally evaluated and examined the patient.  I performed all aspects of MDM  as documented.  I have reviewed and confirmed the accuracy of the patient's history as documented in this note and I have reexamined the patient and the results are consistent with the previously documented exam.  I have updated the documentation as necessary.   Electronically signed by Isidro Armijo MD, 01/02/25, 2:02 PM EST.

## 2025-01-02 NOTE — THERAPY EVALUATION
Acute Care - Speech Language Pathology   Swallow Initial Evaluation  [place]     Patient Name: Colin Nevarez  : 1950  MRN: [id number]  Today's Date: 2025               Admit Date: 2024    Visit Dx:     ICD-10-CM ICD-9-CM   1. Pneumonia due to infectious organism, unspecified laterality, unspecified part of lung  J18.9 486   2. COPD exacerbation  J44.1 491.21   3. Hypoxia  R09.02 799.02   4. Dysphagia, unspecified type  R13.10 787.20     Patient Active Problem List   Diagnosis    Arthritis    Chronic obstructive pulmonary disease    Heart flutter, ventricular    High cholesterol    Prostate cancer    Essential hypertension    Urinary incontinence, stress, male    Cigarette nicotine dependence with nicotine-induced disorder    Hematuria    Ascending aortic aneurysm    Squamous cell carcinoma lung    Acute on chronic respiratory failure    Pneumonia     Past Medical History:   Diagnosis Date    Arthritis     COPD (chronic obstructive pulmonary disease)     Essential hypertension 2021    Forgetfulness     Heart flutter, ventricular     High cholesterol     Lung cancer     Prostate cancer     Ringing in ear     Shortness of Air     SOB (shortness of breath)      Past Surgical History:   Procedure Laterality Date    APPENDECTOMY      COLONOSCOPY  2019    COLONOSCOPY N/A 2023    Procedure: COLONOSCOPY;  Surgeon: Geoffrey Carey MD;  [facility]: [facility] ENDOSCOPY;  Service: General;  Laterality: N/A;  NORMAL    LUNG BIOPSY      PROSTATECTOMY      TONSILLECTOMY      Per PT       SLP Recommendation and Plan          Inpatient Speech Pathology Dysphagia Evaluation        PAIN SCALE: None indicated    PRECAUTIONS/CONTRAINDICATIONS: Standard    SUSPECTED ABUSE/NEGLECT/EXPLOITATION: None noted    SOCIAL/PSYCHOLOGICAL NEEDS/BARRIERS: None noted    PAST SOCIAL HISTORY: 74-year-old male, lives at home    PRIOR FUNCTION: On a regular diet.  Patient reports no prior history of swallowing  difficulties.    PATIENT GOALS/EXPECTATIONS: To continue eating and drinking    HISTORY: Patient is a 74-year-old male admitted [facility] on 12/31/2024 secondary to pneumonia.  He is currently on 3 L nasal cannula.  O2 saturations at bedside 90 to 91%.  Chest CT demonstrated areas of fluid consolidation within the right lower lobe.  This reportedly is similar to previous imaging and emphysema.  Patient reports that he continues smoking.    CURRENT DIET LEVEL: Regular, thin liquids    OBJECTIVE:    TEST ADMINISTERED: Clinical dysphagia evaluation    COGNITION/SAFETY AWARENESS: Appears appropriate for current environment however not thoroughly evaluated    BEHAVIORAL OBSERVATIONS: Awake, cooperative    ORAL MOTOR EXAM: Within normal limits    VOICE QUALITY: Clear    REFLEX EXAM: Deferred    POSTURE: Sitting fully upright on edge of bed    FEEDING/SWALLOWING FUNCTION: Assessed with thin liquids, nectar thick liquids, purée solids, regular solids    CLINICAL OBSERVATIONS: Nectar thick and thin liquids via spoon, cup and controlled straw drink.  Swallows completed within a timely manner.  Vocal quality clear and laryngeal sounds clear with cervical auscultation.  Purée by spoon.  Swallow completed.  Laryngeal sounds clear to auscultation.  Regular solids with patient self administering.  Adequate chewing and bolus manipulation.  Swallow completed.  Double swallow observed.  Vocal quality laryngeal sounds clear.  Laryngeal elevation noted to palpation.  No overt clinical signs or symptoms of aspiration observed at bedside however cannot rule out silent aspiration.    DYSPHAGIA CRITERIA: N/A    FUNCTIONAL ASSESSMENT INSTRUMENT: Patient currently scored a level 7 of 7 on Functional Communication Measures for swallowing indicating a 0% limitation in function.    ASSESSMENT/ PLAN OF CARE:  At bedside patient exhibits swallow function adequate for tolerance of regular solids and thin liquids.  At this time  patient does not require further skilled speech pathology services regarding dysphagia.  If patient should demonstrate decline in status please reconsult speech pathology.    PROBLEMS:  1.  na   LTG 1: na   STG 1a: na     RECOMMENDATIONS:   1.   DIET: Regular solids, thin liquids    2.  POSITION: Fully upright for all p.o. intake, dermis following    3.  COMPENSATORY STRATEGIES: Controlled bites and sips.    Pt/responsible party agrees with plan of care and has been informed of all alternatives, risks and benefits.                    Anticipated Discharge Disposition (SLP): No further SLP services warranted (01/02/25 0941)                                                               EDUCATION  The patient has been educated in the following areas:   Dysphagia (Swallowing Impairment).                Time Calculation:    Time Calculation- SLP       Row Name 01/02/25 0941             Time Calculation- SLP    SLP Start Time 0630  -SN      SLP Stop Time 0730  -SN      SLP Time Calculation (min) 60 min  -SN      SLP Received On 01/02/25  -SN         Untimed Charges    10494-QM Eval Oral Pharyng Swallow Minutes 60  -SN         Total Minutes    Untimed Charges Total Minutes 60  -SN       Total Minutes 60  -SN                User Key  (r) = Recorded By, (t) = Taken By, (c) = Cosigned By      Initials Name Provider Type    SN Sivan Trujillo MS-CCC/SLP, [facility] Speech and Language Pathologist                    Therapy Charges for Today       Code Description Service Date Service Provider Modifiers Qty    [id number] HC ST EVAL ORAL PHARYNG SWALLOW 4 1/2/2025 Sivan Trujillo MS-CCC/SLP, [facility] GN 1                 JESSICA Faye/SLP, CNT  1/2/2025

## 2025-01-02 NOTE — THERAPY EVALUATION
Patient Name: Colin Nevarez  : 1950    MRN: [id number]                              Today's Date: 2025       Admit Date: 2024    Visit Dx:     ICD-10-CM ICD-9-CM   1. Pneumonia due to infectious organism, unspecified laterality, unspecified part of lung  J18.9 486   2. COPD exacerbation  J44.1 491.21   3. Hypoxia  R09.02 799.02   4. Dysphagia, unspecified type  R13.10 787.20   5. Decreased activities of daily living (ADL)  Z78.9 V49.89     Patient Active Problem List   Diagnosis    Arthritis    Chronic obstructive pulmonary disease    Heart flutter, ventricular    High cholesterol    Prostate cancer    Essential hypertension    Urinary incontinence, stress, male    Cigarette nicotine dependence with nicotine-induced disorder    Hematuria    Ascending aortic aneurysm    Squamous cell carcinoma lung    Acute on chronic respiratory failure    Pneumonia     Past Medical History:   Diagnosis Date    Arthritis     COPD (chronic obstructive pulmonary disease)     Essential hypertension 2021    Forgetfulness     Heart flutter, ventricular     High cholesterol     Lung cancer     Prostate cancer     Ringing in ear     Shortness of Air     SOB (shortness of breath)      Past Surgical History:   Procedure Laterality Date    APPENDECTOMY      COLONOSCOPY  2019    COLONOSCOPY N/A 2023    Procedure: COLONOSCOPY;  Surgeon: Geoffrey Carey MD;  Location: [facility];  Service: General;  Laterality: N/A;  NORMAL    LUNG BIOPSY      PROSTATECTOMY      TONSILLECTOMY      Per PT      General Information       Row Name 25 1335          OT Time and Intention    Document Type evaluation  -LF     Mode of Treatment individual therapy;occupational therapy  -LF       Row Name 25 1334          General Information    Patient Profile Reviewed yes  -LF     Prior Level of Function --  (I) with ADLs, ambulated w/o a device, has a walk-in shower where he stands to shower with grab  bars/handheld shower head in place, standard commode, stands to groom, drives, and wears 3L home O2.  -     Existing Precautions/Restrictions fall  -     Barriers to Rehab none identified  -       Row Name 01/02/25 1335          Occupational Profile    Reason for Services/Referral (Occupational Profile) Patient is a 74 year old male admitted to [facility] for dyspnea on December 31st, 2024. Occupational therapy consulted due to recent decline in ADLs/functional transfers. No previous occupational therapy services for current condition.  -       Row Name 01/02/25 1335          Living Environment    People in Home alone  -       Row Name 01/02/25 1335          Cognition    Orientation Status (Cognition) oriented x 3  -       Row Name 01/02/25 1335          Safety Issues/Impairments Affecting Functional Mobility    Impairments Affecting Function (Mobility) balance;endurance/activity tolerance  -               User Key  (r) = Recorded By, (t) = Taken By, (c) = Cosigned By      Initials Name Provider Type     Estrella Hensley OT Occupational Therapist                     Mobility/ADL's       Row Name 01/02/25 1336          Bed Mobility    Comment, (Bed Mobility) Pt seated on EOB upon OT's arrival.  -       Row Name 01/02/25 1336          Transfers    Transfers sit-stand transfer;stand-sit transfer  -       Row Name 01/02/25 1336          Sit-Stand Transfer    Sit-Stand [place] (Transfers) standby assist  -     Assistive Device (Sit-Stand Transfers) walker, front-wheeled  -       Row Name 01/02/25 1336          Stand-Sit Transfer    Stand-Sit [place] (Transfers) standby assist  -     Assistive Device (Stand-Sit Transfers) walker, front-wheeled  -       Row Name 01/02/25 1336          Activities of Daily Living    BADL Assessment/Intervention bathing;upper body dressing;lower body dressing;grooming;feeding;toileting  -       Row Name 01/02/25 1336          Bathing  Assessment/Intervention    [place] Level (Bathing) bathing skills;upper body;set up;lower body;standby assist  -       Row Name 01/02/25 1336          Upper Body Dressing Assessment/Training    [place] Level (Upper Body Dressing) upper body dressing skills;set up  -       Row Name 01/02/25 1336          Lower Body Dressing Assessment/Training    [place] Level (Lower Body Dressing) lower body dressing skills;standby assist  -[facility] Name 01/02/25 1336          Grooming Assessment/Training    [place] Level (Grooming) grooming skills;set up  -[facility] Name 01/02/25 1336          Self-Feeding Assessment/Training    [place] Level (Feeding) feeding skills;set up  -[facility] Name 01/02/25 1336          Toileting Assessment/Training    [place] Level (Toileting) toileting skills;standby assist  -               User Key  (r) = Recorded By, (t) = Taken By, (c) = Cosigned By      Initials Name Provider Type     Estrella Hensley OT Occupational Therapist                   Obj/Interventions       Row Name 01/02/25 1337          Sensory Assessment (Somatosensory)    Sensory Assessment (Somatosensory) UE sensation intact  -[facility] Name 01/02/25 1337          Vision Assessment/Intervention    Visual Impairment/Limitations WFL  -[facility] Name 01/02/25 1337          Range of Motion Comprehensive    General Range of Motion bilateral upper extremity ROM WFL  -[facility] Name 01/02/25 1337          Strength Comprehensive (MMT)    Comment, General Manual Muscle Testing (MMT) Assessment /5 BUEs  -[facility] Name 01/02/25 1337          Motor Skills    Motor Skills coordination;functional endurance  -LF     Coordination bilateral;upper extremity;WFL  -     Functional Endurance Fair  -[facility] Name 01/02/25 1337          Balance    Balance Assessment sitting dynamic balance;standing dynamic balance  -LF     Dynamic Sitting Balance supervision  -     Position, Sitting  Balance unsupported;sitting edge of bed  -LF     Dynamic Standing Balance standby assist  -LF     Position/Device Used, Standing Balance supported;walker, front-wheeled  -LF               User Key  (r) = Recorded By, (t) = Taken By, (c) = Cosigned By      Initials Name Provider Type    LF Estrella Hensley, OT Occupational Therapist                   Goals/Plan       Row Name 01/02/25 1338          Bed Mobility Goal 1 (OT)    Activity/Assistive Device (Bed Mobility Goal 1, OT) bed mobility activities, all  -LF     [place] Level/Cues Needed (Bed Mobility Goal 1, OT) modified independence  -LF     Time Frame (Bed Mobility Goal 1, OT) long term goal (LTG);10 days  -LF       Row Name 01/02/25 1338          Transfer Goal 1 (OT)    Activity/Assistive Device (Transfer Goal 1, OT) transfers, all  -LF     [place] Level/Cues Needed (Transfer Goal 1, OT) modified independence  -LF     Time Frame (Transfer Goal 1, OT) long term goal (LTG);10 days  -LF       Row Name 01/02/25 1338          Bathing Goal 1 (OT)    Activity/Device (Bathing Goal 1, OT) bathing skills, all  -LF     [place] Level/Cues Needed (Bathing Goal 1, OT) modified independence  -LF     Time Frame (Bathing Goal 1, OT) long term goal (LTG);10 days  -LF       Row Name 01/02/25 1338          Dressing Goal 1 (OT)    Activity/Device (Dressing Goal 1, OT) dressing skills, all  -LF     [place]/Cues Needed (Dressing Goal 1, OT) modified independence  -LF     Time Frame (Dressing Goal 1, OT) long term goal (LTG);10 days  -LF       Row Name 01/02/25 1338          Toileting Goal 1 (OT)    Activity/Device (Toileting Goal 1, OT) toileting skills, all  -LF     [place] Level/Cues Needed (Toileting Goal 1, OT) modified independence  -LF     Time Frame (Toileting Goal 1, OT) long term goal (LTG);10 days  -LF       Row Name 01/02/25 1338          Problem Specific Goal 1 (OT)    Problem Specific Goal 1 (OT) Patient will demonstrate good- endurance to  support ADLs/functional transfers.  -     Time Frame (Problem Specific Goal 1, OT) long term goal (LTG);10 days  -       Row Name 01/02/25 9910          Therapy Assessment/Plan (OT)    Planned Therapy Interventions (OT) activity tolerance training;BADL retraining;functional balance retraining;occupation/activity based interventions;patient/caregiver education/training;strengthening exercise;transfer/mobility retraining  -               User Key  (r) = Recorded By, (t) = Taken By, (c) = Cosigned By      Initials Name Provider Type     Estrella Hensley, OT Occupational Therapist                   Clinical Impression       Row Name 01/02/25 1334          Pain Assessment    Additional Documentation Pain Scale: FACES Pre/Post-Treatment (Group)  -       Row Name 01/02/25 1700          Pain Scale: FACES Pre/Post-Treatment    Pain: FACES Scale, Pretreatment 0-->no hurt  -     Posttreatment Pain Rating 0-->no hurt  -       Row Name 01/02/25 3600          Plan of Care Review    Plan of Care Reviewed With patient  -     Progress no change  -     Outcome Evaluation Patient presents with limitations in self-care, functional transfers, balance, and endurance. He would benefit from continued skilled occupational therapy services to maximize independence with ADLs/functional transfers.  -       Row Name 01/02/25 0824          Therapy Assessment/Plan (OT)    Patient/Family Therapy Goal Statement (OT) To maximize independence.  -     Rehab Potential (OT) good  -     Criteria for Skilled Therapeutic Interventions Met (OT) yes;meets criteria;skilled treatment is necessary  -     Therapy Frequency (OT) 5 times/wk  -       Row Name 01/02/25 2253          Therapy Plan Review/Discharge Plan (OT)    Anticipated Discharge Disposition (OT) inpatient rehabilitation facility  -       Row Name 01/02/25 2262          Vital Signs    O2 Delivery Pre Treatment nasal cannula  -     O2 Delivery Intra Treatment nasal  cannula  -LF     O2 Delivery Post Treatment nasal cannula  -LF       Row Name 01/02/25 1337          Positioning and Restraints    Pre-Treatment Position in bed  no alarm active on arrival  -LF     Post Treatment Position bed  -LF     In Bed sitting EOB;encouraged to call for assist;call light within reach  -LF               User Key  (r) = Recorded By, (t) = Taken By, (c) = Cosigned By      Initials Name Provider Type     Estrella Hensley OT Occupational Therapist                   Outcome Measures       Row Name 01/02/25 9438          How much help from another is currently needed...    Putting on and taking off regular lower body clothing? 3  -LF     Bathing (including washing, rinsing, and drying) 3  -LF     Toileting (which includes using toilet bed pan or urinal) 3  -LF     Putting on and taking off regular upper body clothing 4  -LF     Taking care of personal grooming (such as brushing teeth) 4  -LF     Eating meals 4  -LF     AM-PAC 6 Clicks Score (OT) 21  -LF       Row Name 01/02/25 0855          How much help from another person do you currently need...    Turning from your back to your side while in flat bed without using bedrails? 4  -AH     Moving from lying on back to sitting on the side of a flat bed without bedrails? 4  -AH     Moving to and from a bed to a chair (including a wheelchair)? 4  -AH     Standing up from a chair using your arms (e.g., wheelchair, bedside chair)? 4  -AH     Climbing 3-5 steps with a railing? 3  -AH     To walk in hospital room? 3  -AH     AM-PAC 6 Clicks Score (PT) 22  -AH       Row Name 01/02/25 1338          Functional Assessment    Outcome Measure Options AM-PAC 6 Clicks Daily Activity (OT);Optimal Instrument  -LF       Row Name 01/02/25 1338          Optimal Instrument    Optimal Instrument Optimal - 3  -LF     Bending/Stooping 2  -LF     Standing 2  -LF     Reaching 1  -LF     From the list, choose the 3 activities you would most like to be able to do without any  difficulty Bending/stooping;Standing;Reaching  -     Total Score Optimal - 3 5  -LF               User Key  (r) = Recorded By, (t) = Taken By, (c) = Cosigned By      Initials Name Provider Type     Liset Gerber, RN Registered Nurse     Estrella Hensley OT Occupational Therapist                    Occupational Therapy Education       Title: PT OT SLP Therapies (In Progress)       Topic: Occupational Therapy (In Progress)       Point: ADL training (In Progress)       Description:   Instruct learner(s) on proper safety adaptation and remediation techniques during self care or transfers.   Instruct in proper use of assistive devices.                  Learning Progress Summary            Patient Acceptance, E,TB, NR by  at 1/2/2025 1339                      Point: Precautions (In Progress)       Description:   Instruct learner(s) on prescribed precautions during self-care and functional transfers.                  Learning Progress Summary            Patient Acceptance, E,TB, NR by  at 1/2/2025 1339                      Point: Body mechanics (In Progress)       Description:   Instruct learner(s) on proper positioning and spine alignment during self-care, functional mobility activities and/or exercises.                  Learning Progress Summary            Patient Acceptance, E,TB, NR by  at 1/2/2025 1339                                      User Key       Initials Effective Dates Name Provider Type Discipline     06/16/21 -  Estrella Hensley OT Occupational Therapist OT                  OT Recommendation and Plan  Planned Therapy Interventions (OT): activity tolerance training, BADL retraining, functional balance retraining, occupation/activity based interventions, patient/caregiver education/training, strengthening exercise, transfer/mobility retraining  Therapy Frequency (OT): 5 times/wk  Plan of Care Review  Plan of Care Reviewed With: patient  Progress: no change  Outcome Evaluation: Patient  presents with limitations in self-care, functional transfers, balance, and endurance. He would benefit from continued skilled occupational therapy services to maximize independence with ADLs/functional transfers.     Time Calculation:   Evaluation Complexity (OT)  Review Occupational Profile/Medical/Therapy History Complexity: brief/low complexity  Assessment, Occupational Performance/Identification of Deficit Complexity: 3-5 performance deficits  Clinical Decision Making Complexity (OT): problem focused assessment/low complexity  Overall Complexity of Evaluation (OT): low complexity     Time Calculation- OT       Row Name 01/02/25 1339             Time Calculation- OT    OT Received On 01/02/25  -LF      OT Goal Re-Cert Due Date 01/11/25  -LF         Untimed Charges    OT Eval/Re-eval Minutes 35  -LF         Total Minutes    Untimed Charges Total Minutes 35  -LF       Total Minutes 35  -LF                User Key  (r) = Recorded By, (t) = Taken By, (c) = Cosigned By      Initials Name Provider Type    LF Estrella Hensley OT Occupational Therapist                  Therapy Charges for Today       Code Description Service Date Service Provider Modifiers Qty    [id number] HC OT EVAL LOW COMPLEXITY 3 1/2/2025 Estrella Hensley OT GO 1                 Estrella Hensley OT  1/2/2025

## 2025-01-02 NOTE — PROGRESS NOTES
[facility]   Hospitalist Progress Note  Date: 2025  Patient Name: Colin Nevarez  : 1950  MRN: [id number]  Date of admission: 2024      Subjective   Subjective     Chief Complaint: Follow-up shortness of breath    Summary:Colin Nevarez is a 74 y.o. male with COPD, essential hypertension, lung and prostate, cancer who presents to the emergency room for evaluation of worsening dyspnea.  Patient has had a low-grade fever and a progressive cough for the last couple of days.  He was at his primary care provider's office when patient had a pulse ox of 81% on room air.  He was put on 5 L of oxygen to get his pulse ox above 90%.  He was instructed to come to the ER because he was working so hard to breathe. On arrival to the ED, patient temperature 99.5, pulse of 80, respiratory 24, blood pressure 106/77, and he is on 3 L of oxygen saturating 99%.  Eventually patient required 4 L of oxygen and was saturating 91%. Chest x-ray shows hyperexpansion of lungs compatible with COPD.  Increased opacity at the left lung base which may represent atelectasis or pneumonia superimposed upon chronic change. On labs, patient has a white blood cell count of 9.68, hemoglobin of 19.3, CO2 of 32.3.  ABG demonstrated chronic hypercapnia with a pCO2 of 63.  Patient admitted for further evaluation and treatment.  Pulmonology consulted.  Started on empiric antibiotics, inhaled bronchodilators and systemic steroids.  Patient briefly required AirVo but was able to be weaned to high flow nasal cannula on morning of 2025.  Respiratory panel positive for coronavirus OC 43.  Respiratory status slowly improving.  Patient hoping to return home once respiratory status improves.  CT chest did reveal areas of peripheral consolidation and right lower lobe similar to prior study.  Recommending outpatient follow-up in 3 to 6 months to assess for resolution.    Interval Followup: Patient sitting up on side of bed appears to be resting  comfortably.  Patient indicates shortness of breath continues to improve.  Cough remains minimally productive.   Afebrile overnight.  Sinus rhythm 60s to 90s on telemetry review.  Blood pressure within normal limits.  Satting well on 3 to 4 L nasal cannula.  No other issues per nursing.    Review of Systems  Constitutional: Negative for fatigue and fever.   HENT: Negative for sore throat and trouble swallowing.    Eyes: Negative for pain and discharge.   Respiratory: Positive for cough and shortness of breath.    Cardiovascular: Negative for chest pain and palpitations.   Gastrointestinal: Negative for abdominal pain, nausea and vomiting.   Endocrine: Negative for cold intolerance and heat intolerance.   Genitourinary: Negative for difficulty urinating and dysuria.   Musculoskeletal: Negative for back pain and neck stiffness.   Skin: Negative for color change and rash.   Neurological: Negative for syncope and headaches.   Hematological: Negative for adenopathy.   Psychiatric/Behavioral: Negative for confusion and hallucinations.    Objective   Objective     Vitals:   Temp:  [97.3 °F (36.3 °C)-98.4 °F (36.9 °C)] 98.4 °F (36.9 °C)  Heart Rate:  [66-92] 80  Resp:  [16-18] 18  BP: (136-157)/(77-82) 151/77  Flow (L/min) (Oxygen Therapy):  [3-6] 3  Physical Exam   General: well-developed appearing stated age in no acute distress  HEENT: Normocephalic atraumatic moist membranes pupils equal round reactive light, no scleral icterus no conjunctival injection  Cardiovascular: regular rate and rhythm no murmurs rubs or gallops S1-S2, no lower extremity edema appreciated  Pulmonary: Scattered wheezes, prolonged expiratory phase, no rhonchi, symmetric chest expansion, unlabored, no conversational dyspnea appreciated  Gastrointestinal: Soft nontender nondistended positive bowel sounds all 4 quadrants no rebound or guarding  Musculoskeletal: No clubbing cyanosis, warm and well-perfused, calves soft symmetric nontender  bilaterally  Skin: Clean dry without rashes  Neuro: Cranial nerves II through XII intact grossly no sensorimotor deficits appreciated bilateral upper and lower extremities  Psych: Patient is calm cooperative and appropriate with exam not responding to internal stimuli  : No Nj catheter no bladder distention no suprapubic tenderness    Result Review    Result Review:  I have personally reviewed these results and agree with these findings:  [x]  Laboratory  LAB RESULTS:      Lab 01/02/25 0457 01/01/25 0412 12/31/24  2100 12/31/24 1920 12/31/24  1438   WBC 10.05 6.77  --   --  9.68   HEMOGLOBIN 17.3 18.6*  --   --  19.3*   HEMATOCRIT 54.9* 57.4*  --   --  58.8*   PLATELETS 178 196  --   --  212   NEUTROS ABS 9.05* 6.14  --   --  6.45   IMMATURE GRANS (ABS) 0.04 0.02  --   --  0.04   LYMPHS ABS 0.63* 0.51*  --   --  2.22   MONOS ABS 0.32 0.08*  --   --  0.94*   EOS ABS 0.00 0.00  --   --  0.02   MCV 92.1 89.8  --   --  89.9   PROCALCITONIN  --   --   --  0.03  --    LACTATE  --   --  1.1  --   --          Lab 01/02/25 0457 01/01/25 0412 12/31/24 1920 12/31/24  1438   SODIUM 140 143  --  140   POTASSIUM 4.5 4.6  --  4.1   CHLORIDE 100 101  --  99   CO2 32.4* 32.2*  --  32.3*   ANION GAP 7.6 9.8  --  8.7   BUN 22 24*  --  22   CREATININE 0.55* 0.66*  --  0.73*   EGFR 104.6 99.0  --  96.1   GLUCOSE 140* 159*  --  173*   CALCIUM 8.9 9.0  --  9.4   MAGNESIUM 2.1 2.2 2.3  --    PHOSPHORUS 2.9 3.2 2.9  --          Lab 12/31/24  1438   TOTAL PROTEIN 7.2   ALBUMIN 4.0   GLOBULIN 3.2   ALT (SGPT) 35   AST (SGOT) 16   BILIRUBIN 0.4   ALK PHOS 79         Lab 12/31/24 1920 12/31/24  1438   PROBNP  --  <36.0   HSTROP T 13 12                 Lab 12/31/24 2009   PH, ARTERIAL 7.352   PCO2, ARTERIAL 63.6*   PO2 ART 66.2*   O2 SATURATION ART 90.9*   FIO2 40   HCO3 ART 35.3*   BASE EXCESS ART 6.2*     Brief Urine Lab Results  (Last result in the past 365 days)        Color   Clarity   Blood   Leuk Est   Nitrite   Protein    CREAT   Urine HCG        08/08/24 0915 Yellow   Clear   Negative   Negative   Negative   Negative                 Microbiology Results (last 10 days)       Procedure Component Value - Date/Time    Respiratory Culture - Sputum, Throat [[id number]] Collected: 01/02/25 1518    Lab Status: Preliminary result Specimen: Sputum from Throat Updated: 01/02/25 1613     Gram Stain Few (2+) WBCs seen      Rare (1+) Gram positive cocci    Legionella Antigen, Urine - Urine, Urine, Clean Catch [[id number]]  (Normal) Collected: 01/02/25 1343    Lab Status: Final result Specimen: Urine, Clean Catch Updated: 01/02/25 1433     LEGIONELLA ANTIGEN, URINE Negative    Respiratory Panel PCR w/COVID-19(SARS-CoV-2) LUCINDA/BINTA/FILOMENA/PAD/COR/PATRICK In-House, NP Swab in UTM/VTM, 2 HR TAT - Swab, Nasopharynx [[id number]]  (Abnormal) Collected: 01/01/25 0912    Lab Status: Final result Specimen: Swab from Nasopharynx Updated: 01/01/25 1157     ADENOVIRUS, PCR Not Detected     Coronavirus 229E Not Detected     Coronavirus HKU1 Not Detected     Coronavirus NL63 Not Detected     Coronavirus OC43 Detected     COVID19 Not Detected     Human Metapneumovirus Not Detected     Human Rhinovirus/Enterovirus Not Detected     Influenza A PCR Not Detected     Influenza B PCR Not Detected     Parainfluenza Virus 1 Not Detected     Parainfluenza Virus 2 Not Detected     Parainfluenza Virus 3 Not Detected     Parainfluenza Virus 4 Not Detected     RSV, PCR Not Detected     Bordetella pertussis pcr Not Detected     Bordetella parapertussis PCR Not Detected     Chlamydophila pneumoniae PCR Not Detected     Mycoplasma pneumo by PCR Not Detected    Narrative:      In the setting of a positive respiratory panel with a viral infection PLUS a negative procalcitonin without other underlying concern for bacterial infection, consider observing off antibiotics or discontinuation of antibiotics and continue supportive care. If the respiratory panel is positive for atypical bacterial  infection (Bordetella pertussis, Chlamydophila pneumoniae, or Mycoplasma pneumoniae), consider antibiotic de-escalation to target atypical bacterial infection.    Blood Culture - Blood, Arm, Left [[id number]]  (Normal) Collected: 12/31/24 1920    Lab Status: Preliminary result Specimen: Blood from Arm, Left Updated: 01/01/25 2045     Blood Culture No growth at 24 hours    Narrative:      Less than seven (7) mL's of blood was collected.  Insufficient quantity may yield false negative results.    Blood Culture - Blood, Arm, Right [[id number]]  (Normal) Collected: 12/31/24 1920    Lab Status: Preliminary result Specimen: Blood from Arm, Right Updated: 01/01/25 2045     Blood Culture No growth at 24 hours    Narrative:      Less than seven (7) mL's of blood was collected.  Insufficient quantity may yield false negative results.    Legionella Antigen, Urine - Urine, Urine, Clean Catch [[id number]]  (Normal) Collected: 12/31/24 1919    Lab Status: Final result Specimen: Urine, Clean Catch Updated: 12/31/24 1948     LEGIONELLA ANTIGEN, URINE Negative    S. Pneumo Ag Urine or CSF - Urine, Urine, Clean Catch [[id number]]  (Normal) Collected: 12/31/24 1919    Lab Status: Final result Specimen: Urine, Clean Catch Updated: 12/31/24 1949     Strep Pneumo Ag Negative    COVID-19, FLU A/B, RSV PCR 1 HR TAT - Swab, Nasopharynx [[id number]]  (Normal) Collected: 12/31/24 1422    Lab Status: Final result Specimen: Swab from Nasopharynx Updated: 12/31/24 1511     COVID19 Not Detected     Influenza A PCR Not Detected     Influenza B PCR Not Detected     RSV, PCR Not Detected    Narrative:      Fact sheet for providers: https://www.fda.gov/media/558324/download    Fact sheet for patients: https://www.fda.gov/media/073924/download    Test performed by PCR.            [x]  Microbiology  [x]  Radiology  CT Chest Without Contrast Diagnostic    Result Date: 12/31/2024  Impression: 1. No acute process. 2. Area of peripheral consolidation within the  right lower lobe appears similar to the prior study from 8/8/2024 and may relate to scarring in the setting of prior infection, recommend close follow-up in 3 to 6 months to assess for resolution. No new area of consolidation. 3. Emphysema, coronary artery calcifications and additional chronic findings above. Electronically Signed: Joseph Pacheco MD  12/31/2024 10:01 PM EST  Workstation ID: [id number]    XR Chest 1 View    Result Date: 12/31/2024  Impression: 1.Hyperexpansion of lungs compatible with COPD. 2.Increased opacity at the left lung base which may represent atelectasis or pneumonia superimposed upon chronic change. 3.Recommend follow-up to document resolution. Electronically Signed: Kirit Cool MD  12/31/2024 3:32 PM EST  Workstation ID: [id number]     [x]  EKG/Telemetry   []  Cardiology/Vascular   []  Pathology  []  Old records  [x]  Other:  Scheduled Meds:amLODIPine, 10 mg, Oral, Daily  arformoterol, 15 mcg, Nebulization, BID - RT  aspirin, 81 mg, Oral, Daily  budesonide, 0.5 mg, Nebulization, BID - RT  cefTRIAXone, 1,000 mg, Intravenous, Q24H  enoxaparin, 40 mg, Subcutaneous, Daily  ipratropium-albuterol, 3 mL, Nebulization, Q4H While Awake - RT  methylPREDNISolone sodium succinate, 40 mg, Intravenous, Q12H  senna-docusate sodium, 2 tablet, Oral, BID  sodium chloride, 10 mL, Intravenous, Q12H      Continuous Infusions:   PRN Meds:.  acetaminophen **OR** acetaminophen **OR** acetaminophen    albuterol    senna-docusate sodium **AND** polyethylene glycol **AND** bisacodyl **AND** bisacodyl    HYDROcodone-acetaminophen    melatonin    ondansetron ODT **OR** ondansetron    sodium chloride    sodium chloride    sodium chloride      Assessment & Plan   Assessment / Plan     Assessment/Plan:  Acute on chronic hypoxic respiratory failure  Chronic hypercapnic respiratory failure  COPD with acute exacerbation  Coronavirus OC 43 infection  Hypertension  Polycythemia vera        Patient admitted for further  evaluation and treatment  Pulmonology consulted thank you for assistance  Continue supplemental oxygen titrate to keep sats between 90 and 96%  Continue BiPAP at night as needed  Continue Solu-Medrol and taper per pulmonology.  Will likely need a long taper per pulmonology  Continue Sarika Royal with DuoNebs scheduled  Continue as needed albuterol  Continue Rocephin empirically  Completed course of azithromycin  Continue bronchopulmonary hygiene protocol  Continue supportive care for coronavirus infection  Continue amlodipine  Further inpatient orders and recommendations pending clinical course       Discussed plan with bedside RN as well as pulmonology team.    Disposition: Home as respiratory function improves.  Repeat CT chest in 3 to 6 months to ensure benign resolution of peripheral right lower lobe consolidation.    VTE Prophylaxis:  Pharmacologic VTE prophylaxis orders are present.        CODE STATUS:   Level Of Support Discussed With: Patient  Code Status (Patient has no pulse and is not breathing): CPR (Attempt to Resuscitate)  Medical Interventions (Patient has pulse or is breathing): Full Support

## 2025-01-02 NOTE — PLAN OF CARE
Goal Outcome Evaluation:      Patient alert and able to make needs known, rested through the night without complaints. Continues on 3L per nasal canula. Will continue with plan of care

## 2025-01-02 NOTE — THERAPY EVALUATION
Acute Care - Physical Therapy Initial Evaluation   [facility]     Patient Name: Colin Nevarez  : 1950  MRN: [id number]  Today's Date: 2025      Visit Dx:     ICD-10-CM ICD-9-CM   1. Pneumonia due to infectious organism, unspecified laterality, unspecified part of lung  J18.9 486   2. COPD exacerbation  J44.1 491.21   3. Hypoxia  R09.02 799.02   4. Dysphagia, unspecified type  R13.10 787.20   5. Decreased activities of daily living (ADL)  Z78.9 V49.89   6. Difficulty walking  R26.2 719.7     Patient Active Problem List   Diagnosis    Arthritis    Chronic obstructive pulmonary disease    Heart flutter, ventricular    High cholesterol    Prostate cancer    Essential hypertension    Urinary incontinence, stress, male    Cigarette nicotine dependence with nicotine-induced disorder    Hematuria    Ascending aortic aneurysm    Squamous cell carcinoma lung    Acute on chronic respiratory failure    Pneumonia     Past Medical History:   Diagnosis Date    Arthritis     COPD (chronic obstructive pulmonary disease)     Essential hypertension 2021    Forgetfulness     Heart flutter, ventricular     High cholesterol     Lung cancer     Prostate cancer     Ringing in ear     Shortness of Air     SOB (shortness of breath)      Past Surgical History:   Procedure Laterality Date    APPENDECTOMY      COLONOSCOPY  2019    COLONOSCOPY N/A 2023    Procedure: COLONOSCOPY;  Surgeon: Geoffrey Carey MD;  Location: [facility] ENDOSCOPY;  Service: General;  Laterality: N/A;  NORMAL    LUNG BIOPSY      PROSTATECTOMY      TONSILLECTOMY      Per PT     PT Assessment (Last 12 Hours)       PT Evaluation and Treatment       Row Name 25 1525          Physical Therapy Time and Intention    Subjective Information no complaints  -DP     Document Type evaluation  -DP     Mode of Treatment individual therapy;physical therapy  -DP     Patient Effort good  -DP       Row Name 25 1525          General Information     Patient Profile Reviewed yes  -DP     Patient Observations alert;cooperative;agree to therapy  -DP     Prior Level of Function independent:;gait;transfer;bed mobility;ADL's  -DP     Equipment Currently Used at Home none  -DP     Existing Precautions/Restrictions no known precautions/restrictions  -DP     Barriers to Rehab none identified  -DP       Row Name 01/02/25 1525          Living Environment    Current Living Arrangements home  -DP     People in Home alone  -DP     Primary Care Provided by self  -DP       Row Name 01/02/25 1525          Cognition    Orientation Status (Cognition) oriented x 3  -DP       Row Name 01/02/25 1525          Range of Motion Comprehensive    General Range of Motion bilateral lower extremity ROM WFL  -DP       Row Name 01/02/25 1525          Strength (Manual Muscle Testing)    Strength (Manual Muscle Testing) bilateral lower extremities;strength is WFL  -DP       Row Name 01/02/25 1525          Bed Mobility    Bed Mobility supine-sit-supine  -DP     Supine-Sit-Supine [place] (Bed Mobility) independent  -DP       Row Name 01/02/25 1525          Transfers    Transfers sit-stand transfer  -DP       Row Name 01/02/25 1525          Sit-Stand Transfer    Sit-Stand [place] (Transfers) independent  -DP       Row Name 01/02/25 1525          Gait/Stairs (Locomotion)    Gait/Stairs Locomotion gait/ambulation independence  -DP     [place] Level (Gait) modified independence  -DP     Patient was able to Ambulate yes  -DP     Comment, (Gait/Stairs) Pt able to ambulate ad gloria in her room  -DP       Row Name 01/02/25 1525          Balance    Balance Assessment standing dynamic balance  -DP     Dynamic Standing Balance supervision  -DP       Row Name 01/02/25 1525          Plan of Care Review    Plan of Care Reviewed With patient  -DP     Outcome Evaluation Patient is able to complete all transfers and bed mobilities indepedently in the room. Pt is able to ambulate ad gloria in the room. Pt  does not need inpatient PT services. Recommend DC home.  -DP       Row Name 01/02/25 1525          Therapy Assessment/Plan (PT)    Criteria for Skilled Interventions Met (PT) no problems identified which require skilled intervention  -DP     Therapy Frequency (PT) evaluation only  -DP       Row Name 01/02/25 1525          PT Evaluation Complexity    History, PT Evaluation Complexity no personal factors and/or comorbidities  -DP     Examination of Body Systems (PT Eval Complexity) total of 4 or more elements  -DP     Clinical Presentation (PT Evaluation Complexity) stable  -DP     Clinical Decision Making (PT Evaluation Complexity) low complexity  -DP     Overall Complexity (PT Evaluation Complexity) low complexity  -DP       Row Name 01/02/25 1525          Physical Therapy Goals    Problem Specific Goal Selection (PT) problem specific goal 1, PT  -DP       Row Name 01/02/25 1525          Problem Specific Goal 1 (PT)    Problem Specific Goal 1 (PT) Complete PT evaluation.  -DP     Time Frame (Problem Specific Goal 1, PT) 1 day  -DP     Progress/Outcome (Problem Specific Goal 1, PT) goal met  -DP               User Key  (r) = Recorded By, (t) = Taken By, (c) = Cosigned By      Initials Name Provider Type    Carlos Enrique Thomas, PT Physical Therapist                    Physical Therapy Education        No education to display                  PT Recommendation and Plan  Anticipated Discharge Disposition (PT): home  Therapy Frequency (PT): evaluation only  Plan of Care Reviewed With: patient  Outcome Evaluation: Patient is able to complete all transfers and bed mobilities indepedently in the room. Pt is able to ambulate ad gloria in the room. Pt does not need inpatient PT services. Recommend DC home.   Outcome Measures       Row Name 01/02/25 1500             How much help from another person do you currently need...    Turning from your back to your side while in flat bed without using bedrails? 4  -DP      Moving from  lying on back to sitting on the side of a flat bed without bedrails? 4  -DP      Moving to and from a bed to a chair (including a wheelchair)? 4  -DP      Standing up from a chair using your arms (e.g., wheelchair, bedside chair)? 4  -DP      Climbing 3-5 steps with a railing? 4  -DP      To walk in hospital room? 4  -DP      AM-PAC 6 Clicks Score (PT) 24  -DP         Functional Assessment    Outcome Measure Options AM-PAC 6 Clicks Basic Mobility (PT)  -DP                User Key  (r) = Recorded By, (t) = Taken By, (c) = Cosigned By      Initials Name Provider Type    Carlos Enrique Thomas, PT Physical Therapist                     Time Calculation:    PT Charges       Row Name 01/02/25 1532             Time Calculation    PT Received On 01/02/25  -DP         Untimed Charges    PT Eval/Re-eval Minutes 40  -DP         Total Minutes    Untimed Charges Total Minutes 40  -DP       Total Minutes 40  -DP                User Key  (r) = Recorded By, (t) = Taken By, (c) = Cosigned By      Initials Name Provider Type    Carlos Enrique Thomas, PT Physical Therapist                      PT G-Codes  Outcome Measure Options: AM-PAC 6 Clicks Basic Mobility (PT)  AM-PAC 6 Clicks Score (PT): 24  AM-PAC 6 Clicks Score (OT): 21    Carlos Enrique Conklin, PT  1/2/2025

## 2025-01-02 NOTE — PLAN OF CARE
Goal Outcome Evaluation:              Outcome Evaluation: Patient alert and oriented, on 3L NC, no complaints of pain, no other changes

## 2025-01-03 ENCOUNTER — READMISSION MANAGEMENT (OUTPATIENT)
Dept: CALL CENTER | Facility: HOSPITAL | Age: 75
End: 2025-01-03
Payer: MEDICARE

## 2025-01-03 VITALS
RESPIRATION RATE: 18 BRPM | DIASTOLIC BLOOD PRESSURE: 82 MMHG | SYSTOLIC BLOOD PRESSURE: 120 MMHG | HEIGHT: 68 IN | BODY MASS INDEX: 21.28 KG/M2 | TEMPERATURE: 97.9 F | HEART RATE: 79 BPM | OXYGEN SATURATION: 95 % | WEIGHT: 140.43 LBS

## 2025-01-03 PROCEDURE — 25010000002 CEFTRIAXONE PER 250 MG: Performed by: HOSPITALIST

## 2025-01-03 PROCEDURE — 99238 HOSP IP/OBS DSCHRG MGMT 30/<: CPT | Performed by: FAMILY MEDICINE

## 2025-01-03 PROCEDURE — 94799 UNLISTED PULMONARY SVC/PX: CPT

## 2025-01-03 PROCEDURE — 94664 DEMO&/EVAL PT USE INHALER: CPT

## 2025-01-03 PROCEDURE — 94761 N-INVAS EAR/PLS OXIMETRY MLT: CPT

## 2025-01-03 PROCEDURE — 25010000002 ENOXAPARIN PER 10 MG: Performed by: HOSPITALIST

## 2025-01-03 PROCEDURE — 63710000001 PREDNISONE PER 1 MG

## 2025-01-03 PROCEDURE — 94668 MNPJ CHEST WALL SBSQ: CPT

## 2025-01-03 PROCEDURE — 99232 SBSQ HOSP IP/OBS MODERATE 35: CPT | Performed by: INTERNAL MEDICINE

## 2025-01-03 RX ORDER — PREDNISONE 10 MG/1
TABLET ORAL
Qty: 30 TABLET | Refills: 0 | Status: SHIPPED | OUTPATIENT
Start: 2025-01-03 | End: 2025-01-10

## 2025-01-03 RX ORDER — PREDNISONE 20 MG/1
40 TABLET ORAL
Status: DISCONTINUED | OUTPATIENT
Start: 2025-01-03 | End: 2025-01-03 | Stop reason: HOSPADM

## 2025-01-03 RX ADMIN — Medication 10 ML: at 08:18

## 2025-01-03 RX ADMIN — IPRATROPIUM BROMIDE AND ALBUTEROL SULFATE 3 ML: 2.5; .5 SOLUTION RESPIRATORY (INHALATION) at 07:34

## 2025-01-03 RX ADMIN — BUDESONIDE 0.5 MG: 0.5 INHALANT RESPIRATORY (INHALATION) at 07:34

## 2025-01-03 RX ADMIN — ASPIRIN 81 MG: 81 TABLET, CHEWABLE ORAL at 08:18

## 2025-01-03 RX ADMIN — PREDNISONE 40 MG: 20 TABLET ORAL at 08:18

## 2025-01-03 RX ADMIN — AMLODIPINE BESYLATE 10 MG: 10 TABLET ORAL at 08:18

## 2025-01-03 RX ADMIN — ARFORMOTEROL TARTRATE 15 MCG: 15 SOLUTION RESPIRATORY (INHALATION) at 07:34

## 2025-01-03 RX ADMIN — ENOXAPARIN SODIUM 40 MG: 100 INJECTION SUBCUTANEOUS at 08:17

## 2025-01-03 RX ADMIN — SODIUM CHLORIDE 1000 MG: 9 INJECTION INTRAMUSCULAR; INTRAVENOUS; SUBCUTANEOUS at 08:17

## 2025-01-03 RX ADMIN — IPRATROPIUM BROMIDE AND ALBUTEROL SULFATE 3 ML: 2.5; .5 SOLUTION RESPIRATORY (INHALATION) at 10:48

## 2025-01-03 NOTE — PROGRESS NOTES
Pulmonary / Critical Care Progress Note      Patient Name: Colin Nevarez  : 1950  MRN: [id number]  Primary Care Physician:  Liz Roper DO  Date of admission: 2024    Subjective   Subjective   Follow-up for Acute on chronic hypoxic respiratory failure, concern for pneumonia from unknown organism, COPD exacerbation     No acute events overnight.    This morning,  On 3 L nasal cannula  Sitting on edge of bed  Feels well today  Denies any chest pain or chest tightness  Cough improved  Ambulating well  No fever or chills  Eager to go    Objective   Objective     Vitals:   Temp:  [97.2 °F (36.2 °C)-98.4 °F (36.9 °C)] 97.9 °F (36.6 °C)  Heart Rate:  [69-93] 72  Resp:  [16-20] 18  BP: (120-157)/(70-85) 120/82  Flow (L/min) (Oxygen Therapy):  [3-3.5] 3  Physical Exam   Vital Signs Reviewed   General: WDWN, Alert, NAD.  Chronically ill-appearing elderly male, sitting on edge of bed  HEENT:  PERRL, EOMI.  OP, nares clear, no sinus tenderness  Neck:  Supple, no JVD, no thyromegaly  Chest: Improved aeration with resolving rhonchi and wheezing, no crackles, no increased work of breathing on 3 L while at rest  CV: RRR, no MGR, pulses 2+, equal.  Abd:  Soft, NT, ND, + BS, no HSM  EXT:  no clubbing, no cyanosis, no edema  Neuro:  A&Ox3, CN grossly intact, no focal deficits.  Skin: No rashes or lesions noted      Result Review    Result Review:  I have personally reviewed the results from the time of this admission to 1/3/2025 10:02 EST and agree with these findings:  []  Laboratory  []  Microbiology  []  Radiology  []  EKG/Telemetry   []  Cardiology/Vascular   []  Pathology  []  Old records  []  Other:  Most notable findings include:         Lab 25  0457 25  0412 24  1920 24  1438   WBC 10.05 6.77  --  9.68   HEMOGLOBIN 17.3 18.6*  --  19.3*   HEMATOCRIT 54.9* 57.4*  --  58.8*   PLATELETS 178 196  --  212   SODIUM 140 143  --  140   POTASSIUM 4.5 4.6  --  4.1   CHLORIDE 100 101  --  99   CO2  32.4* 32.2*  --  32.3*   BUN 22 24*  --  22   CREATININE 0.55* 0.66*  --  0.73*   GLUCOSE 140* 159*  --  173*   CALCIUM 8.9 9.0  --  9.4   PHOSPHORUS 2.9 3.2 2.9  --    TOTAL PROTEIN  --   --   --  7.2   ALBUMIN  --   --   --  4.0   GLOBULIN  --   --   --  3.2       Assessment & Plan   Assessment / Plan     Active Hospital Problems:  Active Hospital Problems    Diagnosis     **Pneumonia    Impression:  Acute on chronic hypoxic respiratory failure requiring Airvo  Acute on chronic COPD exacerbation, FEV1 24%  Coronavirus OC 43 infection  Concern for pneumonia from unknown organism  Stable perihilar consolidation in the right lower lobe  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  -Continue Brovana, Pulmicort, DuoNebs  -Continue Solu-Medrol 40 mg IV twice daily.  Patient will likely need steroid taper  -Continue bronchopulmonary hygiene.  Encourage incentive spirometer and  -Chest physiotherapy available  -PT/OT/SLP on board.  Appreciate assistance  -Encouraged tobacco cessation  -Okay from pulmonary standpoint to be discharged  -Follow-up pulmonary clinic 1 to 2 weeks after discharge.  Recommend repeat chest CT in 4 to 6 weeks    VTE Prophylaxis:  Pharmacologic VTE prophylaxis orders are present.    CODE STATUS:   Level Of Support Discussed With: Patient  Code Status (Patient has no pulse and is not breathing): CPR (Attempt to Resuscitate)  Medical Interventions (Patient has pulse or is breathing): Full Support    Electronically signed by CHEN Childs, 01/03/25, 10:03 AM EST.  This visit was performed by both a physician and an APC.  I personally evaluated and examined the patient.  I performed all aspects of MDM as documented.  I  have reviewed and confirmed the accuracy of the patient's history as documented in this note and I have reexamined the patient and the results are consistent with the previously documented exam.  I have updated the documentation as necessary.   Electronically signed by Isidro Armijo MD, 01/03/25, 11:22 AM EST.

## 2025-01-03 NOTE — SIGNIFICANT NOTE
01/03/25 1132   OTHER   Discipline occupational therapist   Rehab Time/Intention   Session Not Performed other (see comments)  (Pt in process of d/c)

## 2025-01-03 NOTE — OUTREACH NOTE
Prep Survey      Flowsheet Row Responses   [facility] patient discharged from? [facility]   Is LACE score < 7 ? No   Eligibility [facility]   Date of Admission 12/31/24   Date of Discharge 01/03/25   Discharge Disposition Home or Self Care   Discharge diagnosis Pneumonia, COPD with acute exacerbation   Does the patient have one of the following disease processes/diagnoses(primary or secondary)? COPD   Prep survey completed? Yes            Barbra DA SILVA - Registered Nurse

## 2025-01-03 NOTE — DISCHARGE SUMMARY
[facility]         HOSPITALIST  DISCHARGE SUMMARY    Patient Name: Colin Nevarez  : 1950  MRN: [id number]    Date of Admission: 2024  Date of Discharge: 1/3/2025  Primary Care Physician: Liz Roper DO    Consults       Date and Time Order Name Status Description    2024  8:59 PM Inpatient Pulmonology Consult      2024  6:15 PM Hospitalist (on-call MD unless specified)              Active and Resolved Hospital Problems:  Acute on chronic hypoxic respiratory failure  Chronic hypercapnic respiratory failure  COPD with acute exacerbation  Coronavirus OC 43 infection  Hypertension  Polycythemia vera  Active Hospital Problems    Diagnosis POA    **Pneumonia [J18.9] Yes      Resolved Hospital Problems   No resolved problems to display.       Hospital Course     Hospital Course:  Colin Nevarez is a 74 y.o. male  with COPD, essential hypertension, lung and prostate, cancer who presents to the emergency room for evaluation of worsening dyspnea.  Patient has had a low-grade fever and a progressive cough for the last couple of days.  He was at his primary care provider's office when patient had a pulse ox of 81% on room air.  He was put on 5 L of oxygen to get his pulse ox above 90%.  He was instructed to come to the ER because he was working so hard to breathe. On arrival to the ED, patient temperature 99.5, pulse of 80, respiratory 24, blood pressure 106/77, and he is on 3 L of oxygen saturating 99%.  Eventually patient required 4 L of oxygen and was saturating 91%. Chest x-ray shows hyperexpansion of lungs compatible with COPD.  Increased opacity at the left lung base which may represent atelectasis or pneumonia superimposed upon chronic change. On labs, patient has a white blood cell count of 9.68, hemoglobin of 19.3, CO2 of 32.3.  ABG demonstrated chronic hypercapnia with a pCO2 of 63.  Patient admitted for further evaluation and treatment.  Pulmonology consulted.  Started  on empiric antibiotics, inhaled bronchodilators and systemic steroids.  Patient briefly required AirVo but was able to be weaned to high flow nasal cannula on morning of 1/1/2025.  Respiratory panel positive for coronavirus OC 43.  Respiratory status slowly improving.  Patient hoping to return home once respiratory status improves.  CT chest did reveal areas of peripheral consolidation and right lower lobe similar to prior study.  Recommending outpatient follow-up in 3 to 6 months to assess for resolution.  Respiratory function slowly improved back to baseline satting well on home 3 L nasal cannula.  Patient seen and evaluated on date of discharge and thought stable for discharge home to follow-up with his primary care provider and pulmonology as an outpatient.        DISCHARGE Follow Up Recommendations for labs and diagnostics: Repeat chest CT in 3 to 6 months to assess for resolution of peripheral right lower lobe consolidation.      Day of Discharge     Vital Signs:  Temp:  [97.2 °F (36.2 °C)-98.4 °F (36.9 °C)] 97.9 °F (36.6 °C)  Heart Rate:  [69-93] 79  Resp:  [16-20] 18  BP: (120-157)/(70-85) 120/82  Flow (L/min) (Oxygen Therapy):  [3-3.5] 3  Physical Exam:   General: well-developed appearing stated age in no acute distress  HEENT: Normocephalic atraumatic moist membranes pupils equal round reactive light, no scleral icterus no conjunctival injection  Cardiovascular: regular rate and rhythm no murmurs rubs or gallops S1-S2, no lower extremity edema appreciated  Pulmonary: Scant wheezes, prolonged expiratory phase, no rhonchi, symmetric chest expansion, unlabored, no conversational dyspnea appreciated  Gastrointestinal: Soft nontender nondistended positive bowel sounds all 4 quadrants no rebound or guarding  Musculoskeletal: No clubbing cyanosis, warm and well-perfused, calves soft symmetric nontender bilaterally  Skin: Clean dry without rashes  Neuro: Cranial nerves II through XII intact grossly no sensorimotor  deficits appreciated bilateral upper and lower extremities  Psych: Patient is calm cooperative and appropriate with exam not responding to internal stimuli  : No Nj catheter no bladder distention no suprapubic tenderness       Discharge Details        Discharge Medications        New Medications        Instructions Start Date   amoxicillin-clavulanate 875-125 MG per tablet  Commonly known as: AUGMENTIN   1 tablet, Oral, 2 Times Daily             Changes to Medications        Instructions Start Date   predniSONE 10 MG tablet  Commonly known as: DELTASONE  What changed:   medication strength  See the new instructions.   Take 4 tablets by mouth Daily for 3 days, THEN 3 tablets Daily for 3 days, THEN 2 tablets Daily for 3 days, THEN 1 tablet Daily for 3 days.   Start Date: January 3, 2025            Continue These Medications        Instructions Start Date   albuterol sulfate  (90 Base) MCG/ACT inhaler  Commonly known as: PROVENTIL HFA;VENTOLIN HFA;PROAIR HFA   2 puffs, Inhalation, Every 4 Hours PRN      albuterol (2.5 MG/3ML) 0.083% nebulizer solution  Commonly known as: PROVENTIL   2.5 mg, Nebulization, Every 4 Hours PRN      amLODIPine 10 MG tablet  Commonly known as: NORVASC   10 mg, Oral, Daily      arformoterol 15 MCG/2ML nebulizer solution  Commonly known as: BROVANA   15 mcg, Nebulization, 2 Times Daily - RT      aspirin 81 MG chewable tablet   81 mg, Daily      budesonide 0.5 MG/2ML nebulizer solution  Commonly known as: Pulmicort   0.5 mg, Nebulization, 2 Times Daily - RT      ipratropium-albuterol 0.5-2.5 mg/3 ml nebulizer  Commonly known as: DUO-NEB   3 mL, Nebulization, Every 4 Hours PRN      MULTIVITAMIN GUMMIES ADULTS PO   1 tablet, Daily      O2  Commonly known as: OXYGEN   Patient  unsure of how many liters and only wears prn.               No Known Allergies    Discharge Disposition:  Home or Self Care    Diet:  Hospital:  Diet Order   Procedures    Diet: Regular/House; Fluid Consistency:  Thin (IDDSI 0)       Discharge Activity:   Activity Instructions       Gradually Increase Activity Until at Pre-Hospitalization Level              CODE STATUS:  Code Status and Medical Interventions: CPR (Attempt to Resuscitate); Full Support   Ordered at: 12/1950     Level Of Support Discussed With:    Patient     Code Status (Patient has no pulse and is not breathing):    CPR (Attempt to Resuscitate)     Medical Interventions (Patient has pulse or is breathing):    Full Support         Future Appointments   Date Time Provider Department [place]   2/6/2025  9:15 AM Nahed Samuels APRN [facility] U ETRING [facility]   2/13/2025  9:15 AM [address]   2/17/2025  9:00 AM Dionisio Neville MD [facility] ONC ETWN [facility]   6/20/2025  9:15 AM Falguni Crawford APRN [facility] PCC ETW [facility]       Additional Instructions for the Follow-ups that You Need to Schedule       Discharge Follow-up with PCP   As directed       Currently Documented PCP:    Liz Roper DO    PCP Phone Number:    [phone]     Follow Up Details: Hospital discharge follow up 1-2 weeks                Pertinent  and/or Most Recent Results     PROCEDURES:   None    LAB RESULTS:      Lab 01/02/25 0457 01/01/25 0412 12/31/24 2100 12/31/24 1920 12/31/24  1438   WBC 10.05 6.77  --   --  9.68   HEMOGLOBIN 17.3 18.6*  --   --  19.3*   HEMATOCRIT 54.9* 57.4*  --   --  58.8*   PLATELETS 178 196  --   --  212   NEUTROS ABS 9.05* 6.14  --   --  6.45   IMMATURE GRANS (ABS) 0.04 0.02  --   --  0.04   LYMPHS ABS 0.63* 0.51*  --   --  2.22   MONOS ABS 0.32 0.08*  --   --  0.94*   EOS ABS 0.00 0.00  --   --  0.02   MCV 92.1 89.8  --   --  89.9   PROCALCITONIN  --   --   --  0.03  --    LACTATE  --   --  1.1  --   --          Lab 01/02/25  0457 01/01/25  0412 12/31/24  1920 12/31/24  1438   SODIUM 140 143  --  140   POTASSIUM 4.5 4.6  --  4.1   CHLORIDE 100 101  --  99   CO2 32.4* 32.2*  --  32.3*   ANION GAP 7.6 9.8  --  8.7   BUN 22 24*  --  22   CREATININE  0.55* 0.66*  --  0.73*   EGFR 104.6 99.0  --  96.1   GLUCOSE 140* 159*  --  173*   CALCIUM 8.9 9.0  --  9.4   MAGNESIUM 2.1 2.2 2.3  --    PHOSPHORUS 2.9 3.2 2.9  --          Lab 12/31/24  1438   TOTAL PROTEIN 7.2   ALBUMIN 4.0   GLOBULIN 3.2   ALT (SGPT) 35   AST (SGOT) 16   BILIRUBIN 0.4   ALK PHOS 79         Lab 12/31/24  1920 12/31/24  1438   PROBNP  --  <36.0   HSTROP T 13 12                 Lab 12/31/24 2009   PH, ARTERIAL 7.352   PCO2, ARTERIAL 63.6*   PO2 ART 66.2*   O2 SATURATION ART 90.9*   FIO2 40   HCO3 ART 35.3*   BASE EXCESS ART 6.2*     Brief Urine Lab Results  (Last result in the past 365 days)        Color   Clarity   Blood   Leuk Est   Nitrite   Protein   CREAT   Urine HCG        08/08/24 0915 Yellow   Clear   Negative   Negative   Negative   Negative                 Microbiology Results (last 10 days)       Procedure Component Value - Date/Time    Respiratory Culture - Sputum, Throat [[id number]] Collected: 01/02/25 1518    Lab Status: Preliminary result Specimen: Sputum from Throat Updated: 01/03/25 0958     Respiratory Culture Rare growth The culture consists of normal respiratory richard. This is a preliminary report; final report to follow.     Gram Stain Few (2+) WBCs seen      Rare (1+) Gram positive cocci      Rare (1+) Gram negative bacilli      Rare (1+) Fungal elements      Moderate (3+) Mucous strands      Few (2+) Epithelial cells per low power field    Legionella Antigen, Urine - Urine, Urine, Clean Catch [[id number]]  (Normal) Collected: 01/02/25 1343    Lab Status: Final result Specimen: Urine, Clean Catch Updated: 01/02/25 1433     LEGIONELLA ANTIGEN, URINE Negative    Respiratory Panel PCR w/COVID-19(SARS-CoV-2) LUCINDA/BINTA/FILOMENA/PAD/COR/PATRICK In-House, NP Swab in UTM/VTM, 2 HR TAT - Swab, Nasopharynx [[id number]]  (Abnormal) Collected: 01/01/25 0912    Lab Status: Final result Specimen: Swab from Nasopharynx Updated: 01/01/25 1157     ADENOVIRUS, PCR Not Detected     Coronavirus 229E Not  Detected     Coronavirus HKU1 Not Detected     Coronavirus NL63 Not Detected     Coronavirus OC43 Detected     COVID19 Not Detected     Human Metapneumovirus Not Detected     Human Rhinovirus/Enterovirus Not Detected     Influenza A PCR Not Detected     Influenza B PCR Not Detected     Parainfluenza Virus 1 Not Detected     Parainfluenza Virus 2 Not Detected     Parainfluenza Virus 3 Not Detected     Parainfluenza Virus 4 Not Detected     RSV, PCR Not Detected     Bordetella pertussis pcr Not Detected     Bordetella parapertussis PCR Not Detected     Chlamydophila pneumoniae PCR Not Detected     Mycoplasma pneumo by PCR Not Detected    Narrative:      In the setting of a positive respiratory panel with a viral infection PLUS a negative procalcitonin without other underlying concern for bacterial infection, consider observing off antibiotics or discontinuation of antibiotics and continue supportive care. If the respiratory panel is positive for atypical bacterial infection (Bordetella pertussis, Chlamydophila pneumoniae, or Mycoplasma pneumoniae), consider antibiotic de-escalation to target atypical bacterial infection.    Blood Culture - Blood, Arm, Left [[id number]]  (Normal) Collected: 12/31/24 1920    Lab Status: Preliminary result Specimen: Blood from Arm, Left Updated: 01/02/25 2045     Blood Culture No growth at 2 days    Narrative:      Less than seven (7) mL's of blood was collected.  Insufficient quantity may yield false negative results.    Blood Culture - Blood, Arm, Right [[id number]]  (Normal) Collected: 12/31/24 1920    Lab Status: Preliminary result Specimen: Blood from Arm, Right Updated: 01/02/25 2045     Blood Culture No growth at 2 days    Narrative:      Less than seven (7) mL's of blood was collected.  Insufficient quantity may yield false negative results.    Legionella Antigen, Urine - Urine, Urine, Clean Catch [[id number]]  (Normal) Collected: 12/31/24 1919    Lab Status: Final result Specimen:  Urine, Clean Catch Updated: 12/31/24 1948     LEGIONELLA ANTIGEN, URINE Negative    S. Pneumo Ag Urine or CSF - Urine, Urine, Clean Catch [[id number]]  (Normal) Collected: 12/31/24 1919    Lab Status: Final result Specimen: Urine, Clean Catch Updated: 12/31/24 1949     Strep Pneumo Ag Negative    COVID-19, FLU A/B, RSV PCR 1 HR TAT - Swab, Nasopharynx [[id number]]  (Normal) Collected: 12/31/24 1422    Lab Status: Final result Specimen: Swab from Nasopharynx Updated: 12/31/24 1511     COVID19 Not Detected     Influenza A PCR Not Detected     Influenza B PCR Not Detected     RSV, PCR Not Detected    Narrative:      Fact sheet for providers: https://www.fda.gov/media/662131/download    Fact sheet for patients: https://www.fda.gov/media/241865/download    Test performed by PCR.            CT Chest Without Contrast Diagnostic    Result Date: 12/31/2024  Impression: Impression: 1. No acute process. 2. Area of peripheral consolidation within the right lower lobe appears similar to the prior study from 8/8/2024 and may relate to scarring in the setting of prior infection, recommend close follow-up in 3 to 6 months to assess for resolution. No new area of consolidation. 3. Emphysema, coronary artery calcifications and additional chronic findings above. Electronically Signed: Joseph Pacheco MD  12/31/2024 10:01 PM EST  Workstation ID: [id number]    XR Chest 1 View    Result Date: 12/31/2024  Impression: Impression: 1.Hyperexpansion of lungs compatible with COPD. 2.Increased opacity at the left lung base which may represent atelectasis or pneumonia superimposed upon chronic change. 3.Recommend follow-up to document resolution. Electronically Signed: Kirit Cool MD  12/31/2024 3:32 PM EST  Workstation ID: [id number]                 Labs Pending at Discharge:  Pending Labs       Order Current Status    Blood Culture - Blood, Arm, Left Preliminary result    Blood Culture - Blood, Arm, Right Preliminary result    Respiratory Culture  - Sputum, Throat Preliminary result              Time spent on Discharge including face to face service: 25 minutes    Electronically signed by Javi Hassan MD, 01/03/25, 11:31 AM EST.

## 2025-01-03 NOTE — PLAN OF CARE
Goal Outcome Evaluation:              Outcome Evaluation: Patient is alert and oriented. On 3L NC. No complaints of pain. Patient is being discharged.

## 2025-01-04 LAB
BACTERIA SPEC RESP CULT: NORMAL
GRAM STN SPEC: NORMAL

## 2025-01-05 LAB
BACTERIA SPEC AEROBE CULT: NORMAL
BACTERIA SPEC AEROBE CULT: NORMAL

## 2025-01-05 NOTE — PROGRESS NOTES
"Enter Query Response Below      Query Response: -Bacterial pneumonia superimposed on Coronavirus OC43 pneumonia              If applicable, please update the problem list.         Patient: Colin Nevarez \"Mayco\"        : 1950  Account: [id number]           Admit Date:         How to Respond to this query:       a. Click New Note     b. Answer query within the yellow box.                c. Update the Problem List, if applicable.      If you have any questions about this query contact me at: [email or web address]     :     74 y.o. male smoker with COPD admitted () with \"pneumonia\" per the H&P, Pulmonology consult & pulmonology progress notes ( & 1/3) and the discharge summary.   Respiratory panel () positive for Coronavirus OC43  Treatment:  IV Rocephin (-1/3)  IV Azithromycin (-)  PO Azithromycin ()    Please clarify the type of pneumonia treated & monitored:    -Bacterial pneumonia superimposed on Coronavirus OC43 pneumonia  -Other- specify_____      By submitting this query, we are merely seeking further clarification of documentation to accurately reflect all conditions that you are monitoring, evaluating, treating or that extend the hospitalization or utilize additional resources of care. Please utilize your independent clinical judgment when addressing the question(s) above.     This query and your response, once completed, will be entered into the legal medical record.    Sincerely,  Danna SIMPSON RN   Clinical Documentation Integrity Program     "

## 2025-01-06 ENCOUNTER — TRANSITIONAL CARE MANAGEMENT TELEPHONE ENCOUNTER (OUTPATIENT)
Dept: CALL CENTER | Facility: HOSPITAL | Age: 75
End: 2025-01-06
Payer: MEDICARE

## 2025-01-06 NOTE — OUTREACH NOTE
Call Center TCM Note      Flowsheet Row Responses   [facility]? [facility]   Does the patient have one of the following disease processes/diagnoses(primary or secondary)? Pneumonia   TCM attempt successful? No  [VR- NONE]   Unsuccessful attempts Attempt 1   Discharge diagnosis Pneumonia, COPD with acute exacerbation            Betzaida Ortega RN    1/6/2025, 08:39 EST

## 2025-01-06 NOTE — OUTREACH NOTE
Call Center TCM Note      Flowsheet Row Responses   [facility] facility patient discharged from? [facility]   Does the patient have one of the following disease processes/diagnoses(primary or secondary)? Pneumonia   TCM attempt successful? No   Unsuccessful attempts Attempt 2   Discharge diagnosis Pneumonia, COPD with acute exacerbation            Betzaida Ortega RN    1/6/2025, 15:28 EST

## 2025-01-07 ENCOUNTER — TRANSITIONAL CARE MANAGEMENT TELEPHONE ENCOUNTER (OUTPATIENT)
Dept: CALL CENTER | Facility: HOSPITAL | Age: 75
End: 2025-01-07
Payer: MEDICARE

## 2025-01-07 NOTE — OUTREACH NOTE
Call Center TCM Note      Flowsheet Row Responses   [facility] facility patient discharged from? [facility]   Does the patient have one of the following disease processes/diagnoses(primary or secondary)? Pneumonia   TCM attempt successful? No  [no verbal release]   Unsuccessful attempts Attempt 3            Phyllis Gong RN    1/7/2025, 11:17 EST         Location Indication Override (Is Already Calculated Based On Selected Body Location): Area L

## 2025-01-10 ENCOUNTER — OFFICE VISIT (OUTPATIENT)
Dept: FAMILY MEDICINE CLINIC | Facility: CLINIC | Age: 75
End: 2025-01-10
Payer: MEDICARE

## 2025-01-10 VITALS
OXYGEN SATURATION: 92 % | RESPIRATION RATE: 20 BRPM | HEART RATE: 91 BPM | TEMPERATURE: 98.2 F | HEIGHT: 68 IN | BODY MASS INDEX: 21.34 KG/M2 | DIASTOLIC BLOOD PRESSURE: 76 MMHG | SYSTOLIC BLOOD PRESSURE: 137 MMHG | WEIGHT: 140.8 LBS

## 2025-01-10 DIAGNOSIS — Z09 HOSPITAL DISCHARGE FOLLOW-UP: Primary | ICD-10-CM

## 2025-01-10 DIAGNOSIS — F17.219 CIGARETTE NICOTINE DEPENDENCE WITH NICOTINE-INDUCED DISORDER: Chronic | ICD-10-CM

## 2025-01-10 DIAGNOSIS — J44.9 CHRONIC OBSTRUCTIVE PULMONARY DISEASE, UNSPECIFIED COPD TYPE: Chronic | ICD-10-CM

## 2025-01-10 DIAGNOSIS — I10 ESSENTIAL HYPERTENSION: Chronic | ICD-10-CM

## 2025-01-10 PROCEDURE — 3078F DIAST BP <80 MM HG: CPT | Performed by: FAMILY MEDICINE

## 2025-01-10 PROCEDURE — 1159F MED LIST DOCD IN RCRD: CPT | Performed by: FAMILY MEDICINE

## 2025-01-10 PROCEDURE — 1111F DSCHRG MED/CURRENT MED MERGE: CPT | Performed by: FAMILY MEDICINE

## 2025-01-10 PROCEDURE — 99495 TRANSJ CARE MGMT MOD F2F 14D: CPT | Performed by: FAMILY MEDICINE

## 2025-01-10 PROCEDURE — 3075F SYST BP GE 130 - 139MM HG: CPT | Performed by: FAMILY MEDICINE

## 2025-01-10 PROCEDURE — 1160F RVW MEDS BY RX/DR IN RCRD: CPT | Performed by: FAMILY MEDICINE

## 2025-01-10 PROCEDURE — 1126F AMNT PAIN NOTED NONE PRSNT: CPT | Performed by: FAMILY MEDICINE

## 2025-01-10 RX ORDER — AMLODIPINE BESYLATE 10 MG/1
10 TABLET ORAL DAILY
Qty: 90 TABLET | Refills: 1 | Status: SHIPPED | OUTPATIENT
Start: 2025-01-10

## 2025-01-10 RX ORDER — FLUTICASONE FUROATE, UMECLIDINIUM BROMIDE AND VILANTEROL TRIFENATATE 200; 62.5; 25 UG/1; UG/1; UG/1
1 POWDER RESPIRATORY (INHALATION)
Qty: 2 EACH | Refills: 0 | COMMUNITY
Start: 2025-01-10

## 2025-01-10 NOTE — PROGRESS NOTES
Transitional Care Follow Up Visit  Subjective     Colin Nevarez is a 74 y.o. male who presents for a transitional care management visit.    Within 48 business hours after discharge our office contacted him via telephone to coordinate his care and needs.      I reviewed and discussed the details of that call along with the discharge summary, hospital problems, inpatient lab results, inpatient diagnostic studies, and consultation reports with Colin.     Current outpatient and discharge medications have been reconciled for the patient.  Reviewed by: Liz Roper DO          1/3/2025     4:37 PM   Date of TCM Phone Call   [facility]   Date of Admission 12/31/2024   Date of Discharge 1/3/2025   Discharge Disposition Home or Self Care     Risk for Readmission (LACE) Score: 12 (1/3/2025  6:00 AM)      History of Present Illness   Course During Hospital Stay:  Colin Nevarez is a 74 y.o. male  with COPD, essential hypertension, lung and prostate, cancer who presents to the emergency room for evaluation of worsening dyspnea.  Patient has had a low-grade fever and a progressive cough for the last couple of days.  He was at his primary care provider's office when patient had a pulse ox of 81% on room air.  He was put on 5 L of oxygen to get his pulse ox above 90%.  He was instructed to come to the ER because he was working so hard to breathe. On arrival to the ED, patient temperature 99.5, pulse of 80, respiratory 24, blood pressure 106/77, and he is on 3 L of oxygen saturating 99%.  Eventually patient required 4 L of oxygen and was saturating 91%. Chest x-ray shows hyperexpansion of lungs compatible with COPD.  Increased opacity at the left lung base which may represent atelectasis or pneumonia superimposed upon chronic change. On labs, patient has a white blood cell count of 9.68, hemoglobin of 19.3, CO2 of 32.3.  ABG demonstrated chronic hypercapnia with a pCO2 of 63.  Patient admitted for further  "evaluation and treatment.  Pulmonology consulted.  Started on empiric antibiotics, inhaled bronchodilators and systemic steroids.  Patient briefly required AirVo but was able to be weaned to high flow nasal cannula on morning of 1/1/2025.  Respiratory panel positive for coronavirus OC 43.  Respiratory status slowly improving.  Patient hoping to return home once respiratory status improves.  CT chest did reveal areas of peripheral consolidation and right lower lobe similar to prior study.  Recommending outpatient follow-up in 3 to 6 months to assess for resolution.  Respiratory function slowly improved back to baseline satting well on home 3 L nasal cannula.  Patient seen and evaluated on date of discharge and thought stable for discharge home to follow-up with his primary care provider and pulmonology as an outpatient.      The following portions of the patient's history were reviewed and updated as appropriate: allergies, current medications, past family history, past medical history, past social history, past surgical history, and problem list.    Review of Systems   Constitutional:  Negative for fever.   HENT:  Negative for nosebleeds.    Respiratory:  Positive for cough and wheezing. Negative for choking.    Cardiovascular:  Negative for chest pain.   Gastrointestinal:  Negative for abdominal pain.   Genitourinary:  Negative for difficulty urinating.   Allergic/Immunologic: Negative for immunocompromised state.   Neurological:  Negative for dizziness, seizures, syncope and headaches.   Hematological:  Negative for adenopathy.   Psychiatric/Behavioral:  Negative for agitation, behavioral problems and confusion.        Objective   /76 (BP Location: Left arm, Patient Position: Sitting, Cuff Size: Adult)   Pulse 91   Temp 98.2 °F (36.8 °C) (Temporal)   Resp 20   Ht 172.7 cm (67.99\")   Wt 63.9 kg (140 lb 12.8 oz)   SpO2 92%   BMI 21.41 kg/m²   Physical Exam  Vitals reviewed.   Constitutional:       " Appearance: Normal appearance. He is well-developed.   HENT:      Head: Normocephalic and atraumatic.      Right Ear: External ear normal.      Left Ear: External ear normal.      Mouth/Throat:      Pharynx: No oropharyngeal exudate.   Eyes:      Conjunctiva/sclera: Conjunctivae normal.      Pupils: Pupils are equal, round, and reactive to light.   Neck:      Vascular: No carotid bruit.   Cardiovascular:      Rate and Rhythm: Normal rate and regular rhythm.      Heart sounds: No murmur heard.     No friction rub. No gallop.   Pulmonary:      Effort: Pulmonary effort is normal.      Breath sounds: Decreased air movement present. Decreased breath sounds present. No wheezing or rhonchi.   Abdominal:      General: There is no distension.   Skin:     General: Skin is warm and dry.   Neurological:      Mental Status: He is alert and oriented to person, place, and time.      Cranial Nerves: No cranial nerve deficit.      Motor: No weakness.   Psychiatric:         Mood and Affect: Mood and affect normal.         Behavior: Behavior normal.         Thought Content: Thought content normal.         Judgment: Judgment normal.         Assessment & Plan   Diagnoses and all orders for this visit:    1. Hospital discharge follow-up (Primary)    2. Chronic obstructive pulmonary disease, unspecified COPD type  Assessment & Plan:  COPD is worsening.    Plan:  Continue same medication/s without change.    Discussed medication dosage, use, side effects, and goals of treatment in detail.    Warning signs of respiratory distress were reviewed with the patient.   Smoking cessation discussed: Information shared with patient..    Patient Treatment Goals:   symptom prevention, minimizing limitation in activity, prevention of exacerbations and use of ER/inpatient care, maintenance of optimal pulmonary function, and minimization of adverse effects of treatment    Followup in 3 months.    Orders:  -     Fluticasone-Umeclidin-Vilant (Trelegy  Ellipta) 200-62.5-25 MCG/ACT inhaler; Inhale 1 puff Daily.  Dispense: 2 each; Refill: 0    3. Essential hypertension  Assessment & Plan:  Hypertension is stable and controlled  Continue current treatment regimen.  Dietary sodium restriction.  Weight loss.  Blood pressure will be reassessed in 6 months.    Orders:  -     amLODIPine (NORVASC) 10 MG tablet; Take 1 tablet by mouth Daily.  Dispense: 90 tablet; Refill: 1    4. Cigarette nicotine dependence with nicotine-induced disorder  Assessment & Plan:  Tobacco use is improving with lifestyle modifications.  Smoking cessation counseling was provided.  Tobacco use will be reassessed in 6 months.    He is down to smoking 12 cigarettes per day.

## 2025-01-30 ENCOUNTER — LAB (OUTPATIENT)
Dept: LAB | Facility: HOSPITAL | Age: 75
End: 2025-01-30
Payer: MEDICARE

## 2025-01-30 DIAGNOSIS — R97.21 RISING PSA FOLLOWING TREATMENT FOR MALIGNANT NEOPLASM OF PROSTATE: ICD-10-CM

## 2025-01-30 LAB — PSA SERPL-MCNC: 0.04 NG/ML (ref 0–4)

## 2025-01-30 PROCEDURE — 84153 ASSAY OF PSA TOTAL: CPT

## 2025-01-30 PROCEDURE — 36415 COLL VENOUS BLD VENIPUNCTURE: CPT

## 2025-02-03 DIAGNOSIS — J44.9 CHRONIC OBSTRUCTIVE PULMONARY DISEASE, UNSPECIFIED COPD TYPE: Primary | Chronic | ICD-10-CM

## 2025-02-06 ENCOUNTER — OFFICE VISIT (OUTPATIENT)
Dept: UROLOGY | Age: 75
End: 2025-02-06
Payer: MEDICARE

## 2025-02-06 VITALS — BODY MASS INDEX: 21.19 KG/M2 | HEIGHT: 70 IN | WEIGHT: 148 LBS

## 2025-02-06 DIAGNOSIS — R97.21 RISING PSA FOLLOWING TREATMENT FOR MALIGNANT NEOPLASM OF PROSTATE: Primary | ICD-10-CM

## 2025-02-06 NOTE — PROGRESS NOTES
Chief Complaint: Elevated PSA (He denies any urinary issues and states that he is doing great)    Subjective         Prostate Cancer    Primary Care Follow-Up    Colin Nevarez is a 74 y.o. male presents to [facility] to be seen for annual f/u prostate cancer.    Patient returns today with a repeat PSA from 2025 which is now noted to be 0.036    Patient denies any bony symptoms.    Denies any abnormal weight loss.    He did get admitted again for 3 days in 24  for pneumonia.          Previous:      He returns today with a repeat PSA from 24 which is now 0.016    No further UTI since we last saw him.     He did get admitted for 11 days for COPD exacerbation but has done well since he has been home has been gaining weight back that he lost.     He is cutting back on smoking.     Leakage he states is getting better.    Previous:    status post  RALPw LND  20 , pT2 N0 R1     NO UTI or GH     Voids without issue.    Minimal incontinence, no pads.     Not worried about erections.    PSA now 0.016 previously undetectable        No history of kidney stone.     Patient had a ruptured appendix in .  Still has a large midline incision.     No urologic family history     No CAD, No COPD.  smokes 1 ppd.  ASA 82  Mom dies of CA at 50.  Dad  at 60 of CVA.       0.81, GFR >60    Prostate CA        <0.014         <0.014  3/21    <0.01  10/20  <0.01       RALP w LND  3+4, 4+3 5 to 10%, tertiary pattern 5 5% overall Dee score 7, margins involved, right apical, right anterior, right posterior, 7 lymph nodes negative     pT2 N0 R1     2020 MRI fusion prostate biopsy  Region of interest - 3+3, multiple cores, 22%  Right base3+3, 2/2, 17%  Right apex3+3, 2/2, 77%  Right mid3+4, 2/2, 46%  Left base, left apex, left midnegative  Region of interest #23 +3, 2/2, 26%      MRI [id number] g17 x 4 mm, PIRADS 5.  Right lateral peripheral zone at the apex to mid  gland  5/20  8.16    Objective     Past Medical History:   Diagnosis Date    Arthritis     COPD (chronic obstructive pulmonary disease)     Essential hypertension 07/12/2021    Forgetfulness     Heart flutter, ventricular     High cholesterol     Lung cancer     Prostate cancer     Ringing in ear     Shortness of Air     SOB (shortness of breath)        Past Surgical History:   Procedure Laterality Date    APPENDECTOMY      COLONOSCOPY  06/01/2019    COLONOSCOPY N/A 6/14/2023    Procedure: COLONOSCOPY;  Surgeon: Geoffrey Carey MD;  Location: [facility] ENDOSCOPY;  Service: General;  Laterality: N/A;  NORMAL    LUNG BIOPSY      PROSTATECTOMY      TONSILLECTOMY  1969    Per PT         Current Outpatient Medications:     albuterol (PROVENTIL) (2.5 MG/3ML) 0.083% nebulizer solution, Take 2.5 mg by nebulization Every 4 (Four) Hours As Needed for Wheezing., Disp: 100 each, Rfl: 12    albuterol sulfate  (90 Base) MCG/ACT inhaler, Inhale 2 puffs Every 4 (Four) Hours As Needed for Wheezing or Shortness of Air., Disp: 18 g, Rfl: 2    amLODIPine (NORVASC) 10 MG tablet, Take 1 tablet by mouth Daily., Disp: 90 tablet, Rfl: 1    arformoterol (BROVANA) 15 MCG/2ML nebulizer solution, Take 2 mL by nebulization 2 (Two) Times a Day., Disp: 120 mL, Rfl: 3    aspirin 81 MG chewable tablet, Chew 1 tablet Daily., Disp: , Rfl:     budesonide (Pulmicort) 0.5 MG/2ML nebulizer solution, Take 2 mL by nebulization 2 (Two) Times a Day., Disp: 60 each, Rfl: 3    Fluticasone-Umeclidin-Vilant (Trelegy Ellipta) 200-62.5-25 MCG/ACT inhaler, Inhale 1 puff Daily., Disp: 2 each, Rfl: 0    ipratropium-albuterol (DUO-NEB) 0.5-2.5 mg/3 ml nebulizer, Take 3 mL by nebulization Every 4 (Four) Hours As Needed for Wheezing., Disp: 360 mL, Rfl: 2    Multiple Vitamins-Minerals (MULTIVITAMIN GUMMIES ADULTS PO), Take 1 tablet by mouth Daily. 1 gummie/day, Disp: , Rfl:     O2 (OXYGEN), Patient  unsure of how many liters and only wears prn., Disp: , Rfl:     No  "Known Allergies     Family History   Problem Relation Age of Onset    Ovarian cancer Mother     Stroke Father     Breast cancer Sister 40    Cancer Maternal Grandmother     Cancer Paternal Grandmother        Social History     Socioeconomic History    Marital status: Single   Tobacco Use    Smoking status: Every Day     Current packs/day: 0.25     Average packs/day: 0.3 packs/day for 66.1 years (16.5 ttl pk-yrs)     Types: Cigarettes     Start date: 1959     Passive exposure: Current    Smokeless tobacco: Never    Tobacco comments:        Vaping Use    Vaping status: Never Used   Substance and Sexual Activity    Alcohol use: Never    Drug use: Never    Sexual activity: Defer       Vital Signs:   Ht 177.8 cm (70\")   Wt 67.1 kg (148 lb)   BMI 21.24 kg/m²      Physical Exam     Result Review :   The following data was reviewed by: CHEN Jay on 2/6/2025:  Results for orders placed or performed in visit on 01/30/25   PSA DIAGNOSTIC    Collection Time: 01/30/25 10:03 AM    Specimen: Blood   Result Value Ref Range    PSA 0.036 0.000 - 4.000 ng/mL      PSA          4/30/2024    09:31 8/1/2024    08:53 1/30/2025    10:03   PSA   PSA 0.016  0.016  0.036             Procedures        Assessment and Plan    Diagnoses and all orders for this visit:    1. Rising PSA following treatment for malignant neoplasm of prostate (Primary)  -     PSA DIAGNOSTIC; Future        Psa continues to rise minimally at this time.     We will plan for now to repeat in 6 months.      I spent 10 minutes caring for Colin on this date of service. This time includes time spent by me in the following activities:reviewing tests, obtaining and/or reviewing a separately obtained history, performing a medically appropriate examination and/or evaluation , counseling and educating the patient/family/caregiver, ordering medications, tests, or procedures, and documenting information in the medical record  Follow Up   Return in about 6 months " (around 8/6/2025) for f/u with psa prior.  Patient was given instructions and counseling regarding his condition or for health maintenance advice. Please see specific information pulled into the AVS if appropriate.         This document has been electronically signed by CHEN Jay  February 6, 2025 09:44 EST

## 2025-02-13 ENCOUNTER — HOSPITAL ENCOUNTER (OUTPATIENT)
Dept: CT IMAGING | Facility: HOSPITAL | Age: 75
Discharge: HOME | End: 2025-02-13
Admitting: INTERNAL MEDICINE
Payer: MEDICARE

## 2025-02-13 DIAGNOSIS — C34.91 SQUAMOUS CELL CARCINOMA OF RIGHT LUNG: ICD-10-CM

## 2025-02-13 PROCEDURE — 71250 CT THORAX DX C-: CPT

## 2025-02-17 ENCOUNTER — OFFICE VISIT (OUTPATIENT)
Dept: ONCOLOGY | Facility: HOSPITAL | Age: 75
End: 2025-02-17
Payer: MEDICARE

## 2025-02-17 VITALS
OXYGEN SATURATION: 93 % | HEIGHT: 70 IN | RESPIRATION RATE: 16 BRPM | DIASTOLIC BLOOD PRESSURE: 79 MMHG | WEIGHT: 150.79 LBS | SYSTOLIC BLOOD PRESSURE: 120 MMHG | TEMPERATURE: 97.2 F | BODY MASS INDEX: 21.59 KG/M2 | HEART RATE: 79 BPM

## 2025-02-17 DIAGNOSIS — J44.9 CHRONIC OBSTRUCTIVE PULMONARY DISEASE, UNSPECIFIED COPD TYPE: ICD-10-CM

## 2025-02-17 DIAGNOSIS — C34.91 SQUAMOUS CELL CARCINOMA OF RIGHT LUNG: Primary | ICD-10-CM

## 2025-02-17 DIAGNOSIS — D75.1 ERYTHROCYTOSIS: ICD-10-CM

## 2025-02-17 DIAGNOSIS — C61 PROSTATE CANCER: ICD-10-CM

## 2025-02-17 PROCEDURE — G0463 HOSPITAL OUTPT CLINIC VISIT: HCPCS | Performed by: INTERNAL MEDICINE

## 2025-02-17 NOTE — PROGRESS NOTES
Chief Complaint   FOLLOW UP 2     Liz Roper, Liz Roberts, DO    Subjective          Colin Nevarez presents to [facility] for NSCLC    Mr. Nevarez is a very pleasant 73-year-old male with a history of COPD, prostate cancer, hypertension, tobacco use who presents for follow up for squamous cell carcinoma of RLL. He underwent SBRT and finished this in early June 2023.  Patient doing well since last visit.  Patient was admitted January for COPD exacerbation and coronavirus infection.  He is doing better since that hospitalization.  He wears oxygen about 4 to 5 hours a day.  Reports occasional dry mouth when using it.  Does not use it overnight.  No fevers or chills.  No hemoptysis.  Minimal back pain.  This is not new.  No chest pain reported.  Using daily inhalers.  Reports being itchy from his waist up.  Reports feeling cold a lot.  Thinks this is from radiation.  Recent CT chest 2/13/2025 with stability.     Oncology/Hematology History Overview Note   8/2020: Patient underwent radical prostatectomy (Dr. Rodriguez) for clinical T3, Dee 3+4=7 prostate adenocarcinoma, PSA <10.     4/10/23: NM/PET performed due to abnormal lung nodule on CT Chest demonstrated a non-calcified 1.7 cm pulmonary nodule within the lateral right lower lobe with FDG activity of 7.33. No other nodule identified. No mediastinal, hilar or axillary lymphadenopathy.    5/3/23: CT needle biopsy of RLL: Moderately to poorly differentiated squamous cell carcinoma.    6/5/23: SBRT completed to RLL nodule    6/14/2023: Colonoscopy with Dr. Carey. Normal exam with no specimens collected. Repeat in 5 years for surveillance (2028).     7/31/23: CT Chest: interval decrease in size of RLL nodule.          Prostate cancer   7/12/2021 Initial Diagnosis    Prostate cancer (HCC)     5/11/2023 Cancer Staged    Staging form: Prostate, AJCC 8th Edition  - Clinical: Stage IIB (cT1c, cN0, cM0, PSA: 8.2, Grade Group:  2) - Signed by Dionisio Neville MD on 5/11/2023     Squamous cell carcinoma lung   4/4/2023 Initial Diagnosis    Squamous cell carcinoma lung     5/11/2023 Cancer Staged    Staging form: Lung, AJCC 8th Edition  - Clinical: Stage IA2 (cT1b, cN0, cM0) - Signed by Dionisio Neville MD on 5/11/2023     Malignant neoplasm of lower lobe, right bronchus or lung (Resolved)   5/22/2023 Initial Diagnosis    Malignant neoplasm of lower lobe, right bronchus or lung     5/30/2023 - 6/5/2023 Radiation    Radiation OncologyTreatment Course:  Colin Nevarez received 5500 cGy in 5 fractions to right lower lobe via stereotactic body radiotherapy.            Review of Systems   Constitutional:  Positive for fatigue and unexpected weight gain. Negative for appetite change, diaphoresis, fever and unexpected weight loss.   HENT:  Negative for hearing loss, mouth sores, sore throat, swollen glands, trouble swallowing and voice change.    Eyes:  Negative for blurred vision.   Respiratory:  Positive for shortness of breath. Negative for cough and wheezing.    Cardiovascular:  Negative for chest pain and palpitations.   Gastrointestinal:  Negative for abdominal pain, blood in stool, constipation, diarrhea, nausea and vomiting.   Endocrine: Negative for cold intolerance and heat intolerance.   Genitourinary:  Negative for difficulty urinating, dysuria, frequency, hematuria and urinary incontinence.   Musculoskeletal:  Negative for arthralgias, back pain and myalgias.   Skin:  Negative for rash, skin lesions and wound.   Neurological:  Negative for dizziness, seizures, weakness, numbness and headache.   Hematological:  Does not bruise/bleed easily.   Psychiatric/Behavioral:  Negative for depressed mood. The patient is not nervous/anxious.    All other systems reviewed and are negative.    Current Outpatient Medications on File Prior to Visit   Medication Sig Dispense Refill    albuterol (PROVENTIL) (2.5 MG/3ML) 0.083% nebulizer  solution Take 2.5 mg by nebulization Every 4 (Four) Hours As Needed for Wheezing. 100 each 12    albuterol sulfate  (90 Base) MCG/ACT inhaler Inhale 2 puffs Every 4 (Four) Hours As Needed for Wheezing or Shortness of Air. 18 g 2    amLODIPine (NORVASC) 10 MG tablet Take 1 tablet by mouth Daily. 90 tablet 1    arformoterol (BROVANA) 15 MCG/2ML nebulizer solution Take 2 mL by nebulization 2 (Two) Times a Day. 120 mL 3    aspirin 81 MG chewable tablet Chew 1 tablet Daily.      budesonide (Pulmicort) 0.5 MG/2ML nebulizer solution Take 2 mL by nebulization 2 (Two) Times a Day. 60 each 3    Fluticasone-Umeclidin-Vilant (Trelegy Ellipta) 200-62.5-25 MCG/ACT inhaler Inhale 1 puff Daily. 2 each 0    ipratropium-albuterol (DUO-NEB) 0.5-2.5 mg/3 ml nebulizer Take 3 mL by nebulization Every 4 (Four) Hours As Needed for Wheezing. 360 mL 2    Multiple Vitamins-Minerals (MULTIVITAMIN GUMMIES ADULTS PO) Take 1 tablet by mouth Daily. 1 gummie/day      O2 (OXYGEN) Patient  unsure of how many liters and only wears prn.       No current facility-administered medications on file prior to visit.       No Known Allergies  Past Medical History:   Diagnosis Date    Arthritis     COPD (chronic obstructive pulmonary disease)     Essential hypertension 07/12/2021    Forgetfulness     Heart flutter, ventricular     High cholesterol     Lung cancer     Prostate cancer     Ringing in ear     Shortness of Air     SOB (shortness of breath)      Past Surgical History:   Procedure Laterality Date    APPENDECTOMY      COLONOSCOPY  06/01/2019    COLONOSCOPY N/A 6/14/2023    Procedure: COLONOSCOPY;  Surgeon: Geoffrey Carey MD;  Location: [facility] ENDOSCOPY;  Service: General;  Laterality: N/A;  NORMAL    LUNG BIOPSY      PROSTATECTOMY      TONSILLECTOMY  1969    Per PT     Social History     Socioeconomic History    Marital status: Single   Tobacco Use    Smoking status: Every Day     Current packs/day: 0.25     Average packs/day: 0.3 packs/day  "for 66.1 years (16.5 ttl pk-yrs)     Types: Cigarettes     Start date: 1959     Passive exposure: Current    Smokeless tobacco: Never    Tobacco comments:        Vaping Use    Vaping status: Never Used   Substance and Sexual Activity    Alcohol use: Never    Drug use: Never    Sexual activity: Defer     Family History   Problem Relation Age of Onset    Ovarian cancer Mother     Stroke Father     Breast cancer Sister 40    Cancer Maternal Grandmother     Cancer Paternal Grandmother        Objective   Physical Exam  Well appearing patient in no acute distress on RA  Anicteric sclerae, no rash on exposed skin  Respirations non-labored  Awake, alert, and oriented x 4. Speech intact. No gross neurologic deficit  Appropriate mood and affect      Vitals:    02/17/25 0857   BP: 120/79   Pulse: 79   Resp: 16   Temp: 97.2 °F (36.2 °C)   TempSrc: Temporal   SpO2: 93%   Weight: 68.4 kg (150 lb 12.7 oz)   Height: 177.8 cm (70\")   PainSc: 0-No pain                   PHQ-9 Total Score:           Result Review :   The following data was reviewed by: Dionisio Neville MD on 02/17/25:  Lab Results   Component Value Date    HGB 17.3 01/02/2025    HCT 54.9 (H) 01/02/2025    MCV 92.1 01/02/2025     01/02/2025    WBC 10.05 01/02/2025    NEUTROABS 9.05 (H) 01/02/2025    LYMPHSABS 0.63 (L) 01/02/2025    MONOSABS 0.32 01/02/2025    EOSABS 0.00 01/02/2025    BASOSABS 0.01 01/02/2025     Lab Results   Component Value Date    GLUCOSE 140 (H) 01/02/2025    BUN 22 01/02/2025    CREATININE 0.55 (L) 01/02/2025     01/02/2025    K 4.5 01/02/2025     01/02/2025    CO2 32.4 (H) 01/02/2025    CALCIUM 8.9 01/02/2025    PROTEINTOT 7.2 12/31/2024    ALBUMIN 4.0 12/31/2024    BILITOT 0.4 12/31/2024    ALKPHOS 79 12/31/2024    AST 16 12/31/2024    ALT 35 12/31/2024     Lab Results   Component Value Date    MG 2.1 01/02/2025    PHOS 2.9 01/02/2025    FREET4 1.1 05/29/2020    TSH 1.240 02/09/2024     Labs personally reviewed. " Erythrocytosis noted.     1/2025 Discharge summary personally reviewed    Urology note personally reviewed    CT Chest personally reviewed and per my independent read with stability    No radiology results for the last 30 days.          Assessment and Plan    Diagnoses and all orders for this visit:    1. Squamous cell carcinoma of right lung (Primary)  -     CT Chest With Contrast; Future    2. Erythrocytosis    3. Prostate cancer    4. Chronic obstructive pulmonary disease, unspecified COPD type          Stage IA2 NSCLC (squamous cell carcinoma)  1.7 cm RLL FDG avid lesion per PET without any other hypermetabolic adenopathy. Staged at dE0tbO2. Completed SBRT 6/5/23 per radiation oncology. 7/31/23 CT Chest demonstrated disease response. Repeat CT Chest improved right hilar nodes and no evidence of malignancy. 12/31/24 repeat CT chest with BRYON and stable consolidation in RLL, could be scaring. Repeat CT chest 2/13/25 with stable findings. Recommend repeat CT Chest in 6 months. Follow up at that time.      COPD   Recent exacerbation and hospitalization. Recommend continued follow-up with pulmonology.  Patient currently off oxygen.  Recommend compliance with inhalers.    Erythrocytosis  Secondary to smoking history. No workup or treatment recommended.    Smoking Cessation  Cessation recommended. Patient cutting back.     Prostate Cancer  Stage IIB, [place] 3+4=7, PSA <10, clinical T3N0, s/p radical prostatectomy 8/2020, BRYON. Follows with urology. PSA only minimally elevated.        Patient Follow Up: 6 months with CT Chest  Patient was given instructions and counseling regarding his condition or for health maintenance advice. Please see specific information pulled into the AVS if appropriate.

## 2025-02-18 ENCOUNTER — TELEPHONE (OUTPATIENT)
Dept: ONCOLOGY | Facility: HOSPITAL | Age: 75
End: 2025-02-18
Payer: MEDICARE

## 2025-03-06 ENCOUNTER — OFFICE VISIT (OUTPATIENT)
Dept: FAMILY MEDICINE CLINIC | Facility: CLINIC | Age: 75
End: 2025-03-06
Payer: MEDICARE

## 2025-03-06 VITALS
BODY MASS INDEX: 21.45 KG/M2 | DIASTOLIC BLOOD PRESSURE: 73 MMHG | HEART RATE: 82 BPM | OXYGEN SATURATION: 92 % | TEMPERATURE: 97.8 F | SYSTOLIC BLOOD PRESSURE: 139 MMHG | HEIGHT: 70 IN | RESPIRATION RATE: 19 BRPM | WEIGHT: 149.8 LBS

## 2025-03-06 DIAGNOSIS — J96.11 CHRONIC HYPOXIC RESPIRATORY FAILURE: Primary | ICD-10-CM

## 2025-03-06 DIAGNOSIS — I10 ESSENTIAL HYPERTENSION: Chronic | ICD-10-CM

## 2025-03-06 DIAGNOSIS — F17.219 CIGARETTE NICOTINE DEPENDENCE WITH NICOTINE-INDUCED DISORDER: Chronic | ICD-10-CM

## 2025-03-06 DIAGNOSIS — Z00.00 ANNUAL PHYSICAL EXAM: ICD-10-CM

## 2025-03-06 DIAGNOSIS — R73.9 ELEVATED BLOOD SUGAR: ICD-10-CM

## 2025-03-06 DIAGNOSIS — E78.00 HIGH CHOLESTEROL: Chronic | ICD-10-CM

## 2025-03-06 PROBLEM — J96.20 ACUTE ON CHRONIC RESPIRATORY FAILURE: Status: RESOLVED | Noted: 2024-12-31 | Resolved: 2025-03-06

## 2025-03-06 PROBLEM — Z09 HOSPITAL DISCHARGE FOLLOW-UP: Status: RESOLVED | Noted: 2024-07-26 | Resolved: 2025-03-06

## 2025-03-06 NOTE — PROGRESS NOTES
"Chief Complaint  COPD    Subjective        Colin Nevarez presents to [facility]  History of Present Illness  He is here today for management of his chronic medical conditions. He has right middle lung cancer, tobacco abuse, COPD, prostate cancer and chronic back pain. He is a smoker. He has smoked since he was 7 years old. He smokes a little less than one pack per day.     He is feeling better today. He is not currently using 02 in the clinic. He does have supplemental 02 at home and uses from time to time.       The patient has no other complaints today and denies chest pain, shortness of breath, weakness, numbness, nausea, vomiting, diarrhea, dizziness or syncopal event.        Objective   Vital Signs:  /73 (BP Location: Left arm, Patient Position: Sitting, Cuff Size: Adult)   Pulse 82   Temp 97.8 °F (36.6 °C) (Temporal)   Resp 19   Ht 177.8 cm (70\")   Wt 67.9 kg (149 lb 12.8 oz)   SpO2 92%   BMI 21.49 kg/m²   Estimated body mass index is 21.49 kg/m² as calculated from the following:    Height as of this encounter: 177.8 cm (70\").    Weight as of this encounter: 67.9 kg (149 lb 12.8 oz).    BMI is within normal parameters. No other follow-up for BMI required.      Physical Exam  Vitals reviewed.   Constitutional:       Appearance: Normal appearance. He is well-developed.   HENT:      Head: Normocephalic and atraumatic.      Right Ear: External ear normal.      Left Ear: External ear normal.      Mouth/Throat:      Pharynx: No oropharyngeal exudate.   Eyes:      Conjunctiva/sclera: Conjunctivae normal.      Pupils: Pupils are equal, round, and reactive to light.   Neck:      Vascular: No carotid bruit.   Cardiovascular:      Rate and Rhythm: Normal rate and regular rhythm.      Heart sounds: No murmur heard.     No friction rub. No gallop.   Pulmonary:      Effort: Pulmonary effort is normal.      Breath sounds: Decreased breath sounds present. No wheezing or rhonchi. "   Abdominal:      General: There is no distension.   Skin:     General: Skin is warm and dry.   Neurological:      Mental Status: He is alert and oriented to person, place, and time.      Cranial Nerves: No cranial nerve deficit.      Motor: No weakness.   Psychiatric:         Mood and Affect: Mood and affect normal.         Behavior: Behavior normal.         Thought Content: Thought content normal.         Judgment: Judgment normal.        Result Review :    CMP          12/31/2024    14:38 1/1/2025    04:12 1/2/2025    04:57   CMP   Glucose 173  159  140    BUN 22  24  22    Creatinine 0.73  0.66  0.55    EGFR 96.1  99.0  104.6    Sodium 140  143  140    Potassium 4.1  4.6  4.5    Chloride 99  101  100    Calcium 9.4  9.0  8.9    Total Protein 7.2      Albumin 4.0      Globulin 3.2      Total Bilirubin 0.4      Alkaline Phosphatase 79      AST (SGOT) 16      ALT (SGPT) 35      Albumin/Globulin Ratio 1.3      BUN/Creatinine Ratio 30.1  36.4  40.0    Anion Gap 8.7  9.8  7.6      CBC          12/31/2024    14:38 1/1/2025    04:12 1/2/2025    04:57   CBC   WBC 9.68  6.77  10.05    RBC 6.54  6.39  5.96    Hemoglobin 19.3  18.6  17.3    Hematocrit 58.8  57.4  54.9    MCV 89.9  89.8  92.1    MCH 29.5  29.1  29.0    MCHC 32.8  32.4  31.5    RDW 13.0  12.6  12.6    Platelets 212  196  178                    Assessment and Plan   Diagnoses and all orders for this visit:    1. Chronic hypoxic respiratory failure (Primary)  Comments:  The patient would benefit from supplemental O2 because of feeling his O2 dropping below 90% when he is ambulating.  Order was placed today for an Inogen.  Orders:  -     Miscellaneous DME    2. Essential hypertension  Assessment & Plan:  Hypertension is stable and controlled  Continue current treatment regimen.  Dietary sodium restriction.  Weight loss.  Blood pressure will be reassessed in 6 months.      3. High cholesterol  -     Lipid Panel; Future    4. Elevated blood sugar  -     Hemoglobin  A1c; Future    5. Annual physical exam  -     TSH+Free T4; Future  -     Comprehensive Metabolic Panel; Future  -     CBC & Differential; Future  -     Urinalysis With Microscopic - Urine, Clean Catch; Future    6. Cigarette nicotine dependence with nicotine-induced disorder  Assessment & Plan:  Tobacco use is improving with lifestyle modifications.  Smoking cessation counseling was provided.  Tobacco use will be reassessed in 6 months.    He is down to smoking 12 cigarettes per day.                  Follow Up   No follow-ups on file.  Patient was given instructions and counseling regarding his condition or for health maintenance advice. Please see specific information pulled into the AVS if appropriate.

## 2025-03-10 ENCOUNTER — TELEPHONE (OUTPATIENT)
Dept: FAMILY MEDICINE CLINIC | Facility: CLINIC | Age: 75
End: 2025-03-10
Payer: MEDICARE

## 2025-03-10 DIAGNOSIS — Z11.59 MEASLES SCREENING: Primary | ICD-10-CM

## 2025-03-10 NOTE — TELEPHONE ENCOUNTER
Spoke with patient about [employer]OG[employer] orders to let him know to call company and set up info and then they would send us the order forms      Patient is asking if he should be worried about the measles outbreak, he thinks he was previously vaccinated. Are we able to do a titer to check for immunity with his labs he has ordered? I pended a lab if it is the correct action

## 2025-03-11 NOTE — TELEPHONE ENCOUNTER
Spoke with patient, he is aware titer order was placed.  He will come by tomorrow to get this and other yearly labs drawn.

## 2025-03-12 ENCOUNTER — LAB (OUTPATIENT)
Dept: LAB | Facility: HOSPITAL | Age: 75
End: 2025-03-12
Payer: MEDICARE

## 2025-03-12 DIAGNOSIS — R73.9 ELEVATED BLOOD SUGAR: ICD-10-CM

## 2025-03-12 DIAGNOSIS — Z11.59 MEASLES SCREENING: ICD-10-CM

## 2025-03-12 DIAGNOSIS — E78.00 HIGH CHOLESTEROL: Chronic | ICD-10-CM

## 2025-03-12 DIAGNOSIS — Z00.00 ANNUAL PHYSICAL EXAM: ICD-10-CM

## 2025-03-12 LAB
ALBUMIN SERPL-MCNC: 4.4 G/DL (ref 3.5–5.2)
ALBUMIN/GLOB SERPL: 1.4 G/DL
ALP SERPL-CCNC: 90 U/L (ref 39–117)
ALT SERPL W P-5'-P-CCNC: 16 U/L (ref 1–41)
ANION GAP SERPL CALCULATED.3IONS-SCNC: 13.5 MMOL/L (ref 5–15)
AST SERPL-CCNC: 23 U/L (ref 1–40)
BACTERIA UR QL AUTO: NORMAL /HPF
BASOPHILS # BLD AUTO: 0.04 10*3/MM3 (ref 0–0.2)
BASOPHILS NFR BLD AUTO: 0.4 % (ref 0–1.5)
BILIRUB SERPL-MCNC: 0.4 MG/DL (ref 0–1.2)
BILIRUB UR QL STRIP: NEGATIVE
BUN SERPL-MCNC: 10 MG/DL (ref 8–23)
BUN/CREAT SERPL: 12.2 (ref 7–25)
CALCIUM SPEC-SCNC: 10.1 MG/DL (ref 8.6–10.5)
CHLORIDE SERPL-SCNC: 97 MMOL/L (ref 98–107)
CHOLEST SERPL-MCNC: 253 MG/DL (ref 0–200)
CLARITY UR: CLEAR
CO2 SERPL-SCNC: 25.5 MMOL/L (ref 22–29)
COLOR UR: YELLOW
CREAT SERPL-MCNC: 0.82 MG/DL (ref 0.76–1.27)
DEPRECATED RDW RBC AUTO: 46.4 FL (ref 37–54)
EGFRCR SERPLBLD CKD-EPI 2021: 92.8 ML/MIN/1.73
EOSINOPHIL # BLD AUTO: 0.28 10*3/MM3 (ref 0–0.4)
EOSINOPHIL NFR BLD AUTO: 3 % (ref 0.3–6.2)
ERYTHROCYTE [DISTWIDTH] IN BLOOD BY AUTOMATED COUNT: 14.4 % (ref 12.3–15.4)
GLOBULIN UR ELPH-MCNC: 3.1 GM/DL
GLUCOSE SERPL-MCNC: 95 MG/DL (ref 65–99)
GLUCOSE UR STRIP-MCNC: NEGATIVE MG/DL
HBA1C MFR BLD: 5.6 % (ref 4.8–5.6)
HCT VFR BLD AUTO: 51.4 % (ref 37.5–51)
HDLC SERPL-MCNC: 53 MG/DL (ref 40–60)
HGB BLD-MCNC: 18.4 G/DL (ref 13–17.7)
HGB UR QL STRIP.AUTO: NEGATIVE
HYALINE CASTS UR QL AUTO: NORMAL /LPF
IMM GRANULOCYTES # BLD AUTO: 0.05 10*3/MM3 (ref 0–0.05)
IMM GRANULOCYTES NFR BLD AUTO: 0.5 % (ref 0–0.5)
KETONES UR QL STRIP: NEGATIVE
LDLC SERPL CALC-MCNC: 183 MG/DL (ref 0–100)
LDLC/HDLC SERPL: 3.4 {RATIO}
LEUKOCYTE ESTERASE UR QL STRIP.AUTO: NEGATIVE
LYMPHOCYTES # BLD AUTO: 2.34 10*3/MM3 (ref 0.7–3.1)
LYMPHOCYTES NFR BLD AUTO: 25.1 % (ref 19.6–45.3)
MCH RBC QN AUTO: 31.4 PG (ref 26.6–33)
MCHC RBC AUTO-ENTMCNC: 35.8 G/DL (ref 31.5–35.7)
MCV RBC AUTO: 87.7 FL (ref 79–97)
MONOCYTES # BLD AUTO: 0.72 10*3/MM3 (ref 0.1–0.9)
MONOCYTES NFR BLD AUTO: 7.7 % (ref 5–12)
NEUTROPHILS NFR BLD AUTO: 5.91 10*3/MM3 (ref 1.7–7)
NEUTROPHILS NFR BLD AUTO: 63.3 % (ref 42.7–76)
NITRITE UR QL STRIP: NEGATIVE
NRBC BLD AUTO-RTO: 0 /100 WBC (ref 0–0.2)
PH UR STRIP.AUTO: 7 [PH] (ref 5–8)
PLATELET # BLD AUTO: 229 10*3/MM3 (ref 140–450)
PMV BLD AUTO: 10.7 FL (ref 6–12)
POTASSIUM SERPL-SCNC: 4.2 MMOL/L (ref 3.5–5.2)
PROT SERPL-MCNC: 7.5 G/DL (ref 6–8.5)
PROT UR QL STRIP: NEGATIVE
RBC # BLD AUTO: 5.86 10*6/MM3 (ref 4.14–5.8)
RBC # UR STRIP: NORMAL /HPF
REF LAB TEST METHOD: NORMAL
SODIUM SERPL-SCNC: 136 MMOL/L (ref 136–145)
SP GR UR STRIP: 1.01 (ref 1–1.03)
SQUAMOUS #/AREA URNS HPF: NORMAL /HPF
T4 FREE SERPL-MCNC: 1.23 NG/DL (ref 0.92–1.68)
TRIGL SERPL-MCNC: 98 MG/DL (ref 0–150)
TSH SERPL DL<=0.05 MIU/L-ACNC: 2.09 UIU/ML (ref 0.27–4.2)
UROBILINOGEN UR QL STRIP: NORMAL
VLDLC SERPL-MCNC: 17 MG/DL (ref 5–40)
WBC # UR STRIP: NORMAL /HPF
WBC NRBC COR # BLD AUTO: 9.34 10*3/MM3 (ref 3.4–10.8)

## 2025-03-12 PROCEDURE — 85025 COMPLETE CBC W/AUTO DIFF WBC: CPT

## 2025-03-12 PROCEDURE — 80061 LIPID PANEL: CPT

## 2025-03-12 PROCEDURE — 80053 COMPREHEN METABOLIC PANEL: CPT

## 2025-03-12 PROCEDURE — 84439 ASSAY OF FREE THYROXINE: CPT

## 2025-03-12 PROCEDURE — 36415 COLL VENOUS BLD VENIPUNCTURE: CPT

## 2025-03-12 PROCEDURE — 81001 URINALYSIS AUTO W/SCOPE: CPT

## 2025-03-12 PROCEDURE — 84443 ASSAY THYROID STIM HORMONE: CPT

## 2025-03-12 PROCEDURE — 86765 RUBEOLA ANTIBODY: CPT

## 2025-03-12 PROCEDURE — 83036 HEMOGLOBIN GLYCOSYLATED A1C: CPT

## 2025-03-13 LAB — MEV IGG SER IA-ACNC: >300 AU/ML

## 2025-03-17 ENCOUNTER — TELEPHONE (OUTPATIENT)
Dept: PULMONOLOGY | Facility: CLINIC | Age: 75
End: 2025-03-17

## 2025-03-17 NOTE — TELEPHONE ENCOUNTER
"    Caller: Colin Nevarez \"Mayco\"    Relationship: Self    Best call back number: [phone] (home)       What orders are you requesting (i.e. lab or imaging): PORTABLE OXYGEN MACHINE  6 MIN WALK TEST    In what timeframe would the patient need to come in: ASAP      Additional notes: ZUNILDA TOLD PT THEY HAVE MACHINE HE IS NEEDING BUT NEEDS NEW ORDER AND A 6 MINUTE WALK TEST. PLEASE CALL PT TO ADVISE.     "

## 2025-03-21 ENCOUNTER — OFFICE VISIT (OUTPATIENT)
Dept: PULMONOLOGY | Facility: CLINIC | Age: 75
End: 2025-03-21
Payer: MEDICARE

## 2025-03-21 VITALS
HEIGHT: 70 IN | SYSTOLIC BLOOD PRESSURE: 156 MMHG | WEIGHT: 153.8 LBS | HEART RATE: 87 BPM | OXYGEN SATURATION: 94 % | TEMPERATURE: 97.9 F | BODY MASS INDEX: 22.02 KG/M2 | RESPIRATION RATE: 16 BRPM | DIASTOLIC BLOOD PRESSURE: 95 MMHG

## 2025-03-21 DIAGNOSIS — J44.9 CHRONIC OBSTRUCTIVE PULMONARY DISEASE, UNSPECIFIED COPD TYPE: Primary | Chronic | ICD-10-CM

## 2025-03-21 DIAGNOSIS — J96.11 CHRONIC RESPIRATORY FAILURE WITH HYPOXIA: Chronic | ICD-10-CM

## 2025-03-21 DIAGNOSIS — F17.210 NICOTINE DEPENDENCE, CIGARETTES, UNCOMPLICATED: ICD-10-CM

## 2025-03-21 DIAGNOSIS — J43.2 CENTRILOBULAR EMPHYSEMA: Chronic | ICD-10-CM

## 2025-03-21 NOTE — PROGRESS NOTES
Primary Care Provider  Liz Roper DO     Referring Provider  No ref. provider found       Patient or patient representative verbalized consent for the use of Ambient Listening during the visit with  CHEN Olsen for chart documentation. 3/21/2025  08:54 EDT    Chief Complaint  COPD, cough, shortness of breath and wheezing    Subjective          Colin Nevarez presents to [facility]  History of Present Illness  Colin Nevarez is a 74 y.o. male patient here for management of COPD, centrilobular emphysema and tobacco abuse of cigarettes ongoing.  He is needing recertification for oxygen.    Patient given samples of Trelegy by PCP on 1/10/2025.  PCP also placed order for an Inogen on 3/6/2025.      History of Present Illness  The patient is a 74-year-old male who presents for evaluation of respiratory issues.    He has been utilizing Trelegy inhaler intermittently, with the most recent use being this morning.  He does not use his Trelegy on the same days that he uses Brovana and Pulmicort.  He also employs an emergency inhaler as needed. His oxygen therapy regimen includes a daily dose of 2 liters. He reports a significant improvement in his condition since the initiation of this treatment plan. However, he continues to experience sleep disturbances, often waking up around 2:00 AM. He has been using Trelegy inhaler intermittently and also employs an emergency inhaler as needed. His oxygen therapy regimen includes a daily dose of 3 liters.    He has noticed weight gain, with his weight increasing from 136 pounds to nearly 155 pounds, which he attributes to an improved appetite and consumption of two substantial meals per day. He maintains a smoking habit, consuming less than one pack per day.    SOCIAL HISTORY  The patient admits to smoking less than a pack a day.    MEDICATIONS  Brovana, Pulmicort, albuterol       His history of smoking is   Tobacco Use:  High Risk (3/10/2025)    Patient History     Smoking Tobacco Use: Every Day     Smokeless Tobacco Use: Never     Passive Exposure: Current   Colin Nevarez  reports that he has been smoking cigarettes. He started smoking about 66 years ago. He has a 16.6 pack-year smoking history. He has been exposed to tobacco smoke. He has never used smokeless tobacco. I have educated him on the risk of diseases from using tobacco products such as cancer, COPD, and heart disease.     I advised him to quit and he is not willing to quit.    I spent 3  minutes counseling the patient.         Review of Systems   Constitutional:  Negative for chills, fatigue, fever, unexpected weight gain and unexpected weight loss.   HENT:  Congestion: Nasal.    Respiratory:  Positive for shortness of breath. Negative for apnea, cough and wheezing.         Negative for Hemoptysis     Cardiovascular:  Negative for chest pain, palpitations and leg swelling.   Skin:         Negative for cyanosis      Sleep: Negative for Excessive daytime sleepiness  Negative for morning headaches  Negative for Snoring    Family History   Problem Relation Age of Onset    Ovarian cancer Mother     Stroke Father     Breast cancer Sister 40    Cancer Maternal Grandmother     Cancer Paternal Grandmother         Social History     Socioeconomic History    Marital status: Single   Tobacco Use    Smoking status: Every Day     Current packs/day: 0.25     Average packs/day: 0.3 packs/day for 66.2 years (16.6 ttl pk-yrs)     Types: Cigarettes     Start date: 1958     Passive exposure: Current    Smokeless tobacco: Never    Tobacco comments:        Vaping Use    Vaping status: Never Used   Substance and Sexual Activity    Alcohol use: Never    Drug use: Never    Sexual activity: Defer        Past Medical History:   Diagnosis Date    Arthritis     COPD (chronic obstructive pulmonary disease)     Essential hypertension 07/12/2021    Forgetfulness     Heart flutter, ventricular      High cholesterol     Lung cancer     Prostate cancer     Ringing in ear     Shortness of Air     SOB (shortness of breath)         Immunization History   Administered Date(s) Administered    COVID-19 (MODERNA) 1st,2nd,3rd Dose Monovalent 04/12/2021, 05/10/2021, 03/29/2022    COVID-19 (MODERNA) Monovalent Original Booster 04/12/2021, 05/10/2021    FLUAD TRI 65YR+ 10/12/2017    Fluzone Quad >6mos (Multi-dose) 10/23/2019    Pneumococcal Conjugate 13-Valent (PCV13) 09/03/2018    Pneumococcal Conjugate 20-Valent (PCV20) 01/25/2024    Pneumococcal Polysaccharide (PPSV23) 09/04/2017, 10/12/2017         No Known Allergies       Current Outpatient Medications:     albuterol (PROVENTIL) (2.5 MG/3ML) 0.083% nebulizer solution, Take 2.5 mg by nebulization Every 4 (Four) Hours As Needed for Wheezing., Disp: 100 each, Rfl: 12    albuterol sulfate  (90 Base) MCG/ACT inhaler, Inhale 2 puffs Every 4 (Four) Hours As Needed for Wheezing or Shortness of Air., Disp: 18 g, Rfl: 2    amLODIPine (NORVASC) 10 MG tablet, Take 1 tablet by mouth Daily., Disp: 90 tablet, Rfl: 1    arformoterol (BROVANA) 15 MCG/2ML nebulizer solution, Take 2 mL by nebulization 2 (Two) Times a Day., Disp: 120 mL, Rfl: 3    aspirin 81 MG chewable tablet, Chew 1 tablet Daily., Disp: , Rfl:     budesonide (Pulmicort) 0.5 MG/2ML nebulizer solution, Take 2 mL by nebulization 2 (Two) Times a Day., Disp: 60 each, Rfl: 3    Fluticasone-Umeclidin-Vilant (Trelegy Ellipta) 200-62.5-25 MCG/ACT inhaler, Inhale 1 puff Daily., Disp: 2 each, Rfl: 0    ipratropium-albuterol (DUO-NEB) 0.5-2.5 mg/3 ml nebulizer, Take 3 mL by nebulization Every 4 (Four) Hours As Needed for Wheezing., Disp: 360 mL, Rfl: 2    Multiple Vitamins-Minerals (MULTIVITAMIN GUMMIES ADULTS PO), Take 1 tablet by mouth Daily. 1 gummie/day, Disp: , Rfl:     O2 (OXYGEN), Patient  unsure of how many liters and only wears prn., Disp: , Rfl:      Objective   Physical Exam  Constitutional:       General: He is  not in acute distress.     Appearance: Normal appearance. He is normal weight.   HENT:      Right Ear: Hearing normal.      Left Ear: Hearing normal.      Nose: No nasal tenderness or congestion.      Mouth/Throat:      Mouth: Mucous membranes are moist. No oral lesions.   Eyes:      Extraocular Movements: Extraocular movements intact.      Pupils: Pupils are equal, round, and reactive to light.   Cardiovascular:      Rate and Rhythm: Normal rate and regular rhythm.      Pulses: Normal pulses.      Heart sounds: Normal heart sounds. No murmur heard.  Pulmonary:      Effort: Pulmonary effort is normal.      Breath sounds: Normal breath sounds. No wheezing, rhonchi or rales.   Musculoskeletal:      Right lower leg: No edema.      Left lower leg: No edema.   Skin:     General: Skin is warm and dry.      Findings: No lesion or rash.   Neurological:      General: No focal deficit present.      Mental Status: He is alert and oriented to person, place, and time.   Psychiatric:         Mood and Affect: Affect normal. Mood is not anxious or depressed.         Vital Signs:   /95, HR 97, Temp 97.9, RR 16, O2 94%, weight 69.8 kg, height 177.8 cm     Result Review :   The following data was reviewed by: CHEN Olsen on 03/21/2025:  CMP          1/1/2025    04:12 1/2/2025    04:57 3/12/2025    10:14   CMP   Glucose 159  140  95    BUN 24  22  10    Creatinine 0.66  0.55  0.82    EGFR 99.0  104.6  92.8    Sodium 143  140  136    Potassium 4.6  4.5  4.2    Chloride 101  100  97    Calcium 9.0  8.9  10.1    Total Protein   7.5    Albumin   4.4    Globulin   3.1    Total Bilirubin   0.4    Alkaline Phosphatase   90    AST (SGOT)   23    ALT (SGPT)   16    Albumin/Globulin Ratio   1.4    BUN/Creatinine Ratio 36.4  40.0  12.2    Anion Gap 9.8  7.6  13.5      CBC w/diff          1/1/2025    04:12 1/2/2025    04:57 3/12/2025    10:14   CBC w/Diff   WBC 6.77  10.05  9.34    RBC 6.39  5.96  5.86    Hemoglobin 18.6  17.3  18.4     Hematocrit 57.4  54.9  51.4    MCV 89.8  92.1  87.7    MCH 29.1  29.0  31.4    MCHC 32.4  31.5  35.8    RDW 12.6  12.6  14.4    Platelets 196  178  229    Neutrophil Rel % 90.7  90.0  63.3    Immature Granulocyte Rel % 0.3  0.4  0.5    Lymphocyte Rel % 7.5  6.3  25.1    Monocyte Rel % 1.2  3.2  7.7    Eosinophil Rel % 0.0  0.0  3.0    Basophil Rel % 0.3  0.1  0.4      Data reviewed : Radiologic studies chest CT 2/13/2025, PFT 5/15/2023, Recent hospitalization notes hospital discharge summary 1/3/2025, and my last office note and PCP last office note 3/6/2025    Procedures        Assessment and Plan    Diagnoses and all orders for this visit:    1. Chronic obstructive pulmonary disease, unspecified COPD type (Primary)  Comments:  Continue Brovana and Pulmicort  Orders:  -     6 Minute Walk Test; Future  -     Oxygen Therapy    2. Centrilobular emphysema  Comments:  Continue Brovana and Pulmicort  Orders:  -     6 Minute Walk Test; Future  -     Oxygen Therapy    3. Chronic respiratory failure with hypoxia  Comments:  continue oxygen.  Patient using and benefiting.  Orders:  -     6 Minute Walk Test; Future  -     Oxygen Therapy    4. Nicotine dependence, cigarettes, uncomplicated  Comments:  smoking cessation education      Smoking cessation counseling provided.  I spent 3 minutes today counseling patient on the risks of smoking, including throat cancer, lung cancer, COPD, heart disease and death.  Also discussed the benefits of quitting.     6-minute walk performed in office today.  Patient qualifies for 2 L pulsed dose.  Order for POC placed.  Patient continues to use and benefit from his oxygen.  Assessment & Plan  1. COPD  He is currently using Trelegy inhaler intermittently and has an emergency inhaler for use as needed. He is advised not to use the Trelegy inhaler on the same days he uses other respiratory medications. [facility], specifying a pulse dose of 2 liters. No  changes will be made to his current medication regimen.    2. Weight gain.  He has gained weight, currently weighing 155 pounds, up from 136 pounds. He attributes this to feeling better and eating two good meals a day.          Follow Up   No follow-ups on file.  Patient was given instructions and counseling regarding his condition or for health maintenance advice. Please see specific information pulled into the AVS if appropriate.

## 2025-03-21 NOTE — PATIENT INSTRUCTIONS
COPD and Physical Activity  Chronic obstructive pulmonary disease (COPD) is a long-term, or chronic, condition that affects the lungs. COPD is a general term that can be used to describe many problems that cause inflammation of the lungs and limit airflow. These conditions include chronic bronchitis and emphysema.  The main symptom of COPD is shortness of breath, which makes it harder to do even simple tasks. This can also make it harder to exercise and stay active. Talk with your health care provider about treatments to help you breathe better and actions you can take to prevent breathing problems during physical activity.  What are the benefits of exercising when you have COPD?  Exercising regularly is an important part of a healthy lifestyle. You can still exercise and do physical activities even though you have COPD. Exercise and physical activity improve your shortness of breath by increasing blood flow (circulation). This causes your heart to pump more oxygen through your body. Moderate exercise can:  Improve oxygen use.  Increase your energy level.  Help with shortness of breath.  Strengthen your breathing muscles.  Improve heart health.  Help with sleep.  Improve your self-esteem and feelings of self-worth.  Lower depression, stress, and anxiety.  Exercise can benefit everyone with COPD. The severity of your disease may affect how hard you can exercise, especially at first, but everyone can benefit. Talk with your health care provider about how much exercise is safe for you, and which activities and exercises are safe for you.  What actions can I take to prevent breathing problems during physical activity?  Sign up for a pulmonary rehabilitation program. This type of program may include:  Education about lung diseases.  Exercise classes that teach you how to exercise and be more active while improving your breathing. This usually involves:  Exercise using your lower extremities, such as a stationary  bicycle.  About 30 minutes of exercise, 2 to 5 times per week, for 6 to 12 weeks.  Strength training, such as push-ups or leg lifts.  Nutrition education.  Group classes in which you can talk with others who also have COPD and learn ways to manage stress.  If you use an oxygen tank, you should use it while you exercise. Work with your health care provider to adjust your oxygen for your physical activity. Your resting flow rate is different from your flow rate during physical activity.  How to manage your breathing while exercising  While you are exercising:  Take slow breaths.  Pace yourself, and do nottry to go too fast.  Purse your lips while breathing out. Pursing your lips is similar to a kissing or whistling position.  If doing exercise that uses a quick burst of effort, such as weight lifting:  Breathe in before starting the exercise.  Breathe out during the hardest part of the exercise, such as raising the weights.  Where to find support  You can find support for exercising with COPD from:  Your health care provider.  A pulmonary rehabilitation program.  Your local health department or community health programs.  Support groups, either online or in-person. Your health care provider may be able to recommend support groups.  Where to find more information  You can find more information about exercising with COPD from:  American Lung Association: lung.org  COPD Foundation: copdfoundation.org  Contact a health care provider if:  Your symptoms get worse.  You have nausea.  You have a fever.  You want to start a new exercise program or a new activity.  Get help right away if:  You have chest pain.  You cannot breathe.  These symptoms may represent a serious problem that is an emergency. Do not wait to see if the symptoms will go away. Get medical help right away. Call your local emergency services (911 in the [place] Do not drive yourself to the hospital.  Summary  COPD is a general term that can be used to describe  "many different lung problems that cause lung inflammation and limit airflow. This includes chronic bronchitis and emphysema.  Exercise and physical activity improve your shortness of breath by increasing blood flow (circulation). This causes your heart to provide more oxygen to your body.  Contact your health care provider before starting any exercise program or new activity. Ask your health care provider what exercises and activities are safe for you.  This information is not intended to replace advice given to you by your health care provider. Make sure you discuss any questions you have with your health care provider.  Document Revised: 11/01/2024 Document Reviewed: 11/01/2024  [employer] Patient Education © 2024 [employer]Smoking Tobacco Information, Adult  Smoking tobacco can be harmful to your health. Tobacco contains a toxic colorless chemical called nicotine. Nicotine causes changes in your brain that make you want more and more. This is called addiction. This can make it hard to stop smoking once you start. Tobacco also has other toxic chemicals that can hurt your body and raise your risk of many cancers.  Menthol or \"lite\" tobacco or cigarette brands are not safer than regular brands.  How can smoking tobacco affect me?  Smoking tobacco puts you at risk for:  Cancer. Smoking is most commonly associated with lung cancer, but can also lead to cancer in other parts of the body.  Chronic obstructive pulmonary disease (COPD). This is a long-term lung condition that makes it hard to breathe. It also gets worse over time.  High blood pressure (hypertension), heart disease, stroke, heart attack, and lung infections, such as pneumonia.  Cataracts. This is when the lenses in the eyes become clouded.  Digestive problems. This may include peptic ulcers, heartburn, and gastroesophageal reflux disease (GERD).  Oral health problems, such as gum disease, mouth sores, and tooth loss.  Loss of taste and smell.  Smoking " also affects how you look and smell. Smoking may cause:  Wrinkles.  Yellow or stained teeth, fingers, and fingernails.  Bad breath.  Bad-smelling clothes and hair.  Smoking tobacco can also affect your social life, because:  It may be challenging to find places to smoke when away from home. Many workplaces, restaurants, hotels, and public places are tobacco-free.  Smoking is expensive. This is due to the cost of tobacco and the long-term costs of treating health problems from smoking.  Secondhand smoke may affect those around you. Secondhand smoke can cause lung cancer, breathing problems, and heart disease. Children of smokers have a higher risk for:  Sudden infant death syndrome (SIDS).  Ear infections.  Lung infections.  What actions can I take to prevent health problems?  Quit smoking    Do not start smoking. Quit if you already smoke.  Do not replace cigarette smoking with vaping devices, such as e-cigarettes.  Make a plan to quit smoking and commit to it. Look for programs to help you, and ask your health care provider for recommendations and ideas. Set a date and write down all the reasons you want to quit.  Let your friends and family know you are quitting so they can help and support you. Consider finding friends who also want to quit. It can be easier to quit with someone else, so that you can support each other.  Talk with your health care provider about using nicotine replacement medicines to help you quit. These include gum, lozenges, patches, sprays, or pills.  If you try to quit but return to smoking, stay positive. It is common to slip up when you first quit, so take it one day at a time.  Be prepared for cravings. When you feel the urge to smoke, chew gum or suck on hard candy.  Lifestyle  Stay busy.  Take care of your body. Get plenty of exercise, eat a healthy diet, and drink plenty of water.  Find ways to manage your stress, such as meditation, yoga, exercise, or time spent with friends and  family.  Ask your health care provider about having regular tests (screenings) to check for cancer. This may include blood tests, imaging tests, and other tests.  Where to find support  To get support to quit smoking, consider:  Asking your health care provider for more information and resources.  Joining a support group for people who want to quit smoking in your local community. There are many effective programs that may help you to quit.  Calling the smokefree.gov counselor helpline at 4-603-QUIT-NOW (1-581.256.7038).  Where to find more information  You may find more information about quitting smoking from:  Centers for Disease Control and Prevention: cdc.gov/tobacco  Smokefree.gov: smokefree.gov  American Lung Association: freedomfromsmoking.org  Contact a health care provider if:  You have problems breathing.  Your lips, nose, or fingers turn blue.  You have chest pain.  You are coughing up blood.  You feel like you will faint.  You have other health changes that cause you to worry.  Summary  Smoking tobacco can negatively affect your health, the health of those around you, your finances, and your social life.  Do not start smoking. Quit if you already smoke. If you need help quitting, ask your health care provider.  Consider joining a support group for people in your local community who want to quit smoking. There are many effective programs that may help you to quit.  This information is not intended to replace advice given to you by your health care provider. Make sure you discuss any questions you have with your health care provider.  Document Revised: 12/13/2022 Document Reviewed: 12/13/2022  [employer] Patient Education © 2024 [employer]

## 2025-05-06 ENCOUNTER — CLINICAL SUPPORT (OUTPATIENT)
Dept: FAMILY MEDICINE CLINIC | Facility: CLINIC | Age: 75
End: 2025-05-06
Payer: MEDICARE

## 2025-05-06 DIAGNOSIS — J44.9 CHRONIC OBSTRUCTIVE PULMONARY DISEASE, UNSPECIFIED COPD TYPE: Primary | Chronic | ICD-10-CM

## 2025-05-06 PROCEDURE — 96372 THER/PROPH/DIAG INJ SC/IM: CPT | Performed by: FAMILY MEDICINE

## 2025-05-06 RX ORDER — TRIAMCINOLONE ACETONIDE 40 MG/ML
60 INJECTION, SUSPENSION INTRA-ARTICULAR; INTRAMUSCULAR ONCE
Status: COMPLETED | OUTPATIENT
Start: 2025-05-06 | End: 2025-05-06

## 2025-05-06 RX ADMIN — TRIAMCINOLONE ACETONIDE 60 MG: 40 INJECTION, SUSPENSION INTRA-ARTICULAR; INTRAMUSCULAR at 11:35

## 2025-07-16 DIAGNOSIS — I10 ESSENTIAL HYPERTENSION: Chronic | ICD-10-CM

## 2025-07-16 RX ORDER — AMLODIPINE BESYLATE 10 MG/1
10 TABLET ORAL DAILY
Qty: 90 TABLET | Refills: 1 | Status: SHIPPED | OUTPATIENT
Start: 2025-07-16

## 2025-07-22 DIAGNOSIS — J44.9 CHRONIC OBSTRUCTIVE PULMONARY DISEASE, UNSPECIFIED COPD TYPE: Chronic | ICD-10-CM

## 2025-07-23 RX ORDER — ARFORMOTEROL TARTRATE 15 UG/2ML
15 SOLUTION RESPIRATORY (INHALATION)
Qty: 120 ML | Refills: 3 | Status: SHIPPED | OUTPATIENT
Start: 2025-07-23

## 2025-07-23 RX ORDER — BUDESONIDE 0.5 MG/2ML
0.5 INHALANT ORAL
Qty: 60 EACH | Refills: 3 | Status: SHIPPED | OUTPATIENT
Start: 2025-07-23

## 2025-07-23 NOTE — TELEPHONE ENCOUNTER
Patient stated he cannot get this medication at [facility] and he requested the scripts be faxed to [facility] at [phone]. Please advise, thank you.

## 2025-07-30 ENCOUNTER — LAB (OUTPATIENT)
Dept: LAB | Facility: HOSPITAL | Age: 75
End: 2025-07-30
Payer: MEDICARE

## 2025-07-30 DIAGNOSIS — R97.21 RISING PSA FOLLOWING TREATMENT FOR MALIGNANT NEOPLASM OF PROSTATE: ICD-10-CM

## 2025-07-30 LAB — PSA SERPL-MCNC: 0.04 NG/ML (ref 0–4)

## 2025-07-30 PROCEDURE — 36415 COLL VENOUS BLD VENIPUNCTURE: CPT

## 2025-07-30 PROCEDURE — 84153 ASSAY OF PSA TOTAL: CPT

## 2025-07-31 ENCOUNTER — TELEPHONE (OUTPATIENT)
Dept: PULMONOLOGY | Facility: CLINIC | Age: 75
End: 2025-07-31
Payer: MEDICARE

## 2025-08-04 ENCOUNTER — CLINICAL SUPPORT (OUTPATIENT)
Dept: FAMILY MEDICINE CLINIC | Facility: CLINIC | Age: 75
End: 2025-08-04
Payer: MEDICARE

## 2025-08-04 DIAGNOSIS — M19.90 ARTHRITIS: Primary | ICD-10-CM

## 2025-08-04 DIAGNOSIS — J44.9 CHRONIC OBSTRUCTIVE PULMONARY DISEASE, UNSPECIFIED COPD TYPE: Primary | Chronic | ICD-10-CM

## 2025-08-04 PROCEDURE — 96372 THER/PROPH/DIAG INJ SC/IM: CPT | Performed by: FAMILY MEDICINE

## 2025-08-04 RX ORDER — TRIAMCINOLONE ACETONIDE 40 MG/ML
60 INJECTION, SUSPENSION INTRA-ARTICULAR; INTRAMUSCULAR ONCE
Status: COMPLETED | OUTPATIENT
Start: 2025-08-04 | End: 2025-08-04

## 2025-08-04 RX ADMIN — TRIAMCINOLONE ACETONIDE 60 MG: 40 INJECTION, SUSPENSION INTRA-ARTICULAR; INTRAMUSCULAR at 11:08

## 2025-08-07 ENCOUNTER — OFFICE VISIT (OUTPATIENT)
Dept: UROLOGY | Age: 75
End: 2025-08-07
Payer: MEDICARE

## 2025-08-07 VITALS — RESPIRATION RATE: 18 BRPM | WEIGHT: 153.88 LBS | BODY MASS INDEX: 22.03 KG/M2 | HEIGHT: 70 IN

## 2025-08-07 DIAGNOSIS — C61 PROSTATE CANCER: ICD-10-CM

## 2025-08-07 DIAGNOSIS — R97.21 RISING PSA FOLLOWING TREATMENT FOR MALIGNANT NEOPLASM OF PROSTATE: Primary | ICD-10-CM

## 2025-08-07 PROBLEM — Z72.0 TOBACCO USER: Status: ACTIVE | Noted: 2025-08-07

## 2025-08-13 ENCOUNTER — HOSPITAL ENCOUNTER (OUTPATIENT)
Dept: CT IMAGING | Facility: HOSPITAL | Age: 75
Discharge: HOME | End: 2025-08-13
Admitting: INTERNAL MEDICINE
Payer: MEDICARE

## 2025-08-13 DIAGNOSIS — C34.91 SQUAMOUS CELL CARCINOMA OF RIGHT LUNG: ICD-10-CM

## 2025-08-13 PROCEDURE — 25510000001 IOPAMIDOL PER 1 ML: Performed by: INTERNAL MEDICINE

## 2025-08-13 PROCEDURE — 71260 CT THORAX DX C+: CPT

## 2025-08-13 RX ORDER — IOPAMIDOL 755 MG/ML
100 INJECTION, SOLUTION INTRAVASCULAR
Status: COMPLETED | OUTPATIENT
Start: 2025-08-13 | End: 2025-08-13

## 2025-08-13 RX ADMIN — IOPAMIDOL 69 ML: 755 INJECTION, SOLUTION INTRAVENOUS at 08:55

## 2025-08-18 ENCOUNTER — OFFICE VISIT (OUTPATIENT)
Dept: ONCOLOGY | Facility: HOSPITAL | Age: 75
End: 2025-08-18
Payer: MEDICARE

## 2025-08-18 VITALS
BODY MASS INDEX: 19.82 KG/M2 | DIASTOLIC BLOOD PRESSURE: 95 MMHG | SYSTOLIC BLOOD PRESSURE: 149 MMHG | HEIGHT: 70 IN | OXYGEN SATURATION: 94 % | HEART RATE: 81 BPM | TEMPERATURE: 98 F | RESPIRATION RATE: 16 BRPM | WEIGHT: 138.45 LBS

## 2025-08-18 DIAGNOSIS — C34.91 SQUAMOUS CELL CARCINOMA OF RIGHT LUNG: Primary | ICD-10-CM

## 2025-08-18 DIAGNOSIS — C61 PROSTATE CANCER: Chronic | ICD-10-CM

## 2025-08-18 DIAGNOSIS — D75.1 ERYTHROCYTOSIS: ICD-10-CM

## 2025-08-18 PROCEDURE — G0463 HOSPITAL OUTPT CLINIC VISIT: HCPCS | Performed by: INTERNAL MEDICINE

## 2025-08-20 ENCOUNTER — OFFICE VISIT (OUTPATIENT)
Dept: PULMONOLOGY | Facility: CLINIC | Age: 75
End: 2025-08-20
Payer: MEDICARE

## 2025-08-20 VITALS
OXYGEN SATURATION: 93 % | BODY MASS INDEX: 20.19 KG/M2 | HEIGHT: 70 IN | SYSTOLIC BLOOD PRESSURE: 158 MMHG | RESPIRATION RATE: 16 BRPM | DIASTOLIC BLOOD PRESSURE: 91 MMHG | HEART RATE: 87 BPM | TEMPERATURE: 97.6 F | WEIGHT: 141 LBS

## 2025-08-20 DIAGNOSIS — J43.2 CENTRILOBULAR EMPHYSEMA: Chronic | ICD-10-CM

## 2025-08-20 DIAGNOSIS — R06.09 DYSPNEA ON EXERTION: ICD-10-CM

## 2025-08-20 DIAGNOSIS — J44.9 CHRONIC OBSTRUCTIVE PULMONARY DISEASE, UNSPECIFIED COPD TYPE: Primary | Chronic | ICD-10-CM

## 2025-08-20 DIAGNOSIS — C34.91 SQUAMOUS CELL CARCINOMA OF RIGHT LUNG: ICD-10-CM

## 2025-08-20 DIAGNOSIS — F17.210 NICOTINE DEPENDENCE, CIGARETTES, UNCOMPLICATED: ICD-10-CM

## 2025-08-20 DIAGNOSIS — J96.11 CHRONIC RESPIRATORY FAILURE WITH HYPOXIA: Chronic | ICD-10-CM

## 2025-08-20 PROCEDURE — 3077F SYST BP >= 140 MM HG: CPT | Performed by: NURSE PRACTITIONER

## 2025-08-20 PROCEDURE — 99214 OFFICE O/P EST MOD 30 MIN: CPT | Performed by: NURSE PRACTITIONER

## 2025-08-20 PROCEDURE — 1160F RVW MEDS BY RX/DR IN RCRD: CPT | Performed by: NURSE PRACTITIONER

## 2025-08-20 PROCEDURE — 1159F MED LIST DOCD IN RCRD: CPT | Performed by: NURSE PRACTITIONER

## 2025-08-20 PROCEDURE — 3080F DIAST BP >= 90 MM HG: CPT | Performed by: NURSE PRACTITIONER

## 2025-08-20 PROCEDURE — G2211 COMPLEX E/M VISIT ADD ON: HCPCS | Performed by: NURSE PRACTITIONER

## (undated) DEVICE — SOL IRRG H2O PL/BG 1000ML STRL

## (undated) DEVICE — CONN JET HYDRA H20 AUXILIARY DISP

## (undated) DEVICE — Device: Brand: DEFENDO AIR/WATER/SUCTION AND BIOPSY VALVE

## (undated) DEVICE — LINER SURG CANSTR SXN S/RIGD 1500CC

## (undated) DEVICE — SOL IRR NACL 0.9PCT BT 1000ML

## (undated) DEVICE — GLV SURG BIOGEL LTX PF 7 1/2

## (undated) DEVICE — SOLIDIFIER LIQLOC PLS 1500CC BT

## (undated) DEVICE — Device